# Patient Record
Sex: MALE | Race: OTHER | Employment: OTHER | URBAN - METROPOLITAN AREA
[De-identification: names, ages, dates, MRNs, and addresses within clinical notes are randomized per-mention and may not be internally consistent; named-entity substitution may affect disease eponyms.]

---

## 2017-01-13 ENCOUNTER — OFFICE VISIT (OUTPATIENT)
Dept: PHYSICAL THERAPY | Facility: HOSPITAL | Age: 82
End: 2017-01-13
Attending: FAMILY MEDICINE
Payer: MEDICARE

## 2017-01-13 DIAGNOSIS — M54.5 ACUTE LEFT-SIDED LOW BACK PAIN, WITH SCIATICA PRESENCE UNSPECIFIED: ICD-10-CM

## 2017-01-13 DIAGNOSIS — M47.816 OSTEOARTHRITIS OF LUMBAR SPINE, UNSPECIFIED SPINAL OSTEOARTHRITIS COMPLICATION STATUS: Primary | ICD-10-CM

## 2017-01-13 PROCEDURE — 97162 PT EVAL MOD COMPLEX 30 MIN: CPT

## 2017-01-13 PROCEDURE — 97110 THERAPEUTIC EXERCISES: CPT

## 2017-01-13 NOTE — PROGRESS NOTES
LUMBAR SPINE EVALUATION:   Referring Physician: Dr. Hutson End  Date of Onset: August 2016 Date of Service: 1/13/2017   Dx:Osteoarthritis of lumbar spine, unspecified spinal osteoarthritis complication status (C07.085)  Acute left-sided low back pain, with sc movements:  Baseline   RFIS Min loss, NE on Sxs   PAUL Mod loss, NE on Sxs   Rt Rotation   WNL, incr. Lt. LBP,NW   Lt  Rotation WNL, NE on Sxs   RSGIS   WNL, incr. Lt. LBP,NW   LSGIS WNL, incr. Lt. LBP,NW     RFIL WNL, incr. Lt. LBP,NW   REIL WNL,  incr.  E body mechanics instruction, and a Home Exercise Program.     Education or treatment limitation: Communication  Rehab Potential: good  FOTO: Not captured  Current status G Code: Initial, Mobility: Walking and Moving Around, CK: 40-59% impaired, limited, or

## 2017-01-16 ENCOUNTER — OFFICE VISIT (OUTPATIENT)
Dept: PHYSICAL THERAPY | Facility: HOSPITAL | Age: 82
End: 2017-01-16
Attending: FAMILY MEDICINE
Payer: MEDICARE

## 2017-01-16 PROCEDURE — 97140 MANUAL THERAPY 1/> REGIONS: CPT

## 2017-01-16 PROCEDURE — 97110 THERAPEUTIC EXERCISES: CPT

## 2017-01-16 RX ORDER — LANCETS 33 GAUGE
EACH MISCELLANEOUS
Qty: 100 EACH | Refills: 3 | Status: SHIPPED | OUTPATIENT
Start: 2017-01-16 | End: 2017-04-20

## 2017-01-17 ENCOUNTER — TELEPHONE (OUTPATIENT)
Dept: FAMILY MEDICINE CLINIC | Facility: CLINIC | Age: 82
End: 2017-01-17

## 2017-01-18 ENCOUNTER — OFFICE VISIT (OUTPATIENT)
Dept: PHYSICAL THERAPY | Facility: HOSPITAL | Age: 82
End: 2017-01-18
Attending: FAMILY MEDICINE
Payer: MEDICARE

## 2017-01-18 PROCEDURE — 97110 THERAPEUTIC EXERCISES: CPT

## 2017-01-18 RX ORDER — TAMSULOSIN HYDROCHLORIDE 0.4 MG/1
CAPSULE ORAL
Qty: 180 CAPSULE | Refills: 0 | Status: SHIPPED | OUTPATIENT
Start: 2017-01-18 | End: 2017-04-20

## 2017-01-18 NOTE — PROGRESS NOTES
Dx: Osteoarthritis of lumbar spine, unspecified spinal osteoarthritis complication status (G41.640)  Acute left-sided low back pain, with sciatica presence unspecified (M54.5)    Authorized # of Visits:  3        Next MD visit: none scheduled  Fall Risk: s

## 2017-01-18 NOTE — TELEPHONE ENCOUNTER
Refill Protocol Appointment Criteria  · Appointment scheduled in the past 12 months or in the next 3 months  Recent Visits       Provider Department Primary Dx    1 month ago Anson Siemens, MD [facility], [address], [place] Uncontrolled type 2 di

## 2017-01-23 ENCOUNTER — OFFICE VISIT (OUTPATIENT)
Dept: PHYSICAL THERAPY | Facility: HOSPITAL | Age: 82
End: 2017-01-23
Attending: FAMILY MEDICINE
Payer: MEDICARE

## 2017-01-23 PROCEDURE — 97140 MANUAL THERAPY 1/> REGIONS: CPT

## 2017-01-23 PROCEDURE — 97110 THERAPEUTIC EXERCISES: CPT

## 2017-01-23 NOTE — PROGRESS NOTES
Dx: Osteoarthritis of lumbar spine, unspecified spinal osteoarthritis complication status (O49.546)  Acute left-sided low back pain, with sciatica presence unspecified (M54.5)    Authorized # of Visits:  4       Next MD visit: none scheduled  Fall Risk: st PROGRESS  2. Improve Postural awareness to facilitate symptom management to allow for proper sitting posture/tolerance to activity--IN PROGRESS   3.  Patient able to sit using proper posture for up to1 hour  to allow for patient to watch a television show a

## 2017-01-25 ENCOUNTER — OFFICE VISIT (OUTPATIENT)
Dept: PHYSICAL THERAPY | Facility: HOSPITAL | Age: 82
End: 2017-01-25
Attending: FAMILY MEDICINE
Payer: MEDICARE

## 2017-01-25 PROCEDURE — 97110 THERAPEUTIC EXERCISES: CPT

## 2017-01-25 PROCEDURE — 97140 MANUAL THERAPY 1/> REGIONS: CPT

## 2017-01-25 NOTE — PROGRESS NOTES
Dx: Osteoarthritis of lumbar spine, unspecified spinal osteoarthritis complication status (E89.775)  Acute left-sided low back pain, with sciatica presence unspecified (M54.5)    Authorized # of Visits:  5       Next MD visit: none scheduled  Fall Risk: st television show and rise from chair without increased pain--PARTIALLY MET  4. Patient able bend without increased pain or peripheralization of Sxs to allow patient to perform ADLs this activity without increased deviation--MET  5.  Independent with progress

## 2017-01-30 ENCOUNTER — OFFICE VISIT (OUTPATIENT)
Dept: PHYSICAL THERAPY | Facility: HOSPITAL | Age: 82
End: 2017-01-30
Attending: FAMILY MEDICINE
Payer: MEDICARE

## 2017-01-30 PROCEDURE — 97110 THERAPEUTIC EXERCISES: CPT

## 2017-01-30 PROCEDURE — 97140 MANUAL THERAPY 1/> REGIONS: CPT

## 2017-01-30 NOTE — PROGRESS NOTES
Dx: Osteoarthritis of lumbar spine, unspecified spinal osteoarthritis complication status (B75.305)  Acute left-sided low back pain, with sciatica presence unspecified (M54.5)    Authorized # of Visits:  6      Next MD visit: none scheduled  Fall Risk: sta

## 2017-02-01 ENCOUNTER — OFFICE VISIT (OUTPATIENT)
Dept: PHYSICAL THERAPY | Facility: HOSPITAL | Age: 82
End: 2017-02-01
Attending: FAMILY MEDICINE
Payer: MEDICARE

## 2017-02-01 PROCEDURE — 97110 THERAPEUTIC EXERCISES: CPT

## 2017-02-01 PROCEDURE — 97140 MANUAL THERAPY 1/> REGIONS: CPT

## 2017-02-01 NOTE — PROGRESS NOTES
Dx: Osteoarthritis of lumbar spine, unspecified spinal osteoarthritis complication status (F98.447)  Acute left-sided low back pain, with sciatica presence unspecified (M54.5)    Authorized # of Visits:  7      Next MD visit: none scheduled  Fall Risk: sta

## 2017-02-06 ENCOUNTER — OFFICE VISIT (OUTPATIENT)
Dept: PHYSICAL THERAPY | Facility: HOSPITAL | Age: 82
End: 2017-02-06
Attending: FAMILY MEDICINE
Payer: MEDICARE

## 2017-02-06 PROCEDURE — 97110 THERAPEUTIC EXERCISES: CPT

## 2017-02-06 NOTE — PROGRESS NOTES
Dx: Osteoarthritis of lumbar spine, unspecified spinal osteoarthritis complication status (P94.901)  Acute left-sided low back pain, with sciatica presence unspecified (M54.5)    Authorized # of Visits:  8     Next MD visit: none scheduled  Fall Risk: christopher

## 2017-02-08 ENCOUNTER — OFFICE VISIT (OUTPATIENT)
Dept: PHYSICAL THERAPY | Facility: HOSPITAL | Age: 82
End: 2017-02-08
Attending: FAMILY MEDICINE
Payer: MEDICARE

## 2017-02-08 PROCEDURE — 97110 THERAPEUTIC EXERCISES: CPT

## 2017-02-08 RX ORDER — SIMVASTATIN 40 MG
TABLET ORAL
Qty: 90 TABLET | Refills: 0 | Status: SHIPPED | OUTPATIENT
Start: 2017-02-08 | End: 2017-05-23

## 2017-02-08 NOTE — PROGRESS NOTES
Dx: Osteoarthritis of lumbar spine, unspecified spinal osteoarthritis complication status (M59.980)  Acute left-sided low back pain, with sciatica presence unspecified (M54.5)    Authorized # of Visits:  9     Next MD visit: none scheduled  Fall Risk: christopher ADLs this activity without increased deviation--MET  5.  Independent with progressive HEP during and upon discharge from therapy to aide with Sx management and progress toward greater/painfree functional independence--IN PROGRESS            Plan: Progress s

## 2017-02-08 NOTE — TELEPHONE ENCOUNTER
Rx approved per  protocol. Chart reviewed.     Cholesterol Medications  Protocol Criteria:  · Appointment scheduled in the past 12 months or in the next 3 months  · ALT & LDL on file in the past 12 months  · ALT result < 80  · LDL result <130   Recent Vis

## 2017-02-09 ENCOUNTER — MYAURORA ACCOUNT LINK (OUTPATIENT)
Dept: OTHER | Age: 82
End: 2017-02-09

## 2017-02-15 ENCOUNTER — OFFICE VISIT (OUTPATIENT)
Dept: PHYSICAL THERAPY | Facility: HOSPITAL | Age: 82
End: 2017-02-15
Attending: FAMILY MEDICINE
Payer: MEDICARE

## 2017-02-15 PROCEDURE — 97110 THERAPEUTIC EXERCISES: CPT

## 2017-02-15 NOTE — PROGRESS NOTES
Patient Name: Guera Felton, : 1934, MRN: [id number]   Date:  2/15/2017  Referring Physician:  Roverto Llamas MD     Diagnosis: Osteoarthritis of lumbar spine, unspecified spinal osteoarthritis complication status (D82.258)  Acute left-sided deviation--MET  5.  Independent with progressive HEP during and upon discharge from therapy to aide with Sx management and progress toward greater/painfree functional independence--MET          Current status G Code: Discharge, Mobility: Walking and Moving

## 2017-02-15 NOTE — PROGRESS NOTES
Dx: Osteoarthritis of lumbar spine, unspecified spinal osteoarthritis complication status (I80.299)  Acute left-sided low back pain, with sciatica presence unspecified (M54.5)    Authorized # of Visits:  10     Next MD visit: none scheduled  Fall Risk: sta patient to perform ADLs this activity without increased deviation--MET5.  Independent with progressive HEP during and upon discharge from therapy to aide with Sx management and progress toward greater/painfree functional independence--MET          Plan: Dis

## 2017-02-17 ENCOUNTER — APPOINTMENT (OUTPATIENT)
Dept: PHYSICAL THERAPY | Facility: HOSPITAL | Age: 82
End: 2017-02-17
Attending: FAMILY MEDICINE
Payer: MEDICARE

## 2017-02-20 ENCOUNTER — PRIOR ORIGINAL RECORDS (OUTPATIENT)
Dept: OTHER | Age: 82
End: 2017-02-20

## 2017-02-22 ENCOUNTER — APPOINTMENT (OUTPATIENT)
Dept: PHYSICAL THERAPY | Facility: HOSPITAL | Age: 82
End: 2017-02-22
Attending: FAMILY MEDICINE
Payer: MEDICARE

## 2017-02-27 ENCOUNTER — PRIOR ORIGINAL RECORDS (OUTPATIENT)
Dept: OTHER | Age: 82
End: 2017-02-27

## 2017-03-24 ENCOUNTER — LAB ENCOUNTER (OUTPATIENT)
Dept: LAB | Age: 82
End: 2017-03-24
Attending: INTERNAL MEDICINE
Payer: MEDICARE

## 2017-03-24 DIAGNOSIS — N18.30 CHRONIC KIDNEY DISEASE, STAGE III (MODERATE) (HCC): ICD-10-CM

## 2017-03-24 LAB
ALBUMIN SERPL BCP-MCNC: 3.8 G/DL (ref 3.5–4.8)
ANION GAP SERPL CALC-SCNC: 5 MMOL/L (ref 0–18)
BASOPHILS # BLD: 0 K/UL (ref 0–0.2)
BASOPHILS NFR BLD: 0 %
BUN SERPL-MCNC: 29 MG/DL (ref 8–20)
BUN/CREAT SERPL: 15.8 (ref 10–20)
CALCIUM SERPL-MCNC: 9.3 MG/DL (ref 8.5–10.5)
CHLORIDE SERPL-SCNC: 110 MMOL/L (ref 95–110)
CO2 SERPL-SCNC: 26 MMOL/L (ref 22–32)
CREAT SERPL-MCNC: 1.83 MG/DL (ref 0.5–1.5)
EOSINOPHIL # BLD: 0.1 K/UL (ref 0–0.7)
EOSINOPHIL NFR BLD: 2 %
ERYTHROCYTE [DISTWIDTH] IN BLOOD BY AUTOMATED COUNT: 13.8 % (ref 11–15)
GLUCOSE SERPL-MCNC: 163 MG/DL (ref 70–99)
HCT VFR BLD AUTO: 42.3 % (ref 41–52)
HGB BLD-MCNC: 13.9 G/DL (ref 13.5–17.5)
LYMPHOCYTES # BLD: 1.7 K/UL (ref 1–4)
LYMPHOCYTES NFR BLD: 31 %
MCH RBC QN AUTO: 29.2 PG (ref 27–32)
MCHC RBC AUTO-ENTMCNC: 32.8 G/DL (ref 32–37)
MCV RBC AUTO: 89.1 FL (ref 80–100)
MONOCYTES # BLD: 0.4 K/UL (ref 0–1)
MONOCYTES NFR BLD: 7 %
NEUTROPHILS # BLD AUTO: 3.2 K/UL (ref 1.8–7.7)
NEUTROPHILS NFR BLD: 59 %
OSMOLALITY UR CALC.SUM OF ELEC: 301 MOSM/KG (ref 275–295)
PHOSPHATE SERPL-MCNC: 3.4 MG/DL (ref 2.4–4.7)
PLATELET # BLD AUTO: 159 K/UL (ref 140–400)
PMV BLD AUTO: 8.6 FL (ref 7.4–10.3)
POTASSIUM SERPL-SCNC: 4.8 MMOL/L (ref 3.3–5.1)
RBC # BLD AUTO: 4.75 M/UL (ref 4.5–5.9)
SODIUM SERPL-SCNC: 141 MMOL/L (ref 136–144)
WBC # BLD AUTO: 5.4 K/UL (ref 4–11)

## 2017-03-24 PROCEDURE — 36415 COLL VENOUS BLD VENIPUNCTURE: CPT

## 2017-03-24 PROCEDURE — 80069 RENAL FUNCTION PANEL: CPT

## 2017-03-24 PROCEDURE — 85025 COMPLETE CBC W/AUTO DIFF WBC: CPT

## 2017-03-31 ENCOUNTER — OFFICE VISIT (OUTPATIENT)
Dept: NEPHROLOGY | Facility: CLINIC | Age: 82
End: 2017-03-31

## 2017-03-31 VITALS
WEIGHT: 168 LBS | RESPIRATION RATE: 16 BRPM | HEART RATE: 70 BPM | TEMPERATURE: 98 F | DIASTOLIC BLOOD PRESSURE: 76 MMHG | HEIGHT: 68 IN | BODY MASS INDEX: 25.46 KG/M2 | SYSTOLIC BLOOD PRESSURE: 144 MMHG

## 2017-03-31 DIAGNOSIS — N18.30 CHRONIC KIDNEY DISEASE, STAGE III (MODERATE) (HCC): Primary | Chronic | ICD-10-CM

## 2017-03-31 PROCEDURE — G0463 HOSPITAL OUTPT CLINIC VISIT: HCPCS | Performed by: INTERNAL MEDICINE

## 2017-03-31 PROCEDURE — 99214 OFFICE O/P EST MOD 30 MIN: CPT | Performed by: INTERNAL MEDICINE

## 2017-03-31 RX ORDER — GLIMEPIRIDE 2 MG/1
TABLET ORAL
Qty: 135 TABLET | Refills: 1 | Status: SHIPPED | OUTPATIENT
Start: 2017-03-31 | End: 2017-11-08 | Stop reason: DRUGHIGH

## 2017-03-31 NOTE — PROGRESS NOTES
03/31/2017        Patient: More Nolasco   YOB: 1934   Date of Visit: 3/31/2017       Dear  Dr. Mejia Briceno MD,      Thank you for referring More Nolasco to my practice. Please find my assessment and plan below.       As you know he is an 82-yea

## 2017-04-20 ENCOUNTER — OFFICE VISIT (OUTPATIENT)
Dept: FAMILY MEDICINE CLINIC | Facility: CLINIC | Age: 82
End: 2017-04-20

## 2017-04-20 VITALS
BODY MASS INDEX: 25 KG/M2 | SYSTOLIC BLOOD PRESSURE: 132 MMHG | WEIGHT: 166.63 LBS | DIASTOLIC BLOOD PRESSURE: 73 MMHG | HEART RATE: 75 BPM

## 2017-04-20 DIAGNOSIS — J30.2 SEASONAL ALLERGIC RHINITIS, UNSPECIFIED ALLERGIC RHINITIS TRIGGER: ICD-10-CM

## 2017-04-20 DIAGNOSIS — N40.0 PROSTATE HYPERTROPHY: Primary | ICD-10-CM

## 2017-04-20 PROCEDURE — 99214 OFFICE O/P EST MOD 30 MIN: CPT | Performed by: FAMILY MEDICINE

## 2017-04-20 PROCEDURE — G0463 HOSPITAL OUTPT CLINIC VISIT: HCPCS | Performed by: FAMILY MEDICINE

## 2017-04-20 RX ORDER — TAMSULOSIN HYDROCHLORIDE 0.4 MG/1
CAPSULE ORAL
Qty: 180 CAPSULE | Refills: 2 | Status: SHIPPED | OUTPATIENT
Start: 2017-04-20 | End: 2018-03-11

## 2017-04-20 RX ORDER — FLUTICASONE PROPIONATE 50 MCG
2 SPRAY, SUSPENSION (ML) NASAL DAILY
Qty: 1 BOTTLE | Refills: 3 | Status: SHIPPED | OUTPATIENT
Start: 2017-04-20 | End: 2018-04-15

## 2017-04-20 RX ORDER — FINASTERIDE 5 MG/1
5 TABLET, FILM COATED ORAL DAILY
Qty: 90 TABLET | Refills: 3 | Status: SHIPPED | OUTPATIENT
Start: 2017-04-20 | End: 2018-04-15

## 2017-04-20 NOTE — PROGRESS NOTES
Rajan Swenson is a 80year old male. Patient presents with: Allergies    HPI:   Pt here for issues with urinating. Reports problems with urinating in the mornings - sometimes holds it in the night and then has problems starting the mornings.   Taking tams • Diabetes mellitus ([facility] [address]    • Basal cell carcinoma      right jaw   • Basal cell carcinoma 2009     left cheek   • Basal cell carcinoma 1/13/16     located on the right cheek sup (AO)      Social History:    Smoking Status: Former Smoker

## 2017-05-03 ENCOUNTER — OFFICE VISIT (OUTPATIENT)
Dept: ENDOCRINOLOGY CLINIC | Facility: CLINIC | Age: 82
End: 2017-05-03

## 2017-05-03 VITALS
HEART RATE: 67 BPM | BODY MASS INDEX: 25.22 KG/M2 | WEIGHT: 166.38 LBS | DIASTOLIC BLOOD PRESSURE: 77 MMHG | SYSTOLIC BLOOD PRESSURE: 161 MMHG | HEIGHT: 68 IN

## 2017-05-03 DIAGNOSIS — E11.65 UNCONTROLLED TYPE 2 DIABETES MELLITUS WITH HYPERGLYCEMIA, WITH LONG-TERM CURRENT USE OF INSULIN (HCC): Primary | ICD-10-CM

## 2017-05-03 DIAGNOSIS — Z79.4 UNCONTROLLED TYPE 2 DIABETES MELLITUS WITH HYPERGLYCEMIA, WITH LONG-TERM CURRENT USE OF INSULIN (HCC): Primary | ICD-10-CM

## 2017-05-03 PROCEDURE — 83036 HEMOGLOBIN GLYCOSYLATED A1C: CPT | Performed by: INTERNAL MEDICINE

## 2017-05-03 PROCEDURE — 99213 OFFICE O/P EST LOW 20 MIN: CPT | Performed by: INTERNAL MEDICINE

## 2017-05-03 PROCEDURE — 36416 COLLJ CAPILLARY BLOOD SPEC: CPT | Performed by: INTERNAL MEDICINE

## 2017-05-03 PROCEDURE — 82962 GLUCOSE BLOOD TEST: CPT | Performed by: INTERNAL MEDICINE

## 2017-05-03 NOTE — PROGRESS NOTES
Name: Hernan Godinez  Date: 5/3/2017    Referring Physician: No ref. provider found    HISTORY OF PRESENT ILLNESS   Hernan Godinez is a 80year old male who presents for evaluation of diabetes mellitus.      Prior HbA, C or glycohemoglobin were 8.2% 1/2014, SUBCUTANEOUS ROUTE EVERY EVENING., Disp: 15 mL, Rfl: 3  •  LISINOPRIL 10 MG Oral Tab, TAKE 1 TABLET BY MOUTH DAILY, Disp: 30 tablet, Rfl: 5  •  BD PEN NEEDLE MINI U/F 31G X 5 MM Does not apply Misc, USE ONCE DAILY, Disp: 100 each, Rfl: 3  •  Lancet Device and normal pupils  Ears/Nose/Mouth/Throat/Neck:  no palpable thyroid nodules or cervical lymphadenopathy  Respiratory:  clear to auscultation bilaterally  Cardiovascular:  regular rate, rhythm, , no murmurs, S3 or S4  Gastrointestinal:  normal bowel sounds

## 2017-05-11 RX ORDER — SIMVASTATIN 40 MG
TABLET ORAL
Qty: 90 TABLET | Refills: 0 | Status: SHIPPED | OUTPATIENT
Start: 2017-05-11 | End: 2017-06-19

## 2017-05-11 NOTE — TELEPHONE ENCOUNTER
Cholesterol Medications  Protocol Criteria:  · Appointment scheduled in the past 12 months or in the next 3 months  · ALT & LDL on file in the past 12 months  · ALT result < 80  · LDL result <130   Recent Visits       Provider Department Primary Dx    3 we

## 2017-05-18 RX ORDER — PEN NEEDLE, DIABETIC 31 GX5/16"
NEEDLE, DISPOSABLE MISCELLANEOUS
Qty: 100 EACH | Refills: 3 | Status: SHIPPED | OUTPATIENT
Start: 2017-05-18 | End: 2017-08-21

## 2017-05-18 RX ORDER — SIMVASTATIN 40 MG
TABLET ORAL
Qty: 90 TABLET | Refills: 0 | OUTPATIENT
Start: 2017-05-18

## 2017-05-26 RX ORDER — SIMVASTATIN 40 MG
TABLET ORAL
Qty: 90 TABLET | Refills: 0 | Status: SHIPPED | OUTPATIENT
Start: 2017-05-26 | End: 2017-06-19

## 2017-05-27 NOTE — TELEPHONE ENCOUNTER
Cholesterol Medications: Refilled per protocol    Protocol Criteria:  · Appointment scheduled in the past 12 months or in the next 3 months  · ALT & LDL on file in the past 12 months  · ALT result < 80  · LDL result <130   Recent Visits       Provider Vadim

## 2017-05-30 RX ORDER — TERBINAFINE HYDROCHLORIDE 250 MG/1
250 TABLET ORAL DAILY
Qty: 30 TABLET | Refills: 2 | Status: SHIPPED | OUTPATIENT
Start: 2017-05-30 | End: 2017-08-22

## 2017-06-06 ENCOUNTER — TELEPHONE (OUTPATIENT)
Dept: NEPHROLOGY | Facility: CLINIC | Age: 82
End: 2017-06-06

## 2017-06-06 NOTE — TELEPHONE ENCOUNTER
Spoke to patient's son Yelena Vazquez. Dr. Rolando Avila advice relayed to try Dulcolax suppositories.

## 2017-06-06 NOTE — TELEPHONE ENCOUNTER
Pts son states that the pt. continues to have problems with constipation for the past 2-3 weeks. Pt. Is taking the Mylanta, but it is not helping. Pts son. Wants to know if there is an over the counter medication that the pt. Can take?

## 2017-06-06 NOTE — TELEPHONE ENCOUNTER
MiraLAX 1 scoop daily but if problem persists they need to address this with his primary care doctor.

## 2017-06-06 NOTE — TELEPHONE ENCOUNTER
Contacted son, Balaji Crow. He really apologizes, but he confused Miralax and Mylanta. He has been giving pt Miralax every daily (1 scoop of powder in liquid). Is there anything else he can try? Or should he contact PCP?

## 2017-06-19 ENCOUNTER — OFFICE VISIT (OUTPATIENT)
Dept: FAMILY MEDICINE CLINIC | Facility: CLINIC | Age: 82
End: 2017-06-19

## 2017-06-19 VITALS
HEART RATE: 76 BPM | BODY MASS INDEX: 25 KG/M2 | DIASTOLIC BLOOD PRESSURE: 76 MMHG | SYSTOLIC BLOOD PRESSURE: 119 MMHG | WEIGHT: 163.19 LBS

## 2017-06-19 DIAGNOSIS — R09.89 PAINFUL VEINS: ICD-10-CM

## 2017-06-19 DIAGNOSIS — R52 PAINFUL VEINS: ICD-10-CM

## 2017-06-19 DIAGNOSIS — M54.16 LUMBAR RADICULOPATHY: ICD-10-CM

## 2017-06-19 DIAGNOSIS — R33.8 HYPERTROPHY OF PROSTATE WITH URINARY RETENTION: Primary | ICD-10-CM

## 2017-06-19 DIAGNOSIS — N40.1 HYPERTROPHY OF PROSTATE WITH URINARY RETENTION: Primary | ICD-10-CM

## 2017-06-19 PROCEDURE — 99214 OFFICE O/P EST MOD 30 MIN: CPT | Performed by: FAMILY MEDICINE

## 2017-06-19 PROCEDURE — A6455 SELF-ADHER BAND >=5"/YD: HCPCS | Performed by: FAMILY MEDICINE

## 2017-06-19 PROCEDURE — G0463 HOSPITAL OUTPT CLINIC VISIT: HCPCS | Performed by: FAMILY MEDICINE

## 2017-06-19 RX ORDER — SIMVASTATIN 40 MG
40 TABLET ORAL
Qty: 90 TABLET | Refills: 4 | Status: SHIPPED | OUTPATIENT
Start: 2017-06-19 | End: 2018-09-24

## 2017-06-19 RX ORDER — LANCETS 33 GAUGE
EACH MISCELLANEOUS
Refills: 3 | COMMUNITY
Start: 2017-05-23 | End: 2017-08-21

## 2017-06-19 NOTE — PROGRESS NOTES
Francisco J Stark is a 80year old male. Patient presents with:  Constipation  Prostate Problem    HPI:   Reports few days ago having problems having BM and lower back. Taking metamucil and Miralax and now taking prune juice.  Reports then this improved his jose PEN NEEDLES) 31G X 5 MM Does not apply Misc inject once daily Disp: 100 each Rfl: 11   Pantoprazole Sodium (PROTONIX) 40 MG Oral Tab EC Take 1 tablet by mouth every morning before breakfast. take 1 tablet (40MG)  by oral route  every day Disp: 30 tablet Rf improvements despite PT. Will check MRI to see if could benefit from epidural injection.   - MRI SPINE LUMBAR (CPT=72148); Future    3. Painful veins  Ace wrapped the wrist for compression - will help symptoms.  If not improving, can see vascular surgeon fo

## 2017-06-27 ENCOUNTER — HOSPITAL ENCOUNTER (OUTPATIENT)
Dept: MRI IMAGING | Age: 82
Discharge: HOME | End: 2017-06-27
Attending: FAMILY MEDICINE
Payer: MEDICARE

## 2017-06-27 DIAGNOSIS — M54.16 LUMBAR RADICULOPATHY: ICD-10-CM

## 2017-06-27 PROCEDURE — 72148 MRI LUMBAR SPINE W/O DYE: CPT | Performed by: FAMILY MEDICINE

## 2017-06-29 DIAGNOSIS — M51.16 LUMBAR DISC DISEASE WITH RADICULOPATHY: Primary | ICD-10-CM

## 2017-06-29 NOTE — PROGRESS NOTES
Has a lot of disease in his lumbar discs with bulging discs which are chronic but he may benefit from an epidural injection to help with his ongoing back/leg pain. I have placed a referral to see Dr. Francine Riggins physiatry - for an evaluation.

## 2017-07-07 ENCOUNTER — LAB ENCOUNTER (OUTPATIENT)
Dept: LAB | Age: 82
End: 2017-07-07
Attending: INTERNAL MEDICINE
Payer: MEDICARE

## 2017-07-07 DIAGNOSIS — N18.30 CHRONIC KIDNEY DISEASE, STAGE III (MODERATE) (HCC): ICD-10-CM

## 2017-07-07 LAB
ALBUMIN SERPL BCP-MCNC: 3.7 G/DL (ref 3.5–4.8)
ANION GAP SERPL CALC-SCNC: 11 MMOL/L (ref 0–18)
BASOPHILS # BLD: 0 K/UL (ref 0–0.2)
BASOPHILS NFR BLD: 1 %
BUN SERPL-MCNC: 30 MG/DL (ref 8–20)
BUN/CREAT SERPL: 17 (ref 10–20)
CALCIUM SERPL-MCNC: 9.2 MG/DL (ref 8.5–10.5)
CHLORIDE SERPL-SCNC: 107 MMOL/L (ref 95–110)
CO2 SERPL-SCNC: 19 MMOL/L (ref 22–32)
CREAT SERPL-MCNC: 1.76 MG/DL (ref 0.5–1.5)
EOSINOPHIL # BLD: 0.1 K/UL (ref 0–0.7)
EOSINOPHIL NFR BLD: 2 %
ERYTHROCYTE [DISTWIDTH] IN BLOOD BY AUTOMATED COUNT: 13.9 % (ref 11–15)
GLUCOSE SERPL-MCNC: 148 MG/DL (ref 70–99)
HCT VFR BLD AUTO: 41.4 % (ref 41–52)
HGB BLD-MCNC: 13.9 G/DL (ref 13.5–17.5)
LYMPHOCYTES # BLD: 1.7 K/UL (ref 1–4)
LYMPHOCYTES NFR BLD: 28 %
MCH RBC QN AUTO: 30.4 PG (ref 27–32)
MCHC RBC AUTO-ENTMCNC: 33.6 G/DL (ref 32–37)
MCV RBC AUTO: 90.5 FL (ref 80–100)
MONOCYTES # BLD: 0.5 K/UL (ref 0–1)
MONOCYTES NFR BLD: 8 %
NEUTROPHILS # BLD AUTO: 3.7 K/UL (ref 1.8–7.7)
NEUTROPHILS NFR BLD: 62 %
OSMOLALITY UR CALC.SUM OF ELEC: 293 MOSM/KG (ref 275–295)
PHOSPHATE SERPL-MCNC: 2.6 MG/DL (ref 2.4–4.7)
PLATELET # BLD AUTO: 147 K/UL (ref 140–400)
PMV BLD AUTO: 9.5 FL (ref 7.4–10.3)
POTASSIUM SERPL-SCNC: 4.2 MMOL/L (ref 3.3–5.1)
RBC # BLD AUTO: 4.58 M/UL (ref 4.5–5.9)
SODIUM SERPL-SCNC: 137 MMOL/L (ref 136–144)
WBC # BLD AUTO: 5.9 K/UL (ref 4–11)

## 2017-07-07 PROCEDURE — 80069 RENAL FUNCTION PANEL: CPT

## 2017-07-07 PROCEDURE — 85025 COMPLETE CBC W/AUTO DIFF WBC: CPT

## 2017-07-07 PROCEDURE — 36415 COLL VENOUS BLD VENIPUNCTURE: CPT

## 2017-07-08 ENCOUNTER — TELEPHONE (OUTPATIENT)
Dept: NEPHROLOGY | Facility: CLINIC | Age: 82
End: 2017-07-08

## 2017-07-08 NOTE — TELEPHONE ENCOUNTER
Kidney function remains stable. If doing well CPM.  Repeat labs in 3 months and then see for follow-up.

## 2017-07-10 NOTE — TELEPHONE ENCOUNTER
Patient contacted. Results message from Dr. Jeanne Mendenhall read. Patient states he is feeling fine and is aware to repeat lab work in 3 months. He already has an appointment scheduled for 10/2/17 at 4:00PM with Dr. Jeanne Mendenhall. Appointment confirmed.  Patient has a st

## 2017-08-03 ENCOUNTER — TELEPHONE (OUTPATIENT)
Dept: FAMILY MEDICINE CLINIC | Facility: CLINIC | Age: 82
End: 2017-08-03

## 2017-08-03 RX ORDER — CYCLOBENZAPRINE HCL 5 MG
2.5 TABLET ORAL 2 TIMES DAILY PRN
Qty: 20 TABLET | Refills: 0 | Status: SHIPPED | OUTPATIENT
Start: 2017-08-03 | End: 2019-01-21

## 2017-08-03 NOTE — TELEPHONE ENCOUNTER
Son Serafin Carey calling for medication question/recommendation for Dr. Aimee Dewitt. States father Leonardo Johnson. Norberto Ruiz is still having a \"stiff, achy lower back\" and and has tried Tylenol and Aleve without good relief.  Per son, patient is able to ambulate unassisted,

## 2017-08-09 RX ORDER — METHYLPREDNISOLONE 4 MG/1
TABLET ORAL
Qty: 1 KIT | Refills: 0 | Status: SHIPPED | OUTPATIENT
Start: 2017-08-09 | End: 2017-08-21 | Stop reason: ALTCHOICE

## 2017-08-09 NOTE — TELEPHONE ENCOUNTER
Will give medrol dose pack but should be aware - will raise his glucose a lot. Need to be good with the diet and use extra 10 units of long acting insulin while on it.

## 2017-08-21 ENCOUNTER — OFFICE VISIT (OUTPATIENT)
Dept: NEUROLOGY | Facility: CLINIC | Age: 82
End: 2017-08-21

## 2017-08-21 VITALS
WEIGHT: 168 LBS | SYSTOLIC BLOOD PRESSURE: 138 MMHG | DIASTOLIC BLOOD PRESSURE: 82 MMHG | OXYGEN SATURATION: 97 % | RESPIRATION RATE: 15 BRPM | HEART RATE: 77 BPM | BODY MASS INDEX: 25.46 KG/M2 | HEIGHT: 68 IN

## 2017-08-21 DIAGNOSIS — M51.9 LUMBAR DISC DISEASE: ICD-10-CM

## 2017-08-21 DIAGNOSIS — M48.061 LUMBAR FORAMINAL STENOSIS: ICD-10-CM

## 2017-08-21 DIAGNOSIS — M54.16 LUMBAR RADICULOPATHY: Primary | ICD-10-CM

## 2017-08-21 PROCEDURE — 99204 OFFICE O/P NEW MOD 45 MIN: CPT | Performed by: PHYSICAL MEDICINE & REHABILITATION

## 2017-08-21 RX ORDER — TRAMADOL HYDROCHLORIDE 50 MG/1
50 TABLET ORAL EVERY 6 HOURS PRN
Qty: 30 TABLET | Refills: 0 | Status: SHIPPED | OUTPATIENT
Start: 2017-08-21 | End: 2017-08-21

## 2017-08-21 RX ORDER — TRAMADOL HYDROCHLORIDE 50 MG/1
50 TABLET ORAL EVERY 6 HOURS PRN
Qty: 30 TABLET | Refills: 0 | Status: SHIPPED | OUTPATIENT
Start: 2017-08-21 | End: 2017-09-20

## 2017-08-21 NOTE — PROGRESS NOTES
Patient has been scheduled for a bilateral L5 TFESI  on 08/29/17 at the [facility]. Medications and allergies reviewed. Patient informed to hold aspirins, nsaids, blood thinners, vitamins and fish oils 5-7 days prior to procedure.  Patient informed we will need v

## 2017-08-21 NOTE — PROGRESS NOTES
Low Back Pain H & P    Chief Complaint:  Patient presents with:  Low Back Pain: new right handed primarily Croatian speaking patient here with 3-4 year hx of lower back pain that is localized but has some numbness in the right leg when walking.  presents wit Surgical History   Past Surgical History:  2014: COLONOSCOPY  No date: OTHER SURGICAL HISTORY      Comment: hemorrhoids    Family History   Family History   Problem Relation Age of Onset   • Glaucoma Father        Social History     Social History  Social oriented x 3. The patient has a normal affect and mood. Respiratory:  No acute respiratory distress. Patient does not have a cough. HEENT:  Extraocular muscles are intact. There is no kern icterus. Pupils are equal, round, and reactive to light.  Jie Saldivar RIGHT hip flexion Negative pain   LEFT hip flexion Negative pain   RIGHT hip KAL test Negative for pain   LEFT hip KAL test Negative for pain   RIGHT hip internal rotation Positive for bilateral low back pain   LEFT hip internal rotation Positive for

## 2017-08-21 NOTE — PATIENT INSTRUCTIONS
As of October 6th 2014, the Drug Enforcement Agency [facility]) is reclassifying all hydrocodone combination medications from Schedule III to Schedule II. This includes medications such as Norco, Vicodin, Lortab, Zohydro, and Vicoprofen.     What this means for y chart.        Plan  I will do bilateral L5 TFESI's. The patient will follow up in 3 months, but the patient will call me 2 weeks after having the injection to let me know how the injection worked.     If the injections are helpful for him, then he will p

## 2017-08-22 ENCOUNTER — TELEPHONE (OUTPATIENT)
Dept: NEUROLOGY | Facility: CLINIC | Age: 82
End: 2017-08-22

## 2017-08-22 NOTE — TELEPHONE ENCOUNTER
Medicare Online for insurance coverage of bilateral L5 TFESIs cpt codes [id number], [id number]. insurance was verified and procedure is a covered benefit and does not require authorization. Pt. Is scheduled for procedure on 08/29/17. Will inform Nursing.

## 2017-08-23 RX ORDER — GLIMEPIRIDE 2 MG/1
TABLET ORAL
Qty: 180 TABLET | Refills: 0 | Status: SHIPPED | OUTPATIENT
Start: 2017-08-23 | End: 2017-11-08 | Stop reason: DRUGHIGH

## 2017-08-24 RX ORDER — SIMVASTATIN 40 MG
TABLET ORAL
Qty: 90 TABLET | Refills: 0 | OUTPATIENT
Start: 2017-08-23

## 2017-08-29 ENCOUNTER — OFFICE VISIT (OUTPATIENT)
Dept: SURGERY | Facility: CLINIC | Age: 82
End: 2017-08-29

## 2017-08-29 DIAGNOSIS — M51.9 LUMBAR DISC DISEASE: ICD-10-CM

## 2017-08-29 DIAGNOSIS — M48.061 LUMBAR FORAMINAL STENOSIS: ICD-10-CM

## 2017-08-29 DIAGNOSIS — M54.16 LUMBAR RADICULOPATHY: Primary | ICD-10-CM

## 2017-08-29 PROCEDURE — 64483 NJX AA&/STRD TFRM EPI L/S 1: CPT | Performed by: PHYSICAL MEDICINE & REHABILITATION

## 2017-08-29 NOTE — PROCEDURES
[address]    BILATERAL LUMBAR TRANSFORAMINAL   NAME:  [facility]    MR #:    [id number] :  1934     PHYSICIAN:  Megha Gage        Operative Report    DATE OF PROCEDURE: 2017   PREOPERATIVE DIAGNOSES: 1. Adis Moore At this point in time, Omnipaque-240 contrast was used to obtain a good epidurogram indicating correct needle placement. Then, aspiration was performed. No blood, fluid, or air was aspirated.   Then, the patient was injected with a 3 cc solution of 1.5 cc

## 2017-08-31 RX ORDER — INSULIN GLARGINE 100 [IU]/ML
INJECTION, SOLUTION SUBCUTANEOUS
Qty: 15 ML | Refills: 0 | Status: SHIPPED | OUTPATIENT
Start: 2017-08-31 | End: 2018-03-10

## 2017-09-19 ENCOUNTER — OFFICE VISIT (OUTPATIENT)
Dept: FAMILY MEDICINE CLINIC | Facility: CLINIC | Age: 82
End: 2017-09-19

## 2017-09-19 VITALS
WEIGHT: 156.38 LBS | TEMPERATURE: 97 F | HEART RATE: 74 BPM | DIASTOLIC BLOOD PRESSURE: 60 MMHG | BODY MASS INDEX: 24 KG/M2 | SYSTOLIC BLOOD PRESSURE: 120 MMHG

## 2017-09-19 DIAGNOSIS — R49.9 CHANGE OF VOICE: Primary | ICD-10-CM

## 2017-09-19 PROCEDURE — 99213 OFFICE O/P EST LOW 20 MIN: CPT | Performed by: FAMILY MEDICINE

## 2017-09-19 PROCEDURE — G0463 HOSPITAL OUTPT CLINIC VISIT: HCPCS | Performed by: FAMILY MEDICINE

## 2017-09-19 PROCEDURE — G0008 ADMIN INFLUENZA VIRUS VAC: HCPCS | Performed by: FAMILY MEDICINE

## 2017-09-19 PROCEDURE — 90653 IIV ADJUVANT VACCINE IM: CPT | Performed by: FAMILY MEDICINE

## 2017-09-19 RX ORDER — PANTOPRAZOLE SODIUM 40 MG/1
40 TABLET, DELAYED RELEASE ORAL
Qty: 30 TABLET | Refills: 1 | Status: SHIPPED | OUTPATIENT
Start: 2017-09-19 | End: 2018-05-17

## 2017-09-19 RX ORDER — LEVOCETIRIZINE DIHYDROCHLORIDE 5 MG/1
5 TABLET, FILM COATED ORAL EVERY EVENING
Qty: 20 TABLET | Refills: 0 | Status: SHIPPED | OUTPATIENT
Start: 2017-09-19 | End: 2020-10-01 | Stop reason: ALTCHOICE

## 2017-09-19 NOTE — PROGRESS NOTES
HPI:   Radha Ridley is a 80year old male who presents for upper respiratory symptoms for  1  weeks. Patient reports lost voice - no pain. received epidural injection - pain in back improved significantly.       Current Outpatient Prescriptions on File 1/13/16    located on the right cheek sup (AO)   • BPH (benign prostatic hyperplasia)    • Diabetes ([facility] [address]    • Diabetes mellitus ([facility] [address]    • Glaucoma    • Hyperlipidemia    • Kidney disease       Past Surgical History:  2014: COLONOSCOPY  No date: OTHER ANAI

## 2017-09-21 RX ORDER — LEVOCETIRIZINE DIHYDROCHLORIDE 5 MG/1
TABLET, FILM COATED ORAL
Qty: 90 TABLET | Refills: 0 | OUTPATIENT
Start: 2017-09-21

## 2017-09-22 ENCOUNTER — TELEPHONE (OUTPATIENT)
Dept: NEUROLOGY | Facility: CLINIC | Age: 82
End: 2017-09-22

## 2017-09-26 ENCOUNTER — LAB ENCOUNTER (OUTPATIENT)
Dept: LAB | Age: 82
End: 2017-09-26
Attending: INTERNAL MEDICINE
Payer: MEDICARE

## 2017-09-26 DIAGNOSIS — N18.30 CHRONIC KIDNEY DISEASE, STAGE III (MODERATE) (HCC): ICD-10-CM

## 2017-09-26 LAB
ALBUMIN SERPL BCP-MCNC: 3.6 G/DL (ref 3.5–4.8)
ANION GAP SERPL CALC-SCNC: 6 MMOL/L (ref 0–18)
BASOPHILS # BLD: 0 K/UL (ref 0–0.2)
BASOPHILS NFR BLD: 0 %
BUN SERPL-MCNC: 44 MG/DL (ref 8–20)
BUN/CREAT SERPL: 21.1 (ref 10–20)
CALCIUM SERPL-MCNC: 9.5 MG/DL (ref 8.5–10.5)
CHLORIDE SERPL-SCNC: 108 MMOL/L (ref 95–110)
CO2 SERPL-SCNC: 24 MMOL/L (ref 22–32)
CREAT SERPL-MCNC: 2.09 MG/DL (ref 0.5–1.5)
EOSINOPHIL # BLD: 0.1 K/UL (ref 0–0.7)
EOSINOPHIL NFR BLD: 2 %
ERYTHROCYTE [DISTWIDTH] IN BLOOD BY AUTOMATED COUNT: 13.6 % (ref 11–15)
GLUCOSE SERPL-MCNC: 171 MG/DL (ref 70–99)
HCT VFR BLD AUTO: 42 % (ref 41–52)
HGB BLD-MCNC: 13.8 G/DL (ref 13.5–17.5)
LYMPHOCYTES # BLD: 1.8 K/UL (ref 1–4)
LYMPHOCYTES NFR BLD: 30 %
MCH RBC QN AUTO: 29.8 PG (ref 27–32)
MCHC RBC AUTO-ENTMCNC: 32.9 G/DL (ref 32–37)
MCV RBC AUTO: 90.4 FL (ref 80–100)
MONOCYTES # BLD: 0.5 K/UL (ref 0–1)
MONOCYTES NFR BLD: 8 %
NEUTROPHILS # BLD AUTO: 3.6 K/UL (ref 1.8–7.7)
NEUTROPHILS NFR BLD: 60 %
OSMOLALITY UR CALC.SUM OF ELEC: 301 MOSM/KG (ref 275–295)
PHOSPHATE SERPL-MCNC: 2.8 MG/DL (ref 2.4–4.7)
PLATELET # BLD AUTO: 138 K/UL (ref 140–400)
PMV BLD AUTO: 9 FL (ref 7.4–10.3)
POTASSIUM SERPL-SCNC: 5.1 MMOL/L (ref 3.3–5.1)
RBC # BLD AUTO: 4.64 M/UL (ref 4.5–5.9)
SODIUM SERPL-SCNC: 138 MMOL/L (ref 136–144)
WBC # BLD AUTO: 6 K/UL (ref 4–11)

## 2017-09-26 PROCEDURE — 80069 RENAL FUNCTION PANEL: CPT

## 2017-09-26 PROCEDURE — 36415 COLL VENOUS BLD VENIPUNCTURE: CPT

## 2017-09-26 PROCEDURE — 85025 COMPLETE CBC W/AUTO DIFF WBC: CPT

## 2017-09-26 NOTE — TELEPHONE ENCOUNTER
Spoke to patients son Alyce Espinosa who states the patient is doing well with great relief from the bilateral L5 TFESIs on 8/29/17. Marylene Putnam states patient is pleased with the results, does not complain of much of any pain and will follow up on 11/27/17.  Casey

## 2017-10-02 ENCOUNTER — OFFICE VISIT (OUTPATIENT)
Dept: NEPHROLOGY | Facility: CLINIC | Age: 82
End: 2017-10-02

## 2017-10-02 VITALS
DIASTOLIC BLOOD PRESSURE: 66 MMHG | BODY MASS INDEX: 24.34 KG/M2 | WEIGHT: 160.63 LBS | HEIGHT: 68 IN | SYSTOLIC BLOOD PRESSURE: 122 MMHG | HEART RATE: 75 BPM

## 2017-10-02 DIAGNOSIS — N18.30 CHRONIC KIDNEY DISEASE, STAGE III (MODERATE) (HCC): Primary | Chronic | ICD-10-CM

## 2017-10-02 PROCEDURE — 99214 OFFICE O/P EST MOD 30 MIN: CPT | Performed by: INTERNAL MEDICINE

## 2017-10-02 PROCEDURE — G0463 HOSPITAL OUTPT CLINIC VISIT: HCPCS | Performed by: INTERNAL MEDICINE

## 2017-10-02 RX ORDER — LANCETS 33 GAUGE
EACH MISCELLANEOUS
Refills: 3 | COMMUNITY
Start: 2017-08-21 | End: 2019-04-03

## 2017-10-02 RX ORDER — PEN NEEDLE, DIABETIC 31 GX5/16"
NEEDLE, DISPOSABLE MISCELLANEOUS
Refills: 3 | COMMUNITY
Start: 2017-09-26 | End: 2018-10-19

## 2017-10-02 NOTE — PROGRESS NOTES
10/02/17        Patient: Sarika Finley   YOB: 1934   Date of Visit: 10/2/2017       Dear  Dr. Caryl Mace MD,      Thank you for referring Sarika Finley to my practice. Please find my assessment and plan below.       As you know he is an 83-year- to call.            Sincerely,   Aris Noble MD   [facility] , [address] [place] [id number]    Document electronically generated by:  Aris Noble

## 2017-11-04 ENCOUNTER — LAB ENCOUNTER (OUTPATIENT)
Dept: LAB | Age: 82
End: 2017-11-04
Attending: INTERNAL MEDICINE
Payer: MEDICARE

## 2017-11-04 DIAGNOSIS — N18.30 CHRONIC KIDNEY DISEASE, STAGE III (MODERATE) (HCC): ICD-10-CM

## 2017-11-04 PROCEDURE — 36415 COLL VENOUS BLD VENIPUNCTURE: CPT

## 2017-11-04 PROCEDURE — 85025 COMPLETE CBC W/AUTO DIFF WBC: CPT

## 2017-11-04 PROCEDURE — 80069 RENAL FUNCTION PANEL: CPT

## 2017-11-07 ENCOUNTER — OFFICE VISIT (OUTPATIENT)
Dept: FAMILY MEDICINE CLINIC | Facility: CLINIC | Age: 82
End: 2017-11-07

## 2017-11-07 ENCOUNTER — LAB ENCOUNTER (OUTPATIENT)
Dept: LAB | Age: 82
End: 2017-11-07
Attending: FAMILY MEDICINE
Payer: MEDICARE

## 2017-11-07 ENCOUNTER — PRIOR ORIGINAL RECORDS (OUTPATIENT)
Dept: OTHER | Age: 82
End: 2017-11-07

## 2017-11-07 VITALS
HEART RATE: 66 BPM | HEIGHT: 68 IN | BODY MASS INDEX: 24.4 KG/M2 | TEMPERATURE: 98 F | DIASTOLIC BLOOD PRESSURE: 73 MMHG | WEIGHT: 161 LBS | SYSTOLIC BLOOD PRESSURE: 134 MMHG

## 2017-11-07 DIAGNOSIS — E11.22 UNCONTROLLED TYPE 2 DIABETES MELLITUS WITH CHRONIC KIDNEY DISEASE, WITH LONG-TERM CURRENT USE OF INSULIN, UNSPECIFIED CKD STAGE: Chronic | ICD-10-CM

## 2017-11-07 DIAGNOSIS — N18.30 CHRONIC KIDNEY DISEASE, STAGE III (MODERATE) (HCC): Primary | Chronic | ICD-10-CM

## 2017-11-07 DIAGNOSIS — M48.061 LUMBAR FORAMINAL STENOSIS: ICD-10-CM

## 2017-11-07 DIAGNOSIS — E11.65 UNCONTROLLED TYPE 2 DIABETES MELLITUS WITH CHRONIC KIDNEY DISEASE, WITH LONG-TERM CURRENT USE OF INSULIN, UNSPECIFIED CKD STAGE: Chronic | ICD-10-CM

## 2017-11-07 DIAGNOSIS — Z79.4 UNCONTROLLED TYPE 2 DIABETES MELLITUS WITH CHRONIC KIDNEY DISEASE, WITH LONG-TERM CURRENT USE OF INSULIN, UNSPECIFIED CKD STAGE: Chronic | ICD-10-CM

## 2017-11-07 DIAGNOSIS — Z00.00 ENCOUNTER FOR ANNUAL HEALTH EXAMINATION: ICD-10-CM

## 2017-11-07 PROBLEM — M54.16 LUMBAR RADICULOPATHY: Status: RESOLVED | Noted: 2017-08-21 | Resolved: 2017-11-07

## 2017-11-07 PROBLEM — M51.9 LUMBAR DISC DISEASE: Status: RESOLVED | Noted: 2017-08-21 | Resolved: 2017-11-07

## 2017-11-07 PROCEDURE — 82570 ASSAY OF URINE CREATININE: CPT

## 2017-11-07 PROCEDURE — 36415 COLL VENOUS BLD VENIPUNCTURE: CPT

## 2017-11-07 PROCEDURE — G0439 PPPS, SUBSEQ VISIT: HCPCS | Performed by: FAMILY MEDICINE

## 2017-11-07 PROCEDURE — 84443 ASSAY THYROID STIM HORMONE: CPT

## 2017-11-07 PROCEDURE — 82043 UR ALBUMIN QUANTITATIVE: CPT

## 2017-11-07 PROCEDURE — 80061 LIPID PANEL: CPT

## 2017-11-07 NOTE — PROGRESS NOTES
HPI:   Mirta Carrillo is a 80year old male who presents for a Medicare Subsequent Annual Wellness visit (Pt already had Initial Annual Wellness). Pt here for regular physical. Pt has upcoming appt with endocrine and urology for issues with urination. Oral Tab Take 1 tablet (5 mg total) by mouth every evening.    LANTUS SOLOSTAR 100 UNIT/ML Subcutaneous Solution Pen-injector INJECT 20 UNITS UNDER THE SKIN EVERY EVENING   GLIMEPIRIDE 2 MG Oral Tab TAKE 1 AND 1/2 TABLETS BY MOUTH DAILY   Cyclobenzaprine HC denies heartburn  : 2 per night nocturia, no complaint of urinary incontinence  MUSCULOSKELETAL: denies back pain  NEURO: denies headaches  PSYCHE: denies depression or anxiety  HEMATOLOGIC: denies hx of anemia  ENDOCRINE: denies thyroid history  ALL/AST stated age   Head:  Normocephalic, without obvious abnormality, atraumatic   Eyes:  PERRL, conjunctiva/corneas clear, EOM's intact, both eyes   Ears:  Normal TM's and external ear canals, both ears   Nose: Nares normal, septum midline, mucosa normal, no dr use of insulin, unspecified CKD stage (HCC)  Stable. Per Dr. Toni Negrete; Future  - MICROALB/CREAT RATIO, RANDOM URINE; Future  - TSH W REFLEX TO FREE T4; Future    3.  L5-S1 left mod-severe/right severe, L4-5 bilateral mod, L3-4 right mod foramina No     Functional Status     Hearing Problems?: No    Vision Problems? : No    Difficulty walking?: No    Difficulty dressing or bathing?: No    Problems with daily activities? : No    Memory Problems?: No      Fall/Risk Assessment          Have you fallen Health Maintenance if applicable    Flex Sigmoidoscopy Screen every 10 years No results found for this or any previous visit. No flowsheet data found. Fecal Occult Blood Annually No results found for: FOB No flowsheet data found.     Glaucoma Screening COPD      Spirometry Testing Annually Spirometry date:  No flowsheet data found.

## 2017-11-07 NOTE — PATIENT INSTRUCTIONS
Maddie Cates's SCREENING SCHEDULE   Tests on this list are recommended by your physician but may not be covered, or covered at this frequency, by your insurer. Please check with your insurance carrier before scheduling to verify coverage.     PREVENTATI their lifetime   • Anyone with a family history    Colorectal Cancer Screening Covered up to Age 76     Colonoscopy Screen   Covered every 10 years- more often if abnormal There are no preventive care reminders to display for this patient.  Update Health [place] Illicit injectable drug abusers     Tetanus Toxoid- Only covered with a cut with metal- TD and TDaP Not covered by Medicare Part B) No orders found for this or any previous visit.  This may be covered with your prescription benefits, but Medicare does not

## 2017-11-08 ENCOUNTER — TELEPHONE (OUTPATIENT)
Dept: NEPHROLOGY | Facility: CLINIC | Age: 82
End: 2017-11-08

## 2017-11-08 ENCOUNTER — OFFICE VISIT (OUTPATIENT)
Dept: ENDOCRINOLOGY CLINIC | Facility: CLINIC | Age: 82
End: 2017-11-08

## 2017-11-08 VITALS
HEIGHT: 68 IN | WEIGHT: 159.81 LBS | BODY MASS INDEX: 24.22 KG/M2 | SYSTOLIC BLOOD PRESSURE: 126 MMHG | HEART RATE: 66 BPM | DIASTOLIC BLOOD PRESSURE: 84 MMHG

## 2017-11-08 DIAGNOSIS — Z79.4 UNCONTROLLED TYPE 2 DIABETES MELLITUS WITH COMPLICATION, WITH LONG-TERM CURRENT USE OF INSULIN (HCC): Primary | ICD-10-CM

## 2017-11-08 DIAGNOSIS — E11.65 UNCONTROLLED TYPE 2 DIABETES MELLITUS WITH COMPLICATION, WITH LONG-TERM CURRENT USE OF INSULIN (HCC): Primary | ICD-10-CM

## 2017-11-08 DIAGNOSIS — N18.30 STAGE 3 CHRONIC KIDNEY DISEASE (HCC): Primary | ICD-10-CM

## 2017-11-08 DIAGNOSIS — E11.8 UNCONTROLLED TYPE 2 DIABETES MELLITUS WITH COMPLICATION, WITH LONG-TERM CURRENT USE OF INSULIN (HCC): Primary | ICD-10-CM

## 2017-11-08 PROCEDURE — 36416 COLLJ CAPILLARY BLOOD SPEC: CPT | Performed by: INTERNAL MEDICINE

## 2017-11-08 PROCEDURE — 99213 OFFICE O/P EST LOW 20 MIN: CPT | Performed by: INTERNAL MEDICINE

## 2017-11-08 PROCEDURE — 82962 GLUCOSE BLOOD TEST: CPT | Performed by: INTERNAL MEDICINE

## 2017-11-08 PROCEDURE — 83036 HEMOGLOBIN GLYCOSYLATED A1C: CPT | Performed by: INTERNAL MEDICINE

## 2017-11-08 RX ORDER — GLIMEPIRIDE 4 MG/1
4 TABLET ORAL
Qty: 90 TABLET | Refills: 1 | Status: SHIPPED | OUTPATIENT
Start: 2017-11-08 | End: 2018-05-31

## 2017-11-08 NOTE — TELEPHONE ENCOUNTER
[facility]ina/[facility] lab states that lab orders will  in 1 wk. Please update in EPIC. Aaron Carbajal will watch for updated orders and contact pt. Questions call her at ext [phone].

## 2017-11-08 NOTE — PROGRESS NOTES
Name: Mirta Carrillo  Date: 11/8/2017    Referring Physician: No ref. provider found    HISTORY OF PRESENT ILLNESS   Mirta Carrillo is a 80year old male who presents for evaluation of diabetes mellitus.      Prior HbA, C or glycohemoglobin were 8.2% 1/2014 GLIMEPIRIDE 2 MG Oral Tab, TAKE 1 AND 1/2 TABLETS BY MOUTH DAILY, Disp: 180 tablet, Rfl: 0  •  Cyclobenzaprine HCl 5 MG Oral Tab, Take 0.5 tablets (2.5 mg total) by mouth 2 (two) times daily as needed for Muscle spasms. , Disp: 20 tablet, Rfl: 0  •  simvast Basal cell carcinoma     right jaw   • Basal cell carcinoma 2009    left cheek   • Basal cell carcinoma 1/13/16    located on the right cheek sup (AO)   • BPH (benign prostatic hyperplasia)    • Diabetes ([facility] [address]    • [facility]    • Glaucoma

## 2017-11-08 NOTE — PATIENT INSTRUCTIONS
Metformin 500mg daily in the morning    Increase Glimepiride 4mg in the morning (new prescription sent to pharmacy)    Continue Lantus 18 units SQ daily

## 2017-11-09 ENCOUNTER — TELEPHONE (OUTPATIENT)
Dept: NEPHROLOGY | Facility: CLINIC | Age: 82
End: 2017-11-09

## 2017-11-09 NOTE — TELEPHONE ENCOUNTER
Called patient with [address]  ID [phone]. Patient's son answered. Per patient chart OK to release information to his son. Provided him with Dr. Russel Browne message below. He verbalized his understanding.  He would like to know when his father should

## 2017-11-14 NOTE — TELEPHONE ENCOUNTER
Spoke to pt's son, Agnieszka Andino. Notified him MKK would like to see Teresa Castanedaal in April 2018 if he's feeling well but will have him come in sooner if his next blood draw looks worse. Ramesh stated understanding. Scheduled appt for 4/16/18.

## 2017-11-15 ENCOUNTER — OFFICE VISIT (OUTPATIENT)
Dept: SURGERY | Facility: CLINIC | Age: 82
End: 2017-11-15

## 2017-11-15 VITALS
BODY MASS INDEX: 24.25 KG/M2 | WEIGHT: 160 LBS | RESPIRATION RATE: 18 BRPM | HEIGHT: 68 IN | SYSTOLIC BLOOD PRESSURE: 142 MMHG | TEMPERATURE: 98 F | DIASTOLIC BLOOD PRESSURE: 70 MMHG

## 2017-11-15 DIAGNOSIS — R35.0 BENIGN PROSTATIC HYPERPLASIA WITH URINARY FREQUENCY: Primary | ICD-10-CM

## 2017-11-15 DIAGNOSIS — M47.817 SPONDYLOSIS OF LUMBOSACRAL REGION WITHOUT MYELOPATHY OR RADICULOPATHY: ICD-10-CM

## 2017-11-15 DIAGNOSIS — N20.0 KIDNEY STONE: ICD-10-CM

## 2017-11-15 DIAGNOSIS — N18.30 STAGE 3 CHRONIC KIDNEY DISEASE (HCC): ICD-10-CM

## 2017-11-15 DIAGNOSIS — R35.1 NOCTURIA: ICD-10-CM

## 2017-11-15 DIAGNOSIS — N40.1 BENIGN PROSTATIC HYPERPLASIA WITH URINARY FREQUENCY: Primary | ICD-10-CM

## 2017-11-15 PROCEDURE — 99204 OFFICE O/P NEW MOD 45 MIN: CPT | Performed by: UROLOGY

## 2017-11-15 PROCEDURE — G0463 HOSPITAL OUTPT CLINIC VISIT: HCPCS | Performed by: UROLOGY

## 2017-11-15 RX ORDER — FINASTERIDE 5 MG/1
5 TABLET, FILM COATED ORAL DAILY
Qty: 90 TABLET | Refills: 3 | Status: SHIPPED | OUTPATIENT
Start: 2017-11-15 | End: 2018-01-11

## 2017-11-15 NOTE — PROGRESS NOTES
Gilmar West is a 80year old male. HPI:   Patient presents with:  Urinary Symptoms (urologic): Patient present for consult for hypertrophy of prostate with urinary retention. Denies any current pain.          History provided by pt and son Yelena Vazquez; Lorraine • Basal cell carcinoma 1/13/16    located on the right cheek sup (AO)   • BPH (benign prostatic hyperplasia)    • Diabetes ([facility] [address]    • Diabetes mellitus ([facility] [address]    • Glaucoma    • Hyperlipidemia    • Kidney disease       Past Surgical History:  2014: [place] Appl tamsulosin HCl 0.4 MG Oral Cap TAKE 2 CAPSULES BY MOUTH EVERY DAY (Patient taking differently: 0.8 mg daily.  TAKE 2 CAPSULES BY MOUTH EVERY DAY ) Disp: 180 capsule Rfl: 2   Fluticasone Propionate 50 MCG/ACT Nasal Suspension 2 sprays by Each Nare route jovany Constitutional: appears well hydrated alert and responsive no acute distress noted  Neurological: Oriented to time, place, person with normal affect  Exam appropriate for age; patellar reflexes 1/2+ bilaterally  Head/Face: normocephalic  Eyes/Vision: no sc 06/27/2017 MRI Spine Lumbar = L1-L2:   Broad disc bulge/osteophyte complex with effacement of the ventral thecal sac. No significant central stenosis; There is facet arthropathy;  Moderate left and mild to moderate right foraminal narrowing; L2-L3:   Broad Patient denies any kidney stone attacks or passing a stone and feels this is stable. I discussed the Renal US in 2013 which showed a 6.8 mm nonobstructing right renal stone.  In order to understand the current status, I recommended a plain KUB x ray to [facility] 4.   For the next office visit, please come to office with a full bladder --- we will likely have you perform a uroflow urination test    5. Return visit in 6 months    6.    A few days or several days before visit, KUB plain x-ray to investigate reported By signing my name below, Danielherberth Wilson,  attest that this documentation has been prepared under the direction and in the presence of Charlie Cardenas MD.   Electronically Signed: Brittney Carr, 11/15/2017, 2:56 PM.    Evan Chi MD

## 2017-11-15 NOTE — PATIENT INSTRUCTIONS
1. Urine specimen today for complete urinalysis (look for any microscopic blood in urine); Also urine for cytology (to look for any tumor cells in urine, especially because of history of exposure to chemical fumes for 20 years)     2.    Continue 2 Leigh benigna de la próstata (BPH). La BPH causa presión sobre la uretra. Ése es el tubo que lleva la orina desde la vejiga hacia el pene. Puede interferir con el paso de la [facility]. También puede impedir que la vejiga se vacíe completamente.     Los síntomas de BP el riesgo de tener cáncer de la próstata. Lauren, dado que el cáncer de la próstata es un cáncer común en los hombres, se recomienda que todos los hombres en general se kathrny las pruebas de detección.  [place] puede ayudar a descubrir el cáncer en monica primeras e

## 2017-11-27 ENCOUNTER — OFFICE VISIT (OUTPATIENT)
Dept: NEUROLOGY | Facility: CLINIC | Age: 82
End: 2017-11-27

## 2017-11-27 VITALS
DIASTOLIC BLOOD PRESSURE: 58 MMHG | SYSTOLIC BLOOD PRESSURE: 116 MMHG | RESPIRATION RATE: 16 BRPM | HEIGHT: 68 IN | HEART RATE: 66 BPM | WEIGHT: 162 LBS | BODY MASS INDEX: 24.55 KG/M2

## 2017-11-27 DIAGNOSIS — M48.061 LUMBAR FORAMINAL STENOSIS: Primary | ICD-10-CM

## 2017-11-27 DIAGNOSIS — M51.9 LUMBAR DISC DISEASE: ICD-10-CM

## 2017-11-27 PROCEDURE — 99214 OFFICE O/P EST MOD 30 MIN: CPT | Performed by: PHYSICAL MEDICINE & REHABILITATION

## 2017-11-27 NOTE — PROGRESS NOTES
Low Back Pain H & P    Chief Complaint: Patient presents with:  Low Back Pain: LOV 08/21/17. Pt had bilateral E2ARFRC 08/29/17 with over 75% relief of pain. Pt having minimal pain in lower back. Pt having occasional pain when lying a certain way in bed.  Pt does not use an assistive device. .      The patient does live in a home with stairs. Review of Systems      PE: The patient does appear in his stated age in no distress. The patient is well groomed.     Psychiatric:  The patient is alert and noemi current activities. The patient does not need any pain medications at this time. The patient does not need any injections at this time. He will follow up as needed. The patient understands and agrees with the stated plan. Rickie Ivan MD

## 2017-11-27 NOTE — PATIENT INSTRUCTIONS
Refill policies:    • Allow 2 business days for refills; controlled substances may take longer.   • Contact your pharmacy at least 5 days prior to running out of medication and have them send an electronic request or submit request through the “request re your physician has recommended that you have a procedure or additional testing performed. [facility]) will contact your insurance carrier to obtain pre-certification or prior authorization.     Unfortunately, [place] has seen an increas

## 2017-12-28 ENCOUNTER — APPOINTMENT (OUTPATIENT)
Dept: GENERAL RADIOLOGY | Facility: HOSPITAL | Age: 82
End: 2017-12-28
Attending: EMERGENCY MEDICINE
Payer: MEDICARE

## 2017-12-28 ENCOUNTER — HOSPITAL ENCOUNTER (EMERGENCY)
Facility: HOSPITAL | Age: 82
Discharge: HOME | End: 2017-12-28
Attending: EMERGENCY MEDICINE
Payer: MEDICARE

## 2017-12-28 VITALS
HEART RATE: 72 BPM | SYSTOLIC BLOOD PRESSURE: 137 MMHG | RESPIRATION RATE: 18 BRPM | OXYGEN SATURATION: 98 % | DIASTOLIC BLOOD PRESSURE: 74 MMHG

## 2017-12-28 DIAGNOSIS — R55 SYNCOPE, NEAR: Primary | ICD-10-CM

## 2017-12-28 PROCEDURE — 85025 COMPLETE CBC W/AUTO DIFF WBC: CPT | Performed by: EMERGENCY MEDICINE

## 2017-12-28 PROCEDURE — 71010 XR CHEST AP PORTABLE  (CPT=71010): CPT | Performed by: EMERGENCY MEDICINE

## 2017-12-28 PROCEDURE — 84484 ASSAY OF TROPONIN QUANT: CPT | Performed by: EMERGENCY MEDICINE

## 2017-12-28 PROCEDURE — 80048 BASIC METABOLIC PNL TOTAL CA: CPT | Performed by: EMERGENCY MEDICINE

## 2017-12-28 PROCEDURE — 93005 ELECTROCARDIOGRAM TRACING: CPT

## 2017-12-28 PROCEDURE — 82962 GLUCOSE BLOOD TEST: CPT

## 2017-12-28 PROCEDURE — 36415 COLL VENOUS BLD VENIPUNCTURE: CPT

## 2017-12-28 PROCEDURE — 99285 EMERGENCY DEPT VISIT HI MDM: CPT

## 2017-12-28 PROCEDURE — 93010 ELECTROCARDIOGRAM REPORT: CPT | Performed by: EMERGENCY MEDICINE

## 2017-12-28 NOTE — ED INITIAL ASSESSMENT (HPI)
Patient here for c/o 2 near syncopal episodes today. Denies injury. States his blood sugar was high.

## 2017-12-29 NOTE — ED PROVIDER NOTES
Patient Seen in: [facility] AND [facility]    History   Patient presents with:  Syncope (cardiovascular, neurologic)    Stated Complaint: 2 near syncopal episodes    HPI    80-year-old male with history of hypertension, diabetes, hyperlipide src: n/a  SpO2: 98 %  O2 Device: n/a    Current:/74   Pulse 72   Resp 18   SpO2 98%         Physical Exam    General Appearance: alert, no distress  Eyes: pupils equal and round no pallor or injection  ENT, Mouth: mucous membranes moist  Respiratory: RAINBOW DRAW LAVENDER   RAINBOW DRAW DARK GREEN   RAINBOW DRAW LIGHT GREEN   RAINBOW DRAW GOLD   RAINBOW DRAW LAVENDER TALL (BNP)   CBC W/ DIFFERENTIAL     EKG    Rate, intervals and axes as noted on EKG Report.   Rate: 72  Rhythm: Sinus Rhythm  Axis: Nor

## 2018-01-09 ENCOUNTER — TELEPHONE (OUTPATIENT)
Dept: FAMILY MEDICINE CLINIC | Facility: CLINIC | Age: 83
End: 2018-01-09

## 2018-01-09 NOTE — TELEPHONE ENCOUNTER
Pt requesting HFU with Dr. Tracy Blank. Pts son refused pr to see other drs. Please advise.      Please reply to pool: EMERSON Stack

## 2018-01-11 ENCOUNTER — OFFICE VISIT (OUTPATIENT)
Dept: FAMILY MEDICINE CLINIC | Facility: CLINIC | Age: 83
End: 2018-01-11

## 2018-01-11 VITALS
SYSTOLIC BLOOD PRESSURE: 126 MMHG | TEMPERATURE: 98 F | BODY MASS INDEX: 24.83 KG/M2 | RESPIRATION RATE: 12 BRPM | HEIGHT: 68 IN | DIASTOLIC BLOOD PRESSURE: 72 MMHG | HEART RATE: 70 BPM | WEIGHT: 163.81 LBS

## 2018-01-11 DIAGNOSIS — N18.30 CHRONIC KIDNEY DISEASE, STAGE III (MODERATE) (HCC): ICD-10-CM

## 2018-01-11 DIAGNOSIS — E11.65 UNCONTROLLED TYPE 2 DIABETES MELLITUS WITH CHRONIC KIDNEY DISEASE, WITH LONG-TERM CURRENT USE OF INSULIN, UNSPECIFIED CKD STAGE: ICD-10-CM

## 2018-01-11 DIAGNOSIS — R55 SYNCOPE, NEAR: Primary | ICD-10-CM

## 2018-01-11 DIAGNOSIS — Z79.4 UNCONTROLLED TYPE 2 DIABETES MELLITUS WITH CHRONIC KIDNEY DISEASE, WITH LONG-TERM CURRENT USE OF INSULIN, UNSPECIFIED CKD STAGE: ICD-10-CM

## 2018-01-11 DIAGNOSIS — E11.22 UNCONTROLLED TYPE 2 DIABETES MELLITUS WITH CHRONIC KIDNEY DISEASE, WITH LONG-TERM CURRENT USE OF INSULIN, UNSPECIFIED CKD STAGE: ICD-10-CM

## 2018-01-11 PROCEDURE — G0463 HOSPITAL OUTPT CLINIC VISIT: HCPCS | Performed by: FAMILY MEDICINE

## 2018-01-11 PROCEDURE — 99213 OFFICE O/P EST LOW 20 MIN: CPT | Performed by: FAMILY MEDICINE

## 2018-01-11 NOTE — PROGRESS NOTES
Sirena Cleveland is a 80year old male. Patient presents with:  ER F/U: blurry vision, high bp and blood sugars     HPI:   Was seen in ER about 2 weeks ago with pre syncopal episode.    Notes from ER visit \" 80-year-old male with history of hypertension, gaston UNIT/ML Subcutaneous Solution Pen-injector INJECT 20 UNITS UNDER THE SKIN EVERY EVENING (Patient taking differently: INJECT 18 UNITS UNDER THE SKIN EVERY EVENING) Disp: 15 mL Rfl: 0   Cyclobenzaprine HCl 5 MG Oral Tab Take 0.5 tablets (2.5 mg total) by brigida pain  RESPIRATORY: denies shortness of breath, denies cough  CARDIOVASCULAR: denies chest pain  GI: denies abdominal pain and denies heartburn  NEURO: denies headaches  Musculoskeletal: no joint pain, back pain    EXAM:   /72 (BP Location: Left arm,

## 2018-01-31 ENCOUNTER — OFFICE VISIT (OUTPATIENT)
Dept: ENDOCRINOLOGY CLINIC | Facility: CLINIC | Age: 83
End: 2018-01-31

## 2018-01-31 VITALS
BODY MASS INDEX: 24.55 KG/M2 | WEIGHT: 162 LBS | HEIGHT: 68 IN | HEART RATE: 63 BPM | SYSTOLIC BLOOD PRESSURE: 141 MMHG | DIASTOLIC BLOOD PRESSURE: 79 MMHG

## 2018-01-31 DIAGNOSIS — Z79.4 UNCONTROLLED TYPE 2 DIABETES MELLITUS WITH HYPERGLYCEMIA, WITH LONG-TERM CURRENT USE OF INSULIN (HCC): Primary | ICD-10-CM

## 2018-01-31 DIAGNOSIS — E11.65 UNCONTROLLED TYPE 2 DIABETES MELLITUS WITH HYPERGLYCEMIA, WITH LONG-TERM CURRENT USE OF INSULIN (HCC): Primary | ICD-10-CM

## 2018-01-31 LAB
CARTRIDGE LOT#: ABNORMAL NUMERIC
GLUCOSE BLOOD: 127
HEMOGLOBIN A1C: 7.9 % (ref 4.3–5.6)
TEST STRIP LOT #: NORMAL NUMERIC

## 2018-01-31 PROCEDURE — 36416 COLLJ CAPILLARY BLOOD SPEC: CPT | Performed by: INTERNAL MEDICINE

## 2018-01-31 PROCEDURE — 83036 HEMOGLOBIN GLYCOSYLATED A1C: CPT | Performed by: INTERNAL MEDICINE

## 2018-01-31 PROCEDURE — 99213 OFFICE O/P EST LOW 20 MIN: CPT | Performed by: INTERNAL MEDICINE

## 2018-01-31 PROCEDURE — 82962 GLUCOSE BLOOD TEST: CPT | Performed by: INTERNAL MEDICINE

## 2018-01-31 NOTE — PROGRESS NOTES
Name: Lisa George  Date: 1/31/2018    Referring Physician: No ref. provider found    HISTORY OF PRESENT ILLNESS   Lisa George is a 80year old male who presents for evaluation of diabetes mellitus.      Prior HbA, C or glycohemoglobin were 8.2% 1/2014 Solution Pen-injector, INJECT 20 UNITS UNDER THE SKIN EVERY EVENING (Patient taking differently: INJECT 18 UNITS UNDER THE SKIN EVERY EVENING), Disp: 15 mL, Rfl: 0  •  Cyclobenzaprine HCl 5 MG Oral Tab, Take 0.5 tablets (2.5 mg total) by mouth 2 (two) time History:   Diagnosis Date   • Acid reflux    • Basal cell carcinoma     right jaw   • Basal cell carcinoma 2009    left cheek   • Basal cell carcinoma 1/13/16    located on the right cheek sup (AO)   • BPH (benign prostatic hyperplasia)    • Diabetes ([facility] [address] 1/31/2018  Aron Caballero MD

## 2018-02-02 RX ORDER — LISINOPRIL 10 MG/1
TABLET ORAL
Qty: 30 TABLET | Refills: 2 | Status: SHIPPED | OUTPATIENT
Start: 2018-02-02 | End: 2018-09-14

## 2018-03-05 RX ORDER — LANCETS 33 GAUGE
EACH MISCELLANEOUS
Qty: 100 EACH | Refills: 3 | Status: SHIPPED | OUTPATIENT
Start: 2018-03-05 | End: 2018-10-19

## 2018-03-12 RX ORDER — INSULIN GLARGINE 100 [IU]/ML
INJECTION, SOLUTION SUBCUTANEOUS
Qty: 15 ML | Refills: 1 | Status: SHIPPED | OUTPATIENT
Start: 2018-03-12 | End: 2019-03-20

## 2018-03-13 RX ORDER — TAMSULOSIN HYDROCHLORIDE 0.4 MG/1
CAPSULE ORAL
Qty: 180 CAPSULE | Refills: 1 | Status: SHIPPED | OUTPATIENT
Start: 2018-03-13 | End: 2018-04-02

## 2018-03-28 RX ORDER — TAMSULOSIN HYDROCHLORIDE 0.4 MG/1
CAPSULE ORAL
Qty: 180 CAPSULE | Refills: 0 | OUTPATIENT
Start: 2018-03-28

## 2018-03-28 NOTE — TELEPHONE ENCOUNTER
Pending Prescriptions Disp Refills    TAMSULOSIN HCL 0.4 MG Oral Cap [Pharmacy Med Name: TAMSULOSIN 0.4MG CAPSULES] 180 capsule 0     Sig: TAKE 2 CAPSULES BY MOUTH EVERY DAY           LR 3-13-18 # 180 + 1  rx too soon for refill.

## 2018-04-02 RX ORDER — TAMSULOSIN HYDROCHLORIDE 0.4 MG/1
CAPSULE ORAL
Qty: 180 CAPSULE | Refills: 0 | OUTPATIENT
Start: 2018-04-02

## 2018-04-02 RX ORDER — TAMSULOSIN HYDROCHLORIDE 0.4 MG/1
CAPSULE ORAL
Qty: 180 CAPSULE | Refills: 1 | Status: SHIPPED | OUTPATIENT
Start: 2018-04-02 | End: 2018-10-09

## 2018-04-02 NOTE — TELEPHONE ENCOUNTER
Son called to check on refill. Med was refilled on 3/13/18 for 180 tabs/1 refill. Called pharmacy, it had not been received by the pharmacy. Resent as was previously written by DIONNA.

## 2018-04-13 ENCOUNTER — LAB ENCOUNTER (OUTPATIENT)
Dept: LAB | Age: 83
End: 2018-04-13
Attending: INTERNAL MEDICINE
Payer: MEDICARE

## 2018-04-13 DIAGNOSIS — N18.30 STAGE 3 CHRONIC KIDNEY DISEASE (HCC): ICD-10-CM

## 2018-04-13 PROCEDURE — 80069 RENAL FUNCTION PANEL: CPT

## 2018-04-13 PROCEDURE — 36415 COLL VENOUS BLD VENIPUNCTURE: CPT

## 2018-04-13 PROCEDURE — 85025 COMPLETE CBC W/AUTO DIFF WBC: CPT

## 2018-04-16 ENCOUNTER — OFFICE VISIT (OUTPATIENT)
Dept: NEPHROLOGY | Facility: CLINIC | Age: 83
End: 2018-04-16

## 2018-04-16 VITALS
DIASTOLIC BLOOD PRESSURE: 76 MMHG | SYSTOLIC BLOOD PRESSURE: 135 MMHG | HEIGHT: 66.75 IN | WEIGHT: 161.63 LBS | BODY MASS INDEX: 25.37 KG/M2 | HEART RATE: 67 BPM

## 2018-04-16 DIAGNOSIS — N18.30 CHRONIC KIDNEY DISEASE, STAGE III (MODERATE) (HCC): Primary | Chronic | ICD-10-CM

## 2018-04-16 PROCEDURE — 99214 OFFICE O/P EST MOD 30 MIN: CPT | Performed by: INTERNAL MEDICINE

## 2018-04-16 PROCEDURE — G0463 HOSPITAL OUTPT CLINIC VISIT: HCPCS | Performed by: INTERNAL MEDICINE

## 2018-04-16 RX ORDER — FINASTERIDE 5 MG/1
5 TABLET, FILM COATED ORAL DAILY
Refills: 2 | COMMUNITY
Start: 2018-02-16 | End: 2018-11-26

## 2018-04-16 NOTE — PROGRESS NOTES
04/16/18        Patient: Rajan Swenson   YOB: 1934   Date of Visit: 4/16/2018       Dear  Dr. Edwardo Maldonado MD,      Thank you for referring Rajan Swenson to my practice. Please find my assessment and plan below.       As you know he is an 83-year- [address] [address]    Document electronically generated by:  Nathan Graham

## 2018-04-18 RX ORDER — INSULIN GLARGINE 100 [IU]/ML
INJECTION, SOLUTION SUBCUTANEOUS
Qty: 15 ML | Refills: 2 | Status: ON HOLD | OUTPATIENT
Start: 2018-04-18 | End: 2018-04-28

## 2018-04-26 ENCOUNTER — HOSPITAL ENCOUNTER (INPATIENT)
Facility: HOSPITAL | Age: 83
LOS: 2 days | Discharge: HOME | DRG: 243 | End: 2018-04-28
Attending: EMERGENCY MEDICINE | Admitting: EMERGENCY MEDICINE
Payer: MEDICARE

## 2018-04-26 ENCOUNTER — APPOINTMENT (OUTPATIENT)
Dept: GENERAL RADIOLOGY | Facility: HOSPITAL | Age: 83
DRG: 243 | End: 2018-04-26
Attending: EMERGENCY MEDICINE
Payer: MEDICARE

## 2018-04-26 DIAGNOSIS — R00.1 SYMPTOMATIC BRADYCARDIA: Primary | ICD-10-CM

## 2018-04-26 DIAGNOSIS — E11.65 TYPE 2 DIABETES MELLITUS WITH HYPERGLYCEMIA, WITH LONG-TERM CURRENT USE OF INSULIN (HCC): ICD-10-CM

## 2018-04-26 DIAGNOSIS — Z79.4 TYPE 2 DIABETES MELLITUS WITH HYPERGLYCEMIA, WITH LONG-TERM CURRENT USE OF INSULIN (HCC): ICD-10-CM

## 2018-04-26 PROCEDURE — 71045 X-RAY EXAM CHEST 1 VIEW: CPT | Performed by: EMERGENCY MEDICINE

## 2018-04-26 PROCEDURE — 99223 1ST HOSP IP/OBS HIGH 75: CPT | Performed by: INTERNAL MEDICINE

## 2018-04-26 RX ORDER — LEVOCETIRIZINE DIHYDROCHLORIDE 5 MG/1
5 TABLET, FILM COATED ORAL EVERY EVENING
Status: DISCONTINUED | OUTPATIENT
Start: 2018-04-26 | End: 2018-04-26

## 2018-04-26 RX ORDER — NITROGLYCERIN 0.4 MG/1
0.4 TABLET SUBLINGUAL EVERY 5 MIN PRN
Status: DISCONTINUED | OUTPATIENT
Start: 2018-04-26 | End: 2018-04-28

## 2018-04-26 RX ORDER — FINASTERIDE 5 MG/1
5 TABLET, FILM COATED ORAL DAILY
Status: DISCONTINUED | OUTPATIENT
Start: 2018-04-26 | End: 2018-04-28

## 2018-04-26 RX ORDER — ALFUZOSIN HYDROCHLORIDE 10 MG/1
10 TABLET, EXTENDED RELEASE ORAL
Status: DISCONTINUED | OUTPATIENT
Start: 2018-04-27 | End: 2018-04-26

## 2018-04-26 RX ORDER — GLIMEPIRIDE 2 MG/1
4 TABLET ORAL
Status: DISCONTINUED | OUTPATIENT
Start: 2018-04-27 | End: 2018-04-28

## 2018-04-26 RX ORDER — ATORVASTATIN CALCIUM 20 MG/1
20 TABLET, FILM COATED ORAL NIGHTLY
Status: DISCONTINUED | OUTPATIENT
Start: 2018-04-26 | End: 2018-04-28

## 2018-04-26 RX ORDER — SODIUM CHLORIDE 9 MG/ML
INJECTION, SOLUTION INTRAVENOUS CONTINUOUS
Status: DISCONTINUED | OUTPATIENT
Start: 2018-04-26 | End: 2018-04-28

## 2018-04-26 RX ORDER — DEXTROSE MONOHYDRATE 25 G/50ML
50 INJECTION, SOLUTION INTRAVENOUS AS NEEDED
Status: DISCONTINUED | OUTPATIENT
Start: 2018-04-26 | End: 2018-04-28

## 2018-04-26 RX ORDER — PANTOPRAZOLE SODIUM 40 MG/1
40 TABLET, DELAYED RELEASE ORAL
Status: DISCONTINUED | OUTPATIENT
Start: 2018-04-27 | End: 2018-04-26

## 2018-04-26 RX ORDER — ALFUZOSIN HYDROCHLORIDE 10 MG/1
10 TABLET, EXTENDED RELEASE ORAL NIGHTLY
Status: DISCONTINUED | OUTPATIENT
Start: 2018-04-26 | End: 2018-04-28

## 2018-04-26 RX ORDER — SODIUM CHLORIDE 0.9 % (FLUSH) 0.9 %
3 SYRINGE (ML) INJECTION AS NEEDED
Status: DISCONTINUED | OUTPATIENT
Start: 2018-04-26 | End: 2018-04-28

## 2018-04-26 RX ORDER — PANTOPRAZOLE SODIUM 40 MG/1
40 TABLET, DELAYED RELEASE ORAL NIGHTLY
Status: DISCONTINUED | OUTPATIENT
Start: 2018-04-26 | End: 2018-04-28

## 2018-04-26 RX ORDER — CYCLOBENZAPRINE HCL 5 MG
2.5 TABLET ORAL 2 TIMES DAILY PRN
Status: DISCONTINUED | OUTPATIENT
Start: 2018-04-26 | End: 2018-04-28

## 2018-04-26 RX ORDER — CHLORHEXIDINE GLUCONATE 4 G/100ML
30 SOLUTION TOPICAL
Status: ACTIVE | OUTPATIENT
Start: 2018-04-27 | End: 2018-04-27

## 2018-04-26 RX ORDER — CETIRIZINE HYDROCHLORIDE 10 MG/1
10 TABLET ORAL EVERY EVENING
Status: DISCONTINUED | OUTPATIENT
Start: 2018-04-26 | End: 2018-04-28

## 2018-04-26 RX ORDER — DOPAMINE HYDROCHLORIDE 160 MG/100ML
INJECTION, SOLUTION INTRAVENOUS CONTINUOUS
Status: DISCONTINUED | OUTPATIENT
Start: 2018-04-26 | End: 2018-04-27

## 2018-04-26 RX ORDER — LISINOPRIL 10 MG/1
10 TABLET ORAL
Status: DISCONTINUED | OUTPATIENT
Start: 2018-04-26 | End: 2018-04-28

## 2018-04-26 RX ORDER — CEFAZOLIN SODIUM/WATER 2 G/20 ML
2 SYRINGE (ML) INTRAVENOUS
Status: DISPENSED | OUTPATIENT
Start: 2018-04-26 | End: 2018-04-27

## 2018-04-26 RX ORDER — CEFAZOLIN SODIUM/WATER 2 G/20 ML
2 SYRINGE (ML) INTRAVENOUS ONCE
Status: DISCONTINUED | OUTPATIENT
Start: 2018-04-26 | End: 2018-04-28

## 2018-04-26 NOTE — CONSULTS
[facility]  Cardiology Consultation    Sirena Cleveland Patient Status:  Emergency    1934 MRN [id number]   Location [address] Attending Ronda Cohen MD   Hosp Day # 0 PCP Loly Farias MD     Reason for [facility]: Alert and oriented x 3. No apparent distress. No respiratory or constitutional distress. HEENT: Normocephalic, anicteric sclera, neck supple. Neck: No JVD, carotids 2+, no bruits. Cardiac: Regular rate and rhythm.  S1, S2 normal. No murmur,

## 2018-04-26 NOTE — H&P
Pulmonary/Critical Care Admission Note    HPI:   Lisa George is a 80year old male with Patient presents with:  Hypertension (cardiovascular)  Vomiting  Syncope (cardiovascular, neurologic)    Nonda Bolus, MD    Pt is 79 yo with h/o GERD, BPH, DM, H apply Misc USE ONCE D Disp:  Rfl: 3   ONETOUCH DELICA LANCETS 62C Does not apply Misc TEST ONCE D Disp:  Rfl: 3   Glucose Blood (ONETOUCH ULTRA BLUE) In Vitro Strip TEST ONCE D Disp:  Rfl: 3   Pantoprazole Sodium 40 MG Oral Tab EC Take 1 tablet (40 mg tota Smoking status: Former Smoker                                                                Packs/day: 0.00      Years: 17.00          Types: Cigarettes    Smokeless tobacco: Former User                       Alcohol use: Yes           0.6 oz/week       C

## 2018-04-26 NOTE — PROGRESS NOTES
Requested ER RN to pls.  notify receiving floor RN, PT is scheduled for pacemaker tomorrow and  will be sent for around 6:30 am and needs working IV on Lt arm

## 2018-04-26 NOTE — ED PROVIDER NOTES
Patient Seen in: [facility] 2w/sw    History   Patient presents with:  Hypertension (cardiovascular)  Vomiting  Syncope (cardiovascular, neurologic)    Stated Complaint: HTN    HPI    80year old male with past medical history of diabetes, hyperli 98.2 °F (36.8 °C)  Temp src: Oral  SpO2: 98 %  O2 Device: None (Room air)    Current:/60   Pulse 62   Temp 98.6 °F (37 °C) (Temporal)   Resp 18   SpO2 98%         Physical Exam   Constitutional: He is oriented to person, place, and time.  He appears w The following orders were created for panel order CBC WITH DIFFERENTIAL WITH PLATELET.   Procedure                               Abnormality         Status                     ---------                               -----------         ------ administered)   DOPamine in D5W (INTROPIN) 400 mg/250 ml premix infusion (1.5 mcg/kg/min × 73.3 kg Intravenous Handoff 4/26/18 6361)   0.9%  NaCl infusion (not administered)   Chlorhexidine Gluconate (HIBICLENS) 4 % liquid 30 mL (not administered)   ceFAZo

## 2018-04-26 NOTE — CONSULTS
[facility]    Cardiac Electrophysiology Consultation  ELIZA Stark Location: [address]    1934 MRN [id number]   Consulting Date 2018 [facility] [id number]   Consulting Physician Janine Chappell MD daily. Disp:  Rfl: 2   tamsulosin HCl 0.4 MG Oral Cap TAKE 2 CAPSULES BY MOUTH EVERY DAY Disp: 180 capsule Rfl: 1   LANTUS SOLOSTAR 100 UNIT/ML Subcutaneous Solution Pen-injector INJECT 20 UNITS UNDER THE SKIN EVERY EVENING Disp: 15 mL Rfl: 1   ONETOUCH UL paresthesias    Physical Exam:  Vital Signs: /51   Pulse (!) 42   Temp 98.2 °F (36.8 °C) (Oral)   Resp 18   SpO2 98%   General: Comfortable, well-nourished, in no acute distress   HEENT: Atraumatic. No scleral icterus.   Mucous membranes are moist. MD  Cardiac EP  [facility]  4/26/2018  17:30

## 2018-04-26 NOTE — HISTORICAL OFFICE NOTE
Rolando Santiago  : 1934  ACCOUNT:  [id number]  [phone]  PCP: Dr. Aminta Stanford    TODAY'S DATE: 2016  DICTATED BY:  Demetris Carrillo M.D.]    CHIEF COMPLAINT: [abnormal test.]    HPI:  [On 2016, Haley Clarke, an 19-year-old male, presented w see below    VITAL SIGNS: [B/P - 127/73 , Pulse - 70, Respiration - 14, Weight -  166, Height -   68 , BMI - 25.2 ]    CONS: well developed, well nourished. WEIGHT: BMI parameters reviewed and discussed. HEAD/FACE: no trauma and normocephalic.  EYES: conjun basis only.]    Thank you, Dr. Daria Levin, for allowing me to participate in the care of your patient. Feel free to contact me if you have any questions.      PRESCRIPTIONS:  12/23/16 Aspirin Adult Low [id number]      1tab once daily  12/23/16 DiazePAM

## 2018-04-27 ENCOUNTER — APPOINTMENT (OUTPATIENT)
Dept: INTERVENTIONAL RADIOLOGY/VASCULAR | Facility: HOSPITAL | Age: 83
DRG: 243 | End: 2018-04-27
Attending: EMERGENCY MEDICINE
Payer: MEDICARE

## 2018-04-27 ENCOUNTER — PRIOR ORIGINAL RECORDS (OUTPATIENT)
Dept: OTHER | Age: 83
End: 2018-04-27

## 2018-04-27 PROCEDURE — 0JH606Z INSERTION OF PACEMAKER, DUAL CHAMBER INTO CHEST SUBCUTANEOUS TISSUE AND FASCIA, OPEN APPROACH: ICD-10-PCS | Performed by: INTERNAL MEDICINE

## 2018-04-27 PROCEDURE — 02H63JZ INSERTION OF PACEMAKER LEAD INTO RIGHT ATRIUM, PERCUTANEOUS APPROACH: ICD-10-PCS | Performed by: INTERNAL MEDICINE

## 2018-04-27 PROCEDURE — 99232 SBSQ HOSP IP/OBS MODERATE 35: CPT | Performed by: INTERNAL MEDICINE

## 2018-04-27 PROCEDURE — 02HK3JZ INSERTION OF PACEMAKER LEAD INTO RIGHT VENTRICLE, PERCUTANEOUS APPROACH: ICD-10-PCS | Performed by: INTERNAL MEDICINE

## 2018-04-27 RX ORDER — LIDOCAINE HYDROCHLORIDE AND EPINEPHRINE 10; 10 MG/ML; UG/ML
INJECTION, SOLUTION INFILTRATION; PERINEURAL
Status: COMPLETED
Start: 2018-04-27 | End: 2018-04-27

## 2018-04-27 RX ORDER — BACITRACIN 50000 [USP'U]/1
INJECTION, POWDER, LYOPHILIZED, FOR SOLUTION INTRAMUSCULAR
Status: COMPLETED
Start: 2018-04-27 | End: 2018-04-27

## 2018-04-27 RX ORDER — SODIUM CHLORIDE 9 MG/ML
INJECTION, SOLUTION INTRAVENOUS
Status: COMPLETED
Start: 2018-04-27 | End: 2018-04-27

## 2018-04-27 RX ORDER — CEFAZOLIN SODIUM/WATER 2 G/20 ML
SYRINGE (ML) INTRAVENOUS
Status: DISCONTINUED
Start: 2018-04-27 | End: 2018-04-27 | Stop reason: WASHOUT

## 2018-04-27 RX ORDER — ACETAMINOPHEN 500 MG
500 TABLET ORAL EVERY 6 HOURS PRN
Status: DISCONTINUED | OUTPATIENT
Start: 2018-04-27 | End: 2018-04-28

## 2018-04-27 RX ORDER — CEFAZOLIN SODIUM/WATER 2 G/20 ML
2 SYRINGE (ML) INTRAVENOUS EVERY 8 HOURS
Status: COMPLETED | OUTPATIENT
Start: 2018-04-27 | End: 2018-04-28

## 2018-04-27 RX ORDER — MIDAZOLAM HYDROCHLORIDE 1 MG/ML
INJECTION INTRAMUSCULAR; INTRAVENOUS
Status: COMPLETED
Start: 2018-04-27 | End: 2018-04-27

## 2018-04-27 NOTE — OPERATIVE REPORT
[facility]    Cardiac Electrophysiology Operative Note    [facility]   [facility] [id number] MRN [id number]   Admission Date 4/26/2018 Procedure Date 4/27/2018   Attending Physician Bharat Pierce MD Procedure Physic and electrical parameters were excellent, and the lead was secured to the pectoralis muscle using 3 separate ties of 0-ethibond suture and the collar.   The next wire was used to advance a 6-Spanish sheath into the axillary vein, through which the preformed-

## 2018-04-27 NOTE — PROGRESS NOTES
Patient notified this writer that his prescription glasses have been missing. This writer called security and filled a report. Will continue to monitor patient.

## 2018-04-27 NOTE — PROGRESS NOTES
[facility]     Progress Note        Edgardkleberhermann Felton Patient Status:  Inpatient    1934 MRN [id number]   Location [facility] 2W/SW Attending Sofya Florez MD   Hosp Day # 1 PCP Vikash Hoffman MD       Subjective:   Patient (UROXATRAL) 24 hr tab 10 mg 10 mg Oral Nightly   Pantoprazole Sodium (PROTONIX) EC tab 40 mg 40 mg Oral Nightly       Continuous Infusions:   • sodium chloride 20 mL/hr at 04/26/18 2148   • sodium chloride         Physical Exam  Constitutional: no acute di

## 2018-04-28 ENCOUNTER — APPOINTMENT (OUTPATIENT)
Dept: GENERAL RADIOLOGY | Facility: HOSPITAL | Age: 83
DRG: 243 | End: 2018-04-28
Attending: INTERNAL MEDICINE
Payer: MEDICARE

## 2018-04-28 VITALS
TEMPERATURE: 98 F | BODY MASS INDEX: 24 KG/M2 | DIASTOLIC BLOOD PRESSURE: 77 MMHG | RESPIRATION RATE: 18 BRPM | OXYGEN SATURATION: 97 % | SYSTOLIC BLOOD PRESSURE: 141 MMHG | HEART RATE: 94 BPM | WEIGHT: 152.81 LBS

## 2018-04-28 PROCEDURE — 71046 X-RAY EXAM CHEST 2 VIEWS: CPT | Performed by: INTERNAL MEDICINE

## 2018-04-28 PROCEDURE — 99232 SBSQ HOSP IP/OBS MODERATE 35: CPT | Performed by: INTERNAL MEDICINE

## 2018-04-28 PROCEDURE — 99239 HOSP IP/OBS DSCHRG MGMT >30: CPT | Performed by: HOSPITALIST

## 2018-04-28 NOTE — PROGRESS NOTES
[facility]    Progress Note    Rajan Swenson Patient Status:  Inpatient    1934 MRN [id number]   Location [facility] 3W/SW Attending Michi Bergman MD   [facility] Day # 2 PCP Andre Valentin MD     Subjective:     Constitutional: N and right ventricle. Dictated by (CST): Agustin Noriega MD on 4/28/2018 at 10:08     Approved by (CST): Agustin Noriega MD on 4/28/2018 at 10:10          Xr Chest Ap Portable  (cpt=71045)    Result Date: 4/26/2018  CONCLUSION:  1.  No acute dise

## 2018-04-28 NOTE — TRANSITION NOTE
[facility]    Cardiology Discharge Summary    Mirta Carrillo Patient Status:  Inpatient    1934 MRN [id number]   Location [facility] 3W/SW Attending Bladimir Westbrook MD   [facility] Day # 2 PCP Aislinn Chaudhry MD         Discharge Summar Tabs  Commonly known as:  PROSCAR      Take 5 mg by mouth daily.    Refills:  2     glimepiride 4 MG Tabs  Commonly known as:  AMARYL      Take 1 tablet (4 mg total) by mouth every morning before breakfast.   Quantity:  90 tablet  Refills:  1     Levocetiri 3.6 04/13/2018   TROP 0.01 04/26/2018       MILADYS Comer  4/28/2018

## 2018-04-28 NOTE — DISCHARGE SUMMARY
[facility]  Discharge Summary     Andre Arizmendi  : 1934    Status: Inpatient  Day #: 2    Attending: Marcelo Caban MD  PCP: Kaya Loza MD     Date of Admission: 2018  Date of Discharge: 2018     Hospital Discharge Diagno LANTUS SOLOSTAR 100 UNIT/ML Sopn  Generic drug:  insulin glargine  What changed:  Another medication with the same name was removed. Continue taking this medication, and follow the directions you see here.       INJECT Jose G 32 SKIN EVERY Ramonita Calabrese 1     simvastatin 40 MG Tabs  Commonly known as:  ZOCOR      Take 1 tablet (40 mg total) by mouth once daily.    Quantity:  90 tablet  Refills:  4     tamsulosin HCl 0.4 MG Caps  Commonly known as:  FLOMAX      TAKE 2 CAPSULES BY MOUTH EVERY DAY   Quantity

## 2018-04-28 NOTE — PROGRESS NOTES
[facility] Progress Note      Areatha  Patient Status:  Inpatient    1934 MRN [id number]   Location [facility] 3W/SW Attending Shefali Sousa MD   Hosp Day # 2 PCP MD Elsie Soriano well approximated, no hematoma    Scheduled Meds:   • ceFAZolin  2 g Intravenous Once   • insulin detemir  20 Units Subcutaneous Nightly   • glimepiride  4 mg Oral QAM AC   • finasteride  5 mg Oral Daily   • lisinopril  10 mg Oral Daily   • atorvastatin  2 Interpretation  -------------------------- 2:1 AV block Low voltage in precordial leads.  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  -Old inferior-apical infarct.  ABNORMAL When compared with ECG of 12/28/2017 19:55:49

## 2018-04-28 NOTE — PLAN OF CARE
Problem: Diabetes/Glucose Control  Goal: Glucose maintained within prescribed range  INTERVENTIONS:  - Monitor Blood Glucose as ordered  - Assess for signs and symptoms of hyperglycemia and hypoglycemia  - Administer ordered medications to maintain glucose electrolytes and administer replacement therapy as ordered   Outcome: Progressing      Problem: SAFETY ADULT - FALL  Goal: Free from fall injury  INTERVENTIONS:  - Assess pt frequently for physical needs  - Identify cognitive and physical deficits and beha

## 2018-04-28 NOTE — PROGRESS NOTES
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 No acute cardiopulmonary disease. 2. Pacemaker left anterior chest with dual-chamber leads in the right atrium and right ventricle.      Dictated by (CST): Guille Mason MD on 4/28/2018 at 10:08     Approved by (CST): Kimmy Sabillon

## 2018-04-28 NOTE — PLAN OF CARE
Patient discharged on 4/28at 4:10 pm. Patient alert and oriented. Belongings gathered and sent with patient. Discharge instructions given. No new prescriptions. Tele monitor and IV removed. Patient discharged home.

## 2018-04-30 ENCOUNTER — PATIENT OUTREACH (OUTPATIENT)
Dept: CASE MANAGEMENT | Age: 83
End: 2018-04-30

## 2018-04-30 ENCOUNTER — TELEPHONE (OUTPATIENT)
Dept: CARDIOLOGY UNIT | Facility: HOSPITAL | Age: 83
End: 2018-04-30

## 2018-05-04 ENCOUNTER — MYAURORA ACCOUNT LINK (OUTPATIENT)
Dept: OTHER | Age: 83
End: 2018-05-04

## 2018-05-07 ENCOUNTER — PRIOR ORIGINAL RECORDS (OUTPATIENT)
Dept: OTHER | Age: 83
End: 2018-05-07

## 2018-05-10 ENCOUNTER — OFFICE VISIT (OUTPATIENT)
Dept: FAMILY MEDICINE CLINIC | Facility: CLINIC | Age: 83
End: 2018-05-10

## 2018-05-10 VITALS
BODY MASS INDEX: 25 KG/M2 | WEIGHT: 158 LBS | SYSTOLIC BLOOD PRESSURE: 137 MMHG | DIASTOLIC BLOOD PRESSURE: 86 MMHG | HEART RATE: 94 BPM

## 2018-05-10 DIAGNOSIS — Z95.0 S/P PLACEMENT OF CARDIAC PACEMAKER: ICD-10-CM

## 2018-05-10 DIAGNOSIS — R00.1 SYMPTOMATIC BRADYCARDIA: Primary | ICD-10-CM

## 2018-05-10 PROCEDURE — 99495 TRANSJ CARE MGMT MOD F2F 14D: CPT | Performed by: FAMILY MEDICINE

## 2018-05-10 PROCEDURE — 1111F DSCHRG MED/CURRENT MED MERGE: CPT | Performed by: FAMILY MEDICINE

## 2018-05-10 NOTE — PROGRESS NOTES
HPI:    Sarika Finley is a 80year old male here today for hospital follow up.    He was discharged from Inpatient hospital, [facility]  to Home   Admission Date: 4/26/18   Discharge Date: 4/28/18  Hospital Discharge Diagnoses (since 4/10/2018) has No Known Allergies. Current Meds:    Current Outpatient Prescriptions on File Prior to Visit:  finasteride 5 MG Oral Tab Take 5 mg by mouth daily.    tamsulosin HCl 0.4 MG Oral Cap TAKE 2 CAPSULES BY MOUTH EVERY DAY   LANTUS SOLOSTAR 100 UNIT/ML Subc father. He  reports that he has quit smoking. His smoking use included Cigarettes. He smoked 0.00 packs per day for 17.00 years. He has quit using smokeless tobacco. He reports that he drinks about 0.6 oz of alcohol per week .  He reports that he does not this Visit:  No prescriptions requested or ordered in this encounter    Imaging & Consults:  None         Transitional Care Management Certification:  I certify that the following are true:  Communication with the patient was made within 2 business days of

## 2018-05-14 ENCOUNTER — OFFICE VISIT (OUTPATIENT)
Dept: SURGERY | Facility: CLINIC | Age: 83
End: 2018-05-14

## 2018-05-14 VITALS
HEIGHT: 66 IN | WEIGHT: 161 LBS | TEMPERATURE: 98 F | BODY MASS INDEX: 25.88 KG/M2 | HEART RATE: 83 BPM | SYSTOLIC BLOOD PRESSURE: 106 MMHG | DIASTOLIC BLOOD PRESSURE: 70 MMHG

## 2018-05-14 DIAGNOSIS — K59.01 CONSTIPATION BY DELAYED COLONIC TRANSIT: ICD-10-CM

## 2018-05-14 DIAGNOSIS — R80.9 PROTEINURIA, UNSPECIFIED TYPE: ICD-10-CM

## 2018-05-14 DIAGNOSIS — N20.0 KIDNEY STONE: ICD-10-CM

## 2018-05-14 DIAGNOSIS — R35.0 BENIGN PROSTATIC HYPERPLASIA WITH URINARY FREQUENCY: Primary | ICD-10-CM

## 2018-05-14 DIAGNOSIS — R35.1 NOCTURIA: ICD-10-CM

## 2018-05-14 DIAGNOSIS — N40.1 BENIGN PROSTATIC HYPERPLASIA WITH URINARY FREQUENCY: Primary | ICD-10-CM

## 2018-05-14 DIAGNOSIS — N18.30 STAGE 3 CHRONIC KIDNEY DISEASE (HCC): ICD-10-CM

## 2018-05-14 DIAGNOSIS — M47.817 SPONDYLOSIS OF LUMBOSACRAL REGION WITHOUT MYELOPATHY OR RADICULOPATHY: ICD-10-CM

## 2018-05-14 PROCEDURE — G0463 HOSPITAL OUTPT CLINIC VISIT: HCPCS | Performed by: UROLOGY

## 2018-05-14 PROCEDURE — 99214 OFFICE O/P EST MOD 30 MIN: CPT | Performed by: UROLOGY

## 2018-05-14 NOTE — PROGRESS NOTES
HPI:    Patient ID: Sravani Kam is a 80year old male. HPI     History provided by pt and son Shaista Chairez; Citizen of [place] translation provided by son.     Voiding Dysfunction  Patient has current AUA score of 3.5, mild voiding dysfunction category, improved compar chest pain. Gastrointestinal: Positive for constipation. Negative for abdominal pain. Genitourinary: Negative for dysuria, flank pain and hematuria (Gross). Positive for weak stream,  and nocturia 3-4x   Skin: Negative for color change.    Neurol TAKE 1 TABLET BY mouth DAILY WITH MEALS) Disp: 180 tablet Rfl: 4     Allergies:No Known Allergies    HISTORY:  Past Medical History:   Diagnosis Date   • Acid reflux    • Basal cell carcinoma     right jaw   • Basal cell carcinoma 2009    left cheek   • Ba 33  11/15/2017 UA RBC = 1; WBC = 2; Protein = 100  11/4/2017 Creatinine = 1.73; eGFR = 38  11/16/2016 Creatinine = 1.77; eGFR = 39  6/6/2017 PSA = 1.6      UROLOGICAL IMAGING   06/27/2017 MRI Spine Lumbar = L1-L2:   Broad disc bulge/osteophyte complex with and son agree.  I discussed if there were stones found on KUB, the treatment options of laser lithotripsy vs shockwave lithotripsy and I gave the pt a handout of these treatment options.    (N18.3) Stage 3 chronic kidney disease  04/13/2018 Creatinine = 1.8 for, nature of, alternatives to the above treatment options listed above, and I answered questions concerning them; patient understands all of this and decides to proceed with the following:       Treatment Plan & Patient Instructions    1.   Continue 2 wills

## 2018-05-14 NOTE — PATIENT INSTRUCTIONS
1.  Continue 2 tamsulosin 0.4 mg for a total dose of 0.8 mg daily    2. Continue finasteride 5 mg daily    3.   Please take sugar-free generic orange flavored Metamucil 1 large tablespoon in 8-10 ounces of cool water as soon as you wake up every morning--y

## 2018-05-17 RX ORDER — PANTOPRAZOLE SODIUM 40 MG/1
TABLET, DELAYED RELEASE ORAL
Qty: 30 TABLET | Refills: 0 | Status: SHIPPED | OUTPATIENT
Start: 2018-05-17 | End: 2021-02-10

## 2018-05-17 NOTE — TELEPHONE ENCOUNTER
Refill Protocol Appointment Criteria  · Appointment scheduled in the past 12 months or in the next 3 months  Recent Outpatient Visits            3 days ago Benign prostatic hyperplasia with urinary frequency    [facility], [address],

## 2018-05-31 RX ORDER — GLIMEPIRIDE 4 MG/1
4 TABLET ORAL
Qty: 90 TABLET | Refills: 0 | Status: SHIPPED | OUTPATIENT
Start: 2018-05-31 | End: 2018-08-27

## 2018-05-31 NOTE — TELEPHONE ENCOUNTER
Current Outpatient Prescriptions:  glimepiride 4 MG Oral Tab Take 1 tablet (4 mg total) by mouth every morning before breakfast. Disp: 90 tablet Rfl: 1     Refill

## 2018-06-08 ENCOUNTER — PRIOR ORIGINAL RECORDS (OUTPATIENT)
Dept: OTHER | Age: 83
End: 2018-06-08

## 2018-06-08 LAB
BUN: 49 MG/DL
CALCIUM: 9.1 MG/DL
CHLORIDE: 105 MEQ/L
CHOLESTEROL, TOTAL: 123 MG/DL
CREATININE, SERUM: 2.03 MG/DL
GLUCOSE: 254 MG/DL
HDL CHOLESTEROL: 28 MG/DL
HEMATOCRIT: 41.4 %
HEMOGLOBIN: 13.8 G/DL
LDL CHOLESTEROL: 51 MG/DL
NON-HDL CHOLESTEROL: 95 MG/DL
PLATELETS: 165 K/UL
POTASSIUM, SERUM: 4.4 MEQ/L
RED BLOOD COUNT: 4.61 X 10-6/U
SODIUM: 136 MEQ/L
TRIGLYCERIDES: 222 MG/DL
WHITE BLOOD COUNT: 7.6 X 10-3/U

## 2018-06-13 ENCOUNTER — PRIOR ORIGINAL RECORDS (OUTPATIENT)
Dept: OTHER | Age: 83
End: 2018-06-13

## 2018-06-13 ENCOUNTER — MYAURORA ACCOUNT LINK (OUTPATIENT)
Dept: OTHER | Age: 83
End: 2018-06-13

## 2018-06-25 ENCOUNTER — LAB ENCOUNTER (OUTPATIENT)
Dept: LAB | Age: 83
End: 2018-06-25
Attending: INTERNAL MEDICINE
Payer: MEDICARE

## 2018-06-25 ENCOUNTER — TELEPHONE (OUTPATIENT)
Dept: NEPHROLOGY | Facility: CLINIC | Age: 83
End: 2018-06-25

## 2018-06-25 DIAGNOSIS — N18.30 STAGE 3 CHRONIC KIDNEY DISEASE (HCC): ICD-10-CM

## 2018-06-25 PROCEDURE — 36415 COLL VENOUS BLD VENIPUNCTURE: CPT

## 2018-06-25 PROCEDURE — 80069 RENAL FUNCTION PANEL: CPT

## 2018-06-25 PROCEDURE — 85025 COMPLETE CBC W/AUTO DIFF WBC: CPT

## 2018-06-25 NOTE — TELEPHONE ENCOUNTER
Kidney function a little better. If doing well CPM.  Repeat labs in 3 months and then see for follow-up.

## 2018-06-25 NOTE — TELEPHONE ENCOUNTER
Contacted pt's son, Aster Torres. Notified him that Cal's kidney function is a little better. Advised to CPM if doing well and to repeat labs in 3 months and then follow up with [facility].  He already has an appt scheduled with [facility] for 10/19/18 so they will keep this

## 2018-07-09 ENCOUNTER — PRIOR ORIGINAL RECORDS (OUTPATIENT)
Dept: OTHER | Age: 83
End: 2018-07-09

## 2018-08-01 ENCOUNTER — OFFICE VISIT (OUTPATIENT)
Dept: ENDOCRINOLOGY CLINIC | Facility: CLINIC | Age: 83
End: 2018-08-01
Payer: MEDICARE

## 2018-08-01 VITALS
HEART RATE: 86 BPM | HEIGHT: 66 IN | DIASTOLIC BLOOD PRESSURE: 84 MMHG | WEIGHT: 161 LBS | SYSTOLIC BLOOD PRESSURE: 144 MMHG | BODY MASS INDEX: 25.88 KG/M2

## 2018-08-01 DIAGNOSIS — Z79.4 UNCONTROLLED TYPE 2 DIABETES MELLITUS WITH COMPLICATION, WITH LONG-TERM CURRENT USE OF INSULIN (HCC): Primary | ICD-10-CM

## 2018-08-01 DIAGNOSIS — E11.8 UNCONTROLLED TYPE 2 DIABETES MELLITUS WITH COMPLICATION, WITH LONG-TERM CURRENT USE OF INSULIN (HCC): Primary | ICD-10-CM

## 2018-08-01 DIAGNOSIS — E11.65 UNCONTROLLED TYPE 2 DIABETES MELLITUS WITH COMPLICATION, WITH LONG-TERM CURRENT USE OF INSULIN (HCC): Primary | ICD-10-CM

## 2018-08-01 LAB
CARTRIDGE LOT#: ABNORMAL NUMERIC
GLUCOSE BLOOD: 209
HEMOGLOBIN A1C: 9.3 % (ref 4.3–5.6)
TEST STRIP LOT #: NORMAL NUMERIC

## 2018-08-01 PROCEDURE — 99213 OFFICE O/P EST LOW 20 MIN: CPT | Performed by: INTERNAL MEDICINE

## 2018-08-01 PROCEDURE — 36416 COLLJ CAPILLARY BLOOD SPEC: CPT | Performed by: INTERNAL MEDICINE

## 2018-08-01 PROCEDURE — 83036 HEMOGLOBIN GLYCOSYLATED A1C: CPT | Performed by: INTERNAL MEDICINE

## 2018-08-01 PROCEDURE — 82962 GLUCOSE BLOOD TEST: CPT | Performed by: INTERNAL MEDICINE

## 2018-08-01 NOTE — PROGRESS NOTES
Name: Zohra Patterson  Date: 8/1/2018    Referring Physician: No ref. provider found    HISTORY OF PRESENT ILLNESS   Zohra Patterson is a 80year old male who presents for evaluation of diabetes mellitus.      Since last visit he has been hospitalized for bra BEFORE BREAKFAST, Disp: 30 tablet, Rfl: 0  •  finasteride 5 MG Oral Tab, Take 5 mg by mouth daily. , Disp: , Rfl: 2  •  tamsulosin HCl 0.4 MG Oral Cap, TAKE 2 CAPSULES BY MOUTH EVERY DAY, Disp: 180 capsule, Rfl: 1  •  ONETOUCH ULTRA BLUE In Vitro Strip, MARINO The patient does live in a home with stairs.             Medical History:   Past Medical History:   Diagnosis Date   • Acid reflux    • Basal cell carcinoma     right jaw   • Basal cell carcinoma 2009    left cheek   • Basal cell carcinoma 1/13/16    jaspreet to 26 units SQ daily   -Normotensive at home   -UTD with podiatry     RTC 3 months     8/1/2018  Ata Ryan MD

## 2018-08-03 ENCOUNTER — MYAURORA ACCOUNT LINK (OUTPATIENT)
Dept: OTHER | Age: 83
End: 2018-08-03

## 2018-08-03 ENCOUNTER — PRIOR ORIGINAL RECORDS (OUTPATIENT)
Dept: OTHER | Age: 83
End: 2018-08-03

## 2018-08-06 RX ORDER — PEN NEEDLE, DIABETIC 31 GX5/16"
NEEDLE, DISPOSABLE MISCELLANEOUS
Qty: 100 EACH | Refills: 0 | Status: SHIPPED | OUTPATIENT
Start: 2018-08-06 | End: 2018-11-18

## 2018-08-09 ENCOUNTER — PRIOR ORIGINAL RECORDS (OUTPATIENT)
Dept: OTHER | Age: 83
End: 2018-08-09

## 2018-08-13 ENCOUNTER — PRIOR ORIGINAL RECORDS (OUTPATIENT)
Dept: OTHER | Age: 83
End: 2018-08-13

## 2018-08-14 ENCOUNTER — PRIOR ORIGINAL RECORDS (OUTPATIENT)
Dept: OTHER | Age: 83
End: 2018-08-14

## 2018-08-14 ENCOUNTER — MYAURORA ACCOUNT LINK (OUTPATIENT)
Dept: OTHER | Age: 83
End: 2018-08-14

## 2018-08-27 ENCOUNTER — PRIOR ORIGINAL RECORDS (OUTPATIENT)
Dept: OTHER | Age: 83
End: 2018-08-27

## 2018-08-27 RX ORDER — GLIMEPIRIDE 4 MG/1
TABLET ORAL
Qty: 90 TABLET | Refills: 1 | Status: SHIPPED | OUTPATIENT
Start: 2018-08-27 | End: 2019-01-21

## 2018-09-14 RX ORDER — LISINOPRIL 10 MG/1
TABLET ORAL
Qty: 30 TABLET | Refills: 1 | Status: SHIPPED | OUTPATIENT
Start: 2018-09-14 | End: 2018-11-13

## 2018-09-17 NOTE — TELEPHONE ENCOUNTER
Called patient using Turkish interpretor Lobo Jacome #[id number]. Patient is aware to do labs prior to appointment with Dr. Laisha Reyes on 10/19/18. Standing order expires 11/9/18.

## 2018-09-24 RX ORDER — GLIMEPIRIDE 4 MG/1
TABLET ORAL
Qty: 90 TABLET | Refills: 0 | Status: SHIPPED | OUTPATIENT
Start: 2018-09-24 | End: 2018-10-19

## 2018-09-26 RX ORDER — SIMVASTATIN 40 MG
TABLET ORAL
Qty: 90 TABLET | Refills: 1 | Status: SHIPPED | OUTPATIENT
Start: 2018-09-26 | End: 2018-10-19

## 2018-09-26 RX ORDER — SIMVASTATIN 40 MG
TABLET ORAL
Qty: 90 TABLET | Refills: 4 | Status: SHIPPED | OUTPATIENT
Start: 2018-09-26 | End: 2020-07-21

## 2018-10-09 RX ORDER — TAMSULOSIN HYDROCHLORIDE 0.4 MG/1
CAPSULE ORAL
Qty: 180 CAPSULE | Refills: 0 | OUTPATIENT
Start: 2018-10-09

## 2018-10-11 ENCOUNTER — LAB ENCOUNTER (OUTPATIENT)
Dept: LAB | Age: 83
End: 2018-10-11
Attending: INTERNAL MEDICINE
Payer: MEDICARE

## 2018-10-11 DIAGNOSIS — N18.30 STAGE 3 CHRONIC KIDNEY DISEASE (HCC): ICD-10-CM

## 2018-10-11 PROCEDURE — 80069 RENAL FUNCTION PANEL: CPT

## 2018-10-11 PROCEDURE — 85025 COMPLETE CBC W/AUTO DIFF WBC: CPT

## 2018-10-11 PROCEDURE — 36415 COLL VENOUS BLD VENIPUNCTURE: CPT

## 2018-10-11 RX ORDER — TAMSULOSIN HYDROCHLORIDE 0.4 MG/1
CAPSULE ORAL
Qty: 180 CAPSULE | Refills: 0 | Status: SHIPPED | OUTPATIENT
Start: 2018-10-11 | End: 2019-01-06

## 2018-10-12 ENCOUNTER — TELEPHONE (OUTPATIENT)
Dept: NEPHROLOGY | Facility: CLINIC | Age: 83
End: 2018-10-12

## 2018-10-15 NOTE — TELEPHONE ENCOUNTER
Pt's son returned call. Notified him of MKK's results message below (hipaa on file). Pt plans to see MKK on 10/19 and will bring his BP readings.

## 2018-10-19 ENCOUNTER — OFFICE VISIT (OUTPATIENT)
Dept: NEPHROLOGY | Facility: CLINIC | Age: 83
End: 2018-10-19
Payer: MEDICARE

## 2018-10-19 VITALS
WEIGHT: 162.63 LBS | DIASTOLIC BLOOD PRESSURE: 77 MMHG | HEART RATE: 76 BPM | HEIGHT: 68 IN | BODY MASS INDEX: 24.65 KG/M2 | SYSTOLIC BLOOD PRESSURE: 148 MMHG

## 2018-10-19 DIAGNOSIS — N18.30 CHRONIC KIDNEY DISEASE, STAGE III (MODERATE) (HCC): Primary | ICD-10-CM

## 2018-10-19 PROCEDURE — G0463 HOSPITAL OUTPT CLINIC VISIT: HCPCS | Performed by: INTERNAL MEDICINE

## 2018-10-19 PROCEDURE — 99214 OFFICE O/P EST MOD 30 MIN: CPT | Performed by: INTERNAL MEDICINE

## 2018-10-19 RX ORDER — AMLODIPINE BESYLATE 5 MG/1
5 TABLET ORAL DAILY
Refills: 0 | COMMUNITY
Start: 2018-10-19 | End: 2019-04-19

## 2018-10-19 NOTE — PROGRESS NOTES
10/19/18        Patient: Cristina Ambrocio   YOB: 1934   Date of Visit: 10/19/2018       Dear  Dr. Claytno Fulton MD,      Thank you for referring Cristina Ambrocio to my practice. Please find my assessment and plan below.       As you know he is an 84-year indicated. He will continue to work with endocrine to get his blood sugars under better control. Thank you again for allowing me to participate in the care of your patient. If you have any questions please feel free to call.            Sincerely,   Mar

## 2018-10-31 ENCOUNTER — OFFICE VISIT (OUTPATIENT)
Dept: ENDOCRINOLOGY CLINIC | Facility: CLINIC | Age: 83
End: 2018-10-31
Payer: MEDICARE

## 2018-10-31 VITALS
WEIGHT: 159.63 LBS | BODY MASS INDEX: 24 KG/M2 | DIASTOLIC BLOOD PRESSURE: 90 MMHG | SYSTOLIC BLOOD PRESSURE: 143 MMHG | HEART RATE: 90 BPM

## 2018-10-31 DIAGNOSIS — E11.8 DIABETES MELLITUS TYPE 2, UNCONTROLLED, WITH COMPLICATIONS (HCC): Primary | ICD-10-CM

## 2018-10-31 DIAGNOSIS — E11.65 DIABETES MELLITUS TYPE 2, UNCONTROLLED, WITH COMPLICATIONS (HCC): Primary | ICD-10-CM

## 2018-10-31 PROCEDURE — 36416 COLLJ CAPILLARY BLOOD SPEC: CPT | Performed by: INTERNAL MEDICINE

## 2018-10-31 PROCEDURE — 82962 GLUCOSE BLOOD TEST: CPT | Performed by: INTERNAL MEDICINE

## 2018-10-31 PROCEDURE — 99214 OFFICE O/P EST MOD 30 MIN: CPT | Performed by: INTERNAL MEDICINE

## 2018-10-31 PROCEDURE — 83036 HEMOGLOBIN GLYCOSYLATED A1C: CPT | Performed by: INTERNAL MEDICINE

## 2018-10-31 RX ORDER — BRIMONIDINE TARTRATE/TIMOLOL 0.2%-0.5%
DROPS OPHTHALMIC (EYE)
Refills: 12 | COMMUNITY
Start: 2018-08-06 | End: 2021-05-20

## 2018-10-31 NOTE — PROGRESS NOTES
Name: Antonia Amezquita  Date: 10/31/2018    Referring Physician: No ref. provider found    HISTORY OF PRESENT ILLNESS   Antonia Amezquita is a 80year old male who presents for evaluation of diabetes mellitus.      Since last visit he has been hospitalized for b tablet, Rfl: 1  •  BD PEN NEEDLE MINI U/F 31G X 5 MM Does not apply Misc, USE ONCE DAILY, Disp: 100 each, Rfl: 0  •  METFORMIN  MG Oral Tab, TAKE 1 TABLET BY MOUTH TWICE DAILY WITH MEALS, Disp: 180 tablet, Rfl: 4  •  PANTOPRAZOLE SODIUM 40 MG Oral T Comment: 1 beer with dinner      Drug use: No      Sexual activity: Not on file    Other Topics      Concerns:         Service: Not Asked        Blood Transfusions: Not Asked        Caffeine Concern: Yes          coffee and soda        Occupational rhythm, , no murmurs, S3 or S4  Gastrointestinal:  normal bowel sounds and no palpable masses in abdomen, organomegaly or tenderness   Musculoskeletal:  normal muscle strength and tone  Hair & Nails:  normal scalp hair     Hematologic:  no excessive bruisi

## 2018-11-06 NOTE — PROGRESS NOTES
Name: Mara Gaviria  Date: 11/7/2018    Referring Physician: No ref. provider found    HISTORY OF PRESENT ILLNESS   Mara Gaviria is a 80year old   male who presents for evaluation of diabetes mellitus.      Recent hospitalization for bradycardia MG Oral Tab, TAKE 1 TABLET BY MOUTH DAILY, Disp: 30 tablet, Rfl: 1  •  GLIMEPIRIDE 4 MG Oral Tab, TAKE 1 TABLET(4 MG) BY MOUTH EVERY MORNING BEFORE BREAKFAST, Disp: 90 tablet, Rfl: 1  •  BD PEN NEEDLE MINI U/F 31G X 5 MM Does not apply Misc, USE ONCE DAILY Smokeless tobacco: Former User    Substance and Sexual Activity      Alcohol use:  Yes        Alcohol/week: 0.6 oz        Types: 1 Cans of beer per week        Comment: 1 beer with dinner      Drug use: No      Sexual activity: Not on file    Other Topic pupils  Ears/Nose/Mouth/Throat/Neck:  no palpable thyroid nodules or cervical lymphadenopathy  Respiratory:  clear to auscultation bilaterally  Cardiovascular:  regular rate, rhythm, , no murmurs, S3, +  S4  Gastrointestinal:  normal bowel sounds and no pa Statin therapy     4. CKD stage 3  PEr Dr Jeanne Mendenhall       Continuous Glucose Monitoring Interpretation  Cristina Toribio has undergone continuous glucose monitoring with the professional [facility].  He was evaluated with the amadou from 10/31/2018- 11/7/20

## 2018-11-07 ENCOUNTER — OFFICE VISIT (OUTPATIENT)
Dept: ENDOCRINOLOGY CLINIC | Facility: CLINIC | Age: 83
End: 2018-11-07
Payer: MEDICARE

## 2018-11-07 VITALS
SYSTOLIC BLOOD PRESSURE: 134 MMHG | BODY MASS INDEX: 24 KG/M2 | HEART RATE: 62 BPM | DIASTOLIC BLOOD PRESSURE: 76 MMHG | WEIGHT: 157.38 LBS | RESPIRATION RATE: 20 BRPM

## 2018-11-07 DIAGNOSIS — E11.65 TYPE 2 DIABETES MELLITUS WITH HYPERGLYCEMIA, WITH LONG-TERM CURRENT USE OF INSULIN (HCC): ICD-10-CM

## 2018-11-07 DIAGNOSIS — N18.30 CHRONIC KIDNEY DISEASE, STAGE III (MODERATE) (HCC): Primary | ICD-10-CM

## 2018-11-07 DIAGNOSIS — Z79.4 TYPE 2 DIABETES MELLITUS WITH HYPERGLYCEMIA, WITH LONG-TERM CURRENT USE OF INSULIN (HCC): ICD-10-CM

## 2018-11-07 DIAGNOSIS — I15.2 HYPERTENSION DUE TO ENDOCRINE DISORDER: ICD-10-CM

## 2018-11-07 DIAGNOSIS — E11.69 HYPERLIPIDEMIA ASSOCIATED WITH TYPE 2 DIABETES MELLITUS (HCC): ICD-10-CM

## 2018-11-07 DIAGNOSIS — E78.5 HYPERLIPIDEMIA ASSOCIATED WITH TYPE 2 DIABETES MELLITUS (HCC): ICD-10-CM

## 2018-11-07 PROCEDURE — 36416 COLLJ CAPILLARY BLOOD SPEC: CPT | Performed by: NURSE PRACTITIONER

## 2018-11-07 PROCEDURE — 82962 GLUCOSE BLOOD TEST: CPT | Performed by: NURSE PRACTITIONER

## 2018-11-07 PROCEDURE — 99213 OFFICE O/P EST LOW 20 MIN: CPT | Performed by: NURSE PRACTITIONER

## 2018-11-07 PROCEDURE — 95251 CONT GLUC MNTR ANALYSIS I&R: CPT | Performed by: NURSE PRACTITIONER

## 2018-11-08 ENCOUNTER — MYAURORA ACCOUNT LINK (OUTPATIENT)
Dept: OTHER | Age: 83
End: 2018-11-08

## 2018-11-13 RX ORDER — LISINOPRIL 10 MG/1
TABLET ORAL
Qty: 30 TABLET | Refills: 4 | Status: SHIPPED | OUTPATIENT
Start: 2018-11-13

## 2018-11-19 ENCOUNTER — OFFICE VISIT (OUTPATIENT)
Dept: SURGERY | Facility: CLINIC | Age: 83
End: 2018-11-19
Payer: MEDICARE

## 2018-11-19 ENCOUNTER — LAB ENCOUNTER (OUTPATIENT)
Dept: LAB | Facility: HOSPITAL | Age: 83
End: 2018-11-19
Attending: UROLOGY
Payer: MEDICARE

## 2018-11-19 ENCOUNTER — HOSPITAL ENCOUNTER (OUTPATIENT)
Dept: GENERAL RADIOLOGY | Facility: HOSPITAL | Age: 83
Discharge: HOME | End: 2018-11-19
Attending: UROLOGY | Admitting: UROLOGY
Payer: MEDICARE

## 2018-11-19 VITALS
BODY MASS INDEX: 25.23 KG/M2 | WEIGHT: 157 LBS | SYSTOLIC BLOOD PRESSURE: 134 MMHG | RESPIRATION RATE: 16 BRPM | HEIGHT: 66 IN | TEMPERATURE: 98 F | DIASTOLIC BLOOD PRESSURE: 76 MMHG | HEART RATE: 62 BPM

## 2018-11-19 DIAGNOSIS — N18.30 CHRONIC KIDNEY DISEASE, STAGE III (MODERATE) (HCC): ICD-10-CM

## 2018-11-19 DIAGNOSIS — K59.01 CONSTIPATION BY DELAYED COLONIC TRANSIT: ICD-10-CM

## 2018-11-19 DIAGNOSIS — N20.0 KIDNEY STONE: ICD-10-CM

## 2018-11-19 DIAGNOSIS — N18.30 STAGE 3 CHRONIC KIDNEY DISEASE (HCC): ICD-10-CM

## 2018-11-19 DIAGNOSIS — R35.1 NOCTURIA: ICD-10-CM

## 2018-11-19 DIAGNOSIS — M47.817 SPONDYLOSIS OF LUMBOSACRAL REGION WITHOUT MYELOPATHY OR RADICULOPATHY: ICD-10-CM

## 2018-11-19 DIAGNOSIS — R80.9 PROTEINURIA, UNSPECIFIED TYPE: ICD-10-CM

## 2018-11-19 DIAGNOSIS — N40.1 BENIGN PROSTATIC HYPERPLASIA WITH URINARY FREQUENCY: Primary | ICD-10-CM

## 2018-11-19 DIAGNOSIS — R35.0 BENIGN PROSTATIC HYPERPLASIA WITH URINARY FREQUENCY: Primary | ICD-10-CM

## 2018-11-19 PROCEDURE — 99214 OFFICE O/P EST MOD 30 MIN: CPT | Performed by: UROLOGY

## 2018-11-19 PROCEDURE — G0463 HOSPITAL OUTPT CLINIC VISIT: HCPCS | Performed by: UROLOGY

## 2018-11-19 PROCEDURE — 74021 RADEX ABDOMEN 3+ VIEWS: CPT | Performed by: UROLOGY

## 2018-11-19 PROCEDURE — 81001 URINALYSIS AUTO W/SCOPE: CPT

## 2018-11-19 RX ORDER — PEN NEEDLE, DIABETIC 31 GX5/16"
NEEDLE, DISPOSABLE MISCELLANEOUS
Qty: 100 EACH | Refills: 5 | Status: SHIPPED | OUTPATIENT
Start: 2018-11-19 | End: 2020-01-06

## 2018-11-19 NOTE — PROGRESS NOTES
HPI:    Patient ID: Guera Felton is a 80year old male. HPI     History provided by pt and son Thomaspatti Lozaks; Venezuelan translation provided by son.     Voiding Dysfunction  Patient has current AUA score of 4, mild voiding dysfunction category, similar compared Negative for chest tightness and shortness of breath. Cardiovascular: Negative for chest pain. Gastrointestinal: Negative for abdominal pain and constipation. Genitourinary: Negative for dysuria, flank pain and hematuria (Gross).         Positive for by mouth 2 (two) times daily as needed for Muscle spasms.  Disp: 20 tablet Rfl: 0     Allergies:No Known Allergies    HISTORY:  Past Medical History:   Diagnosis Date   • Acid reflux    • Basal cell carcinoma     right jaw   • Basal cell carcinoma 2009    l kg/m².    LABORATORIES    10/11/2018 Creatinine = 1.91; eGFR = 31  04/13/2018 Creatinine = 1.83; eGFR 33  11/15/2017 UA RBC = 1; WBC = 2; Protein = 100  11/4/2017 Creatinine = 1.73; eGFR = 38  11/16/2016 Creatinine = 1.77; eGFR = 39  6/6/2017 PSA = 1.6 understands and agrees; if there is some constipation, may ask to repeat imaging study with higher dose of Miralax.  If imaging study unremarkable discussed option of follow up in 6-12 months and pt would like to follow up in 6 months.     (N18.3) Stage 3 c KUB plain x-ray this evening; if there is a lot of constipation if it is difficult to see stones, we may ask you to repeat it after taking a larger dose of MiraLAX. We will let you know the results regardless.   If the x-ray shows significant kidney stones

## 2018-11-20 RX ORDER — LISINOPRIL 10 MG/1
TABLET ORAL
Qty: 30 TABLET | Refills: 0 | OUTPATIENT
Start: 2018-11-20

## 2018-11-20 NOTE — TELEPHONE ENCOUNTER
Lisinopril 10 mg rx request addressed and filled on 11/13/18. No further action required at this time.

## 2018-11-20 NOTE — PATIENT INSTRUCTIONS
Sloane Chand M.D.      1.  KUB plain x-ray this evening; if there is a lot of constipation if it is difficult to see stones, we may ask you to repeat it after taking a larger dose of MiraLAX.   We will let you know the resul

## 2018-11-26 RX ORDER — LISINOPRIL 10 MG/1
TABLET ORAL
Qty: 30 TABLET | Refills: 0 | OUTPATIENT
Start: 2018-11-26

## 2018-11-26 RX ORDER — FINASTERIDE 5 MG/1
TABLET, FILM COATED ORAL
Qty: 90 TABLET | Refills: 0 | Status: SHIPPED | OUTPATIENT
Start: 2018-11-26 | End: 2019-02-23

## 2018-12-19 ENCOUNTER — PRIOR ORIGINAL RECORDS (OUTPATIENT)
Dept: OTHER | Age: 83
End: 2018-12-19

## 2018-12-19 ENCOUNTER — OFFICE VISIT (OUTPATIENT)
Dept: ENDOCRINOLOGY CLINIC | Facility: CLINIC | Age: 83
End: 2018-12-19
Payer: MEDICARE

## 2018-12-19 VITALS
HEIGHT: 66 IN | SYSTOLIC BLOOD PRESSURE: 146 MMHG | HEART RATE: 60 BPM | WEIGHT: 157 LBS | DIASTOLIC BLOOD PRESSURE: 78 MMHG | BODY MASS INDEX: 25.23 KG/M2

## 2018-12-19 DIAGNOSIS — E11.65 TYPE 2 DIABETES MELLITUS WITH HYPERGLYCEMIA, WITH LONG-TERM CURRENT USE OF INSULIN (HCC): Primary | ICD-10-CM

## 2018-12-19 DIAGNOSIS — E11.69 HYPERLIPIDEMIA ASSOCIATED WITH TYPE 2 DIABETES MELLITUS (HCC): ICD-10-CM

## 2018-12-19 DIAGNOSIS — N18.30 CHRONIC KIDNEY DISEASE, STAGE III (MODERATE) (HCC): ICD-10-CM

## 2018-12-19 DIAGNOSIS — I15.2 HYPERTENSION DUE TO ENDOCRINE DISORDER: ICD-10-CM

## 2018-12-19 DIAGNOSIS — Z79.4 TYPE 2 DIABETES MELLITUS WITH HYPERGLYCEMIA, WITH LONG-TERM CURRENT USE OF INSULIN (HCC): Primary | ICD-10-CM

## 2018-12-19 DIAGNOSIS — E78.5 HYPERLIPIDEMIA ASSOCIATED WITH TYPE 2 DIABETES MELLITUS (HCC): ICD-10-CM

## 2018-12-19 PROCEDURE — 99213 OFFICE O/P EST LOW 20 MIN: CPT | Performed by: NURSE PRACTITIONER

## 2018-12-19 PROCEDURE — G0463 HOSPITAL OUTPT CLINIC VISIT: HCPCS | Performed by: NURSE PRACTITIONER

## 2018-12-19 NOTE — PATIENT INSTRUCTIONS
Continue Glimepiride 4mg  daily   -Continue Metformin low dose / 500 mg po daily   -Continue Lantus 25 units  daily    Follow up in 3 months

## 2018-12-19 NOTE — PROGRESS NOTES
Name: Wilson Arvizu  Date: 12/19/2018    Referring Physician: No ref. provider found    HISTORY OF PRESENT ILLNESS   Wilson Arvizu is a 80year old   male who presents for evaluation of diabetes mellitus.       Significant history of recent 3AVB / Medications:   •  FINASTERIDE 5 MG Oral Tab, TAKE 1 TABLET(5 MG) BY MOUTH DAILY, Disp: 90 tablet, Rfl: 0  •  BD PEN NEEDLE MINI U/F 31G X 5 MM Does not apply Misc, USE EVERY DAY, Disp: 100 each, Rfl: 5  •  LISINOPRIL 10 MG Oral Tab, TAKE 1 TABLET BY MOUTH Use      Smoking status: Former Smoker        Packs/day: 0.00        Years: 17.00        Pack years: 0        Types: Cigarettes      Smokeless tobacco: Former User    Substance and Sexual Activity      Alcohol use:  Yes        Alcohol/week: 0.6 oz        Ty grossly intact and motor grossly intact  Psychiatric:  oriented to time, self, and place  Nutritional:  no abnormal weight gain or loss    ASSESSMENT/PLAN:      1.  Diabetes Mellitus, controlled  -controlled, HgA1c 8.3% -->mild improvement  States he has be

## 2018-12-24 RX ORDER — GLIMEPIRIDE 4 MG/1
TABLET ORAL
Qty: 90 TABLET | Refills: 1 | Status: SHIPPED | OUTPATIENT
Start: 2018-12-24 | End: 2019-07-11

## 2018-12-24 RX ORDER — GLIMEPIRIDE 4 MG/1
TABLET ORAL
Qty: 90 TABLET | Refills: 1 | Status: SHIPPED | OUTPATIENT
Start: 2018-12-24 | End: 2019-01-21

## 2019-01-08 RX ORDER — TAMSULOSIN HYDROCHLORIDE 0.4 MG/1
CAPSULE ORAL
Qty: 180 CAPSULE | Refills: 3 | Status: SHIPPED | OUTPATIENT
Start: 2019-01-08 | End: 2020-01-21

## 2019-01-10 RX ORDER — TAMSULOSIN HYDROCHLORIDE 0.4 MG/1
CAPSULE ORAL
Qty: 180 CAPSULE | Refills: 0 | OUTPATIENT
Start: 2019-01-10

## 2019-01-12 RX ORDER — TAMSULOSIN HYDROCHLORIDE 0.4 MG/1
CAPSULE ORAL
Qty: 180 CAPSULE | Refills: 0 | OUTPATIENT
Start: 2019-01-12

## 2019-01-13 NOTE — TELEPHONE ENCOUNTER
Duplicate request.     Requested Prescriptions     Pending Prescriptions Disp Refills   • TAMSULOSIN HCL 0.4 MG Oral Cap [Pharmacy Med Name: TAMSULOSIN 0.4MG CAPSULES] 180 capsule 0     Sig: TAKE 2 CAPSULES BY MOUTH EVERY DAY       LR 1-8-19 # 180 + 3  Catalino

## 2019-01-14 ENCOUNTER — ORDER TRANSCRIPTION (OUTPATIENT)
Dept: ADMINISTRATIVE | Facility: HOSPITAL | Age: 84
End: 2019-01-14

## 2019-01-14 ENCOUNTER — PRIOR ORIGINAL RECORDS (OUTPATIENT)
Dept: OTHER | Age: 84
End: 2019-01-14

## 2019-01-14 ENCOUNTER — LAB ENCOUNTER (OUTPATIENT)
Dept: LAB | Age: 84
End: 2019-01-14
Attending: INTERNAL MEDICINE
Payer: MEDICARE

## 2019-01-14 DIAGNOSIS — R06.00 DYSPNEA: Primary | ICD-10-CM

## 2019-01-14 DIAGNOSIS — N18.30 CHRONIC KIDNEY DISEASE, STAGE III (MODERATE) (HCC): ICD-10-CM

## 2019-01-14 LAB
ALBUMIN SERPL BCP-MCNC: 3.6 G/DL (ref 3.5–4.8)
ANION GAP SERPL CALC-SCNC: 11 MMOL/L (ref 0–18)
BASOPHILS # BLD: 0 K/UL (ref 0–0.2)
BASOPHILS NFR BLD: 1 %
BUN SERPL-MCNC: 27 MG/DL (ref 8–20)
BUN/CREAT SERPL: 12.3 (ref 10–20)
CALCIUM SERPL-MCNC: 9.5 MG/DL (ref 8.5–10.5)
CHLORIDE SERPL-SCNC: 106 MMOL/L (ref 95–110)
CO2 SERPL-SCNC: 23 MMOL/L (ref 22–32)
CREAT SERPL-MCNC: 2.19 MG/DL (ref 0.5–1.5)
EOSINOPHIL # BLD: 0.2 K/UL (ref 0–0.7)
EOSINOPHIL NFR BLD: 3 %
ERYTHROCYTE [DISTWIDTH] IN BLOOD BY AUTOMATED COUNT: 13.7 % (ref 11–15)
GLUCOSE SERPL-MCNC: 109 MG/DL (ref 70–99)
HCT VFR BLD AUTO: 40.5 % (ref 41–52)
HGB BLD-MCNC: 13.5 G/DL (ref 13.5–17.5)
LYMPHOCYTES # BLD: 1.6 K/UL (ref 1–4)
LYMPHOCYTES NFR BLD: 24 %
MCH RBC QN AUTO: 30 PG (ref 27–32)
MCHC RBC AUTO-ENTMCNC: 33.4 G/DL (ref 32–37)
MCV RBC AUTO: 89.8 FL (ref 80–100)
MONOCYTES # BLD: 0.6 K/UL (ref 0–1)
MONOCYTES NFR BLD: 9 %
NEUTROPHILS # BLD AUTO: 4.3 K/UL (ref 1.8–7.7)
NEUTROPHILS NFR BLD: 64 %
OSMOLALITY UR CALC.SUM OF ELEC: 296 MOSM/KG (ref 275–295)
PHOSPHATE SERPL-MCNC: 3 MG/DL (ref 2.4–4.7)
PLATELET # BLD AUTO: 206 K/UL (ref 140–400)
PMV BLD AUTO: 8.8 FL (ref 7.4–10.3)
POTASSIUM SERPL-SCNC: 4.5 MMOL/L (ref 3.3–5.1)
RBC # BLD AUTO: 4.51 M/UL (ref 4.5–5.9)
SODIUM SERPL-SCNC: 140 MMOL/L (ref 136–144)
WBC # BLD AUTO: 6.7 K/UL (ref 4–11)

## 2019-01-14 PROCEDURE — 80069 RENAL FUNCTION PANEL: CPT

## 2019-01-14 PROCEDURE — 85025 COMPLETE CBC W/AUTO DIFF WBC: CPT

## 2019-01-14 PROCEDURE — 36415 COLL VENOUS BLD VENIPUNCTURE: CPT

## 2019-01-15 ENCOUNTER — TELEPHONE (OUTPATIENT)
Dept: NEPHROLOGY | Facility: CLINIC | Age: 84
End: 2019-01-15

## 2019-01-15 ENCOUNTER — TELEPHONE (OUTPATIENT)
Dept: OTHER | Age: 84
End: 2019-01-15

## 2019-01-15 NOTE — TELEPHONE ENCOUNTER
Patient's son calling Pratik Sun who is on HIPPA stated the patient has been trying multiple times to get his Tamsulosin filled. Per our records indicate that it was filled on 1/8/19. Multiple requests received.     I call [facility] in [place] who stated t

## 2019-01-16 NOTE — TELEPHONE ENCOUNTER
Patient contacted. Results message from Dr. Jacob Garcia read to patient. He states blood pressures are under control but did not have any readings. He is aware to repeat labs in 3 months (April) then schedule a follow up visit with Dr. Jacob Garcia after that.  Yareli

## 2019-01-16 NOTE — TELEPHONE ENCOUNTER
Kidneys were a little worse. Make sure he is drinking enough fluids. Are blood pressures under control? ?.  Continue to work with endocrine to get your blood sugars under good control. Repeat labs in 3 months and then see for follow-up.

## 2019-01-18 ENCOUNTER — HOSPITAL ENCOUNTER (OUTPATIENT)
Dept: RESPIRATORY THERAPY | Facility: HOSPITAL | Age: 84
Discharge: HOME | End: 2019-01-18
Attending: INTERNAL MEDICINE
Payer: MEDICARE

## 2019-01-18 LAB
ALBUMIN: 3.6 G/DL
BUN: 27 MG/DL
CALCIUM: 9.5 MG/DL
CHLORIDE: 106 MEQ/L
CREATININE, SERUM: 2.19 MG/DL
GLUCOSE: 109 MG/DL
HEMATOCRIT: 40.5 %
HEMOGLOBIN: 13.5 G/DL
PLATELETS: 206 K/UL
POTASSIUM, SERUM: 4.5 MEQ/L
RED BLOOD COUNT: 4.51 X 10-6/U
SODIUM: 140 MEQ/L
WHITE BLOOD COUNT: 6.7 X 10-3/U

## 2019-01-18 PROCEDURE — 94729 DIFFUSING CAPACITY: CPT

## 2019-01-18 PROCEDURE — 94726 PLETHYSMOGRAPHY LUNG VOLUMES: CPT

## 2019-01-18 PROCEDURE — 94060 EVALUATION OF WHEEZING: CPT

## 2019-01-22 ENCOUNTER — HOSPITAL ENCOUNTER (OUTPATIENT)
Dept: INTERVENTIONAL RADIOLOGY/VASCULAR | Facility: HOSPITAL | Age: 84
Discharge: HOME | End: 2019-01-22
Attending: INTERNAL MEDICINE | Admitting: INTERNAL MEDICINE
Payer: MEDICARE

## 2019-01-22 ENCOUNTER — PRIOR ORIGINAL RECORDS (OUTPATIENT)
Dept: OTHER | Age: 84
End: 2019-01-22

## 2019-01-22 VITALS
WEIGHT: 169 LBS | HEART RATE: 63 BPM | RESPIRATION RATE: 17 BRPM | SYSTOLIC BLOOD PRESSURE: 142 MMHG | OXYGEN SATURATION: 98 % | DIASTOLIC BLOOD PRESSURE: 76 MMHG | BODY MASS INDEX: 27 KG/M2

## 2019-01-22 DIAGNOSIS — E55.9 VITAMIN D DEFICIENCY: ICD-10-CM

## 2019-01-22 DIAGNOSIS — R06.00 DYSPNEA: ICD-10-CM

## 2019-01-22 LAB
BASE EXCESS BLD CALC-SCNC: -0.8 MMOL/L (ref ?–2)
GLUCOSE BLDC GLUCOMTR-MCNC: 189 MG/DL (ref 70–99)
HCO3 BLDA-SCNC: 24.3 MEQ/L (ref 21–27)
MODIFIED ALLEN TEST: POSITIVE
O2 CT BLD-SCNC: 17.8 VOL% (ref 15–23)
PCO2 BLDA: 37 MM HG (ref 35–45)
PH BLDA: 7.41 [PH] (ref 7.35–7.45)
PO2 BLDA: 76 MM HG (ref 80–100)
PUNCTURE CHARGE: YES
SAO2 % BLDA: 98.3 % (ref 94–100)

## 2019-01-22 PROCEDURE — 4A023N6 MEASUREMENT OF CARDIAC SAMPLING AND PRESSURE, RIGHT HEART, PERCUTANEOUS APPROACH: ICD-10-PCS | Performed by: INTERNAL MEDICINE

## 2019-01-22 PROCEDURE — 36600 WITHDRAWAL OF ARTERIAL BLOOD: CPT | Performed by: INTERNAL MEDICINE

## 2019-01-22 PROCEDURE — 96365 THER/PROPH/DIAG IV INF INIT: CPT

## 2019-01-22 PROCEDURE — 93451 RIGHT HEART CATH: CPT

## 2019-01-22 PROCEDURE — 36415 COLL VENOUS BLD VENIPUNCTURE: CPT

## 2019-01-22 PROCEDURE — 82805 BLOOD GASES W/O2 SATURATION: CPT | Performed by: INTERNAL MEDICINE

## 2019-01-22 PROCEDURE — 82962 GLUCOSE BLOOD TEST: CPT

## 2019-01-22 RX ORDER — SODIUM CHLORIDE 9 MG/ML
INJECTION, SOLUTION INTRAVENOUS CONTINUOUS
Status: DISCONTINUED | OUTPATIENT
Start: 2019-01-22 | End: 2019-01-22

## 2019-01-22 RX ORDER — SODIUM CHLORIDE 9 MG/ML
INJECTION, SOLUTION INTRAVENOUS
Status: DISCONTINUED
Start: 2019-01-22 | End: 2019-01-22 | Stop reason: WASHOUT

## 2019-01-22 RX ORDER — LIDOCAINE HYDROCHLORIDE 20 MG/ML
INJECTION, SOLUTION EPIDURAL; INFILTRATION; INTRACAUDAL; PERINEURAL
Status: COMPLETED
Start: 2019-01-22 | End: 2019-01-22

## 2019-01-22 RX ORDER — HYDROCHLOROTHIAZIDE 25 MG/1
12.5 TABLET ORAL DAILY
Qty: 30 TABLET | Refills: 3 | Status: SHIPPED | OUTPATIENT
Start: 2019-01-22 | End: 2021-05-20

## 2019-01-22 RX ORDER — SODIUM NITROPRUSSIDE 25 MG/ML
INJECTION INTRAVENOUS
Status: DISCONTINUED
Start: 2019-01-22 | End: 2019-01-22 | Stop reason: WASHOUT

## 2019-01-22 RX ORDER — SODIUM CHLORIDE 9 MG/ML
INJECTION, SOLUTION INTRAVENOUS
Status: DISCONTINUED
Start: 2019-01-22 | End: 2019-01-22

## 2019-01-22 RX ORDER — HYDROCHLOROTHIAZIDE 25 MG/1
12.5 TABLET ORAL DAILY
Qty: 30 TABLET | Refills: 3 | Status: SHIPPED | OUTPATIENT
Start: 2019-01-22 | End: 2019-01-22

## 2019-01-22 RX ORDER — DEXTROSE MONOHYDRATE 50 MG/ML
INJECTION, SOLUTION INTRAVENOUS
Status: DISCONTINUED
Start: 2019-01-22 | End: 2019-01-22 | Stop reason: WASHOUT

## 2019-01-22 NOTE — PROCEDURES
Olege Cuff  9/5/1934    Procedure:  1) Right Heart Catheterization    Reason for Procedure:  Dyspnea on exertion     Procedure Summary:  1. Risks and Benefits were explained to patient. Informed Consent was obtained  2.  Patient prepped and draped in us

## 2019-01-22 NOTE — H&P
Lisa March 9/5/1934          HPI:   81 yo male with dual chamber ppm, HTN, CKD, DM, who has had progressive SOB, DUNCAN since ppm implant . NYHA class III symptoms. Not on diuretics. Here for Dyspnea evaluation and RHC.          Past Medical History: Not Asked        Special Diet: Not Asked        Back Care: Not Asked        Exercise: Yes          cutting grass        Bike Helmet: Not Asked        Seat Belt: Not Asked        Self-Exams: Not Asked        Grew up on a farm: Not Asked        History of ta Patient Active Problem List:     Chronic kidney disease, stage III (moderate) (HCC)     Type II diabetes mellitus, uncontrolled (HCC)     L5-S1 left mod-severe/right severe, L4-5 bilateral mod, L3-4 right mod foraminal stenosis     L5-S1 bilateral mo

## 2019-01-23 LAB — GLUCOSE: 189 MG/DL

## 2019-01-31 ENCOUNTER — PRIOR ORIGINAL RECORDS (OUTPATIENT)
Dept: OTHER | Age: 84
End: 2019-01-31

## 2019-02-07 ENCOUNTER — PRIOR ORIGINAL RECORDS (OUTPATIENT)
Dept: OTHER | Age: 84
End: 2019-02-07

## 2019-02-07 ENCOUNTER — LAB ENCOUNTER (OUTPATIENT)
Dept: LAB | Age: 84
End: 2019-02-07
Attending: INTERNAL MEDICINE
Payer: MEDICARE

## 2019-02-07 DIAGNOSIS — E11.65 TYPE 2 DIABETES MELLITUS WITH HYPERGLYCEMIA, WITH LONG-TERM CURRENT USE OF INSULIN (HCC): ICD-10-CM

## 2019-02-07 DIAGNOSIS — I10 BENIGN HYPERTENSION: ICD-10-CM

## 2019-02-07 DIAGNOSIS — R06.02 SHORTNESS OF BREATH: ICD-10-CM

## 2019-02-07 DIAGNOSIS — E78.00 PURE HYPERCHOLESTEROLEMIA: ICD-10-CM

## 2019-02-07 DIAGNOSIS — Z79.4 TYPE 2 DIABETES MELLITUS WITH HYPERGLYCEMIA, WITH LONG-TERM CURRENT USE OF INSULIN (HCC): ICD-10-CM

## 2019-02-07 LAB
ALBUMIN SERPL BCP-MCNC: 3.7 G/DL (ref 3.5–4.8)
ALBUMIN/GLOB SERPL: 1.5 {RATIO} (ref 1–2)
ALP SERPL-CCNC: 108 U/L (ref 32–100)
ALT SERPL-CCNC: 20 U/L (ref 17–63)
ANION GAP SERPL CALC-SCNC: 8 MMOL/L (ref 0–18)
AST SERPL-CCNC: 24 U/L (ref 15–41)
BASOPHILS # BLD AUTO: 0.03 X10(3) UL (ref 0–0.2)
BASOPHILS NFR BLD AUTO: 0.5 %
BILIRUB SERPL-MCNC: 0.4 MG/DL (ref 0.3–1.2)
BNP SERPL-MCNC: 104 PG/ML (ref 0–100)
BUN SERPL-MCNC: 30 MG/DL (ref 8–20)
BUN/CREAT SERPL: 15.8 (ref 10–20)
CALCIUM SERPL-MCNC: 9.6 MG/DL (ref 8.5–10.5)
CHLORIDE SERPL-SCNC: 107 MMOL/L (ref 95–110)
CHOLEST SERPL-MCNC: 129 MG/DL (ref 110–200)
CO2 SERPL-SCNC: 24 MMOL/L (ref 22–32)
CREAT SERPL-MCNC: 1.9 MG/DL (ref 0.5–1.5)
DEPRECATED RDW RBC AUTO: 43.3 FL (ref 35.1–46.3)
EOSINOPHIL # BLD AUTO: 0.1 X10(3) UL (ref 0–0.7)
EOSINOPHIL NFR BLD AUTO: 1.8 %
ERYTHROCYTE [DISTWIDTH] IN BLOOD BY AUTOMATED COUNT: 13 % (ref 11–15)
EST. AVERAGE GLUCOSE BLD GHB EST-MCNC: 189 MG/DL (ref 68–126)
GLOBULIN PLAS-MCNC: 2.5 G/DL (ref 2.5–3.7)
GLUCOSE SERPL-MCNC: 234 MG/DL (ref 70–99)
HBA1C MFR BLD HPLC: 8.2 % (ref ?–5.7)
HCT VFR BLD AUTO: 40.8 % (ref 39–53)
HDLC SERPL-MCNC: 31 MG/DL
HGB BLD-MCNC: 13.3 G/DL (ref 13–17.5)
IMM GRANULOCYTES # BLD AUTO: 0.01 X10(3) UL (ref 0–1)
IMM GRANULOCYTES NFR BLD: 0.2 %
LDLC SERPL CALC-MCNC: 38 MG/DL (ref 0–99)
LYMPHOCYTES # BLD AUTO: 1.22 X10(3) UL (ref 1–4)
LYMPHOCYTES NFR BLD AUTO: 21.7 %
MCH RBC QN AUTO: 29.8 PG (ref 26–34)
MCHC RBC AUTO-ENTMCNC: 32.6 G/DL (ref 31–37)
MCV RBC AUTO: 91.3 FL (ref 80–100)
MONOCYTES # BLD AUTO: 0.43 X10(3) UL (ref 0.1–1)
MONOCYTES NFR BLD AUTO: 7.7 %
NEUTROPHILS # BLD AUTO: 3.83 X10 (3) UL (ref 1.5–7.7)
NEUTROPHILS # BLD AUTO: 3.83 X10(3) UL (ref 1.5–7.7)
NEUTROPHILS NFR BLD AUTO: 68.1 %
NONHDLC SERPL-MCNC: 98 MG/DL
OSMOLALITY UR CALC.SUM OF ELEC: 302 MOSM/KG (ref 275–295)
PATIENT FASTING: YES
PLATELET # BLD AUTO: 216 10(3)UL (ref 150–450)
POTASSIUM SERPL-SCNC: 4.6 MMOL/L (ref 3.3–5.1)
PROT SERPL-MCNC: 6.2 G/DL (ref 5.9–8.4)
RBC # BLD AUTO: 4.47 X10(6)UL (ref 3.8–5.8)
SODIUM SERPL-SCNC: 139 MMOL/L (ref 136–144)
TRIGL SERPL-MCNC: 300 MG/DL (ref 1–149)
TSH SERPL-ACNC: 2.64 UIU/ML (ref 0.45–5.33)
WBC # BLD AUTO: 5.6 X10(3) UL (ref 4–11)

## 2019-02-07 PROCEDURE — 84443 ASSAY THYROID STIM HORMONE: CPT

## 2019-02-07 PROCEDURE — 80053 COMPREHEN METABOLIC PANEL: CPT

## 2019-02-07 PROCEDURE — 85025 COMPLETE CBC W/AUTO DIFF WBC: CPT

## 2019-02-07 PROCEDURE — 80061 LIPID PANEL: CPT

## 2019-02-07 PROCEDURE — 83880 ASSAY OF NATRIURETIC PEPTIDE: CPT

## 2019-02-07 PROCEDURE — 83036 HEMOGLOBIN GLYCOSYLATED A1C: CPT

## 2019-02-07 PROCEDURE — 36415 COLL VENOUS BLD VENIPUNCTURE: CPT

## 2019-02-08 ENCOUNTER — PRIOR ORIGINAL RECORDS (OUTPATIENT)
Dept: OTHER | Age: 84
End: 2019-02-08

## 2019-02-08 ENCOUNTER — MYAURORA ACCOUNT LINK (OUTPATIENT)
Dept: OTHER | Age: 84
End: 2019-02-08

## 2019-02-08 LAB
ALBUMIN: 3.7 G/DL
ALKALINE PHOSPHATATE(ALK PHOS): 108 IU/L
ALT (SGPT): 20 U/L
AST (SGOT): 24 U/L
BILIRUBIN TOTAL: 0.4 MG/DL
BNP: 104 PMOL/L
BUN: 30 MG/DL
CALCIUM: 9.6 MG/DL
CHLORIDE: 107 MEQ/L
CHOLESTEROL, TOTAL: 129 MG/DL
CREATININE, SERUM: 1.9 MG/DL
GLOBULIN: 2.5 G/DL
GLUCOSE: 234 MG/DL
GLUCOSE: 234 MG/DL
HDL CHOLESTEROL: 31 MG/DL
HEMATOCRIT: 40.8 %
HEMOGLOBIN A1C: 8.2 %
HEMOGLOBIN: 13.3 G/DL
LDL CHOLESTEROL: 38 MG/DL
NON-HDL CHOLESTEROL: 98 MG/DL
PLATELETS: 216 K/UL
POTASSIUM, SERUM: 4.6 MEQ/L
PROTEIN, TOTAL: 6.2 G/DL
RED BLOOD COUNT: 4.47 X 10-6/U
SGOT (AST): 24 IU/L
SGPT (ALT): 20 IU/L
SODIUM: 139 MEQ/L
THYROID STIMULATING HORMONE: 2.64 MLU/L
TRIGLYCERIDES: 300 MG/DL
WHITE BLOOD COUNT: 5.6 X 10-3/U

## 2019-02-15 NOTE — MR AVS SNAPSHOT
Clemente Ray [address]  [facility] [address]  [phone]               Thank you for choosing us for your health care visit with Evelio Salazar PT.   We are glad to serve you and happy to provide you with t [address] [facility], [address] [facility] (Nikhil Burkett7)    [address], [address]  [address] [facility]   [phone]              [facility]

## 2019-02-26 RX ORDER — FINASTERIDE 5 MG/1
TABLET, FILM COATED ORAL
Qty: 90 TABLET | Refills: 3 | Status: SHIPPED | OUTPATIENT
Start: 2019-02-26 | End: 2019-03-04

## 2019-02-28 VITALS
HEART RATE: 65 BPM | HEIGHT: 68 IN | SYSTOLIC BLOOD PRESSURE: 110 MMHG | WEIGHT: 164 LBS | BODY MASS INDEX: 24.86 KG/M2 | DIASTOLIC BLOOD PRESSURE: 60 MMHG | RESPIRATION RATE: 16 BRPM

## 2019-02-28 VITALS
DIASTOLIC BLOOD PRESSURE: 60 MMHG | HEIGHT: 60 IN | HEART RATE: 76 BPM | BODY MASS INDEX: 31.61 KG/M2 | OXYGEN SATURATION: 98 % | SYSTOLIC BLOOD PRESSURE: 140 MMHG | WEIGHT: 161 LBS

## 2019-02-28 VITALS
HEART RATE: 76 BPM | SYSTOLIC BLOOD PRESSURE: 140 MMHG | DIASTOLIC BLOOD PRESSURE: 70 MMHG | BODY MASS INDEX: 24.71 KG/M2 | HEIGHT: 68 IN | RESPIRATION RATE: 20 BRPM | WEIGHT: 163 LBS

## 2019-02-28 VITALS
HEIGHT: 68 IN | SYSTOLIC BLOOD PRESSURE: 150 MMHG | HEART RATE: 65 BPM | WEIGHT: 159 LBS | DIASTOLIC BLOOD PRESSURE: 80 MMHG | BODY MASS INDEX: 24.1 KG/M2 | RESPIRATION RATE: 18 BRPM

## 2019-02-28 VITALS
DIASTOLIC BLOOD PRESSURE: 76 MMHG | BODY MASS INDEX: 23.49 KG/M2 | WEIGHT: 155 LBS | OXYGEN SATURATION: 98 % | SYSTOLIC BLOOD PRESSURE: 142 MMHG | HEART RATE: 72 BPM | HEIGHT: 68 IN

## 2019-02-28 VITALS
HEART RATE: 64 BPM | BODY MASS INDEX: 23.95 KG/M2 | OXYGEN SATURATION: 99 % | SYSTOLIC BLOOD PRESSURE: 126 MMHG | DIASTOLIC BLOOD PRESSURE: 60 MMHG | RESPIRATION RATE: 20 BRPM | HEIGHT: 68 IN | WEIGHT: 158 LBS

## 2019-03-04 RX ORDER — FINASTERIDE 5 MG/1
TABLET, FILM COATED ORAL
Qty: 90 TABLET | Refills: 3 | Status: SHIPPED | OUTPATIENT
Start: 2019-03-04 | End: 2020-03-19

## 2019-03-04 NOTE — TELEPHONE ENCOUNTER
Pt LOV with Dr. Floresita Brown 11/19/18 pt pharmacy requesting refill on finasteride if you agree please review and sign med. I copied and pasted part of PVK note below. 2.  Urinalysis urine test this evening--we will let you know the results     3.   Continu

## 2019-03-20 ENCOUNTER — OFFICE VISIT (OUTPATIENT)
Dept: ENDOCRINOLOGY CLINIC | Facility: CLINIC | Age: 84
End: 2019-03-20
Payer: MEDICARE

## 2019-03-20 VITALS
BODY MASS INDEX: 26 KG/M2 | WEIGHT: 158 LBS | HEART RATE: 73 BPM | DIASTOLIC BLOOD PRESSURE: 78 MMHG | SYSTOLIC BLOOD PRESSURE: 164 MMHG

## 2019-03-20 DIAGNOSIS — Z79.4 CONTROLLED TYPE 2 DIABETES MELLITUS WITH COMPLICATION, WITH LONG-TERM CURRENT USE OF INSULIN (HCC): Primary | ICD-10-CM

## 2019-03-20 DIAGNOSIS — E11.8 CONTROLLED TYPE 2 DIABETES MELLITUS WITH COMPLICATION, WITH LONG-TERM CURRENT USE OF INSULIN (HCC): Primary | ICD-10-CM

## 2019-03-20 LAB
CARTRIDGE LOT#: ABNORMAL NUMERIC
HEMOGLOBIN A1C: 7.8 % (ref 4.3–5.6)

## 2019-03-20 PROCEDURE — 83036 HEMOGLOBIN GLYCOSYLATED A1C: CPT | Performed by: INTERNAL MEDICINE

## 2019-03-20 PROCEDURE — 36416 COLLJ CAPILLARY BLOOD SPEC: CPT | Performed by: INTERNAL MEDICINE

## 2019-03-20 PROCEDURE — G0463 HOSPITAL OUTPT CLINIC VISIT: HCPCS | Performed by: INTERNAL MEDICINE

## 2019-03-20 PROCEDURE — 99213 OFFICE O/P EST LOW 20 MIN: CPT | Performed by: INTERNAL MEDICINE

## 2019-04-03 RX ORDER — LANCETS 33 GAUGE
EACH MISCELLANEOUS
Qty: 100 EACH | Refills: 3 | Status: SHIPPED | OUTPATIENT
Start: 2019-04-03 | End: 2020-06-04

## 2019-04-03 NOTE — TELEPHONE ENCOUNTER
Current Outpatient Medications:  ONETOJULIANNA BARRETO LANCETS 64R Does not apply Misc TEST ONCE D Disp:  Rfl: 3     Refill

## 2019-04-08 RX ORDER — SIMVASTATIN 40 MG
TABLET ORAL
Qty: 90 TABLET | Refills: 0 | Status: SHIPPED | OUTPATIENT
Start: 2019-04-08 | End: 2019-10-07

## 2019-04-08 RX ORDER — SIMVASTATIN 40 MG
TABLET ORAL
Qty: 90 TABLET | Refills: 4 | Status: SHIPPED | OUTPATIENT
Start: 2019-04-08 | End: 2019-10-18

## 2019-04-08 NOTE — TELEPHONE ENCOUNTER
do you approve refill request.   Pharmacy stated that they did not received the script from 9/26/2018 I was going to send them a new one but got some high alerts. Please sign if you override the alert. Thanks

## 2019-04-10 RX ORDER — AMLODIPINE BESYLATE 5 MG/1
5 TABLET ORAL DAILY
COMMUNITY
Start: 2018-08-03 | End: 2021-05-13 | Stop reason: ALTCHOICE

## 2019-04-10 RX ORDER — TAMSULOSIN HYDROCHLORIDE 0.4 MG/1
0.8 CAPSULE ORAL DAILY
COMMUNITY
Start: 2016-12-23

## 2019-04-10 RX ORDER — LISINOPRIL 10 MG/1
10 TABLET ORAL DAILY
COMMUNITY
Start: 2016-12-23 | End: 2020-07-13 | Stop reason: DRUGHIGH

## 2019-04-10 RX ORDER — FINASTERIDE 5 MG/1
5 TABLET, FILM COATED ORAL DAILY
COMMUNITY
Start: 2018-12-19

## 2019-04-10 RX ORDER — GLIMEPIRIDE 4 MG/1
4 TABLET ORAL
COMMUNITY
Start: 2018-12-19

## 2019-04-10 RX ORDER — CYCLOBENZAPRINE HCL 5 MG
TABLET ORAL
COMMUNITY
Start: 2018-12-19 | End: 2019-08-19 | Stop reason: CLARIF

## 2019-04-10 RX ORDER — PANTOPRAZOLE SODIUM 40 MG/1
40 TABLET, DELAYED RELEASE ORAL DAILY
COMMUNITY
Start: 2016-12-23

## 2019-04-10 RX ORDER — SIMVASTATIN 40 MG
40 TABLET ORAL NIGHTLY
COMMUNITY
Start: 2016-12-23 | End: 2021-05-13 | Stop reason: SDUPTHER

## 2019-04-10 RX ORDER — LEVOCETIRIZINE DIHYDROCHLORIDE 5 MG/1
TABLET, FILM COATED ORAL
COMMUNITY
Start: 2018-12-19 | End: 2020-01-27

## 2019-04-13 ENCOUNTER — APPOINTMENT (OUTPATIENT)
Dept: LAB | Age: 84
End: 2019-04-13
Attending: INTERNAL MEDICINE
Payer: MEDICARE

## 2019-04-13 DIAGNOSIS — R06.00 DYSPNEA ON EXERTION: ICD-10-CM

## 2019-04-13 DIAGNOSIS — E78.2 MIXED HYPERLIPIDEMIA: ICD-10-CM

## 2019-04-13 DIAGNOSIS — E55.9 VITAMIN D DEFICIENCY: ICD-10-CM

## 2019-04-13 DIAGNOSIS — N18.30 CHRONIC KIDNEY DISEASE, STAGE III (MODERATE) (HCC): ICD-10-CM

## 2019-04-13 DIAGNOSIS — R06.02 SHORTNESS OF BREATH: ICD-10-CM

## 2019-04-13 LAB
25(OH)D3+25(OH)D2 SERPL-MCNC: 29.6 NG/ML
ABSOLUTE IMMATURE GRANULOCYTES (OFFPRE24): NORMAL
ALBUMIN SERPL-MCNC: 3.5 G/DL
ALBUMIN/GLOB SERPL: NORMAL {RATIO}
ALP SERPL-CCNC: 115 U/L
ALT SERPL-CCNC: 21 U/L
ANION GAP SERPL CALC-SCNC: NORMAL MMOL/L
AST SERPL-CCNC: 16 U/L
BASO+EOS+MONOS # BLD: NORMAL 10*3/UL
BASO+EOS+MONOS NFR BLD: NORMAL %
BASOPHILS # BLD: NORMAL 10*3/UL
BASOPHILS NFR BLD: NORMAL %
BILIRUB SERPL-MCNC: 0.3 MG/DL
BNP SERPL-MCNC: 204 PG/ML
BNP SERPL-MCNC: 204 PG/ML
BUN SERPL-MCNC: 37 MG/DL
BUN/CREAT SERPL: NORMAL
CALCIUM SERPL-MCNC: 9.3 MG/DL
CHLORIDE SERPL-SCNC: 110 MMOL/L
CHOLEST SERPL-MCNC: 121 MG/DL
CHOLEST/HDLC SERPL: 56 {RATIO}
CO2 SERPL-SCNC: NORMAL MMOL/L
CREAT SERPL-MCNC: 1.98 MG/DL
DIFFERENTIAL METHOD BLD: NORMAL
EOSINOPHIL # BLD: NORMAL 10*3/UL
EOSINOPHIL NFR BLD: NORMAL %
ERYTHROCYTE [DISTWIDTH] IN BLOOD: NORMAL %
GLOBULIN SER-MCNC: 3.3 G/DL
GLUCOSE SERPL-MCNC: 141 MG/DL
HCT VFR BLD CALC: 41.2 %
HDLC SERPL-MCNC: 31 MG/DL
HGB BLD-MCNC: 13.1 G/DL
IMMATURE GRANULOCYTES (OFFPRE25): NORMAL
LDLC SERPL CALC-MCNC: 90 MG/DL
LENGTH OF FAST TIME PATIENT: NORMAL H
LENGTH OF FAST TIME PATIENT: NORMAL H
LYMPHOCYTES # BLD: NORMAL 10*3/UL
LYMPHOCYTES NFR BLD: NORMAL %
MCH RBC QN AUTO: NORMAL PG
MCHC RBC AUTO-ENTMCNC: NORMAL G/DL
MCV RBC AUTO: NORMAL FL
MONOCYTES # BLD: NORMAL 10*3/UL
MONOCYTES NFR BLD: NORMAL %
MPV (OFFPRE2): NORMAL
NEUTROPHILS # BLD: NORMAL 10*3/UL
NEUTROPHILS NFR BLD: NORMAL %
NONHDLC SERPL-MCNC: NORMAL MG/DL
NRBC BLD MANUAL-RTO: NORMAL %
PLAT MORPH BLD: NORMAL
PLATELET # BLD: 176 10*3/UL
POTASSIUM SERPL-SCNC: 4.6 MMOL/L
PROT SERPL-MCNC: 6.8 G/DL
RBC # BLD: 4.51 10*6/UL
RBC MORPH BLD: NORMAL
SODIUM SERPL-SCNC: 141 MMOL/L
TRIGL SERPL-MCNC: 169 MG/DL
TSH SERPL-ACNC: 3.24 M[IU]/L
TSH SERPL-ACNC: 3.24 M[IU]/L
VLDLC SERPL CALC-MCNC: NORMAL MG/DL
WBC # BLD: 5.4 10*3/UL
WBC MORPH BLD: NORMAL

## 2019-04-13 PROCEDURE — 36415 COLL VENOUS BLD VENIPUNCTURE: CPT

## 2019-04-13 PROCEDURE — 83880 ASSAY OF NATRIURETIC PEPTIDE: CPT

## 2019-04-13 PROCEDURE — 84100 ASSAY OF PHOSPHORUS: CPT

## 2019-04-13 PROCEDURE — 85025 COMPLETE CBC W/AUTO DIFF WBC: CPT

## 2019-04-13 PROCEDURE — 84443 ASSAY THYROID STIM HORMONE: CPT

## 2019-04-13 PROCEDURE — 80061 LIPID PANEL: CPT

## 2019-04-13 PROCEDURE — 80053 COMPREHEN METABOLIC PANEL: CPT

## 2019-04-13 PROCEDURE — 82306 VITAMIN D 25 HYDROXY: CPT

## 2019-04-14 ENCOUNTER — TELEPHONE (OUTPATIENT)
Dept: NEPHROLOGY | Facility: CLINIC | Age: 84
End: 2019-04-14

## 2019-04-14 LAB
ABSOLUTE IMMATURE GRANULOCYTES (OFFPRE24): NORMAL
ALBUMIN SERPL-MCNC: 3.5 G/DL
ALBUMIN/GLOB SERPL: NORMAL {RATIO}
ALP SERPL-CCNC: 115 U/L
ALT SERPL-CCNC: 21 U/L
ANION GAP SERPL CALC-SCNC: NORMAL MMOL/L
AST SERPL-CCNC: 16 U/L
BASO+EOS+MONOS # BLD: NORMAL 10*3/UL
BASO+EOS+MONOS NFR BLD: NORMAL %
BASOPHILS # BLD: NORMAL 10*3/UL
BASOPHILS NFR BLD: NORMAL %
BILIRUB SERPL-MCNC: 0.3 MG/DL
BUN SERPL-MCNC: 37 MG/DL
BUN/CREAT SERPL: NORMAL
CALCIUM SERPL-MCNC: 9.3 MG/DL
CHLORIDE SERPL-SCNC: 110 MMOL/L
CHOLEST SERPL-MCNC: 121 MG/DL
CHOLEST/HDLC SERPL: NORMAL {RATIO}
CO2 SERPL-SCNC: NORMAL MMOL/L
CREAT SERPL-MCNC: 1.98 MG/DL
DIFFERENTIAL METHOD BLD: NORMAL
EOSINOPHIL # BLD: NORMAL 10*3/UL
EOSINOPHIL NFR BLD: NORMAL %
ERYTHROCYTE [DISTWIDTH] IN BLOOD: NORMAL %
GLOBULIN SER-MCNC: 3.3 G/DL
GLUCOSE SERPL-MCNC: 141 MG/DL
HCT VFR BLD CALC: 41.2 %
HDLC SERPL-MCNC: 31 MG/DL
HGB BLD-MCNC: 13.1 G/DL
IMMATURE GRANULOCYTES (OFFPRE25): NORMAL
LDLC SERPL CALC-MCNC: 56 MG/DL
LENGTH OF FAST TIME PATIENT: NORMAL H
LENGTH OF FAST TIME PATIENT: NORMAL H
LYMPHOCYTES # BLD: NORMAL 10*3/UL
LYMPHOCYTES NFR BLD: NORMAL %
MCH RBC QN AUTO: NORMAL PG
MCHC RBC AUTO-ENTMCNC: NORMAL G/DL
MCV RBC AUTO: NORMAL FL
MONOCYTES # BLD: NORMAL 10*3/UL
MONOCYTES NFR BLD: NORMAL %
MPV (OFFPRE2): NORMAL
NEUTROPHILS # BLD: NORMAL 10*3/UL
NEUTROPHILS NFR BLD: NORMAL %
NONHDLC SERPL-MCNC: 90 MG/DL
NRBC BLD MANUAL-RTO: NORMAL %
PLAT MORPH BLD: NORMAL
PLATELET # BLD: 176 10*3/UL
POTASSIUM SERPL-SCNC: 4.6 MMOL/L
PROT SERPL-MCNC: 6.8 G/DL
RBC # BLD: 4.51 10*6/UL
RBC MORPH BLD: NORMAL
SODIUM SERPL-SCNC: 141 MMOL/L
TRIGL SERPL-MCNC: 169 MG/DL
VLDLC SERPL CALC-MCNC: NORMAL MG/DL
WBC # BLD: 5.4 10*3/UL
WBC MORPH BLD: NORMAL

## 2019-04-15 ENCOUNTER — CLINICAL ABSTRACT (OUTPATIENT)
Dept: CARDIOLOGY | Age: 84
End: 2019-04-15

## 2019-04-15 LAB
25(OH)D3+25(OH)D2 SERPL-MCNC: 29.6 NG/ML
25(OH)D3+25(OH)D2 SERPL-MCNC: 29.6 NG/ML

## 2019-04-15 NOTE — TELEPHONE ENCOUNTER
Left son, Db Meade, message per consent on file that pt's kidneys are stable & to keep appt as scheduled on 4/19/19. Advised to call office if he has any questions.

## 2019-04-16 ENCOUNTER — CLINICAL ABSTRACT (OUTPATIENT)
Dept: CARDIOLOGY | Age: 84
End: 2019-04-16

## 2019-04-18 ENCOUNTER — TELEPHONE (OUTPATIENT)
Dept: CARDIOLOGY | Age: 84
End: 2019-04-18

## 2019-04-19 ENCOUNTER — OFFICE VISIT (OUTPATIENT)
Dept: NEPHROLOGY | Facility: CLINIC | Age: 84
End: 2019-04-19
Payer: MEDICARE

## 2019-04-19 VITALS
WEIGHT: 160 LBS | HEIGHT: 66 IN | DIASTOLIC BLOOD PRESSURE: 75 MMHG | SYSTOLIC BLOOD PRESSURE: 143 MMHG | BODY MASS INDEX: 25.71 KG/M2 | HEART RATE: 66 BPM

## 2019-04-19 DIAGNOSIS — N18.30 CHRONIC KIDNEY DISEASE, STAGE III (MODERATE) (HCC): Primary | ICD-10-CM

## 2019-04-19 PROCEDURE — 99214 OFFICE O/P EST MOD 30 MIN: CPT | Performed by: INTERNAL MEDICINE

## 2019-04-19 PROCEDURE — G0463 HOSPITAL OUTPT CLINIC VISIT: HCPCS | Performed by: INTERNAL MEDICINE

## 2019-04-19 RX ORDER — AMLODIPINE BESYLATE 5 MG/1
5 TABLET ORAL DAILY
Qty: 90 TABLET | Refills: 1 | Status: SHIPPED | OUTPATIENT
Start: 2019-04-19 | End: 2019-10-16

## 2019-04-19 NOTE — PROGRESS NOTES
04/19/19        Patient: Zohra Patterson   YOB: 1934   Date of Visit: 4/19/2019       Dear  Dr. David Espinal MD,      Thank you for referring Zohra Patterson to my practice. Please find my assessment and plan below.       As you know he is an 84-year-

## 2019-05-01 ENCOUNTER — CLINICAL ABSTRACT (OUTPATIENT)
Dept: CARDIOLOGY | Age: 84
End: 2019-05-01

## 2019-05-23 ENCOUNTER — APPOINTMENT (OUTPATIENT)
Dept: CARDIOLOGY | Age: 84
End: 2019-05-23
Attending: INTERNAL MEDICINE

## 2019-05-29 ENCOUNTER — TELEPHONE (OUTPATIENT)
Dept: CARDIOLOGY | Age: 84
End: 2019-05-29

## 2019-05-29 RX ORDER — CHOLECALCIFEROL (VITAMIN D3) 125 MCG
2000 CAPSULE ORAL DAILY
COMMUNITY
End: 2021-05-13 | Stop reason: SDUPTHER

## 2019-06-10 ENCOUNTER — CLINICAL ABSTRACT (OUTPATIENT)
Dept: CARDIOLOGY | Age: 84
End: 2019-06-10

## 2019-06-19 ENCOUNTER — ANCILLARY PROCEDURE (OUTPATIENT)
Dept: CARDIOLOGY | Age: 84
End: 2019-06-19
Attending: INTERNAL MEDICINE

## 2019-06-19 ENCOUNTER — ANCILLARY ORDERS (OUTPATIENT)
Dept: CARDIOLOGY | Age: 84
End: 2019-06-19

## 2019-06-19 DIAGNOSIS — Z45.018 PACEMAKER REPROGRAMMING/CHECK: ICD-10-CM

## 2019-06-19 PROCEDURE — 93294 REM INTERROG EVL PM/LDLS PM: CPT | Performed by: INTERNAL MEDICINE

## 2019-06-20 NOTE — TELEPHONE ENCOUNTER
Please review; protocol failed.     Requested Prescriptions     Pending Prescriptions Disp Refills   • METFORMIN  MG Oral Tab [Pharmacy Med Name: METFORMIN 500MG TABLETS] 180 tablet 0     Sig: TAKE 1 TABLET BY MOUTH TWICE DAILY WITH MEALS         Rec

## 2019-06-26 ENCOUNTER — OFFICE VISIT (OUTPATIENT)
Dept: ENDOCRINOLOGY CLINIC | Facility: CLINIC | Age: 84
End: 2019-06-26
Payer: MEDICARE

## 2019-06-26 VITALS
HEART RATE: 62 BPM | DIASTOLIC BLOOD PRESSURE: 77 MMHG | SYSTOLIC BLOOD PRESSURE: 169 MMHG | BODY MASS INDEX: 25 KG/M2 | WEIGHT: 157 LBS

## 2019-06-26 DIAGNOSIS — E11.8 CONTROLLED TYPE 2 DIABETES MELLITUS WITH COMPLICATION, WITH LONG-TERM CURRENT USE OF INSULIN (HCC): Primary | ICD-10-CM

## 2019-06-26 DIAGNOSIS — Z79.4 CONTROLLED TYPE 2 DIABETES MELLITUS WITH COMPLICATION, WITH LONG-TERM CURRENT USE OF INSULIN (HCC): Primary | ICD-10-CM

## 2019-06-26 PROCEDURE — 99213 OFFICE O/P EST LOW 20 MIN: CPT | Performed by: INTERNAL MEDICINE

## 2019-06-26 PROCEDURE — 36416 COLLJ CAPILLARY BLOOD SPEC: CPT | Performed by: INTERNAL MEDICINE

## 2019-06-26 PROCEDURE — 82962 GLUCOSE BLOOD TEST: CPT | Performed by: INTERNAL MEDICINE

## 2019-06-26 PROCEDURE — G0463 HOSPITAL OUTPT CLINIC VISIT: HCPCS | Performed by: INTERNAL MEDICINE

## 2019-06-26 PROCEDURE — 83036 HEMOGLOBIN GLYCOSYLATED A1C: CPT | Performed by: INTERNAL MEDICINE

## 2019-06-26 NOTE — PROGRESS NOTES
Name: More Nolasco  Date: 6/26/2019    Referring Physician: No ref. provider found    HISTORY OF PRESENT ILLNESS   More Nolasco is a 80year old male who presents for evaluation of diabetes mellitus.      He is currently feeling well and following with ONCE D, Disp: 100 each, Rfl: 3  •  ONETOUCH ULTRA BLUE In Vitro Strip, TEST ONCE DAILY, Disp: 100 strip, Rfl: 0  •  insulin glargine (LANTUS SOLOSTAR) 100 UNIT/ML Subcutaneous Solution Pen-injector, INJECT 25 UNITS UNDER THE SKIN EVERY EVENING/Taking 20 un of beer per week        Comment: 1 beer with dinner      Drug use: No    Other Topics      Concerns:        Caffeine Concern: Yes          coffee and soda        Exercise: Yes          cutting grass        Reaction to local anesthetic: No    Social History controlled  -controlled, HgA1c 8.0% -->stable   -Congratulated patient on stable glycemic control   -Discussed importance of glycemic control to prevent complications of diabetes  -Discussed complications of diabetes include retinopathy, neuropathy, nephro

## 2019-07-11 RX ORDER — GLIMEPIRIDE 4 MG/1
TABLET ORAL
Qty: 90 TABLET | Refills: 0 | Status: SHIPPED | OUTPATIENT
Start: 2019-07-11 | End: 2019-10-07

## 2019-07-16 ENCOUNTER — TELEPHONE (OUTPATIENT)
Dept: ENDOCRINOLOGY CLINIC | Facility: CLINIC | Age: 84
End: 2019-07-16

## 2019-07-29 ENCOUNTER — LAB ENCOUNTER (OUTPATIENT)
Dept: LAB | Age: 84
End: 2019-07-29
Attending: INTERNAL MEDICINE
Payer: MEDICARE

## 2019-07-29 DIAGNOSIS — N18.30 CHRONIC KIDNEY DISEASE, STAGE III (MODERATE) (HCC): ICD-10-CM

## 2019-07-29 DIAGNOSIS — R35.1 NOCTURIA: ICD-10-CM

## 2019-07-29 LAB
ALBUMIN SERPL-MCNC: 3.5 G/DL (ref 3.4–5)
ANION GAP SERPL CALC-SCNC: 7 MMOL/L (ref 0–18)
BACTERIA UR QL AUTO: NEGATIVE /HPF
BASOPHILS # BLD AUTO: 0.04 X10(3) UL (ref 0–0.2)
BASOPHILS NFR BLD AUTO: 0.7 %
BILIRUB UR QL: NEGATIVE
BUN BLD-MCNC: 28 MG/DL (ref 7–18)
BUN/CREAT SERPL: 14.4 (ref 10–20)
CALCIUM BLD-MCNC: 9.4 MG/DL (ref 8.5–10.1)
CHLORIDE SERPL-SCNC: 110 MMOL/L (ref 98–112)
CLARITY UR: CLEAR
CO2 SERPL-SCNC: 26 MMOL/L (ref 21–32)
COLOR UR: YELLOW
CREAT BLD-MCNC: 1.95 MG/DL (ref 0.7–1.3)
DEPRECATED RDW RBC AUTO: 45.3 FL (ref 35.1–46.3)
EOSINOPHIL # BLD AUTO: 0.17 X10(3) UL (ref 0–0.7)
EOSINOPHIL NFR BLD AUTO: 2.8 %
ERYTHROCYTE [DISTWIDTH] IN BLOOD BY AUTOMATED COUNT: 13.4 % (ref 11–15)
GLUCOSE BLD-MCNC: 175 MG/DL (ref 70–99)
GLUCOSE UR-MCNC: 50 MG/DL
HCT VFR BLD AUTO: 41.8 % (ref 39–53)
HGB BLD-MCNC: 13.5 G/DL (ref 13–17.5)
HGB UR QL STRIP.AUTO: NEGATIVE
IMM GRANULOCYTES # BLD AUTO: 0.02 X10(3) UL (ref 0–1)
IMM GRANULOCYTES NFR BLD: 0.3 %
KETONES UR-MCNC: NEGATIVE MG/DL
LEUKOCYTE ESTERASE UR QL STRIP.AUTO: NEGATIVE
LYMPHOCYTES # BLD AUTO: 1.46 X10(3) UL (ref 1–4)
LYMPHOCYTES NFR BLD AUTO: 24.1 %
MCH RBC QN AUTO: 29.7 PG (ref 26–34)
MCHC RBC AUTO-ENTMCNC: 32.3 G/DL (ref 31–37)
MCV RBC AUTO: 92.1 FL (ref 80–100)
MONOCYTES # BLD AUTO: 0.47 X10(3) UL (ref 0.1–1)
MONOCYTES NFR BLD AUTO: 7.8 %
NEUTROPHILS # BLD AUTO: 3.89 X10 (3) UL (ref 1.5–7.7)
NEUTROPHILS # BLD AUTO: 3.89 X10(3) UL (ref 1.5–7.7)
NEUTROPHILS NFR BLD AUTO: 64.3 %
NITRITE UR QL STRIP.AUTO: NEGATIVE
OSMOLALITY SERPL CALC.SUM OF ELEC: 306 MOSM/KG (ref 275–295)
PH UR: 6 [PH] (ref 5–8)
PHOSPHATE SERPL-MCNC: 3 MG/DL (ref 2.5–4.9)
PLATELET # BLD AUTO: 189 10(3)UL (ref 150–450)
POTASSIUM SERPL-SCNC: 4.6 MMOL/L (ref 3.5–5.1)
PROT UR-MCNC: 100 MG/DL
RBC # BLD AUTO: 4.54 X10(6)UL (ref 3.8–5.8)
RBC #/AREA URNS AUTO: <1 /HPF
SODIUM SERPL-SCNC: 143 MMOL/L (ref 136–145)
SP GR UR STRIP: 1.02 (ref 1–1.03)
UROBILINOGEN UR STRIP-ACNC: <2
VIT C UR-MCNC: NEGATIVE MG/DL
WBC # BLD AUTO: 6.1 X10(3) UL (ref 4–11)
WBC #/AREA URNS AUTO: <1 /HPF

## 2019-07-29 PROCEDURE — 36415 COLL VENOUS BLD VENIPUNCTURE: CPT

## 2019-07-29 PROCEDURE — 81001 URINALYSIS AUTO W/SCOPE: CPT

## 2019-07-29 PROCEDURE — 80069 RENAL FUNCTION PANEL: CPT

## 2019-07-29 PROCEDURE — 85025 COMPLETE CBC W/AUTO DIFF WBC: CPT

## 2019-07-30 ENCOUNTER — TELEPHONE (OUTPATIENT)
Dept: NEPHROLOGY | Facility: CLINIC | Age: 84
End: 2019-07-30

## 2019-07-30 DIAGNOSIS — N18.30 CHRONIC KIDNEY DISEASE, STAGE III (MODERATE) (HCC): Primary | ICD-10-CM

## 2019-07-30 NOTE — TELEPHONE ENCOUNTER
Kidneys remain stable. If doing well repeat CBC and renal panel just before upcoming visit in October.

## 2019-07-31 ENCOUNTER — OFFICE VISIT (OUTPATIENT)
Dept: FAMILY MEDICINE CLINIC | Facility: CLINIC | Age: 84
End: 2019-07-31
Payer: MEDICARE

## 2019-07-31 VITALS
HEIGHT: 72 IN | SYSTOLIC BLOOD PRESSURE: 164 MMHG | HEART RATE: 71 BPM | WEIGHT: 161 LBS | DIASTOLIC BLOOD PRESSURE: 83 MMHG | BODY MASS INDEX: 21.81 KG/M2

## 2019-07-31 DIAGNOSIS — E11.65 UNCONTROLLED TYPE 2 DIABETES MELLITUS WITH HYPERGLYCEMIA (HCC): ICD-10-CM

## 2019-07-31 DIAGNOSIS — N18.30 CHRONIC KIDNEY DISEASE, STAGE III (MODERATE) (HCC): Primary | ICD-10-CM

## 2019-07-31 DIAGNOSIS — E11.65 TYPE 2 DIABETES MELLITUS WITH HYPERGLYCEMIA, WITH LONG-TERM CURRENT USE OF INSULIN (HCC): ICD-10-CM

## 2019-07-31 DIAGNOSIS — Z00.00 ENCOUNTER FOR ANNUAL HEALTH EXAMINATION: ICD-10-CM

## 2019-07-31 DIAGNOSIS — Z95.0 S/P PLACEMENT OF CARDIAC PACEMAKER: ICD-10-CM

## 2019-07-31 DIAGNOSIS — M48.061 LUMBAR FORAMINAL STENOSIS: ICD-10-CM

## 2019-07-31 DIAGNOSIS — Z79.4 TYPE 2 DIABETES MELLITUS WITH HYPERGLYCEMIA, WITH LONG-TERM CURRENT USE OF INSULIN (HCC): ICD-10-CM

## 2019-07-31 PROBLEM — M51.9 LUMBAR DISC DISEASE: Status: RESOLVED | Noted: 2017-11-27 | Resolved: 2019-07-31

## 2019-07-31 PROBLEM — R00.1 SYMPTOMATIC BRADYCARDIA: Status: RESOLVED | Noted: 2018-04-26 | Resolved: 2019-07-31

## 2019-07-31 PROCEDURE — G0439 PPPS, SUBSEQ VISIT: HCPCS | Performed by: FAMILY MEDICINE

## 2019-07-31 NOTE — PROGRESS NOTES
HPI:   Brooklyn Lipscomb is a 80year old male who presents for a Medicare Subsequent Annual Wellness visit (Pt already had Initial Annual Wellness). Lives with his wife who has significant medical problems but has support of his son.  Just had labs few d as PCP - General (Family Practice)  [facility]  Abdon Sánchez, RN as  (EM) (Care Management)  Mae Heller, PT (Physical Therapy)  Florinda Sparrow, PT as Physical Therapist (Physical Therapy)  Antony Bush MD (Physica apply Misc TEST ONCE D   insulin glargine (LANTUS SOLOSTAR) 100 UNIT/ML Subcutaneous Solution Pen-injector INJECT 25 UNITS UNDER THE SKIN EVERY EVENING/Taking 20 units   FINASTERIDE 5 MG Oral Tab TAKE 1 TABLET(5 MG) BY MOUTH DAILY   hydrochlorothiazide 25 shortness of breath with exertion  CARDIOVASCULAR: denies chest pain on exertion  GI: denies abdominal pain, denies heartburn  : 1 per night nocturia, no complaint of urinary incontinence  MUSCULOSKELETAL: denies back pain  NEURO: denies headaches  PSYCH told me they think I may have hearing loss:   No                 Visual Acuity                           General Appearance:  Alert, cooperative, no distress, appears stated age   Head:  Normocephalic, without obvious abnormality, atraumatic   Eyes:  PERRL, stable. BP still running high. Per pt, normal in Am. No changes to meds for now    2. Uncontrolled type 2 diabetes mellitus with hyperglycemia ([address]  Per endo. Mildly elevated hgb a1c     3.  Type 2 diabetes mellitus with hyperglycemia, with long-term curre years No results found for this or any previous visit. No flowsheet data found. Fecal Occult Blood Annually No results found for: FOB No flowsheet data found.     Glaucoma Screening      Ophthalmology Visit Annually: Diabetics, FHx Glaucoma, AA>50, Hisp flowsheet data found. Drug Serum Conc  Annually No results found for: DIGOXIN, DIG, VALP No flowsheet data found. Diabetes      HgbA1C  Annually HEMOGLOBIN A1C (%)   Date Value   06/26/2019 8.0 (A)       No flowsheet data found.     Creat/alb ratio

## 2019-07-31 NOTE — PATIENT INSTRUCTIONS
Ruchi Cates's SCREENING SCHEDULE   Tests on this list are recommended by your physician but may not be covered, or covered at this frequency, by your insurer. Please check with your insurance carrier before scheduling to verify coverage.     PREVENTATI Covered every 10 years- more often if abnormal There are no preventive care reminders to display for this patient.  Update Health Maintenance if applicable    Flex Sigmoidoscopy Screen  Covered every 5 years No results found for this or any previous visit Part B) No orders found for this or any previous visit. This may be covered with your prescription benefits, but Medicare does not cover unless Medically needed    Zoster (Not covered by Medicare Part B) No orders found for this or any previous visit.  This

## 2019-08-01 NOTE — TELEPHONE ENCOUNTER
Patient contacted. Results message from Dr. Elinor Disla read to patient. He has standing orders for lab work and will complete just before appointment. Orders  10/19/19. Replaced with new orders per written protocol.

## 2019-08-12 ENCOUNTER — ANCILLARY PROCEDURE (OUTPATIENT)
Dept: CARDIOLOGY | Age: 84
End: 2019-08-12
Attending: INTERNAL MEDICINE

## 2019-08-12 DIAGNOSIS — R06.00 DYSPNEA: ICD-10-CM

## 2019-08-12 PROCEDURE — 93306 TTE W/DOPPLER COMPLETE: CPT | Performed by: INTERNAL MEDICINE

## 2019-08-14 ENCOUNTER — TELEPHONE (OUTPATIENT)
Dept: SURGERY | Facility: CLINIC | Age: 84
End: 2019-08-14

## 2019-08-14 NOTE — TELEPHONE ENCOUNTER
Tried to reach patient by phone.  No answer or VM.  ----- Message from Sharon Scherer MD sent at 8/11/2019  4:43 PM CDT -----  Urology medical assistant,  Please notify patient of the following----  7/29/2019 urinalysis urine test did not show any signif

## 2019-08-15 ENCOUNTER — TELEPHONE (OUTPATIENT)
Dept: SURGERY | Facility: CLINIC | Age: 84
End: 2019-08-15

## 2019-08-15 NOTE — TELEPHONE ENCOUNTER
Spoke to patients son and informed of urinalysis results and the need to see Dr Jeanne Mendenhall states he already sees him and has a f/u appointment on 10/18/19

## 2019-08-15 NOTE — TELEPHONE ENCOUNTER
----- Message from Luna Dalal MD sent at 8/11/2019  4:43 PM CDT -----  Urology medical assistant,  Please notify patient of the following----  7/29/2019 urinalysis urine test did not show any significant blood in the urine; it did show a lot of prot

## 2019-08-19 ENCOUNTER — OFFICE VISIT (OUTPATIENT)
Dept: CARDIOLOGY | Age: 84
End: 2019-08-19

## 2019-08-19 VITALS
HEART RATE: 64 BPM | RESPIRATION RATE: 18 BRPM | SYSTOLIC BLOOD PRESSURE: 136 MMHG | BODY MASS INDEX: 24.96 KG/M2 | DIASTOLIC BLOOD PRESSURE: 60 MMHG | WEIGHT: 159 LBS | HEIGHT: 67 IN

## 2019-08-19 DIAGNOSIS — I50.22 CHRONIC SYSTOLIC HEART FAILURE (CMD): ICD-10-CM

## 2019-08-19 DIAGNOSIS — I10 ESSENTIAL HYPERTENSION: Primary | ICD-10-CM

## 2019-08-19 PROCEDURE — 99215 OFFICE O/P EST HI 40 MIN: CPT | Performed by: INTERNAL MEDICINE

## 2019-08-19 RX ORDER — FUROSEMIDE 20 MG/1
20 TABLET ORAL
Qty: 24 TABLET | Refills: 3 | Status: SHIPPED | OUTPATIENT
Start: 2019-08-19 | End: 2020-01-27 | Stop reason: SDUPTHER

## 2019-08-19 ASSESSMENT — NEW YORK HEART ASSOCIATION (NYHA) CLASSIFICATION: NYHA FUNCTIONAL CLASS: II

## 2019-08-19 ASSESSMENT — ENCOUNTER SYMPTOMS
CHILLS: 0
ALLERGIC/IMMUNOLOGIC COMMENTS: NO NEW FOOD ALLERGIES
HEMATOCHEZIA: 0
WEIGHT LOSS: 0
COUGH: 0
SUSPICIOUS LESIONS: 0
WEIGHT GAIN: 0
HEMOPTYSIS: 0
FEVER: 0
BRUISES/BLEEDS EASILY: 0

## 2019-08-19 ASSESSMENT — PATIENT HEALTH QUESTIONNAIRE - PHQ9
SUM OF ALL RESPONSES TO PHQ9 QUESTIONS 1 AND 2: 0
1. LITTLE INTEREST OR PLEASURE IN DOING THINGS: NOT AT ALL
2. FEELING DOWN, DEPRESSED OR HOPELESS: NOT AT ALL
SUM OF ALL RESPONSES TO PHQ9 QUESTIONS 1 AND 2: 0

## 2019-08-20 ENCOUNTER — ANCILLARY PROCEDURE (OUTPATIENT)
Dept: CARDIOLOGY | Age: 84
End: 2019-08-20
Attending: INTERNAL MEDICINE

## 2019-08-20 VITALS
RESPIRATION RATE: 16 BRPM | DIASTOLIC BLOOD PRESSURE: 66 MMHG | WEIGHT: 158 LBS | BODY MASS INDEX: 24.75 KG/M2 | HEART RATE: 60 BPM | SYSTOLIC BLOOD PRESSURE: 144 MMHG

## 2019-08-20 DIAGNOSIS — Z45.018 PACEMAKER REPROGRAMMING/CHECK: Primary | ICD-10-CM

## 2019-08-20 PROCEDURE — 93280 PM DEVICE PROGR EVAL DUAL: CPT | Performed by: INTERNAL MEDICINE

## 2019-08-26 PROCEDURE — 93280 PM DEVICE PROGR EVAL DUAL: CPT | Performed by: INTERNAL MEDICINE

## 2019-09-03 ENCOUNTER — LAB ENCOUNTER (OUTPATIENT)
Dept: LAB | Age: 84
End: 2019-09-03
Attending: INTERNAL MEDICINE
Payer: MEDICARE

## 2019-09-03 DIAGNOSIS — I50.22 CHRONIC SYSTOLIC HEART FAILURE (HCC): Primary | ICD-10-CM

## 2019-09-03 LAB
ANION GAP SERPL CALC-SCNC: 6 MMOL/L (ref 0–18)
ANION GAP SERPL CALC-SCNC: NORMAL MMOL/L
BUN BLD-MCNC: 35 MG/DL (ref 7–18)
BUN SERPL-MCNC: 35 MG/DL
BUN/CREAT SERPL: 15.6 (ref 10–20)
BUN/CREAT SERPL: NORMAL
CALCIUM BLD-MCNC: 9.5 MG/DL (ref 8.5–10.1)
CALCIUM SERPL-MCNC: 9.5 MG/DL
CHLORIDE SERPL-SCNC: 108 MMOL/L
CHLORIDE SERPL-SCNC: 108 MMOL/L (ref 98–112)
CO2 SERPL-SCNC: 28 MMOL/L (ref 21–32)
CO2 SERPL-SCNC: NORMAL MMOL/L
CREAT BLD-MCNC: 2.24 MG/DL (ref 0.7–1.3)
CREAT SERPL-MCNC: 2.24 MG/DL
GLUCOSE BLD-MCNC: 147 MG/DL (ref 70–99)
GLUCOSE SERPL-MCNC: 147 MG/DL
LENGTH OF FAST TIME PATIENT: NORMAL H
OSMOLALITY SERPL CALC.SUM OF ELEC: 305 MOSM/KG (ref 275–295)
PATIENT FASTING: YES
POTASSIUM SERPL-SCNC: 4.6 MMOL/L
POTASSIUM SERPL-SCNC: 4.6 MMOL/L (ref 3.5–5.1)
SODIUM SERPL-SCNC: 142 MMOL/L
SODIUM SERPL-SCNC: 142 MMOL/L (ref 136–145)

## 2019-09-03 PROCEDURE — 80048 BASIC METABOLIC PNL TOTAL CA: CPT

## 2019-09-03 PROCEDURE — 36415 COLL VENOUS BLD VENIPUNCTURE: CPT

## 2019-09-04 ENCOUNTER — CLINICAL ABSTRACT (OUTPATIENT)
Dept: CARDIOLOGY | Age: 84
End: 2019-09-04

## 2019-09-04 ENCOUNTER — TELEPHONE (OUTPATIENT)
Dept: CARDIOLOGY | Age: 84
End: 2019-09-04

## 2019-09-18 ENCOUNTER — OFFICE VISIT (OUTPATIENT)
Dept: FAMILY MEDICINE CLINIC | Facility: CLINIC | Age: 84
End: 2019-09-18
Payer: MEDICARE

## 2019-09-18 VITALS
HEART RATE: 65 BPM | HEIGHT: 67 IN | BODY MASS INDEX: 24.96 KG/M2 | DIASTOLIC BLOOD PRESSURE: 78 MMHG | SYSTOLIC BLOOD PRESSURE: 146 MMHG | WEIGHT: 159 LBS

## 2019-09-18 DIAGNOSIS — H61.23 BILATERAL IMPACTED CERUMEN: Primary | ICD-10-CM

## 2019-09-18 PROCEDURE — G0463 HOSPITAL OUTPT CLINIC VISIT: HCPCS | Performed by: FAMILY MEDICINE

## 2019-09-18 PROCEDURE — 99214 OFFICE O/P EST MOD 30 MIN: CPT | Performed by: FAMILY MEDICINE

## 2019-09-18 NOTE — PROGRESS NOTES
Lisa George is a 80year old male. Patient presents with:  Ear Problem: both ears; x 2 weeks    HPI:   Reports woke up with loud noise 2 weeks ago. No pain. Reports can hear but seems like less. Could be wax.  His son noticed he seems like walking [place] ONCE D Disp:  Rfl: 3   Levocetirizine Dihydrochloride 5 MG Oral Tab Take 1 tablet (5 mg total) by mouth every evening. Disp: 20 tablet Rfl: 0     No current facility-administered medications on file prior to visit.     Past Medical History:   Diagnosis Date

## 2019-10-07 RX ORDER — GLIMEPIRIDE 4 MG/1
TABLET ORAL
Qty: 90 TABLET | Refills: 0 | Status: SHIPPED | OUTPATIENT
Start: 2019-10-07 | End: 2020-01-09

## 2019-10-08 RX ORDER — SIMVASTATIN 40 MG
TABLET ORAL
Qty: 90 TABLET | Refills: 1 | Status: SHIPPED | OUTPATIENT
Start: 2019-10-08 | End: 2019-10-18

## 2019-10-08 NOTE — TELEPHONE ENCOUNTER
Refill passed per [facility] protocol.   Cholesterol Medications  Protocol Criteria:  · Appointment scheduled in the past 12 months or in the next 3 months  · ALT & LDL on file in the past 12 months  · ALT result < 80  · LDL result <130   Recent Outpat

## 2019-10-14 ENCOUNTER — LAB ENCOUNTER (OUTPATIENT)
Dept: LAB | Age: 84
End: 2019-10-14
Attending: FAMILY MEDICINE
Payer: MEDICARE

## 2019-10-14 DIAGNOSIS — N18.30 CHRONIC KIDNEY DISEASE, STAGE III (MODERATE) (HCC): ICD-10-CM

## 2019-10-14 PROCEDURE — 85025 COMPLETE CBC W/AUTO DIFF WBC: CPT

## 2019-10-14 PROCEDURE — 36415 COLL VENOUS BLD VENIPUNCTURE: CPT

## 2019-10-14 PROCEDURE — 80069 RENAL FUNCTION PANEL: CPT

## 2019-10-15 ENCOUNTER — OFFICE VISIT (OUTPATIENT)
Dept: SURGERY | Facility: CLINIC | Age: 84
End: 2019-10-15
Payer: MEDICARE

## 2019-10-15 VITALS
TEMPERATURE: 98 F | WEIGHT: 168 LBS | BODY MASS INDEX: 26.37 KG/M2 | HEART RATE: 60 BPM | SYSTOLIC BLOOD PRESSURE: 138 MMHG | DIASTOLIC BLOOD PRESSURE: 76 MMHG | HEIGHT: 67 IN

## 2019-10-15 DIAGNOSIS — N18.30 STAGE 3 CHRONIC KIDNEY DISEASE (HCC): ICD-10-CM

## 2019-10-15 DIAGNOSIS — M47.817 SPONDYLOSIS OF LUMBOSACRAL REGION WITHOUT MYELOPATHY OR RADICULOPATHY: ICD-10-CM

## 2019-10-15 DIAGNOSIS — R80.1 PERSISTENT PROTEINURIA: ICD-10-CM

## 2019-10-15 DIAGNOSIS — N40.1 BENIGN PROSTATIC HYPERPLASIA WITH URINARY FREQUENCY: Primary | ICD-10-CM

## 2019-10-15 DIAGNOSIS — R35.1 NOCTURIA: ICD-10-CM

## 2019-10-15 DIAGNOSIS — K59.01 CONSTIPATION BY DELAYED COLONIC TRANSIT: ICD-10-CM

## 2019-10-15 DIAGNOSIS — N20.0 KIDNEY STONE: ICD-10-CM

## 2019-10-15 DIAGNOSIS — R35.0 BENIGN PROSTATIC HYPERPLASIA WITH URINARY FREQUENCY: Primary | ICD-10-CM

## 2019-10-15 PROCEDURE — 99214 OFFICE O/P EST MOD 30 MIN: CPT | Performed by: UROLOGY

## 2019-10-15 PROCEDURE — G0463 HOSPITAL OUTPT CLINIC VISIT: HCPCS | Performed by: UROLOGY

## 2019-10-15 NOTE — PROGRESS NOTES
HPI:    Patient ID: Viky Hernandez is a 80year old male. HPI    History provided by patient in Tajik and translated by his son, Jasiel Bishop. Voiding Dysfunction  Chronic.  Patient has current AUA score of 12, moderate voiding dysfunction category, worse improved. 11/15/2017 office consult with me; AUA of 17; Started Tamsulosin 0.4 mg 5 years ago and Finasteride April 2017; 25 year exposure to fumes; 17 pack year smoking history;  Denies any FHx of kidney stones; 4 + enlarged, 50 g, no palpable nodules or apply Misc, TEST ONCE D, Disp: 100 each, Rfl: 3  insulin glargine (LANTUS SOLOSTAR) 100 UNIT/ML Subcutaneous Solution Pen-injector, INJECT 25 UNITS UNDER THE SKIN EVERY EVENING/Taking 20 units, Disp: 15 mL, Rfl: 1  FINASTERIDE 5 MG Oral Tab, TAKE 1 TABLET( History: Social History    Tobacco Use      Smoking status: Former Smoker        Packs/day: 0.00        Years: 17.00        Pack years: 0        Types: Cigarettes      Smokeless tobacco: Former User    Alcohol use:  Yes      Alcohol/week: 1.0 standard drink disc bulge/ossific complex without significant central stenosis; There is facet arthropathy; Moderate right and mild to moderate left foraminal narrowing; L3-L4:   Broad right lateralized disc bulge/osteophyte complex; No significant central stenosis;  Mode benefits, risks, and alternatives to this treatment option and I answered patient's questions; patient decides to undergo renal US.    (R35.1) Nocturia  Patient has current AUA score of 12, moderate voiding dysfunction category, worse compared to previous This Encounter      Urinalysis, Routine- Future      Meds This Visit:  Requested Prescriptions      No prescriptions requested or ordered in this encounter       Imaging & Referrals:  [facility] ([id number]=[id number])     MT#[id number]    By signing my name below, Andrew Amado

## 2019-10-15 NOTE — PATIENT INSTRUCTIONS
Aniyah Harrington M.D.      1.  Please continue finasteride 5 mg daily    2. Please continue tamsulosin 0.4 mg daily    3.   Kidney ultrasound--to make sure there is no new blockage of the kidneys in light of impaired

## 2019-10-16 RX ORDER — AMLODIPINE BESYLATE 5 MG/1
TABLET ORAL
Qty: 90 TABLET | Refills: 1 | Status: SHIPPED | OUTPATIENT
Start: 2019-10-16 | End: 2020-04-23

## 2019-10-16 NOTE — TELEPHONE ENCOUNTER
LOV 4/19/19. RTC in 6 mos (10/2019) F/U scheduled for 10/18/19. Refill pended and routed to Dr. Jacob Garcia to approve.

## 2019-10-18 ENCOUNTER — OFFICE VISIT (OUTPATIENT)
Dept: NEPHROLOGY | Facility: CLINIC | Age: 84
End: 2019-10-18
Payer: MEDICARE

## 2019-10-18 VITALS
HEIGHT: 71 IN | SYSTOLIC BLOOD PRESSURE: 132 MMHG | BODY MASS INDEX: 22.51 KG/M2 | WEIGHT: 160.81 LBS | HEART RATE: 61 BPM | DIASTOLIC BLOOD PRESSURE: 70 MMHG

## 2019-10-18 DIAGNOSIS — N18.30 CHRONIC KIDNEY DISEASE, STAGE III (MODERATE) (HCC): Primary | ICD-10-CM

## 2019-10-18 PROCEDURE — G0463 HOSPITAL OUTPT CLINIC VISIT: HCPCS | Performed by: INTERNAL MEDICINE

## 2019-10-18 PROCEDURE — 99214 OFFICE O/P EST MOD 30 MIN: CPT | Performed by: INTERNAL MEDICINE

## 2019-10-18 NOTE — PROGRESS NOTES
10/18/19        Patient: Wilson Arvizu   YOB: 1934   Date of Visit: 10/18/2019       Dear  Dr. Juaquin Dee MD,      Thank you for referring Wilson Arvizu to my practice. Please find my assessment and plan below.       As you know he is an 85-year

## 2019-10-23 ENCOUNTER — OFFICE VISIT (OUTPATIENT)
Dept: ENDOCRINOLOGY CLINIC | Facility: CLINIC | Age: 84
End: 2019-10-23
Payer: MEDICARE

## 2019-10-23 VITALS
DIASTOLIC BLOOD PRESSURE: 69 MMHG | SYSTOLIC BLOOD PRESSURE: 129 MMHG | WEIGHT: 166 LBS | BODY MASS INDEX: 23 KG/M2 | HEART RATE: 62 BPM

## 2019-10-23 DIAGNOSIS — Z79.4 CONTROLLED TYPE 2 DIABETES MELLITUS WITH COMPLICATION, WITH LONG-TERM CURRENT USE OF INSULIN (HCC): Primary | ICD-10-CM

## 2019-10-23 DIAGNOSIS — E11.8 CONTROLLED TYPE 2 DIABETES MELLITUS WITH COMPLICATION, WITH LONG-TERM CURRENT USE OF INSULIN (HCC): Primary | ICD-10-CM

## 2019-10-23 LAB — HEMOGLOBIN A1C: 8.2 %

## 2019-10-23 PROCEDURE — 99213 OFFICE O/P EST LOW 20 MIN: CPT | Performed by: INTERNAL MEDICINE

## 2019-10-23 PROCEDURE — 36416 COLLJ CAPILLARY BLOOD SPEC: CPT | Performed by: INTERNAL MEDICINE

## 2019-10-23 PROCEDURE — 83036 HEMOGLOBIN GLYCOSYLATED A1C: CPT | Performed by: INTERNAL MEDICINE

## 2019-10-23 PROCEDURE — G0463 HOSPITAL OUTPT CLINIC VISIT: HCPCS | Performed by: INTERNAL MEDICINE

## 2019-10-23 PROCEDURE — 82962 GLUCOSE BLOOD TEST: CPT | Performed by: INTERNAL MEDICINE

## 2019-10-23 NOTE — PROGRESS NOTES
Name: Mary Dalton  Date: 10/23/2019    Referring Physician: No ref. provider found    HISTORY OF PRESENT ILLNESS   Mary Dalton is a 80year old male who presents for evaluation of diabetes mellitus.      He is currently feeling well and following with D, Disp: 100 each, Rfl: 3  •  insulin glargine (LANTUS SOLOSTAR) 100 UNIT/ML Subcutaneous Solution Pen-injector, INJECT 25 UNITS UNDER THE SKIN EVERY EVENING/Taking 20 units, Disp: 15 mL, Rfl: 1  •  FINASTERIDE 5 MG Oral Tab, TAKE 1 TABLET(5 MG) BY MOUTH D cutting grass        Reaction to local anesthetic: No    Social History Narrative      The patient does not use an assistive device. .        The patient does live in a home with stairs.       Medical History:   Past Medical History:   Diagnosis Date   • Aci neuropathy, nephropathy and cardiovascular disease  -Discussed importance of SBGM  -Discussed importance of low CHO diet  -Continue Glimepiride 4mg PO daily   -Continue Metformin   -Continue Lantus 25 units SQ daily  -Normotensive at home   -UTD with catherine

## 2019-11-05 ENCOUNTER — HOSPITAL ENCOUNTER (OUTPATIENT)
Dept: ULTRASOUND IMAGING | Facility: HOSPITAL | Age: 84
Discharge: HOME | End: 2019-11-05
Attending: UROLOGY
Payer: MEDICARE

## 2019-11-05 DIAGNOSIS — N18.30 STAGE 3 CHRONIC KIDNEY DISEASE (HCC): ICD-10-CM

## 2019-11-05 PROCEDURE — 76775 US EXAM ABDO BACK WALL LIM: CPT | Performed by: UROLOGY

## 2019-11-13 ENCOUNTER — TELEPHONE (OUTPATIENT)
Dept: SURGERY | Facility: CLINIC | Age: 84
End: 2019-11-13

## 2019-11-13 NOTE — TELEPHONE ENCOUNTER
----- Message from KARLO Andrade sent at 11/6/2019 11:44 AM CST -----  Please let patient know his ultrasound of his kidneys is normal.  He has bilateral simple cysts on his kidneys, this is a common finding and is not cancerous.   Continue with

## 2019-12-05 ENCOUNTER — ANCILLARY PROCEDURE (OUTPATIENT)
Dept: CARDIOLOGY | Age: 84
End: 2019-12-05
Attending: INTERNAL MEDICINE

## 2019-12-05 DIAGNOSIS — Z95.0 PACEMAKER: ICD-10-CM

## 2019-12-27 DIAGNOSIS — E11.8 CONTROLLED TYPE 2 DIABETES MELLITUS WITH COMPLICATION, WITH LONG-TERM CURRENT USE OF INSULIN (HCC): ICD-10-CM

## 2019-12-27 DIAGNOSIS — Z79.4 CONTROLLED TYPE 2 DIABETES MELLITUS WITH COMPLICATION, WITH LONG-TERM CURRENT USE OF INSULIN (HCC): ICD-10-CM

## 2019-12-27 NOTE — TELEPHONE ENCOUNTER
Current Outpatient Medications   Medication Sig Dispense Refill   • insulin glargine (LANTUS SOLOSTAR) 100 UNIT/ML Subcutaneous Solution Pen-injector INJECT 25 UNITS UNDER THE SKIN EVERY EVENING/Taking 20 units 15 mL 1     Refill per pharmacy pt req a box

## 2019-12-30 ENCOUNTER — OFFICE VISIT (OUTPATIENT)
Dept: CARDIOLOGY | Age: 84
End: 2019-12-30

## 2019-12-30 DIAGNOSIS — I10 ESSENTIAL HYPERTENSION: Primary | ICD-10-CM

## 2020-01-01 ENCOUNTER — EXTERNAL RECORD (OUTPATIENT)
Dept: HEALTH INFORMATION MANAGEMENT | Facility: OTHER | Age: 85
End: 2020-01-01

## 2020-01-06 NOTE — TELEPHONE ENCOUNTER
Current Outpatient Medications   Medication Sig Dispense Refill   • BD PEN NEEDLE MINI U/F 31G X 5 MM Does not apply Misc USE EVERY  each 5     Refill

## 2020-01-09 RX ORDER — GLIMEPIRIDE 4 MG/1
TABLET ORAL
Qty: 90 TABLET | Refills: 0 | Status: SHIPPED | OUTPATIENT
Start: 2020-01-09 | End: 2020-04-07

## 2020-01-15 ENCOUNTER — LAB ENCOUNTER (OUTPATIENT)
Dept: LAB | Age: 85
End: 2020-01-15
Attending: INTERNAL MEDICINE
Payer: MEDICARE

## 2020-01-15 DIAGNOSIS — N18.30 CHRONIC KIDNEY DISEASE, STAGE III (MODERATE) (HCC): ICD-10-CM

## 2020-01-15 LAB
ALBUMIN SERPL-MCNC: 3.6 G/DL (ref 3.4–5)
ANION GAP SERPL CALC-SCNC: 7 MMOL/L (ref 0–18)
BASOPHILS # BLD AUTO: 0.03 X10(3) UL (ref 0–0.2)
BASOPHILS NFR BLD AUTO: 0.5 %
BUN BLD-MCNC: 33 MG/DL (ref 7–18)
BUN SERPL-MCNC: 33 MG/DL
BUN/CREAT SERPL: 14.6 (ref 10–20)
CALCIUM BLD-MCNC: 9 MG/DL (ref 8.5–10.1)
CALCIUM SERPL-MCNC: 9 MG/DL
CHLORIDE SERPL-SCNC: 108 MMOL/L
CHLORIDE SERPL-SCNC: 108 MMOL/L (ref 98–112)
CO2 SERPL-SCNC: 25 MMOL/L (ref 21–32)
CREAT BLD-MCNC: 2.26 MG/DL (ref 0.7–1.3)
CREAT SERPL-MCNC: 2.26 MG/DL
DEPRECATED RDW RBC AUTO: 44.1 FL (ref 35.1–46.3)
EOSINOPHIL # BLD AUTO: 0.18 X10(3) UL (ref 0–0.7)
EOSINOPHIL NFR BLD AUTO: 2.9 %
ERYTHROCYTE [DISTWIDTH] IN BLOOD BY AUTOMATED COUNT: 13.2 % (ref 11–15)
GLUCOSE BLD-MCNC: 174 MG/DL (ref 70–99)
GLUCOSE SERPL-MCNC: 174 MG/DL
HCT VFR BLD AUTO: 42.8 % (ref 39–53)
HCT VFR BLD CALC: 42.8 %
HGB BLD-MCNC: 14.1 G/DL
HGB BLD-MCNC: 14.1 G/DL (ref 13–17.5)
IMM GRANULOCYTES # BLD AUTO: 0.02 X10(3) UL (ref 0–1)
IMM GRANULOCYTES NFR BLD: 0.3 %
LYMPHOCYTES # BLD AUTO: 1.6 X10(3) UL (ref 1–4)
LYMPHOCYTES NFR BLD AUTO: 25.7 %
MCH RBC QN AUTO: 30 PG (ref 26–34)
MCHC RBC AUTO-ENTMCNC: 32.9 G/DL (ref 31–37)
MCV RBC AUTO: 91.1 FL (ref 80–100)
MONOCYTES # BLD AUTO: 0.56 X10(3) UL (ref 0.1–1)
MONOCYTES NFR BLD AUTO: 9 %
NEUTROPHILS # BLD AUTO: 3.84 X10 (3) UL (ref 1.5–7.7)
NEUTROPHILS # BLD AUTO: 3.84 X10(3) UL (ref 1.5–7.7)
NEUTROPHILS NFR BLD AUTO: 61.6 %
OSMOLALITY SERPL CALC.SUM OF ELEC: 301 MOSM/KG (ref 275–295)
PHOSPHATE SERPL-MCNC: 3.4 MG/DL (ref 2.5–4.9)
PLATELET # BLD AUTO: 198 10(3)UL (ref 150–450)
PLATELET # BLD: 198 K/MCL
POTASSIUM SERPL-SCNC: 4.1 MMOL/L
POTASSIUM SERPL-SCNC: 4.1 MMOL/L (ref 3.5–5.1)
RBC # BLD AUTO: 4.7 X10(6)UL (ref 3.8–5.8)
RBC # BLD: 4.7 10*6/UL
SODIUM SERPL-SCNC: 140 MMOL/L
SODIUM SERPL-SCNC: 140 MMOL/L (ref 136–145)
WBC # BLD AUTO: 6.2 X10(3) UL (ref 4–11)
WBC # BLD: 6.2 K/MCL

## 2020-01-15 PROCEDURE — 36415 COLL VENOUS BLD VENIPUNCTURE: CPT

## 2020-01-15 PROCEDURE — 80069 RENAL FUNCTION PANEL: CPT

## 2020-01-15 PROCEDURE — 85025 COMPLETE CBC W/AUTO DIFF WBC: CPT

## 2020-01-19 ENCOUNTER — TELEPHONE (OUTPATIENT)
Dept: NEPHROLOGY | Facility: CLINIC | Age: 85
End: 2020-01-19

## 2020-01-19 NOTE — TELEPHONE ENCOUNTER
Kidney function a little worse compared to 3 months ago but similar to what it was 4 months ago. Make sure he is drinking plenty of fluids. Avoid nonsteroidals. Repeat CBC and renal panel just before upcoming visit in April.

## 2020-01-20 NOTE — TELEPHONE ENCOUNTER
Spoke to patient's son Juarez Feliz (see HIPPA/FYI) Results message read. Appointment confirmed. Patient will do lab work a few days prior to appointment in April. Patient has standing order that  2020.

## 2020-01-21 RX ORDER — TAMSULOSIN HYDROCHLORIDE 0.4 MG/1
CAPSULE ORAL
Qty: 180 CAPSULE | Refills: 1 | Status: SHIPPED | OUTPATIENT
Start: 2020-01-21 | End: 2020-07-20

## 2020-01-22 NOTE — TELEPHONE ENCOUNTER
Review pended refill request as it does not fall under a protocol.   Requested Prescriptions     Pending Prescriptions Disp Refills   • tamsulosin (FLOMAX) cap [Pharmacy Med Name: TAMSULOSIN 0.4MG CAPSULES] 180 capsule 3     Sig: TAKE 2 CAPSULES BY MOUTH EV

## 2020-01-27 ENCOUNTER — OFFICE VISIT (OUTPATIENT)
Dept: CARDIOLOGY | Age: 85
End: 2020-01-27

## 2020-01-27 VITALS
SYSTOLIC BLOOD PRESSURE: 142 MMHG | OXYGEN SATURATION: 97 % | WEIGHT: 163.7 LBS | BODY MASS INDEX: 25.69 KG/M2 | HEART RATE: 68 BPM | HEIGHT: 67 IN | RESPIRATION RATE: 18 BRPM | DIASTOLIC BLOOD PRESSURE: 66 MMHG

## 2020-01-27 DIAGNOSIS — E78.00 HYPERCHOLESTEREMIA: ICD-10-CM

## 2020-01-27 DIAGNOSIS — E55.9 VITAMIN D DEFICIENCY: ICD-10-CM

## 2020-01-27 DIAGNOSIS — I10 ESSENTIAL HYPERTENSION: Primary | ICD-10-CM

## 2020-01-27 PROCEDURE — 99214 OFFICE O/P EST MOD 30 MIN: CPT | Performed by: INTERNAL MEDICINE

## 2020-01-27 RX ORDER — TIMOLOL MALEATE 5 MG/ML
1 SOLUTION/ DROPS OPHTHALMIC 2 TIMES DAILY
COMMUNITY
End: 2021-02-22

## 2020-01-27 RX ORDER — FUROSEMIDE 20 MG/1
20 TABLET ORAL
Qty: 35 TABLET | Refills: 3 | Status: SHIPPED | OUTPATIENT
Start: 2020-01-27 | End: 2021-05-13 | Stop reason: SDUPTHER

## 2020-01-27 RX ORDER — LATANOPROST 50 UG/ML
1 SOLUTION/ DROPS OPHTHALMIC NIGHTLY
COMMUNITY

## 2020-01-27 ASSESSMENT — PATIENT HEALTH QUESTIONNAIRE - PHQ9
1. LITTLE INTEREST OR PLEASURE IN DOING THINGS: NOT AT ALL
SUM OF ALL RESPONSES TO PHQ9 QUESTIONS 1 AND 2: 0
SUM OF ALL RESPONSES TO PHQ9 QUESTIONS 1 AND 2: 0
2. FEELING DOWN, DEPRESSED OR HOPELESS: NOT AT ALL

## 2020-01-27 ASSESSMENT — ENCOUNTER SYMPTOMS
HEMOPTYSIS: 0
BRUISES/BLEEDS EASILY: 0
CONSTIPATION: 1
SUSPICIOUS LESIONS: 0
INSOMNIA: 1
CHILLS: 0
HEMATOCHEZIA: 0
WEIGHT GAIN: 0
FEVER: 0
COUGH: 0
ALLERGIC/IMMUNOLOGIC COMMENTS: NO NEW FOOD ALLERGIES
WEIGHT LOSS: 0

## 2020-02-10 ENCOUNTER — TELEPHONE (OUTPATIENT)
Dept: CARDIOLOGY | Age: 85
End: 2020-02-10

## 2020-02-11 ENCOUNTER — ANCILLARY PROCEDURE (OUTPATIENT)
Dept: CARDIOLOGY | Age: 85
End: 2020-02-11
Attending: INTERNAL MEDICINE

## 2020-02-11 DIAGNOSIS — Z95.0 CARDIAC PACEMAKER: ICD-10-CM

## 2020-02-11 PROCEDURE — X1094 NO CHARGE VISIT: HCPCS | Performed by: INTERNAL MEDICINE

## 2020-03-12 ENCOUNTER — ANCILLARY PROCEDURE (OUTPATIENT)
Dept: CARDIOLOGY | Age: 85
End: 2020-03-12
Attending: INTERNAL MEDICINE

## 2020-03-12 DIAGNOSIS — Z95.0 CARDIAC PACEMAKER: ICD-10-CM

## 2020-03-13 PROCEDURE — 93294 REM INTERROG EVL PM/LDLS PM: CPT | Performed by: INTERNAL MEDICINE

## 2020-03-19 RX ORDER — FINASTERIDE 5 MG/1
TABLET, FILM COATED ORAL
Qty: 90 TABLET | Refills: 3 | Status: SHIPPED | OUTPATIENT
Start: 2020-03-19 | End: 2021-04-15

## 2020-03-19 NOTE — TELEPHONE ENCOUNTER
Pt LOV with BRUNOK 10/15/19 pt pharmacy requesting refill on finasteride if you agree please review and sign med, I copied and pasted part of PVK note below. 1.  Please continue finasteride 5 mg daily     2. Please continue tamsulosin 0.4 mg daily     3.

## 2020-04-07 RX ORDER — GLIMEPIRIDE 4 MG/1
TABLET ORAL
Qty: 90 TABLET | Refills: 0 | Status: SHIPPED | OUTPATIENT
Start: 2020-04-07 | End: 2020-07-08

## 2020-04-15 ENCOUNTER — TELEPHONE (OUTPATIENT)
Dept: NEPHROLOGY | Facility: CLINIC | Age: 85
End: 2020-04-15

## 2020-04-17 ENCOUNTER — VIRTUAL PHONE E/M (OUTPATIENT)
Dept: NEPHROLOGY | Facility: CLINIC | Age: 85
End: 2020-04-17
Payer: MEDICARE

## 2020-04-17 DIAGNOSIS — N18.30 CHRONIC KIDNEY DISEASE, STAGE III (MODERATE) (HCC): Primary | ICD-10-CM

## 2020-04-17 PROCEDURE — 99213 OFFICE O/P EST LOW 20 MIN: CPT | Performed by: INTERNAL MEDICINE

## 2020-04-17 RX ORDER — FUROSEMIDE 20 MG/1
TABLET ORAL
Refills: 0 | COMMUNITY
Start: 2020-04-17 | End: 2021-05-20

## 2020-04-17 NOTE — PATIENT INSTRUCTIONS
Please do your follow-up laboratory studies. If they are stable I will have you repeat them in 3 months and then have you see me for follow-up.

## 2020-04-17 NOTE — PROGRESS NOTES
Virtual Telephone Check-In    Lisa March verbally consents to a Virtual/Telephone Check-In visit on 04/17/20. Patient understands and accepts financial responsibility for any deductible, co-insurance and/or co-pays associated with this service.     [facility])          Neema Eduardo MD

## 2020-04-18 ENCOUNTER — LAB ENCOUNTER (OUTPATIENT)
Dept: LAB | Age: 85
End: 2020-04-18
Attending: INTERNAL MEDICINE
Payer: MEDICARE

## 2020-04-18 DIAGNOSIS — N18.30 CHRONIC KIDNEY DISEASE, STAGE III (MODERATE) (HCC): ICD-10-CM

## 2020-04-18 LAB
BUN SERPL-MCNC: 32 MG/DL
CALCIUM SERPL-MCNC: 9.1 MG/DL
CHLORIDE SERPL-SCNC: 109 MMOL/L
CREAT SERPL-MCNC: 2.07 MG/DL
GLUCOSE SERPL-MCNC: 165 MG/DL
HCT VFR BLD CALC: 41.6 %
HGB BLD-MCNC: 13.4 G/DL
PLATELET # BLD: 187 K/MCL
POTASSIUM SERPL-SCNC: 4.5 MMOL/L
RBC # BLD: 4.52 10*6/UL
SODIUM SERPL-SCNC: 140 MMOL/L
WBC # BLD: 5.7 K/MCL

## 2020-04-18 PROCEDURE — 36415 COLL VENOUS BLD VENIPUNCTURE: CPT

## 2020-04-18 PROCEDURE — 80069 RENAL FUNCTION PANEL: CPT

## 2020-04-18 PROCEDURE — 85025 COMPLETE CBC W/AUTO DIFF WBC: CPT

## 2020-04-19 ENCOUNTER — TELEPHONE (OUTPATIENT)
Dept: NEPHROLOGY | Facility: CLINIC | Age: 85
End: 2020-04-19

## 2020-04-19 DIAGNOSIS — N18.30 STAGE 3 CHRONIC KIDNEY DISEASE (HCC): Primary | ICD-10-CM

## 2020-04-23 RX ORDER — AMLODIPINE BESYLATE 5 MG/1
TABLET ORAL
Qty: 90 TABLET | Refills: 1 | Status: SHIPPED | OUTPATIENT
Start: 2020-04-23

## 2020-04-23 NOTE — TELEPHONE ENCOUNTER
Amlodipine Besylate 5 mg rx request. Please review and sign off if appropriate. Thank you. Last office visit: 4/17/2020 phone visit.   Last refill: 10/16/19

## 2020-06-03 DIAGNOSIS — E11.8 CONTROLLED TYPE 2 DIABETES MELLITUS WITH COMPLICATION, WITH LONG-TERM CURRENT USE OF INSULIN (HCC): ICD-10-CM

## 2020-06-03 DIAGNOSIS — Z79.4 CONTROLLED TYPE 2 DIABETES MELLITUS WITH COMPLICATION, WITH LONG-TERM CURRENT USE OF INSULIN (HCC): ICD-10-CM

## 2020-06-04 RX ORDER — BLOOD SUGAR DIAGNOSTIC
STRIP MISCELLANEOUS
Qty: 100 STRIP | Refills: 0 | Status: SHIPPED | OUTPATIENT
Start: 2020-06-04 | End: 2020-09-15

## 2020-06-04 RX ORDER — LANCETS 33 GAUGE
EACH MISCELLANEOUS
Qty: 100 EACH | Refills: 3 | Status: SHIPPED | OUTPATIENT
Start: 2020-06-04 | End: 2021-07-01

## 2020-06-04 NOTE — TELEPHONE ENCOUNTER
Rx request. Please review and sign off if appropriate. Thank you.     Last office visit: 10/23/19  Future appt: 7/15/2020

## 2020-06-19 ENCOUNTER — ANCILLARY PROCEDURE (OUTPATIENT)
Dept: CARDIOLOGY | Age: 85
End: 2020-06-19
Attending: INTERNAL MEDICINE

## 2020-06-19 DIAGNOSIS — Z95.0 CARDIAC PACEMAKER: ICD-10-CM

## 2020-06-19 PROCEDURE — 93294 REM INTERROG EVL PM/LDLS PM: CPT | Performed by: INTERNAL MEDICINE

## 2020-06-20 NOTE — ED INITIAL ASSESSMENT (HPI)
Pt came in today ,drove himself with c/o high blood pressure, and had a syncopal episode at home today. Did vomit one time. Denies chest pain or sob.  Skin warm and dry Unknown

## 2020-06-24 ENCOUNTER — TELEPHONE (OUTPATIENT)
Dept: FAMILY MEDICINE CLINIC | Facility: CLINIC | Age: 85
End: 2020-06-24

## 2020-06-30 ENCOUNTER — VIRTUAL PHONE E/M (OUTPATIENT)
Dept: FAMILY MEDICINE CLINIC | Facility: CLINIC | Age: 85
End: 2020-06-30
Payer: MEDICARE

## 2020-06-30 DIAGNOSIS — G47.00 INSOMNIA, UNSPECIFIED TYPE: ICD-10-CM

## 2020-06-30 DIAGNOSIS — N18.30 CHRONIC KIDNEY DISEASE, STAGE III (MODERATE) (HCC): ICD-10-CM

## 2020-06-30 DIAGNOSIS — F43.21 GRIEF: ICD-10-CM

## 2020-06-30 DIAGNOSIS — E11.65 UNCONTROLLED TYPE 2 DIABETES MELLITUS WITH HYPERGLYCEMIA (HCC): Primary | ICD-10-CM

## 2020-06-30 PROCEDURE — 99443 PHONE E/M BY PHYS 21-30 MIN: CPT | Performed by: FAMILY MEDICINE

## 2020-06-30 RX ORDER — TRAZODONE HYDROCHLORIDE 50 MG/1
50 TABLET ORAL NIGHTLY
Qty: 30 TABLET | Refills: 2 | Status: SHIPPED | OUTPATIENT
Start: 2020-06-30 | End: 2020-11-16

## 2020-06-30 NOTE — PROGRESS NOTES
Virtual Telephone Check-In    Antonia Amezquita verbally consents to a Virtual/Telephone Check-In visit on 06/30/20. Patient has been referred to the [facility] website at [email or web address] to review the yearly Consent to Treat document.     Patient Alexandro Fatima 3  LISINOPRIL 10 MG Oral Tab, TAKE 1 TABLET BY MOUTH DAILY, Disp: 30 tablet, Rfl: 4  COMBIGAN 0.2-0.5 % Ophthalmic Solution, USE 1 GTT OU BID, Disp: , Rfl: 12  SIMVASTATIN 40 MG Oral Tab, TAKE 1 TABLET(40 MG) BY MOUTH EVERY DAY, Disp: 90 tablet, Rfl: 4  PA Spoke with patient but also with son. Repeating plan   ASSESSMENT AND PLAN:   1. Uncontrolled type 2 diabetes mellitus with hyperglycemia ([facility] [address]  Due for labs. And has appt with Dr. Jad Henry.   - HEMOGLOBIN A1C; Future  - COMP METABOLIC PANEL (14);  Future  -

## 2020-07-01 ENCOUNTER — LAB ENCOUNTER (OUTPATIENT)
Dept: LAB | Age: 85
End: 2020-07-01
Attending: FAMILY MEDICINE
Payer: MEDICARE

## 2020-07-01 DIAGNOSIS — E11.65 UNCONTROLLED TYPE 2 DIABETES MELLITUS WITH HYPERGLYCEMIA (HCC): ICD-10-CM

## 2020-07-01 LAB
ALBUMIN SERPL-MCNC: 3.5 G/DL
ALBUMIN SERPL-MCNC: 3.5 G/DL (ref 3.4–5)
ALBUMIN/GLOB SERPL: 1.2 {RATIO} (ref 1–2)
ALP LIVER SERPL-CCNC: 111 U/L (ref 45–117)
ALP SERPL-CCNC: 111 U/L
ALT SERPL-CCNC: 20 U/L (ref 16–61)
ALT SERPL-CCNC: 20 UNITS/L
ANION GAP SERPL CALC-SCNC: 9 MMOL/L (ref 0–18)
AST SERPL-CCNC: 14 U/L (ref 15–37)
AST SERPL-CCNC: 14 UNITS/L
BASOPHILS # BLD AUTO: 0.03 X10(3) UL (ref 0–0.2)
BASOPHILS NFR BLD AUTO: 0.5 %
BILIRUB SERPL-MCNC: 0.5 MG/DL
BILIRUB SERPL-MCNC: 0.5 MG/DL (ref 0.1–2)
BUN BLD-MCNC: 33 MG/DL (ref 7–18)
BUN SERPL-MCNC: 33 MG/DL
BUN/CREAT SERPL: 15.3 (ref 10–20)
CALCIUM BLD-MCNC: 9.3 MG/DL (ref 8.5–10.1)
CALCIUM SERPL-MCNC: 9.3 MG/DL
CHLORIDE SERPL-SCNC: 109 MMOL/L
CHLORIDE SERPL-SCNC: 109 MMOL/L (ref 98–112)
CHOLEST SERPL-MCNC: 132 MG/DL
CHOLEST SMN-MCNC: 132 MG/DL (ref ?–200)
CO2 SERPL-SCNC: 24 MMOL/L (ref 21–32)
CREAT BLD-MCNC: 2.16 MG/DL (ref 0.7–1.3)
CREAT SERPL-MCNC: 2.16 MG/DL
CREAT UR-SCNC: 132 MG/DL
DEPRECATED RDW RBC AUTO: 42.5 FL (ref 35.1–46.3)
EOSINOPHIL # BLD AUTO: 0.21 X10(3) UL (ref 0–0.7)
EOSINOPHIL NFR BLD AUTO: 3.3 %
ERYTHROCYTE [DISTWIDTH] IN BLOOD BY AUTOMATED COUNT: 12.8 % (ref 11–15)
EST. AVERAGE GLUCOSE BLD GHB EST-MCNC: 192 MG/DL (ref 68–126)
GLOBULIN PLAS-MCNC: 3 G/DL (ref 2.8–4.4)
GLUCOSE BLD-MCNC: 161 MG/DL (ref 70–99)
GLUCOSE SERPL-MCNC: 161 MG/DL
HBA1C MFR BLD HPLC: 8.3 % (ref ?–5.7)
HCT VFR BLD AUTO: 40.4 % (ref 39–53)
HCT VFR BLD CALC: 40.4 %
HDLC SERPL-MCNC: 30 MG/DL
HDLC SERPL-MCNC: 30 MG/DL (ref 40–59)
HGB BLD-MCNC: 13.4 G/DL
HGB BLD-MCNC: 13.4 G/DL (ref 13–17.5)
IMM GRANULOCYTES # BLD AUTO: 0.02 X10(3) UL (ref 0–1)
IMM GRANULOCYTES NFR BLD: 0.3 %
LDLC SERPL CALC-MCNC: 60 MG/DL
LDLC SERPL CALC-MCNC: 60 MG/DL (ref ?–100)
LYMPHOCYTES # BLD AUTO: 1.5 X10(3) UL (ref 1–4)
LYMPHOCYTES NFR BLD AUTO: 23.7 %
M PROTEIN MFR SERPL ELPH: 6.5 G/DL (ref 6.4–8.2)
MCH RBC QN AUTO: 30.1 PG (ref 26–34)
MCHC RBC AUTO-ENTMCNC: 33.2 G/DL (ref 31–37)
MCV RBC AUTO: 90.8 FL (ref 80–100)
MICROALBUMIN UR-MCNC: 75.8 MG/DL
MICROALBUMIN/CREAT 24H UR-RTO: 574.2 UG/MG (ref ?–30)
MONOCYTES # BLD AUTO: 0.52 X10(3) UL (ref 0.1–1)
MONOCYTES NFR BLD AUTO: 8.2 %
NEUTROPHILS # BLD AUTO: 4.04 X10 (3) UL (ref 1.5–7.7)
NEUTROPHILS # BLD AUTO: 4.04 X10(3) UL (ref 1.5–7.7)
NEUTROPHILS NFR BLD AUTO: 64 %
NONHDLC SERPL-MCNC: 102 MG/DL
NONHDLC SERPL-MCNC: 102 MG/DL (ref ?–130)
OSMOLALITY SERPL CALC.SUM OF ELEC: 305 MOSM/KG (ref 275–295)
PATIENT FASTING Y/N/NP: YES
PATIENT FASTING Y/N/NP: YES
PLATELET # BLD AUTO: 197 10(3)UL (ref 150–450)
PLATELET # BLD: 197 K/MCL
POTASSIUM SERPL-SCNC: 4.3 MMOL/L
POTASSIUM SERPL-SCNC: 4.3 MMOL/L (ref 3.5–5.1)
PROT SERPL-MCNC: 6.5 G/DL
RBC # BLD AUTO: 4.45 X10(6)UL (ref 3.8–5.8)
RBC # BLD: 4.45 10*6/UL
SODIUM SERPL-SCNC: 142 MMOL/L
SODIUM SERPL-SCNC: 142 MMOL/L (ref 136–145)
TRIGL SERPL-MCNC: 212 MG/DL
TRIGL SERPL-MCNC: 212 MG/DL (ref 30–149)
TSH SERPL-ACNC: 3.11 MCUNITS/ML
TSI SER-ACNC: 3.11 MIU/ML (ref 0.36–3.74)
VLDLC SERPL CALC-MCNC: 42 MG/DL (ref 0–30)
WBC # BLD AUTO: 6.3 X10(3) UL (ref 4–11)
WBC # BLD: 6.3 K/MCL

## 2020-07-01 PROCEDURE — 36415 COLL VENOUS BLD VENIPUNCTURE: CPT

## 2020-07-01 PROCEDURE — 83036 HEMOGLOBIN GLYCOSYLATED A1C: CPT

## 2020-07-01 PROCEDURE — 82570 ASSAY OF URINE CREATININE: CPT

## 2020-07-01 PROCEDURE — 82043 UR ALBUMIN QUANTITATIVE: CPT

## 2020-07-01 PROCEDURE — 85025 COMPLETE CBC W/AUTO DIFF WBC: CPT

## 2020-07-01 PROCEDURE — 80053 COMPREHEN METABOLIC PANEL: CPT

## 2020-07-01 PROCEDURE — 84443 ASSAY THYROID STIM HORMONE: CPT

## 2020-07-01 PROCEDURE — 80061 LIPID PANEL: CPT

## 2020-07-04 ENCOUNTER — TELEPHONE (OUTPATIENT)
Dept: NEPHROLOGY | Facility: CLINIC | Age: 85
End: 2020-07-04

## 2020-07-07 NOTE — PROGRESS NOTES
Overall labs are stable. No changes to medications at this time. Stable diabetes, cholesterol and kidney function.

## 2020-07-07 NOTE — TELEPHONE ENCOUNTER
Spoke to patient's son Patrick Kawasaki. Results message from Dr. Jo Kelly read. Appointment confirmed.

## 2020-07-08 ENCOUNTER — ANCILLARY PROCEDURE (OUTPATIENT)
Dept: CARDIOLOGY | Age: 85
End: 2020-07-08
Attending: INTERNAL MEDICINE

## 2020-07-08 DIAGNOSIS — I10 ESSENTIAL HYPERTENSION: ICD-10-CM

## 2020-07-08 PROCEDURE — 93306 TTE W/DOPPLER COMPLETE: CPT | Performed by: INTERNAL MEDICINE

## 2020-07-08 RX ORDER — GLIMEPIRIDE 4 MG/1
TABLET ORAL
Qty: 90 TABLET | Refills: 0 | Status: SHIPPED | OUTPATIENT
Start: 2020-07-08 | End: 2020-10-13

## 2020-07-13 ENCOUNTER — OFFICE VISIT (OUTPATIENT)
Dept: CARDIOLOGY | Age: 85
End: 2020-07-13

## 2020-07-13 VITALS
BODY MASS INDEX: 25.11 KG/M2 | HEART RATE: 60 BPM | SYSTOLIC BLOOD PRESSURE: 152 MMHG | RESPIRATION RATE: 18 BRPM | HEIGHT: 67 IN | DIASTOLIC BLOOD PRESSURE: 70 MMHG | WEIGHT: 160 LBS

## 2020-07-13 DIAGNOSIS — I10 ESSENTIAL HYPERTENSION: Primary | ICD-10-CM

## 2020-07-13 DIAGNOSIS — R06.09 DYSPNEA ON EXERTION: ICD-10-CM

## 2020-07-13 PROCEDURE — 99214 OFFICE O/P EST MOD 30 MIN: CPT | Performed by: INTERNAL MEDICINE

## 2020-07-13 RX ORDER — LISINOPRIL 10 MG/1
TABLET ORAL
Qty: 135 TABLET | Refills: 3 | Status: SHIPPED | OUTPATIENT
Start: 2020-07-13 | End: 2020-11-02 | Stop reason: DRUGHIGH

## 2020-07-13 ASSESSMENT — PATIENT HEALTH QUESTIONNAIRE - PHQ9
SUM OF ALL RESPONSES TO PHQ9 QUESTIONS 1 AND 2: 0
CLINICAL INTERPRETATION OF PHQ2 SCORE: NO FURTHER SCREENING NEEDED
1. LITTLE INTEREST OR PLEASURE IN DOING THINGS: NOT AT ALL
SUM OF ALL RESPONSES TO PHQ9 QUESTIONS 1 AND 2: 0
2. FEELING DOWN, DEPRESSED OR HOPELESS: NOT AT ALL
CLINICAL INTERPRETATION OF PHQ9 SCORE: NO FURTHER SCREENING NEEDED

## 2020-07-13 ASSESSMENT — ENCOUNTER SYMPTOMS
ALLERGIC/IMMUNOLOGIC COMMENTS: NO NEW FOOD ALLERGIES
CHILLS: 0
CONSTIPATION: 1
FEVER: 0
BACK PAIN: 1
HEMATOCHEZIA: 0
WEIGHT LOSS: 0
WEIGHT GAIN: 0
HEMOPTYSIS: 0
BRUISES/BLEEDS EASILY: 0
SUSPICIOUS LESIONS: 0
COUGH: 0

## 2020-07-15 ENCOUNTER — OFFICE VISIT (OUTPATIENT)
Dept: ENDOCRINOLOGY CLINIC | Facility: CLINIC | Age: 85
End: 2020-07-15
Payer: MEDICARE

## 2020-07-15 VITALS
HEART RATE: 66 BPM | DIASTOLIC BLOOD PRESSURE: 80 MMHG | WEIGHT: 158 LBS | SYSTOLIC BLOOD PRESSURE: 149 MMHG | BODY MASS INDEX: 22 KG/M2

## 2020-07-15 DIAGNOSIS — E11.8 CONTROLLED TYPE 2 DIABETES MELLITUS WITH COMPLICATION, WITH LONG-TERM CURRENT USE OF INSULIN (HCC): Primary | ICD-10-CM

## 2020-07-15 DIAGNOSIS — Z79.4 CONTROLLED TYPE 2 DIABETES MELLITUS WITH COMPLICATION, WITH LONG-TERM CURRENT USE OF INSULIN (HCC): Primary | ICD-10-CM

## 2020-07-15 LAB
GLUCOSE BLOOD: 194
TEST STRIP LOT #: NORMAL NUMERIC

## 2020-07-15 PROCEDURE — 82962 GLUCOSE BLOOD TEST: CPT | Performed by: INTERNAL MEDICINE

## 2020-07-15 PROCEDURE — 99213 OFFICE O/P EST LOW 20 MIN: CPT | Performed by: INTERNAL MEDICINE

## 2020-07-15 PROCEDURE — G0463 HOSPITAL OUTPT CLINIC VISIT: HCPCS | Performed by: INTERNAL MEDICINE

## 2020-07-15 PROCEDURE — 36416 COLLJ CAPILLARY BLOOD SPEC: CPT | Performed by: INTERNAL MEDICINE

## 2020-07-15 NOTE — PROGRESS NOTES
Name: Susie Reyez  Date: 7/15/2020    Referring Physician: No ref. provider found    HISTORY OF PRESENT ILLNESS   Susie Reyez is a 80year old male who presents for evaluation of diabetes mellitus. Since last visit his wife passed away in 3/2020. In Vitro Strip, TEST ONCE DAILY, Disp: 100 strip, Rfl: 0  •  AMLODIPINE BESYLATE 5 MG Oral Tab, TAKE 1 TABLET(5 MG) BY MOUTH DAILY, Disp: 90 tablet, Rfl: 1  •  furosemide 20 MG Oral Tab, Take 1 pill 2 times per week., Disp: , Rfl: 0  •  FINASTERIDE 5 MG Or coffee and soda        Exercise: Yes          cutting grass        Reaction to local anesthetic: No    Social History Narrative      The patient does not use an assistive device. .        The patient does live in a home with stairs.       Medical History: Metformin   -Continue Lantus 25 units SQ daily  -Normotensive at home   -UTD with podiatry   -Renal function stable     RTC 6 months     7/15/2020  Melvin Hill MD

## 2020-07-20 ENCOUNTER — TELEPHONE (OUTPATIENT)
Dept: FAMILY MEDICINE CLINIC | Facility: CLINIC | Age: 85
End: 2020-07-20

## 2020-07-20 ENCOUNTER — CLINICAL ABSTRACT (OUTPATIENT)
Dept: CARDIOLOGY | Age: 85
End: 2020-07-20

## 2020-07-20 ENCOUNTER — OFFICE VISIT (OUTPATIENT)
Dept: NEPHROLOGY | Facility: CLINIC | Age: 85
End: 2020-07-20
Payer: MEDICARE

## 2020-07-20 VITALS
DIASTOLIC BLOOD PRESSURE: 68 MMHG | HEART RATE: 62 BPM | WEIGHT: 161.19 LBS | BODY MASS INDEX: 25.3 KG/M2 | HEIGHT: 67 IN | SYSTOLIC BLOOD PRESSURE: 136 MMHG

## 2020-07-20 DIAGNOSIS — N18.30 STAGE 3 CHRONIC KIDNEY DISEASE (HCC): Primary | ICD-10-CM

## 2020-07-20 PROCEDURE — 99214 OFFICE O/P EST MOD 30 MIN: CPT | Performed by: INTERNAL MEDICINE

## 2020-07-20 PROCEDURE — G0463 HOSPITAL OUTPT CLINIC VISIT: HCPCS | Performed by: INTERNAL MEDICINE

## 2020-07-20 RX ORDER — TAMSULOSIN HYDROCHLORIDE 0.4 MG/1
CAPSULE ORAL
Qty: 180 CAPSULE | Refills: 1 | Status: SHIPPED | OUTPATIENT
Start: 2020-07-20 | End: 2021-01-11

## 2020-07-20 NOTE — PROGRESS NOTES
07/20/20        Patient: Francisco J Stark   YOB: 1934   Date of Visit: 7/20/2020       Dear  Dr. Brent Mason MD,      Thank you for referring Francisco J Stark to my practice. Please find my assessment and plan below.       As you know he is an 85-year- in 2 weeks. Assuming they are stable he will continue to do CBC and renal panels quarterly. I will see him again in 6 months for follow-up or sooner if clinically indicated. Thank you again for allowing me to participate in the care of your patient.

## 2020-07-20 NOTE — TELEPHONE ENCOUNTER
Current Outpatient Medications:     •  SIMVASTATIN 40 MG Oral Tab, TAKE 1 TABLET(40 MG) BY MOUTH EVERY DAY, Disp: 90 tablet, Rfl: 4

## 2020-07-21 LAB
HCT VFR BLD CALC: 37.5 %
HGB BLD-MCNC: 12 G/DL
PLATELET # BLD: 196 K/MCL
RBC # BLD: 4.08 10*6/UL
WBC # BLD: 5.1 K/MCL

## 2020-07-21 RX ORDER — SIMVASTATIN 40 MG
40 TABLET ORAL DAILY
Qty: 90 TABLET | Refills: 4 | Status: SHIPPED | OUTPATIENT
Start: 2020-07-21 | End: 2021-05-20

## 2020-08-03 ENCOUNTER — OFFICE VISIT (OUTPATIENT)
Dept: PODIATRY CLINIC | Facility: CLINIC | Age: 85
End: 2020-08-03
Payer: MEDICARE

## 2020-08-03 DIAGNOSIS — Z79.4 TYPE 2 DIABETES MELLITUS WITH HYPERGLYCEMIA, WITH LONG-TERM CURRENT USE OF INSULIN (HCC): Primary | ICD-10-CM

## 2020-08-03 DIAGNOSIS — E11.65 TYPE 2 DIABETES MELLITUS WITH HYPERGLYCEMIA, WITH LONG-TERM CURRENT USE OF INSULIN (HCC): Primary | ICD-10-CM

## 2020-08-03 PROCEDURE — 99203 OFFICE O/P NEW LOW 30 MIN: CPT | Performed by: PODIATRIST

## 2020-08-03 PROCEDURE — G0463 HOSPITAL OUTPT CLINIC VISIT: HCPCS | Performed by: PODIATRIST

## 2020-08-03 NOTE — PROGRESS NOTES
HPI:    Patient ID: Jose Gomes is a 80year old male. This 61-year-old male presents to me today as a new patient on referral from [address] [facility]. Patient indicates that he is here for a foot evaluation. He has been a diabetic for many years.   His most • COMBIGAN 0.2-0.5 % Ophthalmic Solution USE 1 GTT OU BID  12   • PANTOPRAZOLE SODIUM 40 MG Oral Tab EC TAKE 1 TABLET(40 MG) BY MOUTH EVERY MORNING BEFORE BREAKFAST 30 tablet 0   • Glucose Blood (ONETOUCH ULTRA BLUE) In Vitro Strip TEST ONCE D  3   • Sherren Messier

## 2020-08-11 ENCOUNTER — TELEPHONE (OUTPATIENT)
Dept: GASTROENTEROLOGY | Facility: CLINIC | Age: 85
End: 2020-08-11

## 2020-08-11 DIAGNOSIS — R13.10 DYSPHAGIA, UNSPECIFIED TYPE: Primary | ICD-10-CM

## 2020-08-11 DIAGNOSIS — K22.2 SCHATZKI'S RING: ICD-10-CM

## 2020-08-11 NOTE — TELEPHONE ENCOUNTER
Please contact the patient or the patient's son to arrange an upper endoscopy and esophageal dilatation for dysphagia and a history of a Schatzki ring. Monitored anesthesia care. Diabetic instructions as per Dr. Shaheen Roca.

## 2020-08-11 NOTE — TELEPHONE ENCOUNTER
Patient's son Sunny Chaudhary is returning the call. GI  not available.  Please advise #[phone]

## 2020-08-11 NOTE — TELEPHONE ENCOUNTER
1st attempt,call patient Son Jasiel Bishop and left voicemail to call back ,regards to schedule his Father Charbel Spear w/. Please forward it Zayda [email or web address] or [email or web address] to schedule . As per  recommendation below;   Note      P

## 2020-08-12 NOTE — TELEPHONE ENCOUNTER
Scheduled for:  EGD w/Dil [address]  Provider Name:  Dr. Deirdre Johnson  Date:  10/14/20  Location:    [facility]  Sedation:  MAC  Time:  1000 (pt is aware to arrive at 0900)   Prep:  NPO after midnight, mailed [place] and Kinyarwanda instructions on 8/13/20   Meds/Allergi

## 2020-08-12 NOTE — TELEPHONE ENCOUNTER
GI/RN--    This patient is scheduled. Please contact Dr. Kody Arceo for DM orders, thank you    You may close this TE when done :)    Please contact this patients son Parrish Click at [phone] to give orders.  Thank you

## 2020-08-12 NOTE — TELEPHONE ENCOUNTER
Dr Raghavendra Lundberg-    Patient is scheduled for EGD on 10- and will be NPO after midnight the night before. Please advise on order adjustments for glimepiride, lantus and metformin. Thank You.      Please route back to P [facility] GI CLINICAL STAFF when complet

## 2020-08-13 NOTE — TELEPHONE ENCOUNTER
The day before EGD decrease Lantus to 18 units SQ daily. No diabetic medications in AM prior to EGD. Thanks.

## 2020-08-13 NOTE — TELEPHONE ENCOUNTER
Spoke with Leydi Atkins he was able to write down and repeat back the medication adjustments below. No further questions.

## 2020-08-27 ENCOUNTER — APPOINTMENT (OUTPATIENT)
Dept: CARDIOLOGY | Age: 85
End: 2020-08-27

## 2020-09-08 ENCOUNTER — MED REC SCAN ONLY (OUTPATIENT)
Dept: FAMILY MEDICINE CLINIC | Facility: CLINIC | Age: 85
End: 2020-09-08

## 2020-09-08 ENCOUNTER — OFFICE VISIT (OUTPATIENT)
Dept: CARDIOLOGY | Age: 85
End: 2020-09-08

## 2020-09-08 VITALS
WEIGHT: 155 LBS | OXYGEN SATURATION: 95 % | SYSTOLIC BLOOD PRESSURE: 130 MMHG | BODY MASS INDEX: 24.33 KG/M2 | HEART RATE: 61 BPM | DIASTOLIC BLOOD PRESSURE: 72 MMHG | HEIGHT: 67 IN

## 2020-09-08 DIAGNOSIS — I44.7 LBBB (LEFT BUNDLE BRANCH BLOCK): ICD-10-CM

## 2020-09-08 DIAGNOSIS — I44.2 AV BLOCK, 3RD DEGREE (CMD): ICD-10-CM

## 2020-09-08 DIAGNOSIS — I50.22 CHRONIC SYSTOLIC CONGESTIVE HEART FAILURE (CMD): Primary | ICD-10-CM

## 2020-09-08 DIAGNOSIS — Z95.0 PACEMAKER: ICD-10-CM

## 2020-09-08 PROCEDURE — 99215 OFFICE O/P EST HI 40 MIN: CPT | Performed by: INTERNAL MEDICINE

## 2020-09-08 ASSESSMENT — PATIENT HEALTH QUESTIONNAIRE - PHQ9
1. LITTLE INTEREST OR PLEASURE IN DOING THINGS: NOT AT ALL
SUM OF ALL RESPONSES TO PHQ9 QUESTIONS 1 AND 2: 0
CLINICAL INTERPRETATION OF PHQ2 SCORE: NO FURTHER SCREENING NEEDED
SUM OF ALL RESPONSES TO PHQ9 QUESTIONS 1 AND 2: 0
CLINICAL INTERPRETATION OF PHQ9 SCORE: NO FURTHER SCREENING NEEDED
2. FEELING DOWN, DEPRESSED OR HOPELESS: NOT AT ALL

## 2020-09-09 ENCOUNTER — TELEPHONE (OUTPATIENT)
Dept: CARDIOLOGY | Age: 85
End: 2020-09-09

## 2020-09-09 DIAGNOSIS — Z95.0 CARDIAC PACEMAKER IN SITU: ICD-10-CM

## 2020-09-09 DIAGNOSIS — R06.00 DYSPNEA, UNSPECIFIED TYPE: ICD-10-CM

## 2020-09-09 DIAGNOSIS — Z01.818 PRE-OP TESTING: ICD-10-CM

## 2020-09-09 DIAGNOSIS — I10 ESSENTIAL HYPERTENSION: ICD-10-CM

## 2020-09-09 DIAGNOSIS — Z95.0 PACEMAKER: ICD-10-CM

## 2020-09-09 DIAGNOSIS — I50.22 CHRONIC SYSTOLIC CONGESTIVE HEART FAILURE (CMD): Primary | ICD-10-CM

## 2020-09-09 DIAGNOSIS — I44.2 AV BLOCK, 3RD DEGREE (CMD): ICD-10-CM

## 2020-09-09 DIAGNOSIS — I44.7 LBBB (LEFT BUNDLE BRANCH BLOCK): ICD-10-CM

## 2020-09-11 ENCOUNTER — ANCILLARY PROCEDURE (OUTPATIENT)
Dept: CARDIOLOGY | Age: 85
End: 2020-09-11
Attending: INTERNAL MEDICINE

## 2020-09-11 VITALS
DIASTOLIC BLOOD PRESSURE: 62 MMHG | BODY MASS INDEX: 24.8 KG/M2 | WEIGHT: 158 LBS | SYSTOLIC BLOOD PRESSURE: 128 MMHG | HEIGHT: 67 IN

## 2020-09-11 DIAGNOSIS — Z45.018 PACEMAKER REPROGRAMMING/CHECK: ICD-10-CM

## 2020-09-11 PROCEDURE — 93280 PM DEVICE PROGR EVAL DUAL: CPT | Performed by: INTERNAL MEDICINE

## 2020-09-14 DIAGNOSIS — Z79.4 CONTROLLED TYPE 2 DIABETES MELLITUS WITH COMPLICATION, WITH LONG-TERM CURRENT USE OF INSULIN (HCC): Primary | ICD-10-CM

## 2020-09-14 DIAGNOSIS — E11.8 CONTROLLED TYPE 2 DIABETES MELLITUS WITH COMPLICATION, WITH LONG-TERM CURRENT USE OF INSULIN (HCC): Primary | ICD-10-CM

## 2020-09-15 RX ORDER — BLOOD SUGAR DIAGNOSTIC
STRIP MISCELLANEOUS
Qty: 100 STRIP | Refills: 0 | Status: SHIPPED | OUTPATIENT
Start: 2020-09-15 | End: 2020-12-21

## 2020-09-16 NOTE — H&P
[facility]    Cardiac Electrophysiology H&P Addendum    [facility]   [facility] [id number] MRN [id number]   Admission Date 10/2/2020 Procedure Date 10/2/2020   Attending Physician Asha Hoffman MD Procedure Physician JAQUELINE

## 2020-09-17 PROCEDURE — 93280 PM DEVICE PROGR EVAL DUAL: CPT | Performed by: INTERNAL MEDICINE

## 2020-09-23 ENCOUNTER — APPOINTMENT (OUTPATIENT)
Dept: CARDIOLOGY | Age: 85
End: 2020-09-23

## 2020-09-29 ENCOUNTER — HOSPITAL ENCOUNTER (OUTPATIENT)
Dept: GENERAL RADIOLOGY | Age: 85
Discharge: HOME | End: 2020-09-29
Attending: INTERNAL MEDICINE
Payer: MEDICARE

## 2020-09-29 ENCOUNTER — APPOINTMENT (OUTPATIENT)
Dept: LAB | Age: 85
End: 2020-09-29
Attending: INTERNAL MEDICINE
Payer: MEDICARE

## 2020-09-29 ENCOUNTER — LAB ENCOUNTER (OUTPATIENT)
Dept: LAB | Age: 85
End: 2020-09-29
Attending: INTERNAL MEDICINE
Payer: MEDICARE

## 2020-09-29 DIAGNOSIS — R35.1 NOCTURIA: ICD-10-CM

## 2020-09-29 DIAGNOSIS — Z01.818 PREOP TESTING: ICD-10-CM

## 2020-09-29 DIAGNOSIS — N18.30 STAGE 3 CHRONIC KIDNEY DISEASE (HCC): ICD-10-CM

## 2020-09-29 LAB
MRSA DNA SPEC QL NAA+PROBE: NEGATIVE
SARS-COV-2 RNA SPEC QL NAA+PROBE: NOT DETECTED
SPECIMEN SOURCE: NORMAL

## 2020-09-29 PROCEDURE — 80069 RENAL FUNCTION PANEL: CPT

## 2020-09-29 PROCEDURE — 81001 URINALYSIS AUTO W/SCOPE: CPT

## 2020-09-29 PROCEDURE — 36415 COLL VENOUS BLD VENIPUNCTURE: CPT

## 2020-09-29 PROCEDURE — 85025 COMPLETE CBC W/AUTO DIFF WBC: CPT

## 2020-09-29 PROCEDURE — 71046 X-RAY EXAM CHEST 2 VIEWS: CPT | Performed by: INTERNAL MEDICINE

## 2020-09-29 PROCEDURE — 87641 MR-STAPH DNA AMP PROBE: CPT

## 2020-10-01 ENCOUNTER — TELEPHONE (OUTPATIENT)
Dept: CARDIOLOGY | Age: 85
End: 2020-10-01

## 2020-10-01 RX ORDER — MULTIVIT-MIN/IRON/FOLIC ACID/K 18-600-40
CAPSULE ORAL DAILY
COMMUNITY

## 2020-10-02 ENCOUNTER — HOSPITAL ENCOUNTER (OUTPATIENT)
Dept: INTERVENTIONAL RADIOLOGY/VASCULAR | Facility: HOSPITAL | Age: 85
Discharge: HOME | End: 2020-10-02
Attending: INTERNAL MEDICINE | Admitting: INTERNAL MEDICINE
Payer: MEDICARE

## 2020-10-02 ENCOUNTER — HOSPITAL ENCOUNTER (OUTPATIENT)
Dept: GENERAL RADIOLOGY | Facility: HOSPITAL | Age: 85
Discharge: HOME | End: 2020-10-02
Attending: INTERNAL MEDICINE | Admitting: INTERNAL MEDICINE
Payer: MEDICARE

## 2020-10-02 VITALS
TEMPERATURE: 98 F | SYSTOLIC BLOOD PRESSURE: 150 MMHG | WEIGHT: 155 LBS | OXYGEN SATURATION: 97 % | RESPIRATION RATE: 21 BRPM | HEART RATE: 63 BPM | HEIGHT: 67 IN | DIASTOLIC BLOOD PRESSURE: 70 MMHG | BODY MASS INDEX: 24.33 KG/M2

## 2020-10-02 DIAGNOSIS — Z01.818 PREOP TESTING: Primary | ICD-10-CM

## 2020-10-02 DIAGNOSIS — I44.7 LBBB (LEFT BUNDLE BRANCH BLOCK): ICD-10-CM

## 2020-10-02 DIAGNOSIS — I44.2 AV BLOCK, 3RD DEGREE (HCC): ICD-10-CM

## 2020-10-02 DIAGNOSIS — I50.22 CHRONIC SYSTOLIC CONGESTIVE HEART FAILURE (HCC): ICD-10-CM

## 2020-10-02 PROCEDURE — 33225 L VENTRIC PACING LEAD ADD-ON: CPT | Performed by: INTERNAL MEDICINE

## 2020-10-02 PROCEDURE — 33229 REMV&REPLC PM GEN MULT LEADS: CPT

## 2020-10-02 PROCEDURE — 82962 GLUCOSE BLOOD TEST: CPT

## 2020-10-02 PROCEDURE — 0JPT0PZ REMOVAL OF CARDIAC RHYTHM RELATED DEVICE FROM TRUNK SUBCUTANEOUS TISSUE AND FASCIA, OPEN APPROACH: ICD-10-PCS | Performed by: INTERNAL MEDICINE

## 2020-10-02 PROCEDURE — 99152 MOD SED SAME PHYS/QHP 5/>YRS: CPT

## 2020-10-02 PROCEDURE — 0JH607Z INSERTION OF CARDIAC RESYNCHRONIZATION PACEMAKER PULSE GENERATOR INTO CHEST SUBCUTANEOUS TISSUE AND FASCIA, OPEN APPROACH: ICD-10-PCS | Performed by: INTERNAL MEDICINE

## 2020-10-02 PROCEDURE — 99152 MOD SED SAME PHYS/QHP 5/>YRS: CPT | Performed by: INTERNAL MEDICINE

## 2020-10-02 PROCEDURE — 75822 VEIN X-RAY ARMS/LEGS: CPT

## 2020-10-02 PROCEDURE — 33264 RMVL & RPLCMT DFB GEN MLT LD: CPT | Performed by: INTERNAL MEDICINE

## 2020-10-02 PROCEDURE — 36415 COLL VENOUS BLD VENIPUNCTURE: CPT

## 2020-10-02 PROCEDURE — 02H43JZ INSERTION OF PACEMAKER LEAD INTO CORONARY VEIN, PERCUTANEOUS APPROACH: ICD-10-PCS | Performed by: INTERNAL MEDICINE

## 2020-10-02 PROCEDURE — 33225 L VENTRIC PACING LEAD ADD-ON: CPT

## 2020-10-02 PROCEDURE — 99153 MOD SED SAME PHYS/QHP EA: CPT

## 2020-10-02 PROCEDURE — B50P1ZZ PLAIN RADIOGRAPHY OF BILATERAL UPPER EXTREMITY VEINS USING LOW OSMOLAR CONTRAST: ICD-10-PCS | Performed by: INTERNAL MEDICINE

## 2020-10-02 PROCEDURE — 36005 INJECTION EXT VENOGRAPHY: CPT

## 2020-10-02 PROCEDURE — 71045 X-RAY EXAM CHEST 1 VIEW: CPT | Performed by: INTERNAL MEDICINE

## 2020-10-02 RX ORDER — LIDOCAINE HYDROCHLORIDE AND EPINEPHRINE 10; 10 MG/ML; UG/ML
INJECTION, SOLUTION INFILTRATION; PERINEURAL
Status: COMPLETED
Start: 2020-10-02 | End: 2020-10-02

## 2020-10-02 RX ORDER — CEFAZOLIN SODIUM/WATER 2 G/20 ML
SYRINGE (ML) INTRAVENOUS
Status: COMPLETED
Start: 2020-10-02 | End: 2020-10-02

## 2020-10-02 RX ORDER — MIDAZOLAM HYDROCHLORIDE 1 MG/ML
INJECTION INTRAMUSCULAR; INTRAVENOUS
Status: COMPLETED
Start: 2020-10-02 | End: 2020-10-02

## 2020-10-02 RX ORDER — ACETAMINOPHEN 500 MG
1000 TABLET ORAL ONCE AS NEEDED
Status: COMPLETED | OUTPATIENT
Start: 2020-10-02 | End: 2020-10-02

## 2020-10-02 RX ORDER — CEPHALEXIN 500 MG/1
500 CAPSULE ORAL EVERY 6 HOURS SCHEDULED
Status: DISCONTINUED | OUTPATIENT
Start: 2020-10-02 | End: 2020-10-02

## 2020-10-02 RX ORDER — SODIUM CHLORIDE 9 MG/ML
INJECTION, SOLUTION INTRAVENOUS
Status: COMPLETED | OUTPATIENT
Start: 2020-10-02 | End: 2020-10-02

## 2020-10-02 RX ORDER — CEPHALEXIN 500 MG/1
500 CAPSULE ORAL EVERY 6 HOURS SCHEDULED
Qty: 4 CAPSULE | Refills: 0 | Status: SHIPPED | OUTPATIENT
Start: 2020-10-02 | End: 2020-10-03

## 2020-10-02 RX ORDER — ACETAMINOPHEN AND CODEINE PHOSPHATE 300; 30 MG/1; MG/1
1 TABLET ORAL EVERY 4 HOURS PRN
Qty: 10 TABLET | Refills: 0 | Status: SHIPPED | OUTPATIENT
Start: 2020-10-02

## 2020-10-02 RX ORDER — ACETAMINOPHEN 500 MG
TABLET ORAL
Status: COMPLETED
Start: 2020-10-02 | End: 2020-10-02

## 2020-10-02 RX ADMIN — ACETAMINOPHEN 1000 MG: 500 MG TABLET ORAL at 12:18:00

## 2020-10-02 RX ADMIN — SODIUM CHLORIDE: 9 INJECTION, SOLUTION INTRAVENOUS at 08:30:00

## 2020-10-02 RX ADMIN — CEPHALEXIN 500 MG: 500 CAPSULE ORAL at 15:30:00

## 2020-10-02 NOTE — OPERATIVE REPORT
[facility]    Cardiac Electrophysiology Operative Note    Justino [facility] Lab Suites   [facility] [id number] MRN [id number]   Admission Date 10/2/2020 Procedure Date 10/2/2020   Attending Physician Gladys Mahoney MD Procedure Physician coronary sinus had a very tortuous ostium, and multiple techniques were used, finally successful with a 6 Czech deflectable EP catheter, with a straight sheath over it. Through this a coronary sinus venogram was performed.   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 2) No anticoagulants for 48 hours. 3) Incision care as directed. Keep dry for 5 days.   4) Driving restrictions as inst

## 2020-10-02 NOTE — PROGRESS NOTES
Discharge instructions reviewed with patient and son at this time. All questions and concerns addressed. All parties verbalized understanding of plan of care via teach back method.  At discharge vital signs were stable on room air, incision dressing was dry

## 2020-10-03 ENCOUNTER — TELEPHONE (OUTPATIENT)
Dept: NEPHROLOGY | Facility: CLINIC | Age: 85
End: 2020-10-03

## 2020-10-03 DIAGNOSIS — N18.31 STAGE 3A CHRONIC KIDNEY DISEASE (HCC): Primary | ICD-10-CM

## 2020-10-06 ENCOUNTER — TELEPHONE (OUTPATIENT)
Dept: CARDIOLOGY | Age: 85
End: 2020-10-06

## 2020-10-09 ENCOUNTER — ANCILLARY PROCEDURE (OUTPATIENT)
Dept: CARDIOLOGY | Age: 85
End: 2020-10-09
Attending: INTERNAL MEDICINE

## 2020-10-09 DIAGNOSIS — Z45.018 PACEMAKER REPROGRAMMING/CHECK: ICD-10-CM

## 2020-10-09 PROCEDURE — 93281 PM DEVICE PROGR EVAL MULTI: CPT | Performed by: INTERNAL MEDICINE

## 2020-10-11 ENCOUNTER — APPOINTMENT (OUTPATIENT)
Dept: LAB | Facility: HOSPITAL | Age: 85
End: 2020-10-11
Attending: INTERNAL MEDICINE
Payer: MEDICARE

## 2020-10-11 DIAGNOSIS — Z01.818 PREOP TESTING: ICD-10-CM

## 2020-10-12 ENCOUNTER — TELEPHONE (OUTPATIENT)
Dept: ENDOCRINOLOGY CLINIC | Facility: CLINIC | Age: 85
End: 2020-10-12

## 2020-10-12 NOTE — TELEPHONE ENCOUNTER
Lov: 7/15/20    Pt son concerned that metformin he is taking is causing cancer per News. rn informed that only certain metformin is affected by a recall of 2 different companies and will check with suis on his supply.    rn called [employer] and state

## 2020-10-12 NOTE — TELEPHONE ENCOUNTER
•  GLIMEPIRIDE 4 MG Oral Tab, TAKE 1 TABLET BY MOUTH EVERY MORNING BEFORE BREAKFAST, Disp: 90 tablet, Rfl: 0

## 2020-10-13 RX ORDER — GLIMEPIRIDE 4 MG/1
4 TABLET ORAL
Qty: 90 TABLET | Refills: 0 | Status: SHIPPED | OUTPATIENT
Start: 2020-10-13 | End: 2021-01-08

## 2020-10-13 NOTE — TELEPHONE ENCOUNTER
Patient's son Sujey Elaine was contacted and relayed below message as outlined. He confirmed that there were no prep medication, was just told to be NPO starting at 2200 today.   He voiced understanding to the instructions and denied further questions at this shama

## 2020-10-13 NOTE — TELEPHONE ENCOUNTER
Jacqui Baxter! Was the patient given any other instructions? Has he been instructed to take a prep of any sort? If not, then, the night before, he can take 18 units of his Lantus and the morning of his procedure, hold his metformin and glimepiride.

## 2020-10-14 ENCOUNTER — HOSPITAL ENCOUNTER (OUTPATIENT)
Facility: HOSPITAL | Age: 85
Setting detail: HOSPITAL OUTPATIENT SURGERY
Discharge: HOME | End: 2020-10-14
Attending: INTERNAL MEDICINE | Admitting: INTERNAL MEDICINE
Payer: MEDICARE

## 2020-10-14 ENCOUNTER — ANESTHESIA EVENT (OUTPATIENT)
Dept: ENDOSCOPY | Facility: HOSPITAL | Age: 85
End: 2020-10-14
Payer: MEDICARE

## 2020-10-14 ENCOUNTER — ANESTHESIA (OUTPATIENT)
Dept: ENDOSCOPY | Facility: HOSPITAL | Age: 85
End: 2020-10-14
Payer: MEDICARE

## 2020-10-14 VITALS
RESPIRATION RATE: 20 BRPM | SYSTOLIC BLOOD PRESSURE: 144 MMHG | HEIGHT: 67 IN | WEIGHT: 160 LBS | OXYGEN SATURATION: 100 % | DIASTOLIC BLOOD PRESSURE: 74 MMHG | HEART RATE: 60 BPM | TEMPERATURE: 98 F | BODY MASS INDEX: 25.11 KG/M2

## 2020-10-14 DIAGNOSIS — Z01.818 PREOP TESTING: Primary | ICD-10-CM

## 2020-10-14 DIAGNOSIS — R13.10 DYSPHAGIA, UNSPECIFIED TYPE: ICD-10-CM

## 2020-10-14 DIAGNOSIS — K44.9 HIATAL HERNIA: ICD-10-CM

## 2020-10-14 DIAGNOSIS — K22.2 SCHATZKI'S RING: ICD-10-CM

## 2020-10-14 PROCEDURE — 0D748ZZ DILATION OF ESOPHAGOGASTRIC JUNCTION, VIA NATURAL OR ARTIFICIAL OPENING ENDOSCOPIC: ICD-10-PCS | Performed by: INTERNAL MEDICINE

## 2020-10-14 PROCEDURE — 43239 EGD BIOPSY SINGLE/MULTIPLE: CPT | Performed by: INTERNAL MEDICINE

## 2020-10-14 PROCEDURE — 43249 ESOPH EGD DILATION <30 MM: CPT | Performed by: INTERNAL MEDICINE

## 2020-10-14 PROCEDURE — 0DB38ZX EXCISION OF LOWER ESOPHAGUS, VIA NATURAL OR ARTIFICIAL OPENING ENDOSCOPIC, DIAGNOSTIC: ICD-10-PCS | Performed by: INTERNAL MEDICINE

## 2020-10-14 PROCEDURE — 93281 PM DEVICE PROGR EVAL MULTI: CPT | Performed by: INTERNAL MEDICINE

## 2020-10-14 RX ORDER — LIDOCAINE HYDROCHLORIDE 20 MG/ML
INJECTION, SOLUTION EPIDURAL; INFILTRATION; INTRACAUDAL; PERINEURAL AS NEEDED
Status: DISCONTINUED | OUTPATIENT
Start: 2020-10-14 | End: 2020-10-14 | Stop reason: SURG

## 2020-10-14 RX ORDER — SODIUM CHLORIDE, SODIUM LACTATE, POTASSIUM CHLORIDE, CALCIUM CHLORIDE 600; 310; 30; 20 MG/100ML; MG/100ML; MG/100ML; MG/100ML
INJECTION, SOLUTION INTRAVENOUS CONTINUOUS
Status: DISCONTINUED | OUTPATIENT
Start: 2020-10-14 | End: 2020-10-14

## 2020-10-14 RX ADMIN — SODIUM CHLORIDE, SODIUM LACTATE, POTASSIUM CHLORIDE, CALCIUM CHLORIDE: 600; 310; 30; 20 INJECTION, SOLUTION INTRAVENOUS at 10:16:00

## 2020-10-14 RX ADMIN — SODIUM CHLORIDE, SODIUM LACTATE, POTASSIUM CHLORIDE, CALCIUM CHLORIDE: 600; 310; 30; 20 INJECTION, SOLUTION INTRAVENOUS at 10:39:00

## 2020-10-14 RX ADMIN — LIDOCAINE HYDROCHLORIDE 20 MG: 20 INJECTION, SOLUTION EPIDURAL; INFILTRATION; INTRACAUDAL; PERINEURAL at 10:19:00

## 2020-10-14 NOTE — ANESTHESIA PREPROCEDURE EVALUATION
Anesthesia PreOp Note    HPI:     Jam Black is a 80year old male who presents for preoperative consultation requested by: Nitesh Reina MD    Date of Surgery: 10/14/2020    Procedure(s):  ESOPHAGOGASTRODUODENOSCOPY (EGD)  Indication: Dysphagi 10/13/2020    •  tamsulosin (FLOMAX) cap, TAKE 2 CAPSULES BY MOUTH EVERY DAY, Disp: 180 capsule, Rfl: 1, 10/13/2020    •  traZODone HCl 50 MG Oral Tab, Take 1 tablet (50 mg total) by mouth nightly., Disp: 30 tablet, Rfl: 2, 10/13/2020    •  METFORMIN HCL 5 3        •  lactated ringers infusion, , Intravenous, Continuous, Denisha Burns MD    No current [facility]-ordered outpatient medications on file.       No Known Allergies    Family History   Problem Relation Age of Onset   • Glaucoma Father      Social Occupational Exposure: Not Asked        Hobby Hazards: Not Asked        Sleep Concern: Not Asked        Stress Concern: Not Asked        Weight Concern: Not Asked        Special Diet: Not Asked        Back Care: Not Asked        Exercise: Yes          cutt breath,   Cardiovascular   (+) pacemaker, hypertension well controlled,     Rhythm: regular  ROS comment: 7/2020 ECHO: EF 40-45%, mild to moderate MR, mild TR    10/2020 PPM upgraded to BiV, states feels better since upgrade.     Neuro/Psych      GI/Hepatic

## 2020-10-14 NOTE — ANESTHESIA POSTPROCEDURE EVALUATION
Patient: Khloe Duncan    Procedure Summary     Date: 10/14/20 Room / Location: [address] [place] [address] ENDOSCOPY 04 / [address] [place] [address] ENDOSCOPY    Anesthesia Start: 8206 Anesthesia Stop:     Procedure: ESOPHAGOGASTRODUODENOSCOPY (EGD) (N/A ) Diagnosis:       Dysphagia, unspecified type

## 2020-10-14 NOTE — OPERATIVE REPORT
[facility] Endoscopy Report      Date of Procedure:  10/14/20        Preoperative Diagnosis:  1. Recurrent solid food dysphagia  2. History of Schatzki ring/peptic stricture      Postoperative Diagnosis:  1. Recurrent Schatzki ring  2. normal.    Following diagnostic endoscopy a TTS balloon dilator was positioned across the gastroesophageal junction. Initially sequential inflations were made with the 15, 16.5 and 18 mm balloons. The balloon catheter was deflated.   There were no signs o

## 2020-10-14 NOTE — H&P
History & Physical Examination    Patient Name: Daron Canavan  MRN: [id number]  CSN: [id number]  YOB: 1934    Diagnosis: Recurrent dysphagia and history of Schatzki ring/peptic stricture        •  Cholecalciferol (VITAMIN D) 50 MCG (2000 UT) Pain., Disp: 10 tablet, Rfl: 0    •  ONETOUCH ULTRA In Vitro Strip, TEST ONCE DAILY, Disp: 100 strip, Rfl: 0    •  ONETOUCH DELICA PLUS [id number] Does not apply Misc, TEST ONCE DAILY, Disp: 100 each, Rfl: 3    •  Insulin Pen Needle (BD PEN NEEDLE MINI U/F) ] I have reviewed the History and Physical done within the last 30 days. Any changes noted above.     Mary Dumont MD  10/14/2020  10:10 AM

## 2020-10-15 ENCOUNTER — OFFICE VISIT (OUTPATIENT)
Dept: FAMILY MEDICINE CLINIC | Facility: CLINIC | Age: 85
End: 2020-10-15
Payer: MEDICARE

## 2020-10-15 VITALS
SYSTOLIC BLOOD PRESSURE: 144 MMHG | HEART RATE: 62 BPM | TEMPERATURE: 97 F | DIASTOLIC BLOOD PRESSURE: 72 MMHG | BODY MASS INDEX: 24 KG/M2 | WEIGHT: 156 LBS

## 2020-10-15 DIAGNOSIS — N18.30 STAGE 3 CHRONIC KIDNEY DISEASE, UNSPECIFIED WHETHER STAGE 3A OR 3B CKD (HCC): ICD-10-CM

## 2020-10-15 DIAGNOSIS — G47.00 INSOMNIA, UNSPECIFIED TYPE: ICD-10-CM

## 2020-10-15 DIAGNOSIS — E11.65 UNCONTROLLED TYPE 2 DIABETES MELLITUS WITH HYPERGLYCEMIA (HCC): Primary | ICD-10-CM

## 2020-10-15 PROCEDURE — 99213 OFFICE O/P EST LOW 20 MIN: CPT | Performed by: FAMILY MEDICINE

## 2020-10-15 PROCEDURE — 1111F DSCHRG MED/CURRENT MED MERGE: CPT | Performed by: FAMILY MEDICINE

## 2020-10-15 PROCEDURE — G0463 HOSPITAL OUTPT CLINIC VISIT: HCPCS | Performed by: FAMILY MEDICINE

## 2020-10-15 NOTE — PROGRESS NOTES
Roxann Paula is a 80year old male. Patient presents with: Follow - Up    HPI:   Pt has been staying active. - reports has large yard and cutting grass and taking care of house. Has dog - walking regularly.  Wife  earlier this year but has support of DAY, Disp: 100 each, Rfl: 5    •  hydrochlorothiazide 25 MG Oral Tab, Take 0.5 tablets (12.5 mg total) by mouth daily. , Disp: 30 tablet, Rfl: 3    •  LISINOPRIL 10 MG Oral Tab, TAKE 1 TABLET BY MOUTH DAILY, Disp: 30 tablet, Rfl: 4    •  COMBIGAN 0.2-0.5 % BMI 24.43 kg/m²   GENERAL: well developed, well nourished,in no apparent distress  SKIN: no rashes,no suspicious lesions  NECK: supple,no adenopathy,no bruits  LUNGS: clear to auscultation  CARDIO: RRR without murmur  EXTREMITIES: no cyanosis, clubbing or

## 2020-10-26 ENCOUNTER — LAB ENCOUNTER (OUTPATIENT)
Dept: LAB | Age: 85
End: 2020-10-26
Attending: INTERNAL MEDICINE
Payer: MEDICARE

## 2020-10-26 DIAGNOSIS — N18.31 STAGE 3A CHRONIC KIDNEY DISEASE (HCC): ICD-10-CM

## 2020-10-26 LAB
BUN SERPL-MCNC: 24 MG/DL
CALCIUM SERPL-MCNC: 9.4 MG/DL
CHLORIDE SERPL-SCNC: 112 MMOL/L
CREAT SERPL-MCNC: 1.94 MG/DL
GLUCOSE SERPL-MCNC: 87 MG/DL
HCT VFR BLD CALC: 38.9 %
HGB BLD-MCNC: 12.4 G/DL
PLATELET # BLD: 210 K/MCL
POTASSIUM SERPL-SCNC: 4.6 MMOL/L
RBC # BLD: 4.16 10*6/UL
SODIUM SERPL-SCNC: 144 MMOL/L
WBC # BLD: 5.4 K/MCL

## 2020-10-26 PROCEDURE — 85025 COMPLETE CBC W/AUTO DIFF WBC: CPT

## 2020-10-26 PROCEDURE — 80069 RENAL FUNCTION PANEL: CPT

## 2020-10-26 PROCEDURE — 36415 COLL VENOUS BLD VENIPUNCTURE: CPT

## 2020-11-02 ENCOUNTER — OFFICE VISIT (OUTPATIENT)
Dept: CARDIOLOGY | Age: 85
End: 2020-11-02

## 2020-11-02 VITALS
BODY MASS INDEX: 24.64 KG/M2 | RESPIRATION RATE: 18 BRPM | HEART RATE: 64 BPM | DIASTOLIC BLOOD PRESSURE: 60 MMHG | HEIGHT: 67 IN | WEIGHT: 157 LBS | SYSTOLIC BLOOD PRESSURE: 158 MMHG

## 2020-11-02 DIAGNOSIS — I50.22 CHRONIC SYSTOLIC CONGESTIVE HEART FAILURE (CMD): Primary | ICD-10-CM

## 2020-11-02 DIAGNOSIS — E11.9 TYPE 2 DIABETES MELLITUS WITHOUT COMPLICATION, WITHOUT LONG-TERM CURRENT USE OF INSULIN (CMD): ICD-10-CM

## 2020-11-02 DIAGNOSIS — I10 ESSENTIAL HYPERTENSION: ICD-10-CM

## 2020-11-02 PROCEDURE — 99024 POSTOP FOLLOW-UP VISIT: CPT | Performed by: INTERNAL MEDICINE

## 2020-11-02 RX ORDER — LISINOPRIL 10 MG/1
5 TABLET ORAL 2 TIMES DAILY
Qty: 135 TABLET | Refills: 3 | Status: SHIPPED | COMMUNITY
Start: 2020-11-02 | End: 2021-05-13 | Stop reason: SDUPTHER

## 2020-11-02 RX ORDER — TRAZODONE HYDROCHLORIDE 50 MG/1
50 TABLET ORAL NIGHTLY PRN
COMMUNITY
Start: 2020-10-08 | End: 2021-08-23 | Stop reason: CLARIF

## 2020-11-02 SDOH — HEALTH STABILITY: PHYSICAL HEALTH: ON AVERAGE, HOW MANY DAYS PER WEEK DO YOU ENGAGE IN MODERATE TO STRENUOUS EXERCISE (LIKE A BRISK WALK)?: 7 DAYS

## 2020-11-02 ASSESSMENT — ENCOUNTER SYMPTOMS
ALLERGIC/IMMUNOLOGIC COMMENTS: NO NEW FOOD ALLERGIES
COUGH: 0
BRUISES/BLEEDS EASILY: 0
WEIGHT GAIN: 0
CONSTIPATION: 1
FEVER: 0
HEMOPTYSIS: 0
INSOMNIA: 1
CHILLS: 0
SUSPICIOUS LESIONS: 0
WEIGHT LOSS: 0
HEMATOCHEZIA: 0

## 2020-11-02 ASSESSMENT — PATIENT HEALTH QUESTIONNAIRE - PHQ9
SUM OF ALL RESPONSES TO PHQ9 QUESTIONS 1 AND 2: 0
CLINICAL INTERPRETATION OF PHQ9 SCORE: NO FURTHER SCREENING NEEDED
SUM OF ALL RESPONSES TO PHQ9 QUESTIONS 1 AND 2: 0
CLINICAL INTERPRETATION OF PHQ2 SCORE: NO FURTHER SCREENING NEEDED
1. LITTLE INTEREST OR PLEASURE IN DOING THINGS: NOT AT ALL
2. FEELING DOWN, DEPRESSED OR HOPELESS: NOT AT ALL

## 2020-11-03 ENCOUNTER — CLINICAL ABSTRACT (OUTPATIENT)
Dept: CARDIOLOGY | Age: 85
End: 2020-11-03

## 2020-11-09 ENCOUNTER — TELEPHONE (OUTPATIENT)
Dept: NEPHROLOGY | Facility: CLINIC | Age: 85
End: 2020-11-09

## 2020-11-09 NOTE — TELEPHONE ENCOUNTER
Copied and pasted from My Chart message. Your kidneys remain stable.  If doing well just repeat your kidney blood test just prior to your upcoming visit in January 2021.

## 2020-11-16 ENCOUNTER — OFFICE VISIT (OUTPATIENT)
Dept: PODIATRY CLINIC | Facility: CLINIC | Age: 85
End: 2020-11-16
Payer: MEDICARE

## 2020-11-16 DIAGNOSIS — M79.675 PAIN IN TOES OF BOTH FEET: Primary | ICD-10-CM

## 2020-11-16 DIAGNOSIS — M79.674 PAIN IN TOES OF BOTH FEET: Primary | ICD-10-CM

## 2020-11-16 DIAGNOSIS — B35.1 ONYCHOMYCOSIS: ICD-10-CM

## 2020-11-16 PROCEDURE — 11721 DEBRIDE NAIL 6 OR MORE: CPT | Performed by: PODIATRIST

## 2020-11-16 NOTE — TELEPHONE ENCOUNTER
•  traZODone HCl 50 MG Oral Tab, Take 1 tablet (50 mg total) by mouth nightly., Disp: 30 tablet, Rfl: 2

## 2020-11-16 NOTE — PROGRESS NOTES
HPI:    Patient ID: Viky Hernandez is a 80year old male.   This 55-year-old diabetic presents today having not been seen since early August.  His chief concern today is some pain and discomfort associated with both great toes although the left seems worse TABLET BY MOUTH DAILY 30 tablet 4   • COMBIGAN 0.2-0.5 % Ophthalmic Solution USE 1 GTT OU BID  12   • PANTOPRAZOLE SODIUM 40 MG Oral Tab EC TAKE 1 TABLET(40 MG) BY MOUTH EVERY MORNING BEFORE BREAKFAST 30 tablet 0   • Glucose Blood (ONETOUCH ULTRA BLUE) In

## 2020-11-17 RX ORDER — TRAZODONE HYDROCHLORIDE 50 MG/1
50 TABLET ORAL NIGHTLY
Qty: 30 TABLET | Refills: 2 | Status: SHIPPED | OUTPATIENT
Start: 2020-11-17 | End: 2021-05-20

## 2020-11-18 DIAGNOSIS — Z79.4 CONTROLLED TYPE 2 DIABETES MELLITUS WITH COMPLICATION, WITH LONG-TERM CURRENT USE OF INSULIN (HCC): ICD-10-CM

## 2020-11-18 DIAGNOSIS — E11.8 CONTROLLED TYPE 2 DIABETES MELLITUS WITH COMPLICATION, WITH LONG-TERM CURRENT USE OF INSULIN (HCC): ICD-10-CM

## 2020-11-18 NOTE — TELEPHONE ENCOUNTER
•  insulin glargine (LANTUS SOLOSTAR) 100 UNIT/ML Subcutaneous Solution Pen-injector, INJECT 25 UNITS UNDER THE SKIN EVERY EVENING, Disp: 15 mL, Rfl: 1

## 2020-11-19 RX ORDER — INSULIN GLARGINE 100 [IU]/ML
INJECTION, SOLUTION SUBCUTANEOUS
Qty: 15 ML | Refills: 1 | Status: SHIPPED | OUTPATIENT
Start: 2020-11-19 | End: 2021-01-14

## 2020-12-18 DIAGNOSIS — Z79.4 CONTROLLED TYPE 2 DIABETES MELLITUS WITH COMPLICATION, WITH LONG-TERM CURRENT USE OF INSULIN (HCC): ICD-10-CM

## 2020-12-18 DIAGNOSIS — E11.8 CONTROLLED TYPE 2 DIABETES MELLITUS WITH COMPLICATION, WITH LONG-TERM CURRENT USE OF INSULIN (HCC): ICD-10-CM

## 2020-12-21 RX ORDER — BLOOD SUGAR DIAGNOSTIC
STRIP MISCELLANEOUS
Qty: 100 STRIP | Refills: 0 | Status: SHIPPED | OUTPATIENT
Start: 2020-12-21 | End: 2021-03-17

## 2021-01-02 ENCOUNTER — PATIENT MESSAGE (OUTPATIENT)
Dept: FAMILY MEDICINE CLINIC | Facility: CLINIC | Age: 86
End: 2021-01-02

## 2021-01-04 NOTE — TELEPHONE ENCOUNTER
Agree with message sent
From: Heraclio Robertson  To: Amanda Chand MD  Sent: 1/2/2021 11:49 AM CST  Subject: Non-Urgent Medical Question    Dr Any Gómez,  When would my father be eligible for the COVID-19 vaccine.  He is 80years old, with all his medical challenges, would be a c
Routed to Dr Jesus Kaplan for [employer], thanks.       Future Appointments   Date Time Provider Nash Delongi   1/13/2021  1:00 PM Francisco J Valdez MD [facility] ADO   1/13/2021  1:30 PM Salo Powell MD [facility] ADO   1/20/2021  9:20 AM aCleb Garcia MD CC
Name band;

## 2021-01-07 ENCOUNTER — PATIENT MESSAGE (OUTPATIENT)
Dept: FAMILY MEDICINE CLINIC | Facility: CLINIC | Age: 86
End: 2021-01-07

## 2021-01-07 ENCOUNTER — TELEPHONE (OUTPATIENT)
Dept: FAMILY MEDICINE CLINIC | Facility: CLINIC | Age: 86
End: 2021-01-07

## 2021-01-07 DIAGNOSIS — Z20.822 SUSPECTED COVID-19 VIRUS INFECTION: Primary | ICD-10-CM

## 2021-01-08 ENCOUNTER — LAB ENCOUNTER (OUTPATIENT)
Dept: LAB | Age: 86
End: 2021-01-08
Attending: PHYSICIAN ASSISTANT
Payer: MEDICARE

## 2021-01-08 RX ORDER — GLIMEPIRIDE 4 MG/1
TABLET ORAL
Qty: 90 TABLET | Refills: 0 | Status: SHIPPED | OUTPATIENT
Start: 2021-01-08 | End: 2021-04-08

## 2021-01-08 NOTE — TELEPHONE ENCOUNTER
Spoke to patient's son Carson Delcid. Diana Morse who is on HIPPA and was informed regarding Covid-19 order being placed. Full name and  of patient verified. Son was told that patient needs to quarantine until results are received.

## 2021-01-08 NOTE — TELEPHONE ENCOUNTER
From: Roxann Shelter  To: Ria Romero MD  Sent: 1/7/2021 5:22 PM CST  Subject: Other    Dr. Cortney Tipton,    My father says he is feeling okay, but has fever of 100.1. I gave him some Tylenol. He has a slight cold.  I'm not sure, if he should come in for

## 2021-01-08 NOTE — TELEPHONE ENCOUNTER
Advised to call d/t sx's     From: Gilmar West  To: Kimberly Palmer MD  Sent: 1/7/2021  5:22 PM CST  Subject: Other    Dr. Rochele Holter,    My father says he is feeling okay, but has fever of 100. 1.  I gave him some Tylenol.  He has a slight cold.  I'm n

## 2021-01-08 NOTE — TELEPHONE ENCOUNTER
Patient's son states he has not yet spoken with his father for an update on his health. Patient lives on his own and is so far able to care for himself. Son asking for Covid test for patient.   Informed son to make sure patient is well hydrated, resting

## 2021-01-09 LAB — SARS-COV-2 RNA RESP QL NAA+PROBE: DETECTED

## 2021-01-11 ENCOUNTER — TELEPHONE (OUTPATIENT)
Dept: FAMILY MEDICINE CLINIC | Facility: CLINIC | Age: 86
End: 2021-01-11

## 2021-01-11 RX ORDER — TAMSULOSIN HYDROCHLORIDE 0.4 MG/1
CAPSULE ORAL
Qty: 180 CAPSULE | Refills: 1 | Status: SHIPPED | OUTPATIENT
Start: 2021-01-11 | End: 2021-05-11

## 2021-01-11 NOTE — TELEPHONE ENCOUNTER
Action Requested: Summary for Provider     []  Critical Lab, Recommendations Needed  [] Need Additional Advice  []   FYI    []   Need Orders  [] Need Medications Sent to Pharmacy  []  Other     SUMMARY: patients savage Martinez contacted office.  Has concerns abo

## 2021-01-11 NOTE — TELEPHONE ENCOUNTER
Per pharmacy pt is requesting refill for the following medication.         •  tamsulosin (FLOMAX) cap, TAKE 2 CAPSULES BY MOUTH EVERY DAY, Disp: 180 capsule, Rfl: 1

## 2021-01-11 NOTE — TELEPHONE ENCOUNTER
Disp Refills Start End     tamsulosin (FLOMAX) cap 180 capsule 1 1/11/2021     Sig: TAKE 2 CAPSULES BY MOUTH EVERY DAY    Sent to pharmacy as: Tamsulosin HCl 0.4 MG Oral Capsule (FLOMAX)    E-Prescribing Status: Receipt confirmed by pharmacy (1/11/2021  2:

## 2021-01-12 ENCOUNTER — HOSPITAL ENCOUNTER (EMERGENCY)
Facility: HOSPITAL | Age: 86
Discharge: HOME | End: 2021-01-12
Attending: EMERGENCY MEDICINE
Payer: MEDICARE

## 2021-01-12 ENCOUNTER — APPOINTMENT (OUTPATIENT)
Dept: GENERAL RADIOLOGY | Facility: HOSPITAL | Age: 86
End: 2021-01-12
Attending: EMERGENCY MEDICINE
Payer: MEDICARE

## 2021-01-12 ENCOUNTER — VIRTUAL PHONE E/M (OUTPATIENT)
Dept: FAMILY MEDICINE CLINIC | Facility: CLINIC | Age: 86
End: 2021-01-12
Payer: MEDICARE

## 2021-01-12 VITALS
HEART RATE: 62 BPM | RESPIRATION RATE: 24 BRPM | DIASTOLIC BLOOD PRESSURE: 77 MMHG | TEMPERATURE: 100 F | WEIGHT: 155 LBS | OXYGEN SATURATION: 96 % | BODY MASS INDEX: 24 KG/M2 | SYSTOLIC BLOOD PRESSURE: 130 MMHG

## 2021-01-12 DIAGNOSIS — U07.1 COVID-19: Primary | ICD-10-CM

## 2021-01-12 DIAGNOSIS — U07.1 PNEUMONIA DUE TO COVID-19 VIRUS: Primary | ICD-10-CM

## 2021-01-12 DIAGNOSIS — J12.82 PNEUMONIA DUE TO COVID-19 VIRUS: Primary | ICD-10-CM

## 2021-01-12 LAB
ANION GAP SERPL CALC-SCNC: 3 MMOL/L (ref 0–18)
BACTERIA UR QL AUTO: NEGATIVE /HPF
BASOPHILS # BLD AUTO: 0.01 X10(3) UL (ref 0–0.2)
BASOPHILS NFR BLD AUTO: 0.2 %
BILIRUB UR QL: NEGATIVE
BUN BLD-MCNC: 32 MG/DL (ref 7–18)
BUN/CREAT SERPL: 14 (ref 10–20)
CALCIUM BLD-MCNC: 9.4 MG/DL (ref 8.5–10.1)
CHLORIDE SERPL-SCNC: 102 MMOL/L (ref 98–112)
CLARITY UR: CLEAR
CO2 SERPL-SCNC: 29 MMOL/L (ref 21–32)
COLOR UR: YELLOW
CREAT BLD-MCNC: 2.29 MG/DL
DEPRECATED RDW RBC AUTO: 41.9 FL (ref 35.1–46.3)
EOSINOPHIL # BLD AUTO: 0 X10(3) UL (ref 0–0.7)
EOSINOPHIL NFR BLD AUTO: 0 %
ERYTHROCYTE [DISTWIDTH] IN BLOOD BY AUTOMATED COUNT: 12.7 % (ref 11–15)
GLUCOSE BLD-MCNC: 110 MG/DL (ref 70–99)
GLUCOSE UR-MCNC: 50 MG/DL
HCT VFR BLD AUTO: 40.7 %
HGB BLD-MCNC: 13.4 G/DL
HGB UR QL STRIP.AUTO: NEGATIVE
HYALINE CASTS #/AREA URNS AUTO: 1 /LPF
IMM GRANULOCYTES # BLD AUTO: 0.03 X10(3) UL (ref 0–1)
IMM GRANULOCYTES NFR BLD: 0.6 %
KETONES UR-MCNC: NEGATIVE MG/DL
LEUKOCYTE ESTERASE UR QL STRIP.AUTO: NEGATIVE
LYMPHOCYTES # BLD AUTO: 1.27 X10(3) UL (ref 1–4)
LYMPHOCYTES NFR BLD AUTO: 27.1 %
MCH RBC QN AUTO: 29.6 PG (ref 26–34)
MCHC RBC AUTO-ENTMCNC: 32.9 G/DL (ref 31–37)
MCV RBC AUTO: 90 FL
MONOCYTES # BLD AUTO: 0.4 X10(3) UL (ref 0.1–1)
MONOCYTES NFR BLD AUTO: 8.5 %
NEUTROPHILS # BLD AUTO: 2.97 X10 (3) UL (ref 1.5–7.7)
NEUTROPHILS # BLD AUTO: 2.97 X10(3) UL (ref 1.5–7.7)
NEUTROPHILS NFR BLD AUTO: 63.6 %
NITRITE UR QL STRIP.AUTO: NEGATIVE
OSMOLALITY SERPL CALC.SUM OF ELEC: 286 MOSM/KG (ref 275–295)
PH UR: 6 [PH] (ref 5–8)
PLATELET # BLD AUTO: 197 10(3)UL (ref 150–450)
POTASSIUM SERPL-SCNC: 4.3 MMOL/L (ref 3.5–5.1)
PROCALCITONIN SERPL-MCNC: 0.16 NG/ML (ref ?–0.16)
PROT UR-MCNC: 100 MG/DL
RBC # BLD AUTO: 4.52 X10(6)UL
RBC #/AREA URNS AUTO: 14 /HPF
SODIUM SERPL-SCNC: 134 MMOL/L (ref 136–145)
SP GR UR STRIP: 1.02 (ref 1–1.03)
UROBILINOGEN UR STRIP-ACNC: <2
WBC # BLD AUTO: 4.7 X10(3) UL (ref 4–11)
WBC #/AREA URNS AUTO: <1 /HPF

## 2021-01-12 PROCEDURE — 36415 COLL VENOUS BLD VENIPUNCTURE: CPT

## 2021-01-12 PROCEDURE — 99442 PHONE E/M BY PHYS 11-20 MIN: CPT | Performed by: FAMILY MEDICINE

## 2021-01-12 PROCEDURE — 81001 URINALYSIS AUTO W/SCOPE: CPT | Performed by: EMERGENCY MEDICINE

## 2021-01-12 PROCEDURE — 80048 BASIC METABOLIC PNL TOTAL CA: CPT | Performed by: EMERGENCY MEDICINE

## 2021-01-12 PROCEDURE — 84145 PROCALCITONIN (PCT): CPT | Performed by: EMERGENCY MEDICINE

## 2021-01-12 PROCEDURE — 99285 EMERGENCY DEPT VISIT HI MDM: CPT

## 2021-01-12 PROCEDURE — 99453 REM MNTR PHYSIOL PARAM SETUP: CPT

## 2021-01-12 PROCEDURE — 85025 COMPLETE CBC W/AUTO DIFF WBC: CPT | Performed by: EMERGENCY MEDICINE

## 2021-01-12 PROCEDURE — 71045 X-RAY EXAM CHEST 1 VIEW: CPT | Performed by: EMERGENCY MEDICINE

## 2021-01-12 RX ORDER — ACETAMINOPHEN 500 MG
1000 TABLET ORAL ONCE
Status: COMPLETED | OUTPATIENT
Start: 2021-01-12 | End: 2021-01-12

## 2021-01-12 NOTE — ED PROVIDER NOTES
Patient Seen in: [facility] AND [facility] Emergency Department    History   Patient presents with:  Covid    Stated Complaint: covid+, sent by doc for [facility]     HPI    80-year-old male past medical diabetes, hypertension, dyslipidemia, CKD, CHF (40-45% ejection fra 300-30 MG Oral Tab,  Take 1 tablet by mouth every 4 (four) hours as needed for Pain. Cholecalciferol (VITAMIN D) 50 MCG (2000 UT) Oral Cap,  Take by mouth daily. simvastatin 40 MG Oral Tab,  Take 1 tablet (40 mg total) by mouth daily.    Nate Bloom Temp 99.8 °F (37.7 °C)   Temp src Temporal   SpO2 96 %   O2 Device None (Room air)       Current:BP (!) 164/77   Pulse 72   Temp 99.8 °F (37.7 °C) (Temporal)   Resp 18   SpO2 96%         Physical Exam   Constitutional: No distress. HEENT: MMM.   Head: N hospitalization and have the following risks factors age, HTN, dyslipidemia, chronic kidney disease, CHF.     The patient/caregiver has been given the “Fact Sheet for Patients, Parents and Caregivers”, informed of alternatives to receiving authorized bamlan care provider within the next three months to obtain basic health screening including reassessment of your blood pressure.       You had elevated blood pressure today and you need to follow up with your doctor for a repeat blood pressure check and further d

## 2021-01-12 NOTE — ED NOTES
Pt educated on bamlanivimab infusion and possible side effects with video  at bedside. Pt verbalized understanding, denied further questions or concerns. Pt states he is ready to receive bamlanivimab.

## 2021-01-12 NOTE — PROGRESS NOTES
Virtual Telephone Check-In    Cristina Ambrocio verbally consents to a Virtual/Telephone Check-In visit on 01/12/21. Patient has been referred to the [facility] website at [email or web address] to review the yearly Consent to Treat document.     Patient Yulia each, Rfl: 3    •  AMLODIPINE BESYLATE 5 MG Oral Tab, TAKE 1 TABLET(5 MG) BY MOUTH DAILY, Disp: 90 tablet, Rfl: 1    •  furosemide 20 MG Oral Tab, Take 1 pill 2 times per week., Disp: , Rfl: 0    •  FINASTERIDE 5 MG Oral Tab, TAKE 1 TABLET(5 MG) BY MOUTH D ear pain  RESPIRATORY: pos shortness of breath, denies cough  CARDIOVASCULAR: denies chest pain  GI: denies abdominal pain and denies heartburn  NEURO: denies headaches      EXAM:   There were no vitals taken for this visit.   GENERAL:speaking in complete s

## 2021-01-12 NOTE — PATIENT INSTRUCTIONS
Instrucciones para el Paciente Sobre el Uso del Pulsioxímetro en el Hogar Debido a la [place]umonía por COVID-19   Resumen y Terminología   Un pulsioxímetro es un pequeño dispositivo que se abrocha al dedo y mide el nivel de saturación de oxígeno y la frecuen vuelva a la salinas de emergencias más cercana o llame al 911. • Si tiene preguntas, contáctese con calderon prestador de atención primaria de terese o con el representante del sam del departamento de emergencias.    [facility] [phone]

## 2021-01-12 NOTE — ED NOTES
Pt sent by PCP for antibody infusion s/p testing COVID+ on 1/8/21. Pt reports mild cough, body aches, and low grade fevers. Pt denies n/v/d. Pt Croatian speaking only and assessment obtained through bedside video .

## 2021-01-13 ENCOUNTER — PATIENT OUTREACH (OUTPATIENT)
Dept: CASE MANAGEMENT | Age: 86
End: 2021-01-13

## 2021-01-13 NOTE — PROGRESS NOTES
Home Monitoring Condition Update    Covid19+ test date: 01/08/2021       Consent Verification:  Assessment Completed With: Patient  HIPAA Verified?   Yes    COVID-19 HOME MONITORING 1/13/2021   Temperature (No Data)   SPO2 97   Pulse taken from Pulse Oxim monoclonal AB treatment in the ED, are you experiencing any of the following: fever, weakness, difficulty breathing, hives, joint pain, rashes, itching, tongue or lip swelling or wheezing?  No  If yes, we will contact your doctor/on call provider or if you

## 2021-01-13 NOTE — PROGRESS NOTES
Called patient to begin Home Monitoring. Spoke to patient's son and he states that patient is sleeping at the time.  Requesting a return call at about 1pm.

## 2021-01-13 NOTE — ED NOTES
Discharge instructions given to pt. Pt verbalized understanding of home care, pulse ox, incentive spirometer, and to follow up with pcp. Pt denied further questions or concerns. Pt ambulatory out of ED, discharged in stable condition.

## 2021-01-14 ENCOUNTER — TELEPHONE (OUTPATIENT)
Dept: FAMILY MEDICINE CLINIC | Facility: CLINIC | Age: 86
End: 2021-01-14

## 2021-01-14 ENCOUNTER — PATIENT OUTREACH (OUTPATIENT)
Dept: CASE MANAGEMENT | Age: 86
End: 2021-01-14

## 2021-01-14 DIAGNOSIS — Z79.4 CONTROLLED TYPE 2 DIABETES MELLITUS WITH COMPLICATION, WITH LONG-TERM CURRENT USE OF INSULIN (HCC): ICD-10-CM

## 2021-01-14 DIAGNOSIS — E11.8 CONTROLLED TYPE 2 DIABETES MELLITUS WITH COMPLICATION, WITH LONG-TERM CURRENT USE OF INSULIN (HCC): ICD-10-CM

## 2021-01-14 RX ORDER — INSULIN GLARGINE 100 [IU]/ML
INJECTION, SOLUTION SUBCUTANEOUS
Qty: 15 ML | Refills: 1 | COMMUNITY
Start: 2021-01-14 | End: 2021-04-26

## 2021-01-14 NOTE — TELEPHONE ENCOUNTER
Encounter routed to the triage pool for RN follow-up.             ----- Message from [address] [place] [address]. Kalpana Gee sent at 1/14/2021  9:51 AM CST -----  Regarding: Other  Contact: [phone]  Dr. Kennyth Kehr Summary.   My dad woke up this morning around 5:30am.  He ha

## 2021-01-14 NOTE — PROGRESS NOTES
Home Monitoring Condition Update    Covid19+ test date: 1/8/2021      Consent Verification:  Assessment Completed With: Patient  HIPAA Verified?   Yes    COVID-19 HOME MONITORING 1/14/2021   Temperature 97.5   SPO2 99   Pulse taken from Pulse Oximeter   E treatment in the ED, are you experiencing any of the following: fever, weakness, difficulty breathing, hives, joint pain, rashes, itching, tongue or lip swelling or wheezing?  No  If yes, we will contact your doctor/on call provider or if you are experienci

## 2021-01-14 NOTE — TELEPHONE ENCOUNTER
Mathew Underwood contacted office. Reports that patient woke up at 530am, was weak. Blood sugar was 81, now 117 after eating fruit. States vitals are stable. Afebrile at this time. Advised to re-check temperature of 95.6  /90.   Decreased appetite, but i

## 2021-01-14 NOTE — TELEPHONE ENCOUNTER
No that is not too high - just a little elevated. If he is nervous, it will raise it. Avoid caffeine right now.

## 2021-01-14 NOTE — TELEPHONE ENCOUNTER
Spoke with pt son and Dr Pedro Barbosa message given. Pt son verb understanding. Pt son states he rechecked pt temp and 97.4    Pt son is asking if Dr Irineo Najjar is concerned about pt b/p. Per son checked it again and 161/80. Per son, pt is concerned about b/p.

## 2021-01-14 NOTE — TELEPHONE ENCOUNTER
Should not stop completely but should take 10 units instead of 25 units as listed in the chart. Continue to monitor pulse ox and vitals regularly.  If oxygen below 90 needs to go to the ER

## 2021-01-15 ENCOUNTER — PATIENT OUTREACH (OUTPATIENT)
Dept: CASE MANAGEMENT | Age: 86
End: 2021-01-15

## 2021-01-15 NOTE — PROGRESS NOTES
Home Monitoring Condition Update    Covid19+ test date: 1/8/2021    Consent Verification:  Assessment Completed With: Patient  HIPAA Verified?   Yes    COVID-19 HOME MONITORING 1/15/2021   Temperature 97.4   Reading From Mouth   SPO2 98   Pulse 70   Pulse you received the monoclonal AB treatment in the ED, are you experiencing any of the following: fever, weakness, difficulty breathing, hives, joint pain, rashes, itching, tongue or lip swelling or wheezing?  No  If yes, we will contact your doctor/on call pr

## 2021-01-16 ENCOUNTER — ANCILLARY PROCEDURE (OUTPATIENT)
Dept: CARDIOLOGY | Age: 86
End: 2021-01-16
Attending: INTERNAL MEDICINE

## 2021-01-16 ENCOUNTER — ANCILLARY ORDERS (OUTPATIENT)
Dept: CARDIOLOGY | Age: 86
End: 2021-01-16

## 2021-01-16 DIAGNOSIS — Z95.0 CARDIAC PACEMAKER IN SITU: ICD-10-CM

## 2021-01-16 PROCEDURE — X1114 CARDIAC DEVICE HOME CHECK - REMOTE UNSCHEDULED: HCPCS | Performed by: INTERNAL MEDICINE

## 2021-01-18 ENCOUNTER — PATIENT OUTREACH (OUTPATIENT)
Dept: CASE MANAGEMENT | Age: 86
End: 2021-01-18

## 2021-01-18 PROCEDURE — 93294 REM INTERROG EVL PM/LDLS PM: CPT | Performed by: INTERNAL MEDICINE

## 2021-01-18 NOTE — PROGRESS NOTES
Home Monitoring Condition Update    Covid19+ test date: 1/8/2021      Consent Verification:  Assessment Completed With: Patient  HIPAA Verified?   Yes    COVID-19 HOME MONITORING 1/18/2021   Temperature 96.5   Reading From Mouth   SPO2 99   Pulse 64   Pul noticed you received the monoclonal AB treatment in the ED, are you experiencing any of the following: fever, weakness, difficulty breathing, hives, joint pain, rashes, itching, tongue or lip swelling or wheezing?  No  If yes, we will contact your doctor/on

## 2021-01-19 ENCOUNTER — PATIENT OUTREACH (OUTPATIENT)
Dept: CASE MANAGEMENT | Age: 86
End: 2021-01-19

## 2021-01-19 ENCOUNTER — TELEPHONE (OUTPATIENT)
Dept: CASE MANAGEMENT | Age: 86
End: 2021-01-19

## 2021-01-19 ENCOUNTER — TELEMEDICINE (OUTPATIENT)
Dept: FAMILY MEDICINE CLINIC | Facility: CLINIC | Age: 86
End: 2021-01-19
Payer: MEDICARE

## 2021-01-19 DIAGNOSIS — U07.1 COVID-19: Primary | ICD-10-CM

## 2021-01-19 PROCEDURE — 99442 PHONE E/M BY PHYS 11-20 MIN: CPT | Performed by: FAMILY MEDICINE

## 2021-01-19 NOTE — PROGRESS NOTES
Virtual Telephone Check-In    Guera Purnima verbally consents to a Virtual/Telephone Check-In visit on 01/19/21. Patient has been referred to the [facility] website at [email or web address] to review the yearly Consent to Treat document.     Patient Monica García 1    •  furosemide 20 MG Oral Tab, Take 1 pill 2 times per week., Disp: , Rfl: 0    •  FINASTERIDE 5 MG Oral Tab, TAKE 1 TABLET(5 MG) BY MOUTH DAILY, Disp: 90 tablet, Rfl: 3    •  Insulin Pen Needle (BD PEN NEEDLE MINI U/F) 31G X 5 MM Does not apply Misc, denies abdominal pain and denies heartburn  NEURO: denies headaches  Musculoskeletal: no joint pain, back pain    EXAM:   There were no vitals taken for this visit. GENERAL: speaking in complete sentences. ASSESSMENT AND PLAN:   1.  COVID-19  Did very

## 2021-01-23 ENCOUNTER — LAB ENCOUNTER (OUTPATIENT)
Dept: LAB | Age: 86
End: 2021-01-23
Attending: INTERNAL MEDICINE
Payer: MEDICARE

## 2021-01-23 DIAGNOSIS — N18.31 STAGE 3A CHRONIC KIDNEY DISEASE (HCC): ICD-10-CM

## 2021-01-23 LAB
ALBUMIN SERPL-MCNC: 3.1 G/DL (ref 3.4–5)
ANION GAP SERPL CALC-SCNC: 7 MMOL/L (ref 0–18)
BASOPHILS # BLD AUTO: 0.03 X10(3) UL (ref 0–0.2)
BASOPHILS NFR BLD AUTO: 0.4 %
BUN BLD-MCNC: 35 MG/DL (ref 7–18)
BUN/CREAT SERPL: 17.2 (ref 10–20)
CALCIUM BLD-MCNC: 9.9 MG/DL (ref 8.5–10.1)
CHLORIDE SERPL-SCNC: 109 MMOL/L (ref 98–112)
CO2 SERPL-SCNC: 23 MMOL/L (ref 21–32)
CREAT BLD-MCNC: 2.03 MG/DL
DEPRECATED RDW RBC AUTO: 42.9 FL (ref 35.1–46.3)
EOSINOPHIL # BLD AUTO: 0.14 X10(3) UL (ref 0–0.7)
EOSINOPHIL NFR BLD AUTO: 2 %
ERYTHROCYTE [DISTWIDTH] IN BLOOD BY AUTOMATED COUNT: 12.7 % (ref 11–15)
GLUCOSE BLD-MCNC: 154 MG/DL (ref 70–99)
HCT VFR BLD AUTO: 40.8 %
HGB BLD-MCNC: 13 G/DL
IMM GRANULOCYTES # BLD AUTO: 0.04 X10(3) UL (ref 0–1)
IMM GRANULOCYTES NFR BLD: 0.6 %
LYMPHOCYTES # BLD AUTO: 1.55 X10(3) UL (ref 1–4)
LYMPHOCYTES NFR BLD AUTO: 22.1 %
MCH RBC QN AUTO: 29.5 PG (ref 26–34)
MCHC RBC AUTO-ENTMCNC: 31.9 G/DL (ref 31–37)
MCV RBC AUTO: 92.5 FL
MONOCYTES # BLD AUTO: 0.59 X10(3) UL (ref 0.1–1)
MONOCYTES NFR BLD AUTO: 8.4 %
NEUTROPHILS # BLD AUTO: 4.66 X10 (3) UL (ref 1.5–7.7)
NEUTROPHILS # BLD AUTO: 4.66 X10(3) UL (ref 1.5–7.7)
NEUTROPHILS NFR BLD AUTO: 66.5 %
OSMOLALITY SERPL CALC.SUM OF ELEC: 299 MOSM/KG (ref 275–295)
PHOSPHATE SERPL-MCNC: 3 MG/DL (ref 2.5–4.9)
PLATELET # BLD AUTO: 355 10(3)UL (ref 150–450)
POTASSIUM SERPL-SCNC: 5.1 MMOL/L (ref 3.5–5.1)
RBC # BLD AUTO: 4.41 X10(6)UL
SODIUM SERPL-SCNC: 139 MMOL/L (ref 136–145)
WBC # BLD AUTO: 7 X10(3) UL (ref 4–11)

## 2021-01-23 PROCEDURE — 80069 RENAL FUNCTION PANEL: CPT

## 2021-01-23 PROCEDURE — 36415 COLL VENOUS BLD VENIPUNCTURE: CPT

## 2021-01-23 PROCEDURE — 85025 COMPLETE CBC W/AUTO DIFF WBC: CPT

## 2021-01-25 ENCOUNTER — OFFICE VISIT (OUTPATIENT)
Dept: NEPHROLOGY | Facility: CLINIC | Age: 86
End: 2021-01-25
Payer: MEDICARE

## 2021-01-25 VITALS
DIASTOLIC BLOOD PRESSURE: 81 MMHG | WEIGHT: 153 LBS | BODY MASS INDEX: 24.01 KG/M2 | HEIGHT: 67 IN | SYSTOLIC BLOOD PRESSURE: 151 MMHG | HEART RATE: 76 BPM

## 2021-01-25 DIAGNOSIS — N18.32 STAGE 3B CHRONIC KIDNEY DISEASE (HCC): Primary | ICD-10-CM

## 2021-01-25 PROCEDURE — 99214 OFFICE O/P EST MOD 30 MIN: CPT | Performed by: INTERNAL MEDICINE

## 2021-01-25 NOTE — PATIENT INSTRUCTIONS
Continue to monitor your blood pressure regularly. Repeat your kidney blood test in 3 months. Orders are in the computer.

## 2021-01-25 NOTE — PROGRESS NOTES
01/25/21        Patient: Khloe Duncan   YOB: 1934   Date of Visit: 1/25/2021       Dear  Dr. Andrew Juárez MD,      Thank you for referring Khloe Duncan to my practice. Please find my assessment and plan below.       As you know he is an 86-year- free to call.            Sincerely,   Angelo Mullen MD   [facility] , [address] [place] [address]  [address]  [facility] [id number]    Document electronically generated by:  Angelo Mullen

## 2021-02-01 DIAGNOSIS — Z23 NEED FOR VACCINATION: ICD-10-CM

## 2021-02-10 ENCOUNTER — OFFICE VISIT (OUTPATIENT)
Dept: FAMILY MEDICINE CLINIC | Facility: CLINIC | Age: 86
End: 2021-02-10
Payer: MEDICARE

## 2021-02-10 ENCOUNTER — LAB ENCOUNTER (OUTPATIENT)
Dept: LAB | Age: 86
End: 2021-02-10
Attending: FAMILY MEDICINE
Payer: MEDICARE

## 2021-02-10 VITALS
DIASTOLIC BLOOD PRESSURE: 70 MMHG | HEIGHT: 67 IN | HEART RATE: 78 BPM | TEMPERATURE: 97 F | BODY MASS INDEX: 24.8 KG/M2 | WEIGHT: 158 LBS | SYSTOLIC BLOOD PRESSURE: 130 MMHG

## 2021-02-10 DIAGNOSIS — Z00.00 ENCOUNTER FOR ANNUAL HEALTH EXAMINATION: ICD-10-CM

## 2021-02-10 DIAGNOSIS — E11.65 UNCONTROLLED TYPE 2 DIABETES MELLITUS WITH HYPERGLYCEMIA (HCC): Chronic | ICD-10-CM

## 2021-02-10 DIAGNOSIS — I44.2 AV BLOCK, 3RD DEGREE (HCC): ICD-10-CM

## 2021-02-10 DIAGNOSIS — Z79.4 TYPE 2 DIABETES MELLITUS WITH HYPERGLYCEMIA, WITH LONG-TERM CURRENT USE OF INSULIN (HCC): ICD-10-CM

## 2021-02-10 DIAGNOSIS — N18.32 STAGE 3B CHRONIC KIDNEY DISEASE (HCC): Chronic | ICD-10-CM

## 2021-02-10 DIAGNOSIS — Z95.0 S/P PLACEMENT OF CARDIAC PACEMAKER: Primary | ICD-10-CM

## 2021-02-10 DIAGNOSIS — E11.65 TYPE 2 DIABETES MELLITUS WITH HYPERGLYCEMIA, WITH LONG-TERM CURRENT USE OF INSULIN (HCC): ICD-10-CM

## 2021-02-10 DIAGNOSIS — M48.061 LUMBAR FORAMINAL STENOSIS: ICD-10-CM

## 2021-02-10 DIAGNOSIS — Z86.16 HISTORY OF COVID-19: ICD-10-CM

## 2021-02-10 DIAGNOSIS — I50.22 CHRONIC SYSTOLIC CONGESTIVE HEART FAILURE (HCC): ICD-10-CM

## 2021-02-10 LAB
ALBUMIN SERPL-MCNC: 3.2 G/DL (ref 3.4–5)
ALP LIVER SERPL-CCNC: 96 U/L
ALT SERPL-CCNC: 29 U/L
AST SERPL-CCNC: 24 U/L (ref 15–37)
BILIRUB DIRECT SERPL-MCNC: 0.1 MG/DL (ref 0–0.2)
BILIRUB SERPL-MCNC: 0.5 MG/DL (ref 0.1–2)
CHOLEST SMN-MCNC: 129 MG/DL (ref ?–200)
CREAT UR-SCNC: 231 MG/DL
EST. AVERAGE GLUCOSE BLD GHB EST-MCNC: 206 MG/DL (ref 68–126)
HBA1C MFR BLD HPLC: 8.8 % (ref ?–5.7)
HDLC SERPL-MCNC: 41 MG/DL (ref 40–59)
LDLC SERPL CALC-MCNC: 57 MG/DL (ref ?–100)
M PROTEIN MFR SERPL ELPH: 6.7 G/DL (ref 6.4–8.2)
MICROALBUMIN UR-MCNC: 139 MG/DL
MICROALBUMIN/CREAT 24H UR-RTO: 601.7 UG/MG (ref ?–30)
NONHDLC SERPL-MCNC: 88 MG/DL (ref ?–130)
PATIENT FASTING Y/N/NP: YES
TRIGL SERPL-MCNC: 157 MG/DL (ref 30–149)
TSI SER-ACNC: 3 MIU/ML (ref 0.36–3.74)
VLDLC SERPL CALC-MCNC: 31 MG/DL (ref 0–30)

## 2021-02-10 PROCEDURE — 84443 ASSAY THYROID STIM HORMONE: CPT

## 2021-02-10 PROCEDURE — 36415 COLL VENOUS BLD VENIPUNCTURE: CPT

## 2021-02-10 PROCEDURE — 82043 UR ALBUMIN QUANTITATIVE: CPT

## 2021-02-10 PROCEDURE — 80076 HEPATIC FUNCTION PANEL: CPT

## 2021-02-10 PROCEDURE — 82570 ASSAY OF URINE CREATININE: CPT

## 2021-02-10 PROCEDURE — G0439 PPPS, SUBSEQ VISIT: HCPCS | Performed by: FAMILY MEDICINE

## 2021-02-10 PROCEDURE — 83036 HEMOGLOBIN GLYCOSYLATED A1C: CPT

## 2021-02-10 PROCEDURE — 80061 LIPID PANEL: CPT

## 2021-02-10 RX ORDER — PANTOPRAZOLE SODIUM 40 MG/1
40 TABLET, DELAYED RELEASE ORAL
Qty: 90 TABLET | Refills: 1 | Status: SHIPPED | OUTPATIENT
Start: 2021-02-10 | End: 2021-10-25

## 2021-02-10 NOTE — PATIENT INSTRUCTIONS
Mary Cates's SCREENING SCHEDULE   Tests on this list are recommended by your physician but may not be covered, or covered at this frequency, by your insurer. Please check with your insurance carrier before scheduling to verify coverage.     PREVENTATI Covered up to Age 76     Colonoscopy Screen   Covered every 10 years- more often if abnormal There are no preventive care reminders to display for this patient.  Update Health Maintenance if applicable    Flex Sigmoidoscopy Screen  Covered every 5 years No with metal- TD and TDaP Not covered by Medicare Part B) No orders found for this or any previous visit.  This may be covered with your prescription benefits, but Medicare does not cover unless Medically needed    Zoster (Not covered by Medicare Part B) No o

## 2021-02-10 NOTE — PROGRESS NOTES
HPI:   Wilson Arvizu is a 80year old male who presents for a Medicare Subsequent Annual Wellness visit (Pt already had Initial Annual Wellness). Here for follow up/ Had covid 19 1/8/2021 and received monoclonal Ab and did ok.  Reports went for COVID abuse and had a score of 0 so is at low risk.      Patient Care Team: Patient Care Team:  Kenia Jj MD as PCP - General (Family Practice)  Hermelinda Smith MD as PCP - [facility]  Molly Zaragoza RN as  (EM) (Care Management)  Suha Gamboa, tablet (500 mg total) by mouth 2 (two) times daily with meals. •  traZODone HCl 50 MG Oral Tab, Take 1 tablet (50 mg total) by mouth nightly.     •  Acetaminophen-Codeine #3 300-30 MG Oral Tab, Take 1 tablet by mouth every 4 (four) hours as needed for Pa for 17.00 years. He has never used smokeless tobacco. He reports current alcohol use of about 1.0 standard drinks of alcohol per week. He reports that he does not use drugs.      REVIEW OF SYSTEMS:   GENERAL: feels well otherwise  SKIN: denies any unusual s at a meeting or place of Islam: No   Many people I talk to seem to mumble (or don't speak clearly): Sometimes People get annoyed because I misunderstand what they say: No   I misunderstand what others are saying and make inappropriate responses: No I mars 10/12/2015, 10/25/2015, 10/05/2016, 10/12/2018   • Influenza 10/08/2009   • Influenza Vaccine, Preserv Free 10/08/2009, 10/05/2010   • Pneumococcal (Prevnar 13) 10/08/2009, 10/08/2009, 10/25/2015, 10/12/2018   • Pneumovax 23 10/08/2009, 10/12/2015        A Internal Lab or Procedure External Lab or Procedure   Diabetes Screening      HbgA1C   Annually HEMOGLOBIN A1C (%)   Date Value   10/23/2019 8.2 (A)     HgbA1C (%)   Date Value   07/01/2020 8.3 (H)       No flowsheet data found.     Fasting Blood Sugar (FSB covered with a cut with metal- TD and TDaP Not covered by Medicare Part B) No vaccine history found This may be covered with your prescription benefits, but Medicare does not cover unless Medically needed    Zoster   Not covered by Medicare Part B No vacci

## 2021-02-11 ENCOUNTER — ANCILLARY PROCEDURE (OUTPATIENT)
Dept: CARDIOLOGY | Age: 86
End: 2021-02-11
Attending: INTERNAL MEDICINE

## 2021-02-11 ENCOUNTER — PATIENT MESSAGE (OUTPATIENT)
Dept: FAMILY MEDICINE CLINIC | Facility: CLINIC | Age: 86
End: 2021-02-11

## 2021-02-11 DIAGNOSIS — I10 ESSENTIAL HYPERTENSION: ICD-10-CM

## 2021-02-11 DIAGNOSIS — I50.22 CHRONIC SYSTOLIC CONGESTIVE HEART FAILURE (CMD): ICD-10-CM

## 2021-02-11 PROCEDURE — 93306 TTE W/DOPPLER COMPLETE: CPT | Performed by: INTERNAL MEDICINE

## 2021-02-11 NOTE — PROGRESS NOTES
José Manuel Mccall - There is slight worsening of your diabetes/glucose. Try to be better with your diet. Avoid sweets and extra breads, rice, tortillas. Make sure you have follow up with Dr. Joey Rice. Your cholesterol is stable.  I will not change your medications for

## 2021-02-22 ENCOUNTER — OFFICE VISIT (OUTPATIENT)
Dept: CARDIOLOGY | Age: 86
End: 2021-02-22

## 2021-02-22 ENCOUNTER — OFFICE VISIT (OUTPATIENT)
Dept: PODIATRY CLINIC | Facility: CLINIC | Age: 86
End: 2021-02-22
Payer: MEDICARE

## 2021-02-22 VITALS
WEIGHT: 154 LBS | HEART RATE: 60 BPM | DIASTOLIC BLOOD PRESSURE: 60 MMHG | RESPIRATION RATE: 18 BRPM | BODY MASS INDEX: 24.17 KG/M2 | HEIGHT: 67 IN | SYSTOLIC BLOOD PRESSURE: 110 MMHG

## 2021-02-22 DIAGNOSIS — E55.9 VITAMIN D DEFICIENCY: ICD-10-CM

## 2021-02-22 DIAGNOSIS — Z79.4 TYPE 2 DIABETES MELLITUS WITH HYPERGLYCEMIA, WITH LONG-TERM CURRENT USE OF INSULIN (HCC): Primary | ICD-10-CM

## 2021-02-22 DIAGNOSIS — E78.00 HYPERCHOLESTEREMIA: ICD-10-CM

## 2021-02-22 DIAGNOSIS — M79.675 PAIN IN TOES OF BOTH FEET: ICD-10-CM

## 2021-02-22 DIAGNOSIS — B35.1 ONYCHOMYCOSIS: ICD-10-CM

## 2021-02-22 DIAGNOSIS — E11.65 TYPE 2 DIABETES MELLITUS WITH HYPERGLYCEMIA, WITH LONG-TERM CURRENT USE OF INSULIN (HCC): Primary | ICD-10-CM

## 2021-02-22 DIAGNOSIS — M79.674 PAIN IN TOES OF BOTH FEET: ICD-10-CM

## 2021-02-22 DIAGNOSIS — I50.30 DIASTOLIC CONGESTIVE HEART FAILURE, UNSPECIFIED HF CHRONICITY (CMD): Primary | ICD-10-CM

## 2021-02-22 PROCEDURE — 99214 OFFICE O/P EST MOD 30 MIN: CPT | Performed by: INTERNAL MEDICINE

## 2021-02-22 PROCEDURE — 11721 DEBRIDE NAIL 6 OR MORE: CPT | Performed by: PODIATRIST

## 2021-02-22 RX ORDER — BRIMONIDINE TARTRATE AND TIMOLOL MALEATE 2; 5 MG/ML; MG/ML
1 SOLUTION OPHTHALMIC 2 TIMES DAILY
COMMUNITY

## 2021-02-22 SDOH — HEALTH STABILITY: PHYSICAL HEALTH: ON AVERAGE, HOW MANY DAYS PER WEEK DO YOU ENGAGE IN MODERATE TO STRENUOUS EXERCISE (LIKE A BRISK WALK)?: 7 DAYS

## 2021-02-22 SDOH — HEALTH STABILITY: PHYSICAL HEALTH: ON AVERAGE, HOW MANY MINUTES DO YOU ENGAGE IN EXERCISE AT THIS LEVEL?: 10 MIN

## 2021-02-22 ASSESSMENT — PATIENT HEALTH QUESTIONNAIRE - PHQ9
1. LITTLE INTEREST OR PLEASURE IN DOING THINGS: NOT AT ALL
CLINICAL INTERPRETATION OF PHQ2 SCORE: NO FURTHER SCREENING NEEDED
SUM OF ALL RESPONSES TO PHQ9 QUESTIONS 1 AND 2: 0
CLINICAL INTERPRETATION OF PHQ9 SCORE: NO FURTHER SCREENING NEEDED
SUM OF ALL RESPONSES TO PHQ9 QUESTIONS 1 AND 2: 0
2. FEELING DOWN, DEPRESSED OR HOPELESS: NOT AT ALL

## 2021-02-22 ASSESSMENT — ENCOUNTER SYMPTOMS
COUGH: 0
HEMOPTYSIS: 0
WEIGHT LOSS: 0
HEMATOCHEZIA: 0
WEIGHT GAIN: 0
FEVER: 0
INSOMNIA: 1
BRUISES/BLEEDS EASILY: 0
SUSPICIOUS LESIONS: 0
CONSTIPATION: 1
ALLERGIC/IMMUNOLOGIC COMMENTS: NO NEW FOOD ALLERGIES
CHILLS: 0

## 2021-02-22 NOTE — PROGRESS NOTES
HPI:    Patient ID: Narayan Bales is a 80year old male. 80year-old diabetic presents with recurrent pain associated with his toenails. The last time I saw this patient was November 16 of 2020. He reports relief by previous care.   His most recent A1 tablets (12.5 mg total) by mouth daily.  30 tablet 3   • LISINOPRIL 10 MG Oral Tab TAKE 1 TABLET BY MOUTH DAILY (Patient taking differently: Take 5 mg by mouth 2 (two) times a day.  ) 30 tablet 4   • COMBIGAN 0.2-0.5 % Ophthalmic Solution USE 1 GTT OU BID

## 2021-03-12 ENCOUNTER — TELEPHONE (OUTPATIENT)
Dept: ENDOCRINOLOGY CLINIC | Facility: CLINIC | Age: 86
End: 2021-03-12

## 2021-03-12 NOTE — TELEPHONE ENCOUNTER
Son requesting appt sooner than first available. Pt missed appt scheduled for 3/10/21.  Please call [phone]

## 2021-03-15 DIAGNOSIS — Z79.4 CONTROLLED TYPE 2 DIABETES MELLITUS WITH COMPLICATION, WITH LONG-TERM CURRENT USE OF INSULIN (HCC): ICD-10-CM

## 2021-03-15 DIAGNOSIS — E11.8 CONTROLLED TYPE 2 DIABETES MELLITUS WITH COMPLICATION, WITH LONG-TERM CURRENT USE OF INSULIN (HCC): ICD-10-CM

## 2021-03-15 NOTE — TELEPHONE ENCOUNTER
Pt apt scheduled for 3/18 at 1500 at [facility].  Spoke with pt's son and confirmed date, time and location

## 2021-03-17 RX ORDER — BLOOD SUGAR DIAGNOSTIC
STRIP MISCELLANEOUS
Qty: 100 STRIP | Refills: 0 | Status: SHIPPED | OUTPATIENT
Start: 2021-03-17 | End: 2021-06-30

## 2021-03-18 ENCOUNTER — OFFICE VISIT (OUTPATIENT)
Dept: ENDOCRINOLOGY CLINIC | Facility: CLINIC | Age: 86
End: 2021-03-18
Payer: MEDICARE

## 2021-03-18 VITALS
SYSTOLIC BLOOD PRESSURE: 162 MMHG | HEART RATE: 69 BPM | WEIGHT: 153 LBS | BODY MASS INDEX: 24 KG/M2 | DIASTOLIC BLOOD PRESSURE: 86 MMHG

## 2021-03-18 DIAGNOSIS — E11.8 CONTROLLED TYPE 2 DIABETES MELLITUS WITH COMPLICATION, WITH LONG-TERM CURRENT USE OF INSULIN (HCC): Primary | ICD-10-CM

## 2021-03-18 DIAGNOSIS — Z79.4 CONTROLLED TYPE 2 DIABETES MELLITUS WITH COMPLICATION, WITH LONG-TERM CURRENT USE OF INSULIN (HCC): Primary | ICD-10-CM

## 2021-03-18 LAB
GLUCOSE BLOOD: 162
TEST STRIP LOT #: NORMAL NUMERIC

## 2021-03-18 PROCEDURE — 99214 OFFICE O/P EST MOD 30 MIN: CPT | Performed by: INTERNAL MEDICINE

## 2021-03-18 PROCEDURE — 36416 COLLJ CAPILLARY BLOOD SPEC: CPT | Performed by: INTERNAL MEDICINE

## 2021-03-18 PROCEDURE — 82947 ASSAY GLUCOSE BLOOD QUANT: CPT | Performed by: INTERNAL MEDICINE

## 2021-03-18 RX ORDER — CANAGLIFLOZIN 100 MG/1
100 TABLET, FILM COATED ORAL
Qty: 90 TABLET | Refills: 1 | Status: SHIPPED | OUTPATIENT
Start: 2021-03-18 | End: 2021-04-17

## 2021-04-08 RX ORDER — GLIMEPIRIDE 4 MG/1
TABLET ORAL
Qty: 90 TABLET | Refills: 0 | Status: SHIPPED | OUTPATIENT
Start: 2021-04-08 | End: 2021-05-24

## 2021-04-08 RX ORDER — FINASTERIDE 5 MG/1
TABLET, FILM COATED ORAL
Qty: 90 TABLET | Refills: 3 | OUTPATIENT
Start: 2021-04-08

## 2021-04-14 NOTE — TELEPHONE ENCOUNTER
Pt's son called. Pt is out of the rx. Finasteride 5mg. Pt scheduled an appointment with Dr. Winnie Libman on 5-11-21. Sent to the [facility].   Call

## 2021-04-15 RX ORDER — FINASTERIDE 5 MG/1
5 TABLET, FILM COATED ORAL DAILY
Qty: 90 TABLET | Refills: 0 | Status: SHIPPED | OUTPATIENT
Start: 2021-04-15 | End: 2021-05-11

## 2021-04-22 ENCOUNTER — ANCILLARY ORDERS (OUTPATIENT)
Dept: CARDIOLOGY | Age: 86
End: 2021-04-22

## 2021-04-22 ENCOUNTER — ANCILLARY PROCEDURE (OUTPATIENT)
Dept: CARDIOLOGY | Age: 86
End: 2021-04-22
Attending: INTERNAL MEDICINE

## 2021-04-22 DIAGNOSIS — Z95.0 CARDIAC PACEMAKER: ICD-10-CM

## 2021-04-22 PROCEDURE — X1114 CARDIAC DEVICE HOME CHECK - REMOTE UNSCHEDULED: HCPCS | Performed by: INTERNAL MEDICINE

## 2021-04-22 PROCEDURE — 93294 REM INTERROG EVL PM/LDLS PM: CPT | Performed by: INTERNAL MEDICINE

## 2021-04-24 DIAGNOSIS — E11.8 CONTROLLED TYPE 2 DIABETES MELLITUS WITH COMPLICATION, WITH LONG-TERM CURRENT USE OF INSULIN (HCC): ICD-10-CM

## 2021-04-24 DIAGNOSIS — Z79.4 CONTROLLED TYPE 2 DIABETES MELLITUS WITH COMPLICATION, WITH LONG-TERM CURRENT USE OF INSULIN (HCC): ICD-10-CM

## 2021-04-26 RX ORDER — INSULIN GLARGINE 100 [IU]/ML
INJECTION, SOLUTION SUBCUTANEOUS
Qty: 15 ML | Refills: 1 | Status: SHIPPED | OUTPATIENT
Start: 2021-04-26

## 2021-04-30 ENCOUNTER — LAB ENCOUNTER (OUTPATIENT)
Dept: LAB | Age: 86
End: 2021-04-30
Attending: INTERNAL MEDICINE
Payer: MEDICARE

## 2021-04-30 DIAGNOSIS — N18.31 STAGE 3A CHRONIC KIDNEY DISEASE (HCC): ICD-10-CM

## 2021-04-30 PROCEDURE — 80069 RENAL FUNCTION PANEL: CPT

## 2021-04-30 PROCEDURE — 85025 COMPLETE CBC W/AUTO DIFF WBC: CPT

## 2021-04-30 PROCEDURE — 36415 COLL VENOUS BLD VENIPUNCTURE: CPT

## 2021-05-11 ENCOUNTER — OFFICE VISIT (OUTPATIENT)
Dept: SURGERY | Facility: CLINIC | Age: 86
End: 2021-05-11
Payer: MEDICARE

## 2021-05-11 ENCOUNTER — TELEPHONE (OUTPATIENT)
Dept: CARDIOLOGY | Age: 86
End: 2021-05-11

## 2021-05-11 VITALS
BODY MASS INDEX: 24.01 KG/M2 | HEIGHT: 67 IN | WEIGHT: 153 LBS | HEART RATE: 62 BPM | DIASTOLIC BLOOD PRESSURE: 77 MMHG | SYSTOLIC BLOOD PRESSURE: 173 MMHG

## 2021-05-11 DIAGNOSIS — N40.1 BENIGN PROSTATIC HYPERPLASIA WITH URINARY FREQUENCY: ICD-10-CM

## 2021-05-11 DIAGNOSIS — R35.0 BENIGN PROSTATIC HYPERPLASIA WITH URINARY FREQUENCY: ICD-10-CM

## 2021-05-11 DIAGNOSIS — M47.817 SPONDYLOSIS OF LUMBOSACRAL REGION WITHOUT MYELOPATHY OR RADICULOPATHY: ICD-10-CM

## 2021-05-11 DIAGNOSIS — R31.29 MICROHEMATURIA: Primary | ICD-10-CM

## 2021-05-11 DIAGNOSIS — R35.1 NOCTURIA: ICD-10-CM

## 2021-05-11 DIAGNOSIS — R80.1 PERSISTENT PROTEINURIA: ICD-10-CM

## 2021-05-11 DIAGNOSIS — N20.0 KIDNEY STONE: ICD-10-CM

## 2021-05-11 DIAGNOSIS — N19 RENAL FAILURE, UNSPECIFIED CHRONICITY: ICD-10-CM

## 2021-05-11 DIAGNOSIS — K59.01 CONSTIPATION BY DELAYED COLONIC TRANSIT: ICD-10-CM

## 2021-05-11 PROCEDURE — 99214 OFFICE O/P EST MOD 30 MIN: CPT | Performed by: UROLOGY

## 2021-05-11 RX ORDER — FINASTERIDE 5 MG/1
5 TABLET, FILM COATED ORAL DAILY
Qty: 90 TABLET | Refills: 3 | Status: SHIPPED | OUTPATIENT
Start: 2021-05-11

## 2021-05-11 RX ORDER — TAMSULOSIN HYDROCHLORIDE 0.4 MG/1
0.8 CAPSULE ORAL DAILY
Qty: 180 CAPSULE | Refills: 3 | Status: SHIPPED | OUTPATIENT
Start: 2021-05-11

## 2021-05-11 NOTE — PATIENT INSTRUCTIONS
Nina West M.D.    1.    Urine specimen today for cytology--we will notify you of the results. Even if this test comes back normal, we still need to do the rest of the work-up listed below      2.   CT of the abd

## 2021-05-11 NOTE — PROGRESS NOTES
HPI:    Patient ID: Andre Arizmendi is a 80year old male. HPI     History provided by patient and his son, Jovita Nix. Kp Castanon in [address] by son.     Microhematuria   Problem started 01/12/2021 when UA RBC = 14.  Patient presently denies any associated epi hydronephrosis; 6.8 mm nonobstructing right renal stone; 1.99 cm right renal cyst  11/07/2017 Dr. Rachel Kelly; medicare subsequent annual wellness; chronic kidney disease stage III; follow up with Dr. Stanley Guevara;  L5-S1 left mod-severe/right severe, L4-L5 daily. 90 tablet 3   • tamsulosin (FLOMAX) cap Take 2 capsules (0.8 mg total) by mouth daily.  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       ASSESSMENT/PLAN:   Microhematuria  (primary encounter diagnosis)  Kidney stone  Benign prostatic hyperplasia with urinary frequency  Renal failure, unspecified chronicity  Persistent proteinuria  Nocturia  Constipation by delayed colonic tr electronic refill to patient's pharmacy today.      (N19) Renal failure, unspecified chronicity    Chronic. 11/05/2019  KIDNEYS = No hydronephrosis. I explain to patient and son that his renal failure is not urological in nature.  Most recent 04/30/2021 C if this test comes back normal, we still need to do the rest of the work-up listed below    2. CT of the abdomen and pelvis without contrast--to investigate microscopic blood in the urine. We will notify you of the results    3.   Cystoscopy with possible

## 2021-05-13 ENCOUNTER — OFFICE VISIT (OUTPATIENT)
Dept: CARDIOLOGY | Age: 86
End: 2021-05-13

## 2021-05-13 VITALS
WEIGHT: 163 LBS | SYSTOLIC BLOOD PRESSURE: 164 MMHG | RESPIRATION RATE: 16 BRPM | DIASTOLIC BLOOD PRESSURE: 68 MMHG | HEART RATE: 60 BPM | BODY MASS INDEX: 25.58 KG/M2 | HEIGHT: 67 IN

## 2021-05-13 DIAGNOSIS — I44.7 LBBB (LEFT BUNDLE BRANCH BLOCK): ICD-10-CM

## 2021-05-13 DIAGNOSIS — Z95.0 PACEMAKER: ICD-10-CM

## 2021-05-13 DIAGNOSIS — E78.00 HYPERCHOLESTEREMIA: ICD-10-CM

## 2021-05-13 DIAGNOSIS — E11.9 TYPE 2 DIABETES MELLITUS WITHOUT COMPLICATION, WITHOUT LONG-TERM CURRENT USE OF INSULIN (CMD): ICD-10-CM

## 2021-05-13 DIAGNOSIS — I44.2 AV BLOCK, 3RD DEGREE (CMD): ICD-10-CM

## 2021-05-13 DIAGNOSIS — I10 ESSENTIAL HYPERTENSION: Primary | ICD-10-CM

## 2021-05-13 DIAGNOSIS — I50.22 CHRONIC SYSTOLIC CONGESTIVE HEART FAILURE (CMD): ICD-10-CM

## 2021-05-13 PROCEDURE — 99214 OFFICE O/P EST MOD 30 MIN: CPT | Performed by: NURSE PRACTITIONER

## 2021-05-13 RX ORDER — FUROSEMIDE 20 MG/1
20 TABLET ORAL
Qty: 90 TABLET | Refills: 3 | Status: SHIPPED | OUTPATIENT
Start: 2021-05-14 | End: 2022-08-31 | Stop reason: DRUGHIGH

## 2021-05-13 RX ORDER — LISINOPRIL 10 MG/1
10 TABLET ORAL 2 TIMES DAILY
Qty: 180 TABLET | Refills: 3 | Status: SHIPPED | OUTPATIENT
Start: 2021-05-13 | End: 2021-09-02 | Stop reason: SDUPTHER

## 2021-05-13 RX ORDER — SIMVASTATIN 40 MG
40 TABLET ORAL NIGHTLY
Qty: 90 TABLET | Refills: 3 | Status: SHIPPED | OUTPATIENT
Start: 2021-05-13

## 2021-05-13 RX ORDER — CHOLECALCIFEROL (VITAMIN D3) 125 MCG
2000 CAPSULE ORAL DAILY
Qty: 90 TABLET | Refills: 3 | Status: SHIPPED | OUTPATIENT
Start: 2021-05-13

## 2021-05-13 RX ORDER — TIMOLOL MALEATE 5 MG/ML
SOLUTION/ DROPS OPHTHALMIC
COMMUNITY
Start: 2021-04-22 | End: 2021-06-04 | Stop reason: SDUPTHER

## 2021-05-13 RX ORDER — PEN NEEDLE, DIABETIC 31 GX3/16"
NEEDLE, DISPOSABLE MISCELLANEOUS
COMMUNITY
Start: 2021-03-15

## 2021-05-13 RX ORDER — LANCETS 33 GAUGE
EACH MISCELLANEOUS
COMMUNITY
Start: 2021-03-15

## 2021-05-13 ASSESSMENT — PATIENT HEALTH QUESTIONNAIRE - PHQ9
2. FEELING DOWN, DEPRESSED OR HOPELESS: NOT AT ALL
CLINICAL INTERPRETATION OF PHQ2 SCORE: NO FURTHER SCREENING NEEDED
SUM OF ALL RESPONSES TO PHQ9 QUESTIONS 1 AND 2: 0
SUM OF ALL RESPONSES TO PHQ9 QUESTIONS 1 AND 2: 0
1. LITTLE INTEREST OR PLEASURE IN DOING THINGS: NOT AT ALL
CLINICAL INTERPRETATION OF PHQ9 SCORE: NO FURTHER SCREENING NEEDED

## 2021-05-20 ENCOUNTER — OFFICE VISIT (OUTPATIENT)
Dept: FAMILY MEDICINE CLINIC | Facility: CLINIC | Age: 86
End: 2021-05-20
Payer: MEDICARE

## 2021-05-20 VITALS
HEART RATE: 69 BPM | BODY MASS INDEX: 24.96 KG/M2 | DIASTOLIC BLOOD PRESSURE: 78 MMHG | WEIGHT: 159 LBS | SYSTOLIC BLOOD PRESSURE: 154 MMHG | HEIGHT: 67 IN

## 2021-05-20 DIAGNOSIS — N18.32 STAGE 3B CHRONIC KIDNEY DISEASE (HCC): ICD-10-CM

## 2021-05-20 DIAGNOSIS — E11.65 UNCONTROLLED TYPE 2 DIABETES MELLITUS WITH HYPERGLYCEMIA (HCC): Primary | ICD-10-CM

## 2021-05-20 DIAGNOSIS — Z79.4 CONTROLLED TYPE 2 DIABETES MELLITUS WITH COMPLICATION, WITH LONG-TERM CURRENT USE OF INSULIN (HCC): ICD-10-CM

## 2021-05-20 DIAGNOSIS — E11.8 CONTROLLED TYPE 2 DIABETES MELLITUS WITH COMPLICATION, WITH LONG-TERM CURRENT USE OF INSULIN (HCC): ICD-10-CM

## 2021-05-20 PROCEDURE — 99214 OFFICE O/P EST MOD 30 MIN: CPT | Performed by: FAMILY MEDICINE

## 2021-05-20 PROCEDURE — 83036 HEMOGLOBIN GLYCOSYLATED A1C: CPT | Performed by: FAMILY MEDICINE

## 2021-05-20 RX ORDER — BRIMONIDINE TARTRATE/TIMOLOL 0.2%-0.5%
1 DROPS OPHTHALMIC (EYE) EVERY 12 HOURS
Qty: 1 BOTTLE | Refills: 12 | Status: SHIPPED | OUTPATIENT
Start: 2021-05-20

## 2021-05-20 RX ORDER — TAMSULOSIN HYDROCHLORIDE 0.4 MG/1
0.8 CAPSULE ORAL DAILY
Qty: 180 CAPSULE | Refills: 3 | Status: SHIPPED | OUTPATIENT
Start: 2021-05-20 | End: 2021-09-13

## 2021-05-20 RX ORDER — PANTOPRAZOLE SODIUM 40 MG/1
40 TABLET, DELAYED RELEASE ORAL
Qty: 90 TABLET | Refills: 1 | Status: SHIPPED | OUTPATIENT
Start: 2021-05-20 | End: 2021-09-13

## 2021-05-20 RX ORDER — FINASTERIDE 5 MG/1
5 TABLET, FILM COATED ORAL DAILY
Qty: 90 TABLET | Refills: 3 | Status: SHIPPED | OUTPATIENT
Start: 2021-05-20 | End: 2021-09-13

## 2021-05-20 RX ORDER — SIMVASTATIN 40 MG
40 TABLET ORAL DAILY
Qty: 90 TABLET | Refills: 4 | Status: SHIPPED | OUTPATIENT
Start: 2021-05-20

## 2021-05-20 RX ORDER — FUROSEMIDE 20 MG/1
TABLET ORAL
Qty: 30 TABLET | Refills: 3 | Status: SHIPPED | OUTPATIENT
Start: 2021-05-20

## 2021-05-20 RX ORDER — LATANOPROST 50 UG/ML
1 SOLUTION/ DROPS OPHTHALMIC NIGHTLY
COMMUNITY
Start: 2021-05-15

## 2021-05-20 RX ORDER — INSULIN GLARGINE 100 [IU]/ML
INJECTION, SOLUTION SUBCUTANEOUS
Qty: 15 ML | Refills: 1 | Status: SHIPPED | OUTPATIENT
Start: 2021-05-20

## 2021-05-20 NOTE — PROGRESS NOTES
Glorious Mass is a 80year old male. Patient presents with:  Diabetes  HTN    HPI:   Stays active - has large yard per pt. Walking regularly and trying to eat healthy. Saw assistant of Teresa Nichols - adjusting medications.  Reports glucose better controlled a Solution, Place 1 drop into both eyes nightly. TAKE AT BEDTIME, Disp: , Rfl:     No current facility-administered medications on file prior to visit.      Past Medical History:   Diagnosis Date   • Acid reflux    • Basal cell carcinoma     right jaw   • Bas mellitus with complication, with long-term current use of insulin (HCC)    - insulin glargine (LANTUS SOLOSTAR) 100 UNIT/ML Subcutaneous Solution Pen-injector; ADMINISTER 25 UNITS UNDER THE SKIN EVERY EVENING  Dispense: 15 mL; Refill: 1    3.  Stage 3b

## 2021-05-24 ENCOUNTER — TELEPHONE (OUTPATIENT)
Dept: FAMILY MEDICINE CLINIC | Facility: CLINIC | Age: 86
End: 2021-05-24

## 2021-05-24 ENCOUNTER — OFFICE VISIT (OUTPATIENT)
Dept: PODIATRY CLINIC | Facility: CLINIC | Age: 86
End: 2021-05-24
Payer: MEDICARE

## 2021-05-24 DIAGNOSIS — E11.65 TYPE 2 DIABETES MELLITUS WITH HYPERGLYCEMIA, WITH LONG-TERM CURRENT USE OF INSULIN (HCC): Primary | ICD-10-CM

## 2021-05-24 DIAGNOSIS — B35.1 ONYCHOMYCOSIS: ICD-10-CM

## 2021-05-24 DIAGNOSIS — Z79.4 TYPE 2 DIABETES MELLITUS WITH HYPERGLYCEMIA, WITH LONG-TERM CURRENT USE OF INSULIN (HCC): Primary | ICD-10-CM

## 2021-05-24 DIAGNOSIS — M79.675 PAIN IN TOES OF BOTH FEET: ICD-10-CM

## 2021-05-24 DIAGNOSIS — M79.674 PAIN IN TOES OF BOTH FEET: ICD-10-CM

## 2021-05-24 PROCEDURE — 11721 DEBRIDE NAIL 6 OR MORE: CPT | Performed by: PODIATRIST

## 2021-05-24 RX ORDER — GLIMEPIRIDE 4 MG/1
4 TABLET ORAL
Qty: 90 TABLET | Refills: 4 | Status: SHIPPED | OUTPATIENT
Start: 2021-05-24

## 2021-05-24 RX ORDER — PEN NEEDLE, DIABETIC 31 GX5/16"
NEEDLE, DISPOSABLE MISCELLANEOUS
Qty: 100 EACH | Refills: 5 | Status: SHIPPED | OUTPATIENT
Start: 2021-05-24

## 2021-05-24 NOTE — PROGRESS NOTES
HPI:    Patient ID: Sarika Finley is a 80year old male. This 80year-old diabetic presents with recurrent frustration associated with his toenails. He saw  on May 20 of this year.   His most recent A1c was 7.7 and his fasting blood sugar today wa Strip TEST EVERY  strip 0   • Pantoprazole Sodium 40 MG Oral Tab EC Take 1 tablet (40 mg total) by mouth every morning before breakfast. 90 tablet 1   • metFORMIN HCl 500 MG Oral Tab Take 1 tablet (500 mg total) by mouth 2 (two) times daily with adilene Referrals:  None       #[id number]

## 2021-05-25 ENCOUNTER — LAB ENCOUNTER (OUTPATIENT)
Dept: LAB | Age: 86
End: 2021-05-25
Attending: NURSE PRACTITIONER
Payer: MEDICARE

## 2021-05-25 DIAGNOSIS — I10 HYPERTENSION: Primary | ICD-10-CM

## 2021-05-25 LAB
ANION GAP SERPL CALC-SCNC: 4 MMOL/L
BUN SERPL-MCNC: 46 MG/DL
CHLORIDE SERPL-SCNC: 111 MMOL/L
CO2 SERPL-SCNC: 27 MMOL/L
CREAT SERPL-MCNC: 2.27 MG/DL
GLUCOSE SERPL-MCNC: 116 MG/DL
POTASSIUM SERPL-SCNC: 4.4 MMOL/L
SODIUM SERPL-SCNC: 142 MMOL/L

## 2021-05-25 PROCEDURE — 36415 COLL VENOUS BLD VENIPUNCTURE: CPT

## 2021-05-25 PROCEDURE — 80048 BASIC METABOLIC PNL TOTAL CA: CPT

## 2021-05-26 ENCOUNTER — CLINICAL ABSTRACT (OUTPATIENT)
Dept: CARDIOLOGY | Age: 86
End: 2021-05-26

## 2021-05-26 ENCOUNTER — OFFICE VISIT (OUTPATIENT)
Dept: CARDIOLOGY | Age: 86
End: 2021-05-26

## 2021-05-26 VITALS
SYSTOLIC BLOOD PRESSURE: 158 MMHG | WEIGHT: 160 LBS | HEIGHT: 67 IN | HEART RATE: 64 BPM | DIASTOLIC BLOOD PRESSURE: 72 MMHG | RESPIRATION RATE: 16 BRPM | BODY MASS INDEX: 25.11 KG/M2

## 2021-05-26 DIAGNOSIS — I44.2 AV BLOCK, 3RD DEGREE (CMD): ICD-10-CM

## 2021-05-26 DIAGNOSIS — E78.00 HYPERCHOLESTEREMIA: ICD-10-CM

## 2021-05-26 DIAGNOSIS — Z95.0 PACEMAKER: ICD-10-CM

## 2021-05-26 DIAGNOSIS — I50.22 CHRONIC SYSTOLIC CONGESTIVE HEART FAILURE (CMD): ICD-10-CM

## 2021-05-26 DIAGNOSIS — I44.7 LBBB (LEFT BUNDLE BRANCH BLOCK): ICD-10-CM

## 2021-05-26 DIAGNOSIS — I10 ESSENTIAL HYPERTENSION: Primary | ICD-10-CM

## 2021-05-26 DIAGNOSIS — E11.9 TYPE 2 DIABETES MELLITUS WITHOUT COMPLICATION, WITHOUT LONG-TERM CURRENT USE OF INSULIN (CMD): ICD-10-CM

## 2021-05-26 PROCEDURE — 99214 OFFICE O/P EST MOD 30 MIN: CPT | Performed by: NURSE PRACTITIONER

## 2021-05-26 ASSESSMENT — PATIENT HEALTH QUESTIONNAIRE - PHQ9
SUM OF ALL RESPONSES TO PHQ9 QUESTIONS 1 AND 2: 0
2. FEELING DOWN, DEPRESSED OR HOPELESS: NOT AT ALL
CLINICAL INTERPRETATION OF PHQ2 SCORE: NO FURTHER SCREENING NEEDED
SUM OF ALL RESPONSES TO PHQ9 QUESTIONS 1 AND 2: 0
CLINICAL INTERPRETATION OF PHQ9 SCORE: NO FURTHER SCREENING NEEDED
1. LITTLE INTEREST OR PLEASURE IN DOING THINGS: NOT AT ALL

## 2021-06-01 ENCOUNTER — TELEPHONE (OUTPATIENT)
Dept: CARDIOLOGY | Age: 86
End: 2021-06-01

## 2021-06-04 ENCOUNTER — OFFICE VISIT (OUTPATIENT)
Dept: CARDIOLOGY | Age: 86
End: 2021-06-04

## 2021-06-04 VITALS
WEIGHT: 159 LBS | RESPIRATION RATE: 18 BRPM | BODY MASS INDEX: 24.96 KG/M2 | HEIGHT: 67 IN | HEART RATE: 64 BPM | DIASTOLIC BLOOD PRESSURE: 62 MMHG | SYSTOLIC BLOOD PRESSURE: 146 MMHG

## 2021-06-04 DIAGNOSIS — I50.30 DIASTOLIC CONGESTIVE HEART FAILURE, UNSPECIFIED HF CHRONICITY (CMD): Primary | ICD-10-CM

## 2021-06-04 PROCEDURE — 99215 OFFICE O/P EST HI 40 MIN: CPT | Performed by: INTERNAL MEDICINE

## 2021-06-04 RX ORDER — AMLODIPINE BESYLATE 5 MG/1
5 TABLET ORAL DAILY
Qty: 90 TABLET | Refills: 3 | Status: SHIPPED | OUTPATIENT
Start: 2021-06-04 | End: 2021-06-04 | Stop reason: SDUPTHER

## 2021-06-04 RX ORDER — AMLODIPINE BESYLATE 5 MG/1
5 TABLET ORAL DAILY
Qty: 14 TABLET | Refills: 0 | Status: SHIPPED | OUTPATIENT
Start: 2021-06-04 | End: 2022-06-09

## 2021-06-04 SDOH — HEALTH STABILITY: PHYSICAL HEALTH: ON AVERAGE, HOW MANY DAYS PER WEEK DO YOU ENGAGE IN MODERATE TO STRENUOUS EXERCISE (LIKE A BRISK WALK)?: 0 DAYS

## 2021-06-04 SDOH — HEALTH STABILITY: PHYSICAL HEALTH: ON AVERAGE, HOW MANY MINUTES DO YOU ENGAGE IN EXERCISE AT THIS LEVEL?: 0 MIN

## 2021-06-04 ASSESSMENT — ENCOUNTER SYMPTOMS
CHILLS: 0
CONSTIPATION: 1
COUGH: 0
ALLERGIC/IMMUNOLOGIC COMMENTS: NO NEW FOOD ALLERGIES
HEMATOCHEZIA: 0
SUSPICIOUS LESIONS: 0
BRUISES/BLEEDS EASILY: 0
FEVER: 0
HEMOPTYSIS: 0
BACK PAIN: 1
WEIGHT GAIN: 0
INSOMNIA: 1
MEMORY LOSS: 1
WEIGHT LOSS: 0

## 2021-06-04 ASSESSMENT — PATIENT HEALTH QUESTIONNAIRE - PHQ9
SUM OF ALL RESPONSES TO PHQ9 QUESTIONS 1 AND 2: 0
1. LITTLE INTEREST OR PLEASURE IN DOING THINGS: NOT AT ALL
2. FEELING DOWN, DEPRESSED OR HOPELESS: NOT AT ALL
SUM OF ALL RESPONSES TO PHQ9 QUESTIONS 1 AND 2: 0
CLINICAL INTERPRETATION OF PHQ2 SCORE: NO FURTHER SCREENING NEEDED
CLINICAL INTERPRETATION OF PHQ9 SCORE: NO FURTHER SCREENING NEEDED

## 2021-06-05 ENCOUNTER — HOSPITAL ENCOUNTER (OUTPATIENT)
Dept: CT IMAGING | Facility: HOSPITAL | Age: 86
Discharge: HOME | End: 2021-06-05
Attending: UROLOGY
Payer: MEDICARE

## 2021-06-05 DIAGNOSIS — R31.29 MICROHEMATURIA: ICD-10-CM

## 2021-06-05 PROCEDURE — 74176 CT ABD & PELVIS W/O CONTRAST: CPT | Performed by: UROLOGY

## 2021-06-11 ENCOUNTER — PROCEDURE (OUTPATIENT)
Dept: SURGERY | Facility: CLINIC | Age: 86
End: 2021-06-11
Payer: MEDICARE

## 2021-06-11 VITALS — HEART RATE: 66 BPM | DIASTOLIC BLOOD PRESSURE: 82 MMHG | SYSTOLIC BLOOD PRESSURE: 150 MMHG

## 2021-06-11 DIAGNOSIS — R35.0 BENIGN PROSTATIC HYPERPLASIA WITH URINARY FREQUENCY: ICD-10-CM

## 2021-06-11 DIAGNOSIS — N28.1 COMPLEX RENAL CYST: ICD-10-CM

## 2021-06-11 DIAGNOSIS — R31.29 MICROHEMATURIA: Primary | ICD-10-CM

## 2021-06-11 DIAGNOSIS — N40.1 BENIGN PROSTATIC HYPERPLASIA WITH URINARY FREQUENCY: ICD-10-CM

## 2021-06-11 DIAGNOSIS — R35.1 NOCTURIA: ICD-10-CM

## 2021-06-11 DIAGNOSIS — N20.0 KIDNEY STONE: ICD-10-CM

## 2021-06-11 DIAGNOSIS — K59.01 CONSTIPATION BY DELAYED COLONIC TRANSIT: ICD-10-CM

## 2021-06-11 DIAGNOSIS — N19 RENAL FAILURE, UNSPECIFIED CHRONICITY: ICD-10-CM

## 2021-06-11 DIAGNOSIS — M47.817 SPONDYLOSIS OF LUMBOSACRAL REGION WITHOUT MYELOPATHY OR RADICULOPATHY: ICD-10-CM

## 2021-06-11 DIAGNOSIS — R80.1 PERSISTENT PROTEINURIA: ICD-10-CM

## 2021-06-11 PROCEDURE — 99213 OFFICE O/P EST LOW 20 MIN: CPT | Performed by: UROLOGY

## 2021-06-11 PROCEDURE — 52000 CYSTOURETHROSCOPY: CPT | Performed by: UROLOGY

## 2021-06-11 RX ORDER — CIPROFLOXACIN 500 MG/1
500 TABLET, FILM COATED ORAL ONCE
Status: COMPLETED | OUTPATIENT
Start: 2021-06-11 | End: 2021-06-11

## 2021-06-11 RX ADMIN — CIPROFLOXACIN 500 MG: 500 TABLET, FILM COATED ORAL at 15:12:00

## 2021-06-11 NOTE — PATIENT INSTRUCTIONS
Bhavani Neves M.D.    1.  If you have burning pain with urination, please take over-the-counter Tylenol.     2.  The CT of the abdomen and pelvis on 6/5/2021 shows that the left kidney has a 3.3 cm lesion that on 201

## 2021-06-24 ENCOUNTER — TELEPHONE (OUTPATIENT)
Dept: ENDOCRINOLOGY CLINIC | Facility: CLINIC | Age: 86
End: 2021-06-24

## 2021-06-24 NOTE — TELEPHONE ENCOUNTER
•  glimepiride 4 MG Oral Tab, Take 1 tablet (4 mg total) by mouth before breakfast., Disp: 90 tablet, Rfl: 4

## 2021-06-29 DIAGNOSIS — Z79.4 CONTROLLED TYPE 2 DIABETES MELLITUS WITH COMPLICATION, WITH LONG-TERM CURRENT USE OF INSULIN (HCC): ICD-10-CM

## 2021-06-29 DIAGNOSIS — E11.8 CONTROLLED TYPE 2 DIABETES MELLITUS WITH COMPLICATION, WITH LONG-TERM CURRENT USE OF INSULIN (HCC): ICD-10-CM

## 2021-06-29 RX ORDER — BLOOD SUGAR DIAGNOSTIC
STRIP MISCELLANEOUS
Qty: 100 STRIP | Refills: 0 | Status: CANCELLED | OUTPATIENT
Start: 2021-06-29

## 2021-06-30 RX ORDER — BLOOD SUGAR DIAGNOSTIC
STRIP MISCELLANEOUS
Qty: 200 STRIP | Refills: 1 | Status: SHIPPED | OUTPATIENT
Start: 2021-06-30 | End: 2021-12-31

## 2021-06-30 NOTE — TELEPHONE ENCOUNTER
LOV: 03/18/21    LR: 03/17/21    Future Appointments   Date Time Provider Nash Ken   7/21/2021 10:45 AM Jenna Carmona MD [facility]     Refilled per protocol

## 2021-07-01 RX ORDER — LANCETS 33 GAUGE
EACH MISCELLANEOUS
Qty: 100 EACH | Refills: 3 | Status: SHIPPED | OUTPATIENT
Start: 2021-07-01 | End: 2021-12-31

## 2021-07-02 ENCOUNTER — TELEPHONE (OUTPATIENT)
Dept: SURGERY | Facility: CLINIC | Age: 86
End: 2021-07-02

## 2021-07-02 NOTE — TELEPHONE ENCOUNTER
Pt's son called. Pt has an order for an ultra sound and is scheduled on 7-7-21. [place] When scheduling was asked if pt is allergic to barium. Not allergic but has diabetes, high blood pressure and kidney disease. Please call.   Caller aware office closes on

## 2021-07-06 ENCOUNTER — TELEPHONE (OUTPATIENT)
Dept: NEPHROLOGY | Facility: CLINIC | Age: 86
End: 2021-07-06

## 2021-07-06 NOTE — TELEPHONE ENCOUNTER
Patients son Nate Flores has ultra sound scheduled tomorrow at 3:00 PM, ordered by Dr. Marvin Reagan due to small findings of kidney stones. Patients son asking if its ok or safe for patient tto ricki up contrast form tomorrow ultra sound.  Please call today if po

## 2021-07-06 NOTE — TELEPHONE ENCOUNTER
He has an ultrasound the kidney scheduled which is perfectly safe from a kidney standpoint. I do not see any mention about barium.

## 2021-07-07 ENCOUNTER — HOSPITAL ENCOUNTER (OUTPATIENT)
Dept: ULTRASOUND IMAGING | Facility: HOSPITAL | Age: 86
Discharge: HOME | End: 2021-07-07
Attending: UROLOGY
Payer: MEDICARE

## 2021-07-07 DIAGNOSIS — N28.1 COMPLEX RENAL CYST: ICD-10-CM

## 2021-07-07 PROCEDURE — 76775 US EXAM ABDO BACK WALL LIM: CPT | Performed by: UROLOGY

## 2021-07-07 NOTE — TELEPHONE ENCOUNTER
Called patient no answer left message informing patient that they always ask patients about their health history it is a routine question.

## 2021-07-08 DIAGNOSIS — I50.30 DIASTOLIC CONGESTIVE HEART FAILURE, UNSPECIFIED HF CHRONICITY (CMD): ICD-10-CM

## 2021-07-14 ENCOUNTER — ANCILLARY PROCEDURE (OUTPATIENT)
Dept: CARDIOLOGY | Age: 86
End: 2021-07-14
Attending: INTERNAL MEDICINE

## 2021-07-14 PROCEDURE — 93306 TTE W/DOPPLER COMPLETE: CPT | Performed by: INTERNAL MEDICINE

## 2021-07-19 ENCOUNTER — TELEPHONE (OUTPATIENT)
Dept: CARDIOLOGY | Age: 86
End: 2021-07-19

## 2021-07-19 DIAGNOSIS — I50.22 CHRONIC SYSTOLIC CONGESTIVE HEART FAILURE (CMD): Primary | ICD-10-CM

## 2021-07-21 ENCOUNTER — LAB ENCOUNTER (OUTPATIENT)
Dept: LAB | Age: 86
End: 2021-07-21
Attending: INTERNAL MEDICINE
Payer: MEDICARE

## 2021-07-21 ENCOUNTER — OFFICE VISIT (OUTPATIENT)
Dept: ENDOCRINOLOGY CLINIC | Facility: CLINIC | Age: 86
End: 2021-07-21
Payer: MEDICARE

## 2021-07-21 VITALS
DIASTOLIC BLOOD PRESSURE: 69 MMHG | HEART RATE: 63 BPM | SYSTOLIC BLOOD PRESSURE: 170 MMHG | BODY MASS INDEX: 25 KG/M2 | WEIGHT: 161 LBS

## 2021-07-21 DIAGNOSIS — N18.31 STAGE 3A CHRONIC KIDNEY DISEASE (HCC): ICD-10-CM

## 2021-07-21 DIAGNOSIS — Z79.4 CONTROLLED TYPE 2 DIABETES MELLITUS WITH COMPLICATION, WITH LONG-TERM CURRENT USE OF INSULIN (HCC): Primary | ICD-10-CM

## 2021-07-21 DIAGNOSIS — E11.8 CONTROLLED TYPE 2 DIABETES MELLITUS WITH COMPLICATION, WITH LONG-TERM CURRENT USE OF INSULIN (HCC): Primary | ICD-10-CM

## 2021-07-21 LAB
ALBUMIN SERPL-MCNC: 3.5 G/DL (ref 3.4–5)
ANION GAP SERPL CALC-SCNC: 6 MMOL/L
ANION GAP SERPL CALC-SCNC: 6 MMOL/L (ref 0–18)
BASOPHILS # BLD AUTO: 0.02 X10(3) UL (ref 0–0.2)
BASOPHILS NFR BLD AUTO: 0.4 %
BUN BLD-MCNC: 36 MG/DL (ref 7–18)
BUN SERPL-MCNC: 36 MG/DL
BUN/CREAT SERPL: 15.7 (ref 10–20)
CALCIUM BLD-MCNC: 9.5 MG/DL (ref 8.5–10.1)
CALCIUM SERPL-MCNC: 9.5 MG/DL
CARTRIDGE LOT#: ABNORMAL NUMERIC
CHLORIDE SERPL-SCNC: 109 MMOL/L
CHLORIDE SERPL-SCNC: 109 MMOL/L (ref 98–112)
CO2 SERPL-SCNC: 25 MMOL/L
CO2 SERPL-SCNC: 25 MMOL/L (ref 21–32)
CREAT BLD-MCNC: 2.29 MG/DL
CREAT SERPL-MCNC: 2.29 MG/DL
DEPRECATED RDW RBC AUTO: 45.4 FL (ref 35.1–46.3)
EOSINOPHIL # BLD AUTO: 0.1 X10(3) UL (ref 0–0.7)
EOSINOPHIL NFR BLD AUTO: 2 %
ERYTHROCYTE [DISTWIDTH] IN BLOOD BY AUTOMATED COUNT: 13.3 % (ref 11–15)
GLUCOSE BLD-MCNC: 241 MG/DL (ref 70–99)
GLUCOSE BLOOD: 261
GLUCOSE SERPL-MCNC: 241 MG/DL
HBA1C MFR BLD: 8.2 %
HCT VFR BLD AUTO: 37.5 %
HEMOGLOBIN A1C: 8.2 % (ref 4.3–5.6)
HGB BLD-MCNC: 12 G/DL
IMM GRANULOCYTES # BLD AUTO: 0.02 X10(3) UL (ref 0–1)
IMM GRANULOCYTES NFR BLD: 0.4 %
LYMPHOCYTES # BLD AUTO: 1.39 X10(3) UL (ref 1–4)
LYMPHOCYTES NFR BLD AUTO: 27.2 %
MCH RBC QN AUTO: 29.4 PG (ref 26–34)
MCHC RBC AUTO-ENTMCNC: 32 G/DL (ref 31–37)
MCV RBC AUTO: 91.9 FL
MONOCYTES # BLD AUTO: 0.4 X10(3) UL (ref 0.1–1)
MONOCYTES NFR BLD AUTO: 7.8 %
NEUTROPHILS # BLD AUTO: 3.18 X10 (3) UL (ref 1.5–7.7)
NEUTROPHILS # BLD AUTO: 3.18 X10(3) UL (ref 1.5–7.7)
NEUTROPHILS NFR BLD AUTO: 62.2 %
OSMOLALITY SERPL CALC.SUM OF ELEC: 306 MOSM/KG (ref 275–295)
PHOSPHATE SERPL-MCNC: 3.1 MG/DL (ref 2.5–4.9)
PLATELET # BLD AUTO: 196 10(3)UL (ref 150–450)
POTASSIUM SERPL-SCNC: 5.2 MMOL/L
POTASSIUM SERPL-SCNC: 5.2 MMOL/L (ref 3.5–5.1)
RBC # BLD AUTO: 4.08 X10(6)UL
SODIUM SERPL-SCNC: 140 MMOL/L
SODIUM SERPL-SCNC: 140 MMOL/L (ref 136–145)
TEST STRIP LOT #: NORMAL NUMERIC
WBC # BLD AUTO: 5.1 X10(3) UL (ref 4–11)

## 2021-07-21 PROCEDURE — 99213 OFFICE O/P EST LOW 20 MIN: CPT | Performed by: INTERNAL MEDICINE

## 2021-07-21 PROCEDURE — 80069 RENAL FUNCTION PANEL: CPT

## 2021-07-21 PROCEDURE — 82947 ASSAY GLUCOSE BLOOD QUANT: CPT | Performed by: INTERNAL MEDICINE

## 2021-07-21 PROCEDURE — 83036 HEMOGLOBIN GLYCOSYLATED A1C: CPT | Performed by: INTERNAL MEDICINE

## 2021-07-21 PROCEDURE — 36415 COLL VENOUS BLD VENIPUNCTURE: CPT

## 2021-07-21 PROCEDURE — 36416 COLLJ CAPILLARY BLOOD SPEC: CPT | Performed by: INTERNAL MEDICINE

## 2021-07-21 PROCEDURE — 85025 COMPLETE CBC W/AUTO DIFF WBC: CPT

## 2021-07-21 NOTE — PROGRESS NOTES
Name: Mara Gaviria  Date: 7/21/2021    Referring Physician: No ref. provider found    HISTORY OF PRESENT ILLNESS   Mara Gaviria is a 80year old male who presents for evaluation of diabetes mellitus.        Prior HbA, C or glycohemoglobin were 8.2% 1/20 total) by mouth daily. (Patient not taking: Reported on 6/11/2021 ), Disp: 90 tablet, Rfl: 3  •  COMBIGAN 0.2-0.5 % Ophthalmic Solution, Place 1 drop into both eyes every 12 (twelve) hours. , Disp: 1 Bottle, Rfl: 12  •  furosemide 20 MG Oral Tab, Take 1 pil (Patient taking differently: Take 5 mg by mouth 2 (two) times a day.  ), Disp: 30 tablet, Rfl: 4  •  Glucose Blood (ONETOUCH ULTRA BLUE) In Vitro Strip, TEST ONCE D, Disp: , Rfl: 3       Allergies:     Barium Sulfate          HIVES    Social History:   Soc normal sclera and normal pupils  Ears/Nose/Mouth/Throat/Neck:  no palpable thyroid nodules or cervical lymphadenopathy  Musculoskeletal:  normal muscle strength and tone  Hair & Nails:  normal scalp hair     Hematologic:  no excessive bruising  Neuro:  sen

## 2021-07-26 ENCOUNTER — OFFICE VISIT (OUTPATIENT)
Dept: NEPHROLOGY | Facility: CLINIC | Age: 86
End: 2021-07-26
Payer: MEDICARE

## 2021-07-26 VITALS
HEART RATE: 71 BPM | SYSTOLIC BLOOD PRESSURE: 155 MMHG | WEIGHT: 162 LBS | BODY MASS INDEX: 25.43 KG/M2 | HEIGHT: 67 IN | DIASTOLIC BLOOD PRESSURE: 66 MMHG

## 2021-07-26 DIAGNOSIS — N18.4 CKD (CHRONIC KIDNEY DISEASE) STAGE 4, GFR 15-29 ML/MIN (HCC): Primary | ICD-10-CM

## 2021-07-26 PROCEDURE — 99214 OFFICE O/P EST MOD 30 MIN: CPT | Performed by: INTERNAL MEDICINE

## 2021-07-26 NOTE — PROGRESS NOTES
07/26/21        Patient: Guera Felton   YOB: 1934   Date of Visit: 7/26/2021       Dear  Dr. Darshana Mcdonald MD,      Thank you for referring Guera Felton to my practice. Please find my assessment and plan below.       As you know he is an 86-year- shows his creatinine is crept up a bit but is stabilized most recently at 2.29 but now with an estimated GFR 25 cc/min. Currently his volume status appears to be acceptable. Reinforced importance of maintaining adequate hydration. Avoid nonsteroidals.

## 2021-08-04 ENCOUNTER — APPOINTMENT (OUTPATIENT)
Dept: CARDIOLOGY | Age: 86
End: 2021-08-04
Attending: INTERNAL MEDICINE

## 2021-08-13 ENCOUNTER — OFFICE VISIT (OUTPATIENT)
Dept: PODIATRY CLINIC | Facility: CLINIC | Age: 86
End: 2021-08-13
Payer: MEDICARE

## 2021-08-13 DIAGNOSIS — B35.1 ONYCHOMYCOSIS: ICD-10-CM

## 2021-08-13 DIAGNOSIS — E08.42 DIABETES MELLITUS DUE TO UNDERLYING CONDITION WITH DIABETIC POLYNEUROPATHY, UNSPECIFIED WHETHER LONG TERM INSULIN USE (HCC): Primary | ICD-10-CM

## 2021-08-13 PROCEDURE — 11721 DEBRIDE NAIL 6 OR MORE: CPT | Performed by: PODIATRIST

## 2021-08-13 NOTE — PROGRESS NOTES
[facility] Podiatry  Progress Note    Jose Gomes is a 80year old male.  Patient presents with:  Diabetic Foot Care: seen by Dr Mathieu Herrera on 5/24/21 - last UsS0C=9.7 from 7/21/21 when he was seen by endo Dr Claudia Macias -  here for f/u on nail care - has no daily. (Patient not taking: Reported on 7/26/2021 ) 180 capsule 3   • Pantoprazole Sodium 40 MG Oral Tab EC Take 1 tablet (40 mg total) by mouth every morning before breakfast. (Patient not taking: Reported on 7/26/2021 ) 90 tablet 1   • finasteride 5 MG O PLACEMENT     • COLONOSCOPY  2014   • OTHER SURGICAL HISTORY      hemorrhoids   • OTHER SURGICAL HISTORY      pacemaker placed      Family History   Problem Relation Age of Onset   • Glaucoma Father       Social History    Socioeconomic History      Eloise left.  Parasthesias present  Musculoskeletal Examination:  Muscle Strength is 5/5.   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          LABS & IMAGING:     Lab Results   Component Value Date     (H) 07/21/2021    BUN 36 (H) 07/21/2021 patient. Discussed with patient proper care and hygiene for their feet. Patient tolerated procedures well, without incident. Instrumentation used includes nail nippers and electric  where appropriate.   Procedure: (73198 Debridement of toenails

## 2021-08-23 ENCOUNTER — OFFICE VISIT (OUTPATIENT)
Dept: CARDIOLOGY | Age: 86
End: 2021-08-23

## 2021-08-23 VITALS
HEART RATE: 72 BPM | DIASTOLIC BLOOD PRESSURE: 70 MMHG | BODY MASS INDEX: 25.39 KG/M2 | HEIGHT: 67 IN | WEIGHT: 161.8 LBS | SYSTOLIC BLOOD PRESSURE: 137 MMHG | RESPIRATION RATE: 18 BRPM

## 2021-08-23 DIAGNOSIS — I50.22 CHRONIC SYSTOLIC CONGESTIVE HEART FAILURE (CMD): ICD-10-CM

## 2021-08-23 DIAGNOSIS — E55.9 VITAMIN D DEFICIENCY: ICD-10-CM

## 2021-08-23 DIAGNOSIS — Z01.810 PRE-PROCEDURAL CARDIOVASCULAR EXAMINATION: ICD-10-CM

## 2021-08-23 DIAGNOSIS — E78.00 HYPERCHOLESTEREMIA: Primary | ICD-10-CM

## 2021-08-23 PROCEDURE — 99214 OFFICE O/P EST MOD 30 MIN: CPT | Performed by: INTERNAL MEDICINE

## 2021-08-23 SDOH — HEALTH STABILITY: PHYSICAL HEALTH: ON AVERAGE, HOW MANY MINUTES DO YOU ENGAGE IN EXERCISE AT THIS LEVEL?: 0 MIN

## 2021-08-23 SDOH — HEALTH STABILITY: PHYSICAL HEALTH: ON AVERAGE, HOW MANY DAYS PER WEEK DO YOU ENGAGE IN MODERATE TO STRENUOUS EXERCISE (LIKE A BRISK WALK)?: 0 DAYS

## 2021-08-23 ASSESSMENT — ENCOUNTER SYMPTOMS
WEIGHT GAIN: 0
ALLERGIC/IMMUNOLOGIC COMMENTS: NO NEW FOOD ALLERGIES
BACK PAIN: 0
DIZZINESS: 0
CONSTIPATION: 1
BLOATING: 0
WEAKNESS: 0
SUSPICIOUS LESIONS: 0
SHORTNESS OF BREATH: 0
DIARRHEA: 0
DEPRESSION: 0
HEMATOCHEZIA: 0
LOSS OF BALANCE: 0
CHILLS: 0
ABDOMINAL PAIN: 0
INSOMNIA: 1
WEIGHT LOSS: 0
COUGH: 0
MEMORY LOSS: 1
SLEEP DISTURBANCES DUE TO BREATHING: 0
BRUISES/BLEEDS EASILY: 0
HEADACHES: 0
LIGHT-HEADEDNESS: 0
FEVER: 0
HEMOPTYSIS: 0

## 2021-08-23 ASSESSMENT — PATIENT HEALTH QUESTIONNAIRE - PHQ9
SUM OF ALL RESPONSES TO PHQ9 QUESTIONS 1 AND 2: 0
2. FEELING DOWN, DEPRESSED OR HOPELESS: NOT AT ALL
CLINICAL INTERPRETATION OF PHQ9 SCORE: NO FURTHER SCREENING NEEDED
1. LITTLE INTEREST OR PLEASURE IN DOING THINGS: NOT AT ALL
SUM OF ALL RESPONSES TO PHQ9 QUESTIONS 1 AND 2: 0
CLINICAL INTERPRETATION OF PHQ2 SCORE: NO FURTHER SCREENING NEEDED

## 2021-08-23 ASSESSMENT — PAIN SCALES - GENERAL: PAINLEVEL: 0

## 2021-08-24 ENCOUNTER — CLINICAL ABSTRACT (OUTPATIENT)
Dept: CARDIOLOGY | Age: 86
End: 2021-08-24

## 2021-08-30 ENCOUNTER — LAB ENCOUNTER (OUTPATIENT)
Dept: LAB | Age: 86
End: 2021-08-30
Attending: INTERNAL MEDICINE
Payer: MEDICARE

## 2021-08-30 DIAGNOSIS — I50.22 CHRONIC SYSTOLIC (CONGESTIVE) HEART FAILURE (HCC): Primary | ICD-10-CM

## 2021-08-30 LAB
ANION GAP SERPL CALC-SCNC: 5 MMOL/L
ANION GAP SERPL CALC-SCNC: 5 MMOL/L (ref 0–18)
BUN BLD-MCNC: 47 MG/DL (ref 7–18)
BUN SERPL-MCNC: 47 MG/DL
BUN/CREAT SERPL: 16.7 (ref 10–20)
CALCIUM BLD-MCNC: 8.9 MG/DL (ref 8.5–10.1)
CALCIUM SERPL-MCNC: 8.9 MG/DL
CHLORIDE SERPL-SCNC: 110 MMOL/L
CHLORIDE SERPL-SCNC: 110 MMOL/L (ref 98–112)
CO2 SERPL-SCNC: 26 MMOL/L
CO2 SERPL-SCNC: 26 MMOL/L (ref 21–32)
CREAT BLD-MCNC: 2.81 MG/DL
CREAT SERPL-MCNC: 2.81 MG/DL
GLUCOSE BLD-MCNC: 119 MG/DL (ref 70–99)
GLUCOSE SERPL-MCNC: 119 MG/DL
OSMOLALITY SERPL CALC.SUM OF ELEC: 305 MOSM/KG (ref 275–295)
PATIENT FASTING Y/N/NP: YES
POTASSIUM SERPL-SCNC: 5 MMOL/L
POTASSIUM SERPL-SCNC: 5 MMOL/L (ref 3.5–5.1)
SODIUM SERPL-SCNC: 141 MMOL/L
SODIUM SERPL-SCNC: 141 MMOL/L (ref 136–145)

## 2021-08-30 PROCEDURE — 36415 COLL VENOUS BLD VENIPUNCTURE: CPT

## 2021-08-30 PROCEDURE — 80048 BASIC METABOLIC PNL TOTAL CA: CPT

## 2021-09-01 ENCOUNTER — CLINICAL ABSTRACT (OUTPATIENT)
Dept: CARDIOLOGY | Age: 86
End: 2021-09-01

## 2021-09-02 ENCOUNTER — TELEPHONE (OUTPATIENT)
Dept: CARDIOLOGY | Age: 86
End: 2021-09-02

## 2021-09-02 DIAGNOSIS — I50.22 CHRONIC SYSTOLIC CONGESTIVE HEART FAILURE (CMD): Primary | ICD-10-CM

## 2021-09-02 RX ORDER — LISINOPRIL 10 MG/1
5 TABLET ORAL 2 TIMES DAILY
Qty: 180 TABLET | Refills: 3 | Status: SHIPPED | COMMUNITY
Start: 2021-09-02 | End: 2022-01-03

## 2021-09-10 ENCOUNTER — LAB ENCOUNTER (OUTPATIENT)
Dept: LAB | Age: 86
End: 2021-09-10
Attending: INTERNAL MEDICINE
Payer: MEDICARE

## 2021-09-10 DIAGNOSIS — I50.22 CHRONIC SYSTOLIC HEART FAILURE (HCC): Primary | ICD-10-CM

## 2021-09-10 LAB
ANION GAP SERPL CALC-SCNC: 5 MMOL/L (ref 0–18)
BUN BLD-MCNC: 37 MG/DL (ref 7–18)
BUN SERPL-MCNC: 37 MG/DL
BUN/CREAT SERPL: 15.6 (ref 10–20)
CALCIUM BLD-MCNC: 9.4 MG/DL (ref 8.5–10.1)
CALCIUM SERPL-MCNC: 9.4 MG/DL
CHLORIDE SERPL-SCNC: 113 MMOL/L
CHLORIDE SERPL-SCNC: 113 MMOL/L (ref 98–112)
CO2 SERPL-SCNC: 25 MMOL/L (ref 21–32)
CREAT BLD-MCNC: 2.37 MG/DL
CREAT SERPL-MCNC: 2.37 MG/DL
GLUCOSE BLD-MCNC: 123 MG/DL (ref 70–99)
GLUCOSE SERPL-MCNC: 123 MG/DL
OSMOLALITY SERPL CALC.SUM OF ELEC: 306 MOSM/KG (ref 275–295)
PATIENT FASTING Y/N/NP: YES
POTASSIUM SERPL-SCNC: 4.4 MMOL/L
POTASSIUM SERPL-SCNC: 4.4 MMOL/L (ref 3.5–5.1)
SODIUM SERPL-SCNC: 143 MMOL/L
SODIUM SERPL-SCNC: 143 MMOL/L (ref 136–145)

## 2021-09-10 PROCEDURE — 80048 BASIC METABOLIC PNL TOTAL CA: CPT

## 2021-09-10 PROCEDURE — 36415 COLL VENOUS BLD VENIPUNCTURE: CPT

## 2021-09-13 ENCOUNTER — LAB SERVICES (OUTPATIENT)
Dept: CARDIOLOGY | Age: 86
End: 2021-09-13

## 2021-09-13 ENCOUNTER — OFF PREMISE (OUTPATIENT)
Dept: CARDIOLOGY | Age: 86
End: 2021-09-13

## 2021-09-13 ENCOUNTER — OFFICE VISIT (OUTPATIENT)
Dept: CARDIOLOGY CLINIC | Facility: CLINIC | Age: 86
End: 2021-09-13
Payer: MEDICARE

## 2021-09-13 VITALS
HEART RATE: 60 BPM | OXYGEN SATURATION: 97 % | DIASTOLIC BLOOD PRESSURE: 68 MMHG | HEIGHT: 67 IN | BODY MASS INDEX: 25.58 KG/M2 | WEIGHT: 163 LBS | RESPIRATION RATE: 18 BRPM | SYSTOLIC BLOOD PRESSURE: 135 MMHG

## 2021-09-13 DIAGNOSIS — I44.2 AV BLOCK, 3RD DEGREE (HCC): Primary | ICD-10-CM

## 2021-09-13 DIAGNOSIS — Z95.0 S/P PLACEMENT OF CARDIAC PACEMAKER: ICD-10-CM

## 2021-09-13 DIAGNOSIS — I50.22 CHRONIC SYSTOLIC CONGESTIVE HEART FAILURE (HCC): ICD-10-CM

## 2021-09-13 DIAGNOSIS — E78.2 MIXED HYPERLIPIDEMIA: ICD-10-CM

## 2021-09-13 PROCEDURE — 99214 OFFICE O/P EST MOD 30 MIN: CPT | Performed by: INTERNAL MEDICINE

## 2021-09-13 NOTE — PROGRESS NOTES
[facility] PROGRESS NOTE      Patient Name: Nirav Sanchez MRN: [id number]   : 1934 CSN: [id number] tablet (40 mg total) by mouth daily. , Disp: 90 tablet, Rfl: 4  finasteride 5 MG Oral Tab, Take 1 tablet (5 mg total) by mouth daily. , Disp: 90 tablet, Rfl: 3  tamsulosin (FLOMAX) cap, Take 2 capsules (0.8 mg total) by mouth daily. , Disp: 180 capsule, Rfl: Strip, TEST ONCE D, Disp: , Rfl: 3    No current facility-administered medications on file prior to visit. PHYSICAL EXAM:   Blood pressure 135/68, pulse 60, resp. rate 18, height 5' 7\" (1.702 m), weight 163 lb (73.9 kg), SpO2 97 %.   GEN - no distress Fortunately, his left ventricular function normalized after that. His exertional dyspnea has improved significantly, and his fluid balance has been stable. He is maintaining NYHA class I status currently.     He continues to see advanced heart failure spe

## 2021-09-13 NOTE — PATIENT INSTRUCTIONS
-Keep your in-office Device Clinic appointment for later this month.   The [address] [place] [address] office will call you to confirm the date and time.  -Follow-up with the [address] [place] [address] Drive with remote checks as scheduled.  -Plan to follow-up with Dr. Tru Taylor

## 2021-09-17 ENCOUNTER — APPOINTMENT (OUTPATIENT)
Dept: URBAN - METROPOLITAN AREA CLINIC 321 | Age: 86
Setting detail: DERMATOLOGY
End: 2021-09-17

## 2021-09-17 DIAGNOSIS — L81.4 OTHER MELANIN HYPERPIGMENTATION: ICD-10-CM

## 2021-09-17 DIAGNOSIS — Z85.828 PERSONAL HISTORY OF OTHER MALIGNANT NEOPLASM OF SKIN: ICD-10-CM

## 2021-09-17 DIAGNOSIS — Z86.007 PERSONAL HISTORY OF IN-SITU NEOPLASM OF SKIN: ICD-10-CM

## 2021-09-17 DIAGNOSIS — D22 MELANOCYTIC NEVI: ICD-10-CM

## 2021-09-17 DIAGNOSIS — L82.1 OTHER SEBORRHEIC KERATOSIS: ICD-10-CM

## 2021-09-17 PROBLEM — D22.5 MELANOCYTIC NEVI OF TRUNK: Status: ACTIVE | Noted: 2021-09-17

## 2021-09-17 PROCEDURE — OTHER COUNSELING: OTHER

## 2021-09-17 PROCEDURE — 99213 OFFICE O/P EST LOW 20 MIN: CPT

## 2021-09-17 ASSESSMENT — LOCATION DETAILED DESCRIPTION DERM
LOCATION DETAILED: RIGHT CENTRAL MALAR CHEEK
LOCATION DETAILED: RIGHT SUPERIOR CENTRAL BUCCAL CHEEK
LOCATION DETAILED: RIGHT SUPERIOR MEDIAL UPPER BACK
LOCATION DETAILED: UPPER STERNUM
LOCATION DETAILED: RIGHT SUPERIOR LATERAL LOWER BACK
LOCATION DETAILED: LEFT MEDIAL UPPER BACK

## 2021-09-17 ASSESSMENT — LOCATION SIMPLE DESCRIPTION DERM
LOCATION SIMPLE: LEFT UPPER BACK
LOCATION SIMPLE: CHEST
LOCATION SIMPLE: RIGHT UPPER BACK
LOCATION SIMPLE: RIGHT CHEEK
LOCATION SIMPLE: RIGHT LOWER BACK

## 2021-09-17 ASSESSMENT — LOCATION ZONE DERM
LOCATION ZONE: FACE
LOCATION ZONE: TRUNK

## 2021-09-22 ENCOUNTER — LAB SERVICES (OUTPATIENT)
Dept: LAB | Age: 86
End: 2021-09-22

## 2021-09-22 DIAGNOSIS — I50.22 CHRONIC SYSTOLIC CONGESTIVE HEART FAILURE (CMD): ICD-10-CM

## 2021-09-22 DIAGNOSIS — Z01.810 PRE-PROCEDURAL CARDIOVASCULAR EXAMINATION: ICD-10-CM

## 2021-09-22 PROCEDURE — U0005 INFEC AGEN DETEC AMPLI PROBE: HCPCS | Performed by: CLINICAL MEDICAL LABORATORY

## 2021-09-22 PROCEDURE — U0003 INFECTIOUS AGENT DETECTION BY NUCLEIC ACID (DNA OR RNA); SEVERE ACUTE RESPIRATORY SYNDROME CORONAVIRUS 2 (SARS-COV-2) (CORONAVIRUS DISEASE [COVID-19]), AMPLIFIED PROBE TECHNIQUE, MAKING USE OF HIGH THROUGHPUT TECHNOLOGIES AS DESCRIBED BY CMS-2020-01-R: HCPCS | Performed by: CLINICAL MEDICAL LABORATORY

## 2021-09-23 LAB
SARS-COV-2 RNA RESP QL NAA+PROBE: NOT DETECTED
SERVICE CMNT-IMP: NORMAL
SERVICE CMNT-IMP: NORMAL

## 2021-09-24 ENCOUNTER — ANCILLARY PROCEDURE (OUTPATIENT)
Dept: CARDIOLOGY | Age: 86
End: 2021-09-24
Attending: INTERNAL MEDICINE

## 2021-09-24 DIAGNOSIS — I50.22 CHRONIC SYSTOLIC CONGESTIVE HEART FAILURE (CMD): ICD-10-CM

## 2021-09-24 LAB
HEART RATE RESERVE PREDICTED: 10.53 BPM
LV EF: 46 %
RESTING HR ACHIEVED: 62 BPM
STRESS BASELINE BP: NORMAL MMHG
STRESS O2 SAT REST: 98 %
STRESS PEAK HR: 119 BPM
STRESS PERCENT HR: 89 %
STRESS POST ESTIMATED WORKLOAD: 4.4 METS
STRESS POST EXERCISE DUR MIN: 6 MIN
STRESS POST EXERCISE DUR SEC: 4 SEC
STRESS POST O2 SAT PEAK: 98 %
STRESS POST PEAK BP: NORMAL MMHG
STRESS TARGET HR: 133 BPM

## 2021-09-24 PROCEDURE — G1004 CDSM NDSC: HCPCS | Performed by: INTERNAL MEDICINE

## 2021-09-24 PROCEDURE — 78452 HT MUSCLE IMAGE SPECT MULT: CPT | Performed by: INTERNAL MEDICINE

## 2021-09-24 PROCEDURE — A9502 TC99M TETROFOSMIN: HCPCS

## 2021-09-24 PROCEDURE — 94621 CARDIOPULM EXERCISE TESTING: CPT | Performed by: INTERNAL MEDICINE

## 2021-09-24 ASSESSMENT — EXERCISE STRESS TEST
STAGE_CATEGORIES: RECOVERY 1
PEAK_RPP: 10540
COMMENTS: RPE 7
PEAK_HR: 119
STAGE_CATEGORIES: 3
PEAK_RPP: 23800
PEAK_RPP: 12792
WORKLOAD: 60
PEAK_RPP: 15770
STAGE_CATEGORIES: 2
PEAK_HR: 119
STOPPAGE_REASON: GENERAL FATIGUE
PEAK_HR: 83
PEAK_BP: 190/82
PEAK_O2_SAT: 98
WORKLOAD: 43
PEAK_HR: 83
PEAK_HR: 62
PEAK_BP: 170/84
STAGE_CATEGORIES: RECOVERY 2
PEAK_BP: 196/84
PEAK_HR: 99
STAGE_CATEGORIES: 1
PEAK_RPP: 16830
PEAK_BP: 156/70
PEAK_BP: 200/88
STAGE_CATEGORIES: RECOVERY 1
STAGE_CATEGORIES: RECOVERY 0
PEAK_HR: 82
PEAK_O2_SAT: 97
PEAK_O2_SAT: 97
STAGE_CATEGORIES: RESTING
PEAK_HR: 100
PEAK_RPP: 23800
PEAK_RPP: 19600
PEAK_BP: 170/78
PEAK_BP: 200/80
PEAK_O2_SAT: 98
WORKLOAD: 23
PEAK_METS: 4.4
PEAK_BP: 200/88
PEAK_RPP: 16600

## 2021-09-26 NOTE — TELEPHONE ENCOUNTER
Please review Protocol Failed/No Protocol        Requested Prescriptions   Pending Prescriptions Disp Refills    LANTUS SOLOSTAR 100 UNIT/ML Subcutaneous Solution Pen-injector [Pharmacy Med Name: LANTUS SOLOSTAR PEN 100U/ML] 15 mL 5     Sig: INJECT 25 UNIT with hyperglycemia, with long-term current use of insulin [facility])    [address], [address], [place], [place]    Office Visit

## 2021-09-27 RX ORDER — INSULIN GLARGINE 100 [IU]/ML
INJECTION, SOLUTION SUBCUTANEOUS
Qty: 15 ML | Refills: 5 | Status: SHIPPED | OUTPATIENT
Start: 2021-09-27

## 2021-09-28 LAB
BODY MASS INDEX: 25.3
BREATHING RESERVE (CALCULATED) PREDICTED: 42.44 %
BREATHING RESERVE (MEASURED) ACHIEVED: 51.6 %
CHANGE VO2/ CHANGE WR ACHIEVED: 9.9 ML/MIN/WATT
CHRONOTROPIC INDEX PREDICTED: 1.03
HEART RATE RESERVE PREDICTED: 9.77 BPM
HEIGHT: 170 CM
IDEAL BODY WEIGHT: 74 KG
METS ACHIEVED: 4.4
O2 SATURATION ACHIEVED: 98 %
OUES ACHIEVED: 993.4
PEAK HR ACHIEVED: 120 BPM
PEAK HR PREDICTED: 133 BPM
PEAK O2 PULSE (%) PREDICTED: 92.69 %
PEAK O2 PULSE (ML/BEAT) ACHIEVED: 9.28 ML/BEAT
PEAK O2 PULSE (ML/BEAT) PREDICTED: 10.02 ML/BEAT
PEAK RESPIRATORY RATE ACHIEVED: 32 BRS/MIN
PEAK VE ACHIEVED: 49.5 L/MIN
PECO2 ACHIEVED: 24.4 MMHG
PECO2/PETCO2 AT PREDICTED: 76.25 %
PETCO2 ACHIEVED: 32 MMHG
PREDICTED VO2 % AT AT: 46.2 %
PREDICTED VO2 %: 83.15 %
RER MAX ACHIEVED: 1.13
RESTING HR ACHIEVED: 62 BPM
RESTING MVV: 86 L/MIN
STRESS BASELINE BP: NORMAL MMHG
STRESS O2 SAT REST: 98 %
STRESS PEAK HR: 120 BPM
STRESS PERCENT HR: 90 %
STRESS POST ESTIMATED WORKLOAD: 4.4 METS
STRESS POST EXERCISE DUR MIN: 6 MIN
STRESS POST EXERCISE DUR SEC: 4 SEC
STRESS POST O2 SAT PEAK: 98 %
STRESS POST PEAK BP: NORMAL MMHG
STRESS TARGET HR: 133 BPM
VD/VT ACHIEVED: 0.3
VE/VCO2 AT ACHIEVED: 35
VE/VO2 AT ACHIEVED: 31
VO2 AT ACHIEVED: 8.5 ML/KG/MIN
VO2 AT AT (ML/MIN) ACHIEVED: 615 ML/MIN
VO2 AT, IBW ACHIEVED: 8.31 ML/KG/MIN
VO2 PEAK (ML/KG/MIN) ACHIEVED: 15.3 ML/KG/MIN
VO2 PEAK (ML/KG/MIN) PREDICTED: 18.4 ML/KG/MIN
VO2 PEAK (ML/MIN) ACHIEVED: 1114 ML/MIN
VO2 PEAK (ML/MIN) PREDICTED: 1332 ML/MIN
VO2 PEAK IBW ACHIEVED: 15.05 ML/KG/MIN
VT/IC PREDICTED: 53 %
WEIGHT MEASUREMENT: 73 KG

## 2021-10-01 ENCOUNTER — LAB ENCOUNTER (OUTPATIENT)
Dept: LAB | Age: 86
End: 2021-10-01
Attending: FAMILY MEDICINE
Payer: MEDICARE

## 2021-10-01 DIAGNOSIS — N18.31 STAGE 3A CHRONIC KIDNEY DISEASE (HCC): ICD-10-CM

## 2021-10-01 DIAGNOSIS — N18.4 CKD (CHRONIC KIDNEY DISEASE) STAGE 4, GFR 15-29 ML/MIN (HCC): ICD-10-CM

## 2021-10-01 PROCEDURE — 36415 COLL VENOUS BLD VENIPUNCTURE: CPT

## 2021-10-01 PROCEDURE — 80069 RENAL FUNCTION PANEL: CPT

## 2021-10-01 PROCEDURE — 85025 COMPLETE CBC W/AUTO DIFF WBC: CPT

## 2021-10-01 NOTE — TELEPHONE ENCOUNTER
Please advise in regards to red flagged warning     Refill passed per [facility] protocol.   Cholesterol Medications  Protocol Criteria:  · Appointment scheduled in the past 12 months or in the next 3 months  · ALT & LDL on file in the past 12 months
family

## 2021-10-03 ENCOUNTER — TELEPHONE (OUTPATIENT)
Dept: NEPHROLOGY | Facility: CLINIC | Age: 86
End: 2021-10-03

## 2021-10-03 NOTE — TELEPHONE ENCOUNTER
Kidneys overall look stable. If doing well CPM.  Repeat standing order in 3 months and then see for follow-up.

## 2021-10-08 NOTE — TELEPHONE ENCOUNTER
Patient contacted result message .  Appointment book  for 1/31/22 Spoke with wife regarding potential for change of LOC to routine status. Discussed room and board rate of $256/day. Discussed financial aide application. Wife verbalized understanding.

## 2021-10-15 ENCOUNTER — OFFICE VISIT (OUTPATIENT)
Dept: PODIATRY CLINIC | Facility: CLINIC | Age: 86
End: 2021-10-15
Payer: MEDICARE

## 2021-10-15 ENCOUNTER — TELEPHONE (OUTPATIENT)
Dept: ENDOCRINOLOGY CLINIC | Facility: CLINIC | Age: 86
End: 2021-10-15

## 2021-10-15 DIAGNOSIS — B35.1 ONYCHOMYCOSIS: ICD-10-CM

## 2021-10-15 DIAGNOSIS — E08.42 DIABETES MELLITUS DUE TO UNDERLYING CONDITION WITH DIABETIC POLYNEUROPATHY, UNSPECIFIED WHETHER LONG TERM INSULIN USE (HCC): Primary | ICD-10-CM

## 2021-10-15 PROCEDURE — 11721 DEBRIDE NAIL 6 OR MORE: CPT | Performed by: PODIATRIST

## 2021-10-15 NOTE — PROGRESS NOTES
[facility] Podiatry  Progress Note    Lisa George is a 80year old male. Patient presents with:  Diabetic Foot Care: 721/21 A1c: 8.2. LOV with Dr. Stephanie Krishnan 7/21/21. Morning sugar 150. Denies any pain.         HPI:     This is a pleasant diabetic male w differently: ADMINISTER 28 UNITS UNDER THE SKIN EVERY EVENING) 15 mL 1   • Pantoprazole Sodium 40 MG Oral Tab EC Take 1 tablet (40 mg total) by mouth every morning before breakfast. 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  We reviewed and discussed the fo

## 2021-10-25 RX ORDER — PANTOPRAZOLE SODIUM 40 MG/1
40 TABLET, DELAYED RELEASE ORAL
Qty: 90 TABLET | Refills: 1 | Status: SHIPPED | OUTPATIENT
Start: 2021-10-25

## 2021-10-25 NOTE — TELEPHONE ENCOUNTER
Refill passed per [facility], [facility] protocol.   Requested Prescriptions   Pending Prescriptions Disp Refills    METFORMIN 500 MG Oral Tab [Pharmacy Med Name: METFORMIN HCL TABS 500MG] 180 tablet 3     Sig: TAKE 1 TABLET TWICE A DAY WITH MEALS        Diabetes Department Appt Notes    In 3 weeks Wade Ochoa MD [facility], [facility], [address]     In 1 month Kurt Carmen MD [facility], [facility], [address]     In 1 month Kade Hackett Show, [address] [facility], [place] [address], [place] 2 MONTHS FU

## 2021-10-25 NOTE — TELEPHONE ENCOUNTER
Please review. Protocol Failed / No Protocol.     Requested Prescriptions   Pending Prescriptions Disp Refills    METFORMIN 500 MG Oral Tab [Pharmacy Med Name: METFORMIN HCL TABS 500MG] 180 tablet 3     Sig: TAKE 1 TABLET TWICE A DAY WITH MEALS        Diab Appt Notes    In 3 weeks Nghia Cummings MD [facility], [facility] [address]     In 1 month Mauro Tavarez MD [facility], [facility], [facility], [address]     In 1 month Klarissa Hackett, [address] [facility], [facility], [place] 2 MONTHS FU    In 3 month

## 2021-11-04 ENCOUNTER — TELEPHONE (OUTPATIENT)
Dept: ENDOCRINOLOGY CLINIC | Facility: CLINIC | Age: 86
End: 2021-11-04

## 2021-11-04 NOTE — TELEPHONE ENCOUNTER
CMN form received again from [facility] , signed and faxed to local [facility] in [facility]

## 2021-11-20 ENCOUNTER — OFFICE VISIT (OUTPATIENT)
Dept: FAMILY MEDICINE CLINIC | Facility: CLINIC | Age: 86
End: 2021-11-20
Payer: MEDICARE

## 2021-11-20 VITALS
TEMPERATURE: 97 F | SYSTOLIC BLOOD PRESSURE: 159 MMHG | WEIGHT: 162.63 LBS | HEIGHT: 67 IN | DIASTOLIC BLOOD PRESSURE: 68 MMHG | HEART RATE: 62 BPM | BODY MASS INDEX: 25.53 KG/M2

## 2021-11-20 DIAGNOSIS — N18.32 STAGE 3B CHRONIC KIDNEY DISEASE (HCC): ICD-10-CM

## 2021-11-20 DIAGNOSIS — M48.061 LUMBAR FORAMINAL STENOSIS: ICD-10-CM

## 2021-11-20 DIAGNOSIS — I50.22 CHRONIC SYSTOLIC CONGESTIVE HEART FAILURE (HCC): ICD-10-CM

## 2021-11-20 DIAGNOSIS — E11.65 UNCONTROLLED TYPE 2 DIABETES MELLITUS WITH HYPERGLYCEMIA (HCC): Primary | ICD-10-CM

## 2021-11-20 PROBLEM — N18.4 CHRONIC RENAL DISEASE, STAGE IV (HCC): Status: ACTIVE | Noted: 2021-11-20

## 2021-11-20 PROCEDURE — 99214 OFFICE O/P EST MOD 30 MIN: CPT | Performed by: FAMILY MEDICINE

## 2021-11-20 PROCEDURE — G0008 ADMIN INFLUENZA VIRUS VAC: HCPCS | Performed by: FAMILY MEDICINE

## 2021-11-20 PROCEDURE — 83036 HEMOGLOBIN GLYCOSYLATED A1C: CPT | Performed by: FAMILY MEDICINE

## 2021-11-20 PROCEDURE — 90662 IIV NO PRSV INCREASED AG IM: CPT | Performed by: FAMILY MEDICINE

## 2021-11-20 NOTE — PROGRESS NOTES
Jose Gomes is a 80year old male. Patient presents with:  Diabetes: F/u    HPI:   Pt here for regular exam - reports pain in lower back. No pain within legs. Stays in his back.  Reports had same pain about 4 years ago and improved with injection per  SOLOSTAR) 100 UNIT/ML Subcutaneous Solution Pen-injector, ADMINISTER 25 UNITS UNDER THE SKIN EVERY EVENING (Patient taking differently: ADMINISTER 28 UNITS UNDER THE SKIN EVERY EVENING), Disp: 15 mL, Rfl: 1  Cholecalciferol (VITAMIN D) 50 MCG (2000 UT) Area 1 Security cough  CARDIOVASCULAR: denies chest pain  GI: denies abdominal pain and denies heartburn  NEURO: denies headaches  Musculoskeletal: no joint pain, back pain    EXAM:   /68   Pulse 62   Temp 97 °F (36.1 °C) (Temporal)   Ht 5' 7\" (1.702 m)   Wt 162 lb

## 2021-11-23 ENCOUNTER — PATIENT OUTREACH (OUTPATIENT)
Dept: CASE MANAGEMENT | Age: 86
End: 2021-11-23

## 2021-11-23 NOTE — PROGRESS NOTES
Spoke to patient's son Treasure Graff, [place] program information provided. Patient is unsure at this time, will discuss with family. Program information mailed to patient. I will follow up in 1-2 weeks.

## 2021-12-01 ENCOUNTER — OFFICE VISIT (OUTPATIENT)
Dept: ENDOCRINOLOGY CLINIC | Facility: CLINIC | Age: 86
End: 2021-12-01
Payer: MEDICARE

## 2021-12-01 VITALS
SYSTOLIC BLOOD PRESSURE: 176 MMHG | BODY MASS INDEX: 26 KG/M2 | HEART RATE: 78 BPM | DIASTOLIC BLOOD PRESSURE: 73 MMHG | WEIGHT: 165 LBS

## 2021-12-01 DIAGNOSIS — Z79.4 CONTROLLED TYPE 2 DIABETES MELLITUS WITH COMPLICATION, WITH LONG-TERM CURRENT USE OF INSULIN (HCC): Primary | ICD-10-CM

## 2021-12-01 DIAGNOSIS — E11.8 CONTROLLED TYPE 2 DIABETES MELLITUS WITH COMPLICATION, WITH LONG-TERM CURRENT USE OF INSULIN (HCC): Primary | ICD-10-CM

## 2021-12-01 PROCEDURE — 82947 ASSAY GLUCOSE BLOOD QUANT: CPT | Performed by: INTERNAL MEDICINE

## 2021-12-01 PROCEDURE — 36416 COLLJ CAPILLARY BLOOD SPEC: CPT | Performed by: INTERNAL MEDICINE

## 2021-12-01 PROCEDURE — 83036 HEMOGLOBIN GLYCOSYLATED A1C: CPT | Performed by: INTERNAL MEDICINE

## 2021-12-01 PROCEDURE — 99213 OFFICE O/P EST LOW 20 MIN: CPT | Performed by: INTERNAL MEDICINE

## 2021-12-01 NOTE — PATIENT INSTRUCTIONS
STOP Metformin    CONTINUE Glimepiride    INCREASE Lantus to 30 units subcutaneous daily     SCHEDULE RN visit in 2 weeks

## 2021-12-01 NOTE — PROGRESS NOTES
Name: Khloe Duncan  Date: 12/1/2021    Referring Physician: No ref. provider found    HISTORY OF PRESENT ILLNESS   Khloe Duncan is a 80year old male who presents for evaluation of diabetes mellitus.        Prior HbA, C or glycohemoglobin were 8.2% 1/20 1  •  glimepiride 4 MG Oral Tab, Take 1 tablet (4 mg total) by mouth before breakfast., Disp: 90 tablet, Rfl: 4  •  Insulin Pen Needle (BD PEN NEEDLE MINI U/F) 31G X 5 MM Does not apply Misc, USE EVERY DAY, Disp: 100 each, Rfl: 5  •  latanoprost 0.005 % Op status:     Tobacco Use      Smoking status: Former Smoker        Packs/day: 0.00        Years: 17.00        Pack years: 0        Types: Cigarettes      Smokeless tobacco: Never Used    Vaping Use      Vaping Use: Never used    Substance and Sexual uncontrolled  -uncontrolled, HgA1c 8.1% -->stable   -Congratulated patient on stable BG levels   -Discussed importance of glycemic control to prevent complications of diabetes  -Discussed complications of diabetes include retinopathy, neuropathy, nephropat

## 2021-12-13 ENCOUNTER — OFFICE VISIT (OUTPATIENT)
Dept: PODIATRY CLINIC | Facility: CLINIC | Age: 86
End: 2021-12-13
Payer: MEDICARE

## 2021-12-13 DIAGNOSIS — B35.1 ONYCHOMYCOSIS: ICD-10-CM

## 2021-12-13 DIAGNOSIS — E11.65 TYPE 2 DIABETES MELLITUS WITH HYPERGLYCEMIA, WITH LONG-TERM CURRENT USE OF INSULIN (HCC): ICD-10-CM

## 2021-12-13 DIAGNOSIS — Z79.4 TYPE 2 DIABETES MELLITUS WITH HYPERGLYCEMIA, WITH LONG-TERM CURRENT USE OF INSULIN (HCC): ICD-10-CM

## 2021-12-13 DIAGNOSIS — E08.42 DIABETES MELLITUS DUE TO UNDERLYING CONDITION WITH DIABETIC POLYNEUROPATHY, UNSPECIFIED WHETHER LONG TERM INSULIN USE (HCC): Primary | ICD-10-CM

## 2021-12-13 PROCEDURE — 11721 DEBRIDE NAIL 6 OR MORE: CPT | Performed by: PODIATRIST

## 2021-12-13 NOTE — PROGRESS NOTES
[facility] Podiatry  Progress Note    Susie Reyez is a 80year old male.  Patient presents with:  Diabetic Foot Care: Pt here for routine nail care, FBS this morning 127        HPI:     This is a pleasant diabetic male with CKD stage 3, CHF and pace ADMINISTER 25 UNITS UNDER THE SKIN EVERY EVENING (Patient taking differently: ADMINISTER 28 UNITS UNDER THE SKIN EVERY EVENING) 15 mL 1   • Acetaminophen-Codeine #3 300-30 MG Oral Tab Take 1 tablet by mouth every 4 (four) hours as needed for Pain.  10 table cutting grass        Reaction to local anesthetic: No          REVIEW OF SYSTEMS:   Denies nausea, fever, chills  No calf pain  No other muscle or joint aches  Denies chest pain or shortness of breath.       EXAM:   There were no vitals taken for [facility] current use of insulin ([facility]        Plan:     Diabetic education and instructions have been provided. We reviewed and discussed the following:    -risk categories related to pts with diabetes and foot or lower extremity complications per ADA.     -adherence

## 2021-12-15 ENCOUNTER — NURSE ONLY (OUTPATIENT)
Dept: ENDOCRINOLOGY CLINIC | Facility: CLINIC | Age: 86
End: 2021-12-15
Payer: MEDICARE

## 2021-12-15 DIAGNOSIS — E11.8 CONTROLLED TYPE 2 DIABETES MELLITUS WITH COMPLICATION, WITH LONG-TERM CURRENT USE OF INSULIN (HCC): Primary | ICD-10-CM

## 2021-12-15 DIAGNOSIS — Z79.4 CONTROLLED TYPE 2 DIABETES MELLITUS WITH COMPLICATION, WITH LONG-TERM CURRENT USE OF INSULIN (HCC): Primary | ICD-10-CM

## 2021-12-15 PROCEDURE — 95250 CONT GLUC MNTR PHYS/QHP EQP: CPT | Performed by: INTERNAL MEDICINE

## 2021-12-15 NOTE — PROGRESS NOTES
Patient presents to clinic for BG review after lantus was increased and metformin was stopped. However, patient did not bring his log today. He reports that yesterday sugar was 84 in the morning fasting and today was 140.   RN tried to get more recall of

## 2021-12-22 ENCOUNTER — TELEPHONE (OUTPATIENT)
Dept: ENDOCRINOLOGY CLINIC | Facility: CLINIC | Age: 86
End: 2021-12-22

## 2021-12-22 ENCOUNTER — NURSE ONLY (OUTPATIENT)
Dept: ENDOCRINOLOGY CLINIC | Facility: CLINIC | Age: 86
End: 2021-12-22
Payer: MEDICARE

## 2021-12-22 DIAGNOSIS — Z79.4 CONTROLLED TYPE 2 DIABETES MELLITUS WITH COMPLICATION, WITH LONG-TERM CURRENT USE OF INSULIN (HCC): Primary | ICD-10-CM

## 2021-12-22 DIAGNOSIS — E11.8 CONTROLLED TYPE 2 DIABETES MELLITUS WITH COMPLICATION, WITH LONG-TERM CURRENT USE OF INSULIN (HCC): Primary | ICD-10-CM

## 2021-12-22 RX ORDER — DULAGLUTIDE 0.75 MG/.5ML
0.75 INJECTION, SOLUTION SUBCUTANEOUS
Qty: 2 ML | Refills: 0 | Status: SHIPPED | OUTPATIENT
Start: 2021-12-22

## 2021-12-22 NOTE — TELEPHONE ENCOUNTER
Tried to call patient and RN was able to talk to patient's son Shantell Carvajal and relayed Dr. Sue Cross message as outlined below.   Son had a few questions and concerns about what's happening with plan of care and RN tried to answer them as best as possible and aski

## 2021-12-22 NOTE — TELEPHONE ENCOUNTER
Please call patient - unfortunately he is having a lot of high BG levels after meals and low BG levels overnight. Decrease Lantus to 25 units subcutaneous daily. Continue Glimepiride. Start Trulicity 0.19NA subcutaneous weekly Disp #4 pens with voucher.

## 2021-12-31 ENCOUNTER — TELEPHONE (OUTPATIENT)
Dept: FAMILY MEDICINE CLINIC | Facility: CLINIC | Age: 86
End: 2021-12-31

## 2021-12-31 DIAGNOSIS — Z79.4 CONTROLLED TYPE 2 DIABETES MELLITUS WITH COMPLICATION, WITH LONG-TERM CURRENT USE OF INSULIN (HCC): ICD-10-CM

## 2021-12-31 DIAGNOSIS — E11.8 CONTROLLED TYPE 2 DIABETES MELLITUS WITH COMPLICATION, WITH LONG-TERM CURRENT USE OF INSULIN (HCC): ICD-10-CM

## 2021-12-31 RX ORDER — BLOOD SUGAR DIAGNOSTIC
STRIP MISCELLANEOUS
Qty: 200 STRIP | Refills: 3 | Status: SHIPPED | OUTPATIENT
Start: 2021-12-31

## 2021-12-31 RX ORDER — LANCETS 33 GAUGE
1 EACH MISCELLANEOUS 2 TIMES DAILY
Qty: 200 EACH | Refills: 3 | Status: SHIPPED | OUTPATIENT
Start: 2021-12-31

## 2021-12-31 NOTE — TELEPHONE ENCOUNTER
Refill passed per [address] [place] protocol. Requested Prescriptions   Pending Prescriptions Disp Refills    Glucose Blood (ONETOUCH ULTRA) In Vitro Strip 200 strip 1     Sig: Check blood sugar twice a day.  DX: E11.65, insulin requiring        Diabetic S [place] Follow     In 5 months Steve Brown MD [facility], [facility]

## 2021-12-31 NOTE — TELEPHONE ENCOUNTER
Ramesh the patient's son called. Patient is out of test strip medication: One Touch Ultra Blue strips. To [facility] in [place] at [facility] and [employer]. Call with any questions.  [phone]

## 2022-01-01 ENCOUNTER — EXTERNAL RECORD (OUTPATIENT)
Dept: OTHER | Age: 87
End: 2022-01-01

## 2022-01-03 ENCOUNTER — TELEPHONE (OUTPATIENT)
Dept: ENDOCRINOLOGY CLINIC | Facility: CLINIC | Age: 87
End: 2022-01-03

## 2022-01-03 DIAGNOSIS — Z79.4 CONTROLLED TYPE 2 DIABETES MELLITUS WITH COMPLICATION, WITH LONG-TERM CURRENT USE OF INSULIN (HCC): ICD-10-CM

## 2022-01-03 DIAGNOSIS — E11.8 CONTROLLED TYPE 2 DIABETES MELLITUS WITH COMPLICATION, WITH LONG-TERM CURRENT USE OF INSULIN (HCC): ICD-10-CM

## 2022-01-03 RX ORDER — LISINOPRIL 10 MG/1
TABLET ORAL
Qty: 135 TABLET | Refills: 3 | Status: SHIPPED | OUTPATIENT
Start: 2022-01-03 | End: 2022-05-17

## 2022-01-03 NOTE — TELEPHONE ENCOUNTER
Spoke to patient's son who is requesting Medicare form for diabetic supplies to be filled out by provider. Told patient's son that ordering provider has to sign off on Medicare form so signatures match.  Advised patient's son to call our office if Dr. Carey Schilling

## 2022-01-03 NOTE — TELEPHONE ENCOUNTER
Current Outpatient Medications   Medication Sig Dispense Refill   • Glucose Blood (ONETOUCH ULTRA) In Vitro Strip Check blood sugar twice a day.  200 strip 3

## 2022-01-03 NOTE — TELEPHONE ENCOUNTER
Patient's son called back per [facility] advice. His dad is still waiting for Medicare form to be signed by Dr Brent Mason  so his dad can get his diabetic lancits and test strips. He hasn't been able to test his blood sugar level since 1-2-22.   He started meliza

## 2022-01-03 NOTE — TELEPHONE ENCOUNTER
Called the patients son Ankita Corley.  I informed him that the diabetic testing supplies were ordered

## 2022-01-03 NOTE — TELEPHONE ENCOUNTER
Patient called stating that annual Medicare form needs to be filled out for [facility] to fill prescription  Samia MAURICIO  Currently out of testing supplies

## 2022-01-03 NOTE — TELEPHONE ENCOUNTER
pts [address] needs to fill out form for medicare that gets filled out once a year, please advise

## 2022-01-04 NOTE — TELEPHONE ENCOUNTER
I spoke to the patients son regarding testing supplies for his father . The rx is ready but [facility] will not release until Dr Yelena Burnham completes the CMN form .  I called [facility]  @ [phone] to request they fax the form again  To 21 [phone] has not r

## 2022-01-04 NOTE — TELEPHONE ENCOUNTER
Have not received CMN form. [phone], requested form be sent to alternative fax# [phone].

## 2022-01-05 ENCOUNTER — TELEPHONE (OUTPATIENT)
Dept: ENDOCRINOLOGY CLINIC | Facility: CLINIC | Age: 87
End: 2022-01-05

## 2022-01-05 NOTE — TELEPHONE ENCOUNTER
Second attempt: left message on [facility] voicemail ph# [phone], requested CMN form be sent to alternative fax# [phone].

## 2022-01-05 NOTE — TELEPHONE ENCOUNTER
Patient states Jeniffer has faxed CMN forms to office for Lancets and Test Strips and was told they have not received completed forms. Patient requesting update. Please call. Thank you.

## 2022-01-12 ENCOUNTER — PATIENT MESSAGE (OUTPATIENT)
Dept: ENDOCRINOLOGY CLINIC | Facility: CLINIC | Age: 87
End: 2022-01-12

## 2022-01-12 ENCOUNTER — TELEPHONE (OUTPATIENT)
Dept: ENDOCRINOLOGY CLINIC | Facility: CLINIC | Age: 87
End: 2022-01-12

## 2022-01-12 NOTE — TELEPHONE ENCOUNTER
Pts son Rolando Zambrano states that pts sugar has been running low at night (last night it was 45). Pt can't sleep because he is afraid of low sugar levels. Pt is also having problems getting diabetic supplies. Please call.

## 2022-01-12 NOTE — TELEPHONE ENCOUNTER
Per Dr. Ayo Arteaga, continue to hold lantus and eat snack tonight. Further recommendations to come from Dr. Oli Dukes.

## 2022-01-12 NOTE — TELEPHONE ENCOUNTER
Spoke with Kirsten from pharmacy. She said, the form faxed is not getting accepted because the signature date is wrong. RN had provider re-sign the form w/ 2022 date. Faxed back to [employer] and [employer]/Medicare department.

## 2022-01-12 NOTE — TELEPHONE ENCOUNTER
From: Elsie Hickman  To: Antolin Montgomery MD  Sent: 1/12/2022 4:07 PM CST  Subject: Cortez Montgomery MD,    Attached is my father's Blood Sugar / Pressure Log. Please review and advise. His blood sugar is erratic, he does not sleep, and he is worried not waking up the next day. Also, we have been struggling for the past 2 weeks trying to his testing supplies. Jeniffer claims the doctor's office has not signed and sent in the forms to get approval and release the testing supplies. I have been buying the testing supplies out of pocket, waiting for the approval. Please advise.  Ramesh

## 2022-01-12 NOTE — TELEPHONE ENCOUNTER
Jeniffer has questions regarding CMN forms - states date on form is incorrect. Please call. Thank you.

## 2022-01-12 NOTE — TELEPHONE ENCOUNTER
KELLIE Quiroz,   Patient's son reports low BG for patient. Checks BG fasting and before bed. He wakes up at 2-3am and his BG was 45 last night at 2am. He doesn't have other readings right now but patient is going to come over to his son's house and write down all his BG levels and send it to us later today through [address] Medications:  Patient stopped Lantus 25 units nightly over a week ago due to lows (son has to verify exactly when with patient). Trulicity 6.11WP weekly--started 3 weeks ago Monday  Taking Glimepiride 4mg daily    Son also reports difficulty getting supplies. CMN form filled out and sent to Dr. Agatha Quiroz in [place] to sign and fax to [facility] at [phone]. Has appt with RN 2/2/22.

## 2022-01-12 NOTE — PROGRESS NOTES
Emailed send to [employer] for update on pt's [address]  Name: More Nolasco  Date: 3/18/2021    Referring Physician: No ref. provider found    HISTORY OF PRESENT ILLNESS   More Nolasco is a 80year old male who presents for evaluation of diabetes mellitus.        Prior HbA, C or glycohemoglobin were 8.2% 1/20 by mouth 2 (two) times daily with meals. , Disp: 180 tablet, Rfl: 1  •  traZODone HCl 50 MG Oral Tab, Take 1 tablet (50 mg total) by mouth nightly., Disp: 30 tablet, Rfl: 2  •  Acetaminophen-Codeine #3 300-30 MG Oral Tab, Take 1 tablet by mouth every 4 (fou Alcohol use: Yes        Alcohol/week: 1.0 standard drinks        Types: 1 Cans of beer per week        Comment: 1 beer with dinner      Drug use: No    Other Topics      Concerns:        Caffeine Concern: Yes          coffee and soda        Exercise:  Yes ESOPHAGOGASTRODUODENOSCOPY (EGD) N/A 10/14/2020    Performed by Malgorzata Burton MD at [facility] ENDOSCOPY   • OTHER SURGICAL HISTORY      hemorrhoids   • OTHER SURGICAL HISTORY      pacemaker placed         PHYSICAL EXAM  BP (!) 162/86   Pulse 69   Wt 153

## 2022-01-12 NOTE — TELEPHONE ENCOUNTER
•  Glucose Blood (ONETOUCH ULTRA) In Vitro Strip, Check blood sugar twice a day., Disp: 200 strip, Rfl: 3      •  Lancets (ONETOUCH DELICA PLUS [id number]) Does not apply Misc, 1 strip by In Vitro route 2 (two) times daily. , Disp: 200 each, Rfl: 3

## 2022-01-12 NOTE — TELEPHONE ENCOUNTER
Dr. Ashia Plasencia. Diego Olivier, see patient's attached BG log and TE from 1/12/22. Patient stopped Lantus 25 units nightly over a week ago due to lows (son has to verify exactly when with patient).   Trulicity 6.63TV weekly--started 3 weeks ago Monday  Taking Glimepiride 4mg daily

## 2022-01-13 NOTE — TELEPHONE ENCOUNTER
Left detailed message for patient's son that I am sending MyChart message with instruction. MyChart sent.

## 2022-01-13 NOTE — TELEPHONE ENCOUNTER
CMN form had been faxed over several times. RN spoke to Kirsten at pharmacy and was told to disregard this as they received the form yesterday and today she called Medicare and was advised to run the claim again in 45 minutes.   Nothing further is needed at

## 2022-01-27 ENCOUNTER — LAB ENCOUNTER (OUTPATIENT)
Dept: LAB | Age: 87
End: 2022-01-27
Attending: INTERNAL MEDICINE
Payer: MEDICARE

## 2022-01-27 DIAGNOSIS — N18.4 CKD (CHRONIC KIDNEY DISEASE) STAGE 4, GFR 15-29 ML/MIN (HCC): ICD-10-CM

## 2022-01-27 LAB
ALBUMIN SERPL-MCNC: 3.5 G/DL (ref 3.4–5)
ANION GAP SERPL CALC-SCNC: 8 MMOL/L (ref 0–18)
BASOPHILS # BLD AUTO: 0.03 X10(3) UL (ref 0–0.2)
BASOPHILS NFR BLD AUTO: 0.4 %
BUN BLD-MCNC: 43 MG/DL (ref 7–18)
BUN/CREAT SERPL: 14.6 (ref 10–20)
CALCIUM BLD-MCNC: 9.8 MG/DL (ref 8.5–10.1)
CHLORIDE SERPL-SCNC: 110 MMOL/L (ref 98–112)
CO2 SERPL-SCNC: 24 MMOL/L (ref 21–32)
CREAT BLD-MCNC: 2.94 MG/DL
DEPRECATED RDW RBC AUTO: 44.2 FL (ref 35.1–46.3)
EOSINOPHIL # BLD AUTO: 0.29 X10(3) UL (ref 0–0.7)
EOSINOPHIL NFR BLD AUTO: 3.5 %
ERYTHROCYTE [DISTWIDTH] IN BLOOD BY AUTOMATED COUNT: 12.8 % (ref 11–15)
GLUCOSE BLD-MCNC: 81 MG/DL (ref 70–99)
HCT VFR BLD AUTO: 38.7 %
HGB BLD-MCNC: 12.4 G/DL
IMM GRANULOCYTES # BLD AUTO: 0.03 X10(3) UL (ref 0–1)
IMM GRANULOCYTES NFR BLD: 0.4 %
LYMPHOCYTES # BLD AUTO: 1.6 X10(3) UL (ref 1–4)
LYMPHOCYTES NFR BLD AUTO: 19.4 %
MCH RBC QN AUTO: 30 PG (ref 26–34)
MCHC RBC AUTO-ENTMCNC: 32 G/DL (ref 31–37)
MCV RBC AUTO: 93.7 FL
MONOCYTES # BLD AUTO: 0.68 X10(3) UL (ref 0.1–1)
MONOCYTES NFR BLD AUTO: 8.2 %
NEUTROPHILS # BLD AUTO: 5.62 X10 (3) UL (ref 1.5–7.7)
NEUTROPHILS # BLD AUTO: 5.62 X10(3) UL (ref 1.5–7.7)
NEUTROPHILS NFR BLD AUTO: 68.1 %
OSMOLALITY SERPL CALC.SUM OF ELEC: 304 MOSM/KG (ref 275–295)
PHOSPHATE SERPL-MCNC: 3 MG/DL (ref 2.5–4.9)
PLATELET # BLD AUTO: 243 10(3)UL (ref 150–450)
POTASSIUM SERPL-SCNC: 4.7 MMOL/L (ref 3.5–5.1)
RBC # BLD AUTO: 4.13 X10(6)UL
SODIUM SERPL-SCNC: 142 MMOL/L (ref 136–145)
WBC # BLD AUTO: 8.3 X10(3) UL (ref 4–11)

## 2022-01-27 PROCEDURE — 80069 RENAL FUNCTION PANEL: CPT

## 2022-01-27 PROCEDURE — 85025 COMPLETE CBC W/AUTO DIFF WBC: CPT

## 2022-01-27 PROCEDURE — 36415 COLL VENOUS BLD VENIPUNCTURE: CPT

## 2022-01-28 ENCOUNTER — TELEPHONE (OUTPATIENT)
Dept: NEPHROLOGY | Facility: CLINIC | Age: 87
End: 2022-01-28

## 2022-01-31 ENCOUNTER — OFFICE VISIT (OUTPATIENT)
Dept: NEPHROLOGY | Facility: CLINIC | Age: 87
End: 2022-01-31
Payer: MEDICARE

## 2022-01-31 VITALS
DIASTOLIC BLOOD PRESSURE: 76 MMHG | SYSTOLIC BLOOD PRESSURE: 173 MMHG | HEART RATE: 66 BPM | BODY MASS INDEX: 24.64 KG/M2 | HEIGHT: 67 IN | WEIGHT: 157 LBS

## 2022-01-31 DIAGNOSIS — N18.4 CKD (CHRONIC KIDNEY DISEASE) STAGE 4, GFR 15-29 ML/MIN (HCC): Primary | ICD-10-CM

## 2022-01-31 PROCEDURE — 99214 OFFICE O/P EST MOD 30 MIN: CPT | Performed by: INTERNAL MEDICINE

## 2022-02-01 NOTE — PROGRESS NOTES
01/31/22        Patient: Carlos Eduardo Garrison   YOB: 1934   Date of Visit: 1/31/2022       Dear  Dr. Rolando Raymond MD,      Thank you for referring Carlos Eduardo Garrison to my practice. Please find my assessment and plan below.       As you know he is an 87-year- a.m. glucoscans are borderline low. I will have him repeat his labs in 4 weeks. If his creatinine is still rising may need to place Trulicity on hold. I will see him again in 3 to 6 months depending upon clinical course.     Thank you again for allowing

## 2022-02-03 RX ORDER — DULAGLUTIDE 0.75 MG/.5ML
0.75 INJECTION, SOLUTION SUBCUTANEOUS WEEKLY
Qty: 12 EACH | Refills: 1 | Status: SHIPPED | OUTPATIENT
Start: 2022-02-03

## 2022-02-03 NOTE — TELEPHONE ENCOUNTER
Ramesh replied and attached blood sugar logs  Teir exceptions usually do not work we can send lillycares form for pa program?  Dr. Raul Andino MD,  Attached is my father's updates Blood Sugar / Pressure Log. His blood sugar is high in the evening and balances out in the morning. He is sleeping a better. He checks his blood sugar in the morning and night before he goes to bed. If his blood sugar is greater than 210 he injects himself with 25 units of insulin. You will find a column in log indicating when he has used the insulin. He is taking 1/2 a tablet of Glimepiride (2mg) every day in the morning. His last Trulicity (2.64TC) injection was on 01/17/2022. If you want him to continue taking Trulicity, I would need to you to forward a prescription to [facility]. I know that this is very pricey medication, but [facility] has explained to me that if the doctor fills out a (Tier Exception Form) changing medication classification from Tier 3 to Tier 2 making the medication more affordable for my farther. Staying on the same topic, are you comfortable with my father checking his blood sugar at 10:00pm, before going to bed? Should he check his blood sugar earlier in the evening or it does not matter as long as he is consistent? Please advise.   Ramesh

## 2022-02-04 ENCOUNTER — PATIENT MESSAGE (OUTPATIENT)
Dept: ENDOCRINOLOGY CLINIC | Facility: CLINIC | Age: 87
End: 2022-02-04

## 2022-02-07 ENCOUNTER — PATIENT MESSAGE (OUTPATIENT)
Dept: ENDOCRINOLOGY CLINIC | Facility: CLINIC | Age: 87
End: 2022-02-07

## 2022-02-07 NOTE — TELEPHONE ENCOUNTER
Received fax from [facility] regarding Trulicity. \"After a review of your plan's list of covered drugs, we have found no other biologic drugs, appropriate to treat your diagnosis or condition, on a lower cost-sharing tier. Since the drug you requested is already being provided to you at the lowest cost-share amount allowed by your plan, your request cannot be approved under your part D benefit\". Notified via [address]

## 2022-02-08 NOTE — TELEPHONE ENCOUNTER
I spoke with pts' son Zahida Tavarez regarding Dr Flaco Pedraza orders from 8/3/17. Stated pt did start the flexeril but it is not working and pain persist. Didn't know about the lidoderm patches. Inquiring on md advise for pain relief. no

## 2022-02-08 NOTE — TELEPHONE ENCOUNTER
Provided patient with information regarding [facility] application information to see if he qualifies. Awaiting patient response.

## 2022-02-09 ENCOUNTER — NURSE ONLY (OUTPATIENT)
Dept: ENDOCRINOLOGY CLINIC | Facility: CLINIC | Age: 87
End: 2022-02-09
Payer: MEDICARE

## 2022-02-09 DIAGNOSIS — Z79.4 CONTROLLED TYPE 2 DIABETES MELLITUS WITH COMPLICATION, WITH LONG-TERM CURRENT USE OF INSULIN (HCC): ICD-10-CM

## 2022-02-09 DIAGNOSIS — E11.8 CONTROLLED TYPE 2 DIABETES MELLITUS WITH COMPLICATION, WITH LONG-TERM CURRENT USE OF INSULIN (HCC): ICD-10-CM

## 2022-02-09 LAB
CARTRIDGE LOT#: ABNORMAL NUMERIC
HEMOGLOBIN A1C: 8 % (ref 4.3–5.6)

## 2022-02-09 PROCEDURE — 36416 COLLJ CAPILLARY BLOOD SPEC: CPT | Performed by: INTERNAL MEDICINE

## 2022-02-09 PROCEDURE — 83036 HEMOGLOBIN GLYCOSYLATED A1C: CPT | Performed by: INTERNAL MEDICINE

## 2022-02-09 RX ORDER — LANCETS 33 GAUGE
1 EACH MISCELLANEOUS 2 TIMES DAILY
Qty: 200 EACH | Refills: 3 | Status: SHIPPED | OUTPATIENT
Start: 2022-02-09

## 2022-02-09 RX ORDER — PIOGLITAZONEHYDROCHLORIDE 30 MG/1
30 TABLET ORAL DAILY
Qty: 90 TABLET | Refills: 1 | Status: SHIPPED | OUTPATIENT
Start: 2022-02-09

## 2022-02-09 RX ORDER — BLOOD SUGAR DIAGNOSTIC
STRIP MISCELLANEOUS
Qty: 200 STRIP | Refills: 3 | Status: SHIPPED | OUTPATIENT
Start: 2022-02-09

## 2022-02-09 NOTE — PROGRESS NOTES
Patient came in for Hgb A1C after starting trulicity. He refused to have an  over the phone and requested to talk to [address] [place] [address]. He does not want phone  as he feels like they do not translate accurately. Patient also brought in BG log that showed hyperglycemia at night and sometimes waking up with low sugars. It also showed that his sugars are better with trulicity. However, he's not able to get Trulicity due to cost.      MA and RN showed Dr. Anne Davis the log and patient's concerns regarding sugars and medications. Dr. Anne Davis recommended the following and also came in the room with RN and MA -- acting as  to explain plan of care with patient. - Lantus 20 units daily  -200: 15 units   BG under 150: no insulin    - Continue glimepiride 4 mg daily    - Start pioglitazone 30 mg daily. Dr. Anne Davis discussed w/ patient s/e of leg swelling and instructed patient to call the office if he develops edema.     - Look into Trulicity patient assistance program as this helps with BG. Pt was given [employer] application to look over and see if he qualifies.    - Call with BG update in 2 weeks    - Follow up with Dr. Anne Davis 2 months. Appointment made for 4/20/22 at 1:30 PM.    Patient voiced understanding.     Future Appointments   Date Time Provider Nash Ken   4/20/2022  1:30 PM Jeff Rosenthal MD [facility]

## 2022-02-14 ENCOUNTER — OFFICE VISIT (OUTPATIENT)
Dept: PODIATRY CLINIC | Facility: CLINIC | Age: 87
End: 2022-02-14
Payer: MEDICARE

## 2022-02-14 DIAGNOSIS — B35.3 TINEA PEDIS OF BOTH FEET: Primary | ICD-10-CM

## 2022-02-14 DIAGNOSIS — B35.1 ONYCHOMYCOSIS: ICD-10-CM

## 2022-02-14 DIAGNOSIS — E08.42 DIABETES MELLITUS DUE TO UNDERLYING CONDITION WITH DIABETIC POLYNEUROPATHY, UNSPECIFIED WHETHER LONG TERM INSULIN USE (HCC): ICD-10-CM

## 2022-02-14 PROCEDURE — 11721 DEBRIDE NAIL 6 OR MORE: CPT | Performed by: PODIATRIST

## 2022-02-14 RX ORDER — KETOCONAZOLE 20 MG/G
CREAM TOPICAL
Qty: 60 G | Refills: 3 | Status: SHIPPED | OUTPATIENT
Start: 2022-02-14

## 2022-02-17 NOTE — TELEPHONE ENCOUNTER
Received [employer] PAP form. Attached patients tax information that he brought in 02/16/22. Waiting for response.

## 2022-02-23 ENCOUNTER — OFFICE VISIT (OUTPATIENT)
Dept: FAMILY MEDICINE CLINIC | Facility: CLINIC | Age: 87
End: 2022-02-23
Payer: MEDICARE

## 2022-02-23 ENCOUNTER — OFFICE VISIT (OUTPATIENT)
Dept: CARDIOLOGY | Age: 87
End: 2022-02-23

## 2022-02-23 VITALS
RESPIRATION RATE: 18 BRPM | BODY MASS INDEX: 24.52 KG/M2 | WEIGHT: 156.2 LBS | DIASTOLIC BLOOD PRESSURE: 60 MMHG | HEART RATE: 62 BPM | HEIGHT: 67 IN | SYSTOLIC BLOOD PRESSURE: 140 MMHG

## 2022-02-23 VITALS
HEIGHT: 67 IN | DIASTOLIC BLOOD PRESSURE: 59 MMHG | SYSTOLIC BLOOD PRESSURE: 153 MMHG | BODY MASS INDEX: 24.39 KG/M2 | WEIGHT: 155.38 LBS | TEMPERATURE: 97 F | HEART RATE: 67 BPM

## 2022-02-23 DIAGNOSIS — I50.22 CHRONIC SYSTOLIC CONGESTIVE HEART FAILURE (HCC): ICD-10-CM

## 2022-02-23 DIAGNOSIS — M48.061 LUMBAR FORAMINAL STENOSIS: ICD-10-CM

## 2022-02-23 DIAGNOSIS — E11.65 UNCONTROLLED TYPE 2 DIABETES MELLITUS WITH HYPERGLYCEMIA (HCC): Chronic | ICD-10-CM

## 2022-02-23 DIAGNOSIS — Z79.4 TYPE 2 DIABETES MELLITUS WITH HYPERGLYCEMIA, WITH LONG-TERM CURRENT USE OF INSULIN (HCC): ICD-10-CM

## 2022-02-23 DIAGNOSIS — I44.2 AV BLOCK, 3RD DEGREE (HCC): ICD-10-CM

## 2022-02-23 DIAGNOSIS — E55.9 VITAMIN D DEFICIENCY: ICD-10-CM

## 2022-02-23 DIAGNOSIS — I50.22 CHRONIC SYSTOLIC CONGESTIVE HEART FAILURE (CMD): ICD-10-CM

## 2022-02-23 DIAGNOSIS — I87.1 RENAL VEIN STENOSIS: Primary | ICD-10-CM

## 2022-02-23 DIAGNOSIS — I70.1 RENAL ARTERY STENOSIS (CMD): ICD-10-CM

## 2022-02-23 DIAGNOSIS — Z00.00 ENCOUNTER FOR ANNUAL HEALTH EXAMINATION: ICD-10-CM

## 2022-02-23 DIAGNOSIS — E11.65 TYPE 2 DIABETES MELLITUS WITH HYPERGLYCEMIA, WITH LONG-TERM CURRENT USE OF INSULIN (HCC): ICD-10-CM

## 2022-02-23 DIAGNOSIS — Z95.0 S/P PLACEMENT OF CARDIAC PACEMAKER: Primary | ICD-10-CM

## 2022-02-23 DIAGNOSIS — J42 CHRONIC BRONCHITIS, UNSPECIFIED CHRONIC BRONCHITIS TYPE (HCC): ICD-10-CM

## 2022-02-23 DIAGNOSIS — N18.4 CHRONIC RENAL DISEASE, STAGE IV (HCC): ICD-10-CM

## 2022-02-23 DIAGNOSIS — E78.2 MIXED HYPERLIPIDEMIA: ICD-10-CM

## 2022-02-23 DIAGNOSIS — I10 ESSENTIAL HYPERTENSION: ICD-10-CM

## 2022-02-23 DIAGNOSIS — E78.00 HYPERCHOLESTEREMIA: ICD-10-CM

## 2022-02-23 PROCEDURE — 99214 OFFICE O/P EST MOD 30 MIN: CPT | Performed by: INTERNAL MEDICINE

## 2022-02-23 PROCEDURE — G0439 PPPS, SUBSEQ VISIT: HCPCS | Performed by: FAMILY MEDICINE

## 2022-02-23 RX ORDER — DULAGLUTIDE 0.75 MG/.5ML
0.75 INJECTION, SOLUTION SUBCUTANEOUS
COMMUNITY
Start: 2021-12-22

## 2022-02-23 RX ORDER — PIOGLITAZONEHYDROCHLORIDE 30 MG/1
30 TABLET ORAL
COMMUNITY
Start: 2022-02-09 | End: 2023-11-13 | Stop reason: CLARIF

## 2022-02-23 SDOH — HEALTH STABILITY: PHYSICAL HEALTH: ON AVERAGE, HOW MANY DAYS PER WEEK DO YOU ENGAGE IN MODERATE TO STRENUOUS EXERCISE (LIKE A BRISK WALK)?: 4 DAYS

## 2022-02-23 SDOH — HEALTH STABILITY: PHYSICAL HEALTH: ON AVERAGE, HOW MANY MINUTES DO YOU ENGAGE IN EXERCISE AT THIS LEVEL?: 10 MIN

## 2022-02-23 ASSESSMENT — ENCOUNTER SYMPTOMS
ALLERGIC/IMMUNOLOGIC COMMENTS: NO NEW FOOD ALLERGIES
DIZZINESS: 0
SLEEP DISTURBANCES DUE TO BREATHING: 0
ABDOMINAL PAIN: 0
HEMATOCHEZIA: 0
SHORTNESS OF BREATH: 0
MEMORY LOSS: 1
WEIGHT LOSS: 0
INSOMNIA: 1
HEMOPTYSIS: 0
BRUISES/BLEEDS EASILY: 0
COUGH: 0
CONSTIPATION: 1
FEVER: 0
WEIGHT GAIN: 0
LOSS OF BALANCE: 0
SUSPICIOUS LESIONS: 0
HEADACHES: 0
CHILLS: 0
LIGHT-HEADEDNESS: 0
WEAKNESS: 0
BLOATING: 0
BACK PAIN: 0
DIARRHEA: 0
DEPRESSION: 0

## 2022-02-23 ASSESSMENT — PATIENT HEALTH QUESTIONNAIRE - PHQ9
SUM OF ALL RESPONSES TO PHQ9 QUESTIONS 1 AND 2: 0
CLINICAL INTERPRETATION OF PHQ2 SCORE: NO FURTHER SCREENING NEEDED
SUM OF ALL RESPONSES TO PHQ9 QUESTIONS 1 AND 2: 0
1. LITTLE INTEREST OR PLEASURE IN DOING THINGS: NOT AT ALL
2. FEELING DOWN, DEPRESSED OR HOPELESS: NOT AT ALL

## 2022-02-24 ENCOUNTER — LAB ENCOUNTER (OUTPATIENT)
Dept: LAB | Age: 87
End: 2022-02-24
Attending: FAMILY MEDICINE
Payer: MEDICARE

## 2022-02-24 DIAGNOSIS — E55.9 VITAMIN D DEFICIENCY: ICD-10-CM

## 2022-02-24 DIAGNOSIS — N18.4 CKD (CHRONIC KIDNEY DISEASE) STAGE 4, GFR 15-29 ML/MIN (HCC): ICD-10-CM

## 2022-02-24 DIAGNOSIS — E11.65 UNCONTROLLED TYPE 2 DIABETES MELLITUS WITH HYPERGLYCEMIA (HCC): ICD-10-CM

## 2022-02-24 LAB
ALBUMIN SERPL-MCNC: 3.3 G/DL (ref 3.4–5)
ALP LIVER SERPL-CCNC: 129 U/L
ALT SERPL-CCNC: 22 U/L
BASOPHILS # BLD AUTO: 0.02 X10(3) UL (ref 0–0.2)
BASOPHILS NFR BLD AUTO: 0.3 %
BILIRUB DIRECT SERPL-MCNC: <0.1 MG/DL (ref 0–0.2)
BILIRUB SERPL-MCNC: 0.4 MG/DL (ref 0.1–2)
CHOLEST SERPL-MCNC: 153 MG/DL (ref ?–200)
CREAT UR-SCNC: 106 MG/DL
DEPRECATED RDW RBC AUTO: 42.5 FL (ref 35.1–46.3)
EOSINOPHIL # BLD AUTO: 0.43 X10(3) UL (ref 0–0.7)
EOSINOPHIL NFR BLD AUTO: 6 %
ERYTHROCYTE [DISTWIDTH] IN BLOOD BY AUTOMATED COUNT: 12.6 % (ref 11–15)
FASTING PATIENT LIPID ANSWER: YES
HCT VFR BLD AUTO: 36.3 %
HDLC SERPL-MCNC: 34 MG/DL (ref 40–59)
IMM GRANULOCYTES # BLD AUTO: 0.03 X10(3) UL (ref 0–1)
IMM GRANULOCYTES NFR BLD: 0.4 %
LDLC SERPL CALC-MCNC: 85 MG/DL (ref ?–100)
LYMPHOCYTES # BLD AUTO: 1.6 X10(3) UL (ref 1–4)
LYMPHOCYTES NFR BLD AUTO: 22.4 %
MCH RBC QN AUTO: 29.5 PG (ref 26–34)
MCHC RBC AUTO-ENTMCNC: 32.2 G/DL (ref 31–37)
MCV RBC AUTO: 91.7 FL
MICROALBUMIN UR-MCNC: 108 MG/DL
MICROALBUMIN/CREAT 24H UR-RTO: 1018.9 UG/MG (ref ?–30)
MONOCYTES # BLD AUTO: 0.57 X10(3) UL (ref 0.1–1)
MONOCYTES NFR BLD AUTO: 8 %
NEUTROPHILS # BLD AUTO: 4.5 X10 (3) UL (ref 1.5–7.7)
NEUTROPHILS # BLD AUTO: 4.5 X10(3) UL (ref 1.5–7.7)
NEUTROPHILS NFR BLD AUTO: 62.9 %
NONHDLC SERPL-MCNC: 119 MG/DL (ref ?–130)
PLATELET # BLD AUTO: 244 10(3)UL (ref 150–450)
PROT SERPL-MCNC: 6.4 G/DL (ref 6.4–8.2)
RBC # BLD AUTO: 3.96 X10(6)UL
TRIGL SERPL-MCNC: 201 MG/DL (ref 30–149)
TSI SER-ACNC: 3.33 MIU/ML (ref 0.36–3.74)
VIT B12 SERPL-MCNC: 558 PG/ML (ref 193–986)
VIT D+METAB SERPL-MCNC: 33.4 NG/ML (ref 30–100)
VLDLC SERPL CALC-MCNC: 32 MG/DL (ref 0–30)
WBC # BLD AUTO: 7.2 X10(3) UL (ref 4–11)

## 2022-02-24 PROCEDURE — 82570 ASSAY OF URINE CREATININE: CPT

## 2022-02-24 PROCEDURE — 80076 HEPATIC FUNCTION PANEL: CPT

## 2022-02-24 PROCEDURE — 36415 COLL VENOUS BLD VENIPUNCTURE: CPT

## 2022-02-24 PROCEDURE — 82306 VITAMIN D 25 HYDROXY: CPT

## 2022-02-24 PROCEDURE — 82043 UR ALBUMIN QUANTITATIVE: CPT

## 2022-02-24 PROCEDURE — 84443 ASSAY THYROID STIM HORMONE: CPT

## 2022-02-24 PROCEDURE — 85025 COMPLETE CBC W/AUTO DIFF WBC: CPT

## 2022-02-24 PROCEDURE — 80061 LIPID PANEL: CPT

## 2022-02-24 PROCEDURE — 82607 VITAMIN B-12: CPT

## 2022-02-26 ENCOUNTER — TELEPHONE (OUTPATIENT)
Dept: NEPHROLOGY | Facility: CLINIC | Age: 87
End: 2022-02-26

## 2022-02-26 DIAGNOSIS — N18.4 CKD (CHRONIC KIDNEY DISEASE) STAGE 4, GFR 15-29 ML/MIN (HCC): Primary | ICD-10-CM

## 2022-02-26 NOTE — TELEPHONE ENCOUNTER
Reviewed labs that Dr. Sravanthi Mulligan ordered. They did not include the kidney test.  Please repeat the renal panel sometime this week.

## 2022-03-03 ENCOUNTER — LAB ENCOUNTER (OUTPATIENT)
Dept: LAB | Age: 87
End: 2022-03-03
Attending: INTERNAL MEDICINE
Payer: MEDICARE

## 2022-03-03 DIAGNOSIS — N18.4 CKD (CHRONIC KIDNEY DISEASE) STAGE 4, GFR 15-29 ML/MIN (HCC): ICD-10-CM

## 2022-03-03 LAB
ALBUMIN SERPL-MCNC: 3.1 G/DL (ref 3.4–5)
ANION GAP SERPL CALC-SCNC: 6 MMOL/L (ref 0–18)
BASOPHILS # BLD AUTO: 0.03 X10(3) UL (ref 0–0.2)
BASOPHILS NFR BLD AUTO: 0.5 %
BUN BLD-MCNC: 41 MG/DL (ref 7–18)
BUN/CREAT SERPL: 15.2 (ref 10–20)
CALCIUM BLD-MCNC: 9 MG/DL (ref 8.5–10.1)
CHLORIDE SERPL-SCNC: 106 MMOL/L (ref 98–112)
CO2 SERPL-SCNC: 27 MMOL/L (ref 21–32)
CREAT BLD-MCNC: 2.69 MG/DL
DEPRECATED RDW RBC AUTO: 44.5 FL (ref 35.1–46.3)
EOSINOPHIL # BLD AUTO: 0.35 X10(3) UL (ref 0–0.7)
EOSINOPHIL NFR BLD AUTO: 6.3 %
ERYTHROCYTE [DISTWIDTH] IN BLOOD BY AUTOMATED COUNT: 13 % (ref 11–15)
GLUCOSE BLD-MCNC: 145 MG/DL (ref 70–99)
HCT VFR BLD AUTO: 35.8 %
HGB BLD-MCNC: 11.4 G/DL
IMM GRANULOCYTES # BLD AUTO: 0.02 X10(3) UL (ref 0–1)
IMM GRANULOCYTES NFR BLD: 0.4 %
LYMPHOCYTES # BLD AUTO: 1.73 X10(3) UL (ref 1–4)
LYMPHOCYTES NFR BLD AUTO: 30.9 %
MCH RBC QN AUTO: 29.8 PG (ref 26–34)
MCHC RBC AUTO-ENTMCNC: 31.8 G/DL (ref 31–37)
MCV RBC AUTO: 93.7 FL
MONOCYTES # BLD AUTO: 0.48 X10(3) UL (ref 0.1–1)
MONOCYTES NFR BLD AUTO: 8.6 %
NEUTROPHILS # BLD AUTO: 2.98 X10 (3) UL (ref 1.5–7.7)
NEUTROPHILS # BLD AUTO: 2.98 X10(3) UL (ref 1.5–7.7)
NEUTROPHILS NFR BLD AUTO: 53.3 %
OSMOLALITY SERPL CALC.SUM OF ELEC: 301 MOSM/KG (ref 275–295)
PHOSPHATE SERPL-MCNC: 3.1 MG/DL (ref 2.5–4.9)
PLATELET # BLD AUTO: 223 10(3)UL (ref 150–450)
POTASSIUM SERPL-SCNC: 4.4 MMOL/L (ref 3.5–5.1)
RBC # BLD AUTO: 3.82 X10(6)UL
SODIUM SERPL-SCNC: 139 MMOL/L (ref 136–145)
WBC # BLD AUTO: 5.6 X10(3) UL (ref 4–11)

## 2022-03-03 PROCEDURE — 85025 COMPLETE CBC W/AUTO DIFF WBC: CPT

## 2022-03-03 PROCEDURE — 80069 RENAL FUNCTION PANEL: CPT

## 2022-03-03 PROCEDURE — 36415 COLL VENOUS BLD VENIPUNCTURE: CPT

## 2022-03-03 NOTE — TELEPHONE ENCOUNTER
rn called [facility] and patient was approved but rx has not shipped yet .   Sent message to pt/son informing of this and to contact pawan for further questions

## 2022-04-18 ENCOUNTER — OFFICE VISIT (OUTPATIENT)
Dept: PODIATRY CLINIC | Facility: CLINIC | Age: 87
End: 2022-04-18
Payer: MEDICARE

## 2022-04-18 DIAGNOSIS — E08.42 DIABETES MELLITUS DUE TO UNDERLYING CONDITION WITH DIABETIC POLYNEUROPATHY, UNSPECIFIED WHETHER LONG TERM INSULIN USE (HCC): ICD-10-CM

## 2022-04-18 DIAGNOSIS — B35.1 ONYCHOMYCOSIS: ICD-10-CM

## 2022-04-18 DIAGNOSIS — B35.3 TINEA PEDIS OF BOTH FEET: Primary | ICD-10-CM

## 2022-04-18 PROCEDURE — 11721 DEBRIDE NAIL 6 OR MORE: CPT | Performed by: PODIATRIST

## 2022-04-20 ENCOUNTER — OFFICE VISIT (OUTPATIENT)
Dept: ENDOCRINOLOGY CLINIC | Facility: CLINIC | Age: 87
End: 2022-04-20
Payer: MEDICARE

## 2022-04-20 VITALS
WEIGHT: 155 LBS | BODY MASS INDEX: 24 KG/M2 | SYSTOLIC BLOOD PRESSURE: 156 MMHG | HEART RATE: 85 BPM | DIASTOLIC BLOOD PRESSURE: 90 MMHG

## 2022-04-20 DIAGNOSIS — Z79.4 CONTROLLED TYPE 2 DIABETES MELLITUS WITH COMPLICATION, WITH LONG-TERM CURRENT USE OF INSULIN (HCC): Primary | ICD-10-CM

## 2022-04-20 DIAGNOSIS — E11.8 CONTROLLED TYPE 2 DIABETES MELLITUS WITH COMPLICATION, WITH LONG-TERM CURRENT USE OF INSULIN (HCC): Primary | ICD-10-CM

## 2022-04-20 LAB
CARTRIDGE LOT#: ABNORMAL NUMERIC
GLUCOSE BLOOD: 179
HEMOGLOBIN A1C: 7.8 % (ref 4.3–5.6)
TEST STRIP LOT #: NORMAL NUMERIC

## 2022-04-20 PROCEDURE — 99213 OFFICE O/P EST LOW 20 MIN: CPT | Performed by: INTERNAL MEDICINE

## 2022-04-20 PROCEDURE — 82947 ASSAY GLUCOSE BLOOD QUANT: CPT | Performed by: INTERNAL MEDICINE

## 2022-04-20 PROCEDURE — 36416 COLLJ CAPILLARY BLOOD SPEC: CPT | Performed by: INTERNAL MEDICINE

## 2022-04-20 PROCEDURE — 83036 HEMOGLOBIN GLYCOSYLATED A1C: CPT | Performed by: INTERNAL MEDICINE

## 2022-04-25 RX ORDER — PANTOPRAZOLE SODIUM 40 MG/1
40 TABLET, DELAYED RELEASE ORAL
Qty: 90 TABLET | Refills: 1 | Status: SHIPPED | OUTPATIENT
Start: 2022-04-25

## 2022-04-25 NOTE — TELEPHONE ENCOUNTER
Refill passed per [facility] Bri Arevalo protocol.     Requested Prescriptions   Pending Prescriptions Disp Refills    PANTOPRAZOLE 40 MG Oral Tab EC [Pharmacy Med Name: PANTOPRAZOLE SODIUM DR TABS 40MG] 90 tablet 3     Sig: TAKE 1 TABLET BEFORE BREAKFAST        Gastrointestional Medication Protocol Passed - 4/24/2022 11:20 PM        Passed - Appointment in past 12 or next 3 months               Recent Outpatient Visits              5 days ago Controlled type 2 diabetes mellitus with complication, with long-term current use of insulin [facility])    [facility] [address], Amanda Tsai MD    Office Visit    1 week ago Tinea pedis of both feet    [facility] [address], Claudia Ozuna DPM    Office Visit    2 months ago S/P placement of cardiac pacemaker    [address] [place] [address] [address], Williams Medina MD    Office Visit    2 months ago Tinea pedis of both feet    [address] [place] [address] [address], Claudia Ozuna [place]    Office Visit    2 months ago Controlled type 2 diabetes mellitus with complication, with long-term current use of insulin [facility])    [facility] Bri Arevalo, [address], [address] Only            Future Appointments         Provider Department Appt Notes    In 1 month Azalia Stanley MD [facility], [address]     In 2 months Rosendo Jerry, [address] [facility], [address], [address] [facility] 3 f/u    In 3 months Sana Gomez MD [address] [place] [address] [address], [place] F/u    In 4 months Enrique Cortes MD [address] [place] [address] [address], [place] followup, release of info form

## 2022-05-03 RX ORDER — FUROSEMIDE 20 MG/1
TABLET ORAL
Qty: 30 TABLET | Refills: 4 | Status: SHIPPED | OUTPATIENT
Start: 2022-05-03

## 2022-05-17 RX ORDER — LISINOPRIL 10 MG/1
TABLET ORAL
Qty: 180 TABLET | Refills: 3 | Status: SHIPPED | OUTPATIENT
Start: 2022-05-17

## 2022-05-25 RX ORDER — SIMVASTATIN 40 MG
40 TABLET ORAL DAILY
Qty: 90 TABLET | Refills: 1 | Status: SHIPPED | OUTPATIENT
Start: 2022-05-25 | End: 2022-11-21

## 2022-05-25 NOTE — TELEPHONE ENCOUNTER
Refill passed per [facility] protocol.      Requested Prescriptions   Pending Prescriptions Disp Refills    SIMVASTATIN 40 MG Oral Tab [Pharmacy Med Name: SIMVASTATIN TABS 40MG] 90 tablet 3     Sig: TAKE 1 TABLET DAILY        Cholesterol Medication Protocol Passed - 5/24/2022 11:13 PM        Passed - ALT in past 12 months        Passed - LDL in past 12 months        Passed - Last ALT < 80       Lab Results   Component Value Date    ALT 22 02/24/2022             Passed - Last LDL < 130     Lab Results   Component Value Date    LDL 85 02/24/2022               Passed - Appointment in past 12 or next 3 months                Recent Outpatient Visits              1 month ago Controlled type 2 diabetes mellitus with complication, with long-term current use of insulin [facility])    [facility], [address], Micha Lim MD    Office Visit    1 month ago Tinea pedis of both feet    [facility], Roel Ozuna [facility]    Office Visit    3 months ago S/P placement of cardiac pacemaker    [facility], Jagdeep Duran, Veronika Haney MD    Office Visit    3 months ago Tinea pedis of both feet    [facility], [facility], Roel Ozuna [place]    Office Visit    3 months ago Controlled type 2 diabetes mellitus with complication, with long-term current use of insulin [facility])    [place] [facility], [address], [place]    Nurse Only             Future Appointments         Provider Department Appt Notes    In 1 month Roel Hackett, [address] [facility], [facility], [facility] 3 f/u    In 1 month [address], [address] [place] [address], 59 [facility] follow up     In 2 months Jayce Cartagena MD [place] [facility], [address], [place] F/u    In 3 months Carole Heredia MD [place] [facility], [facility], [place] followup, release of info form

## 2022-06-09 RX ORDER — AMLODIPINE BESYLATE 5 MG/1
TABLET ORAL
Qty: 90 TABLET | Refills: 3 | Status: SHIPPED | OUTPATIENT
Start: 2022-06-09 | End: 2023-06-12

## 2022-06-10 RX ORDER — GLIMEPIRIDE 4 MG/1
4 TABLET ORAL
Qty: 90 TABLET | Refills: 3 | Status: SHIPPED | OUTPATIENT
Start: 2022-06-10

## 2022-06-14 ENCOUNTER — LAB ENCOUNTER (OUTPATIENT)
Dept: LAB | Age: 87
End: 2022-06-14
Attending: INTERNAL MEDICINE
Payer: MEDICARE

## 2022-06-14 DIAGNOSIS — N18.4 CKD (CHRONIC KIDNEY DISEASE) STAGE 4, GFR 15-29 ML/MIN (HCC): ICD-10-CM

## 2022-06-14 LAB
ALBUMIN SERPL-MCNC: 3.4 G/DL (ref 3.4–5)
ANION GAP SERPL CALC-SCNC: 8 MMOL/L (ref 0–18)
BASOPHILS # BLD AUTO: 0.04 X10(3) UL (ref 0–0.2)
BASOPHILS NFR BLD AUTO: 0.6 %
BUN BLD-MCNC: 34 MG/DL (ref 7–18)
BUN/CREAT SERPL: 13.1 (ref 10–20)
CALCIUM BLD-MCNC: 9.6 MG/DL (ref 8.5–10.1)
CHLORIDE SERPL-SCNC: 109 MMOL/L (ref 98–112)
CO2 SERPL-SCNC: 24 MMOL/L (ref 21–32)
CREAT BLD-MCNC: 2.6 MG/DL
DEPRECATED RDW RBC AUTO: 46.5 FL (ref 35.1–46.3)
EOSINOPHIL # BLD AUTO: 0.19 X10(3) UL (ref 0–0.7)
EOSINOPHIL NFR BLD AUTO: 2.9 %
ERYTHROCYTE [DISTWIDTH] IN BLOOD BY AUTOMATED COUNT: 13.6 % (ref 11–15)
GLUCOSE BLD-MCNC: 90 MG/DL (ref 70–99)
HCT VFR BLD AUTO: 37.5 %
HGB BLD-MCNC: 12 G/DL
IMM GRANULOCYTES # BLD AUTO: 0.02 X10(3) UL (ref 0–1)
IMM GRANULOCYTES NFR BLD: 0.3 %
LYMPHOCYTES # BLD AUTO: 2.14 X10(3) UL (ref 1–4)
LYMPHOCYTES NFR BLD AUTO: 32.4 %
MCH RBC QN AUTO: 29.6 PG (ref 26–34)
MCHC RBC AUTO-ENTMCNC: 32 G/DL (ref 31–37)
MCV RBC AUTO: 92.6 FL
MONOCYTES # BLD AUTO: 0.56 X10(3) UL (ref 0.1–1)
MONOCYTES NFR BLD AUTO: 8.5 %
NEUTROPHILS # BLD AUTO: 3.66 X10 (3) UL (ref 1.5–7.7)
NEUTROPHILS # BLD AUTO: 3.66 X10(3) UL (ref 1.5–7.7)
NEUTROPHILS NFR BLD AUTO: 55.3 %
OSMOLALITY SERPL CALC.SUM OF ELEC: 299 MOSM/KG (ref 275–295)
PHOSPHATE SERPL-MCNC: 3.2 MG/DL (ref 2.5–4.9)
PLATELET # BLD AUTO: 204 10(3)UL (ref 150–450)
POTASSIUM SERPL-SCNC: 4.6 MMOL/L (ref 3.5–5.1)
RBC # BLD AUTO: 4.05 X10(6)UL
SODIUM SERPL-SCNC: 141 MMOL/L (ref 136–145)
WBC # BLD AUTO: 6.6 X10(3) UL (ref 4–11)

## 2022-06-14 PROCEDURE — 80069 RENAL FUNCTION PANEL: CPT

## 2022-06-14 PROCEDURE — 36415 COLL VENOUS BLD VENIPUNCTURE: CPT

## 2022-06-14 PROCEDURE — 85025 COMPLETE CBC W/AUTO DIFF WBC: CPT

## 2022-06-16 RX ORDER — FINASTERIDE 5 MG/1
5 TABLET, FILM COATED ORAL DAILY
Qty: 90 TABLET | Refills: 1 | Status: SHIPPED | OUTPATIENT
Start: 2022-06-16 | End: 2023-02-09

## 2022-06-18 ENCOUNTER — TELEPHONE (OUTPATIENT)
Dept: NEPHROLOGY | Facility: CLINIC | Age: 87
End: 2022-06-18

## 2022-06-27 ENCOUNTER — OFFICE VISIT (OUTPATIENT)
Dept: PODIATRY CLINIC | Facility: CLINIC | Age: 87
End: 2022-06-27
Payer: MEDICARE

## 2022-06-27 DIAGNOSIS — E08.42 DIABETES MELLITUS DUE TO UNDERLYING CONDITION WITH DIABETIC POLYNEUROPATHY, UNSPECIFIED WHETHER LONG TERM INSULIN USE (HCC): Primary | ICD-10-CM

## 2022-06-27 DIAGNOSIS — B35.1 ONYCHOMYCOSIS: ICD-10-CM

## 2022-07-06 ENCOUNTER — TELEPHONE (OUTPATIENT)
Dept: SURGERY | Facility: CLINIC | Age: 87
End: 2022-07-06

## 2022-07-07 ENCOUNTER — LAB ENCOUNTER (OUTPATIENT)
Dept: LAB | Age: 87
End: 2022-07-07
Attending: INTERNAL MEDICINE
Payer: MEDICARE

## 2022-07-07 DIAGNOSIS — N18.4 CKD (CHRONIC KIDNEY DISEASE) STAGE 4, GFR 15-29 ML/MIN (HCC): ICD-10-CM

## 2022-07-07 LAB
ALBUMIN SERPL-MCNC: 3.3 G/DL (ref 3.4–5)
ANION GAP SERPL CALC-SCNC: 9 MMOL/L (ref 0–18)
BASOPHILS # BLD AUTO: 0.03 X10(3) UL (ref 0–0.2)
BASOPHILS NFR BLD AUTO: 0.4 %
BUN BLD-MCNC: 37 MG/DL (ref 7–18)
BUN/CREAT SERPL: 14.5 (ref 10–20)
CALCIUM BLD-MCNC: 9.2 MG/DL (ref 8.5–10.1)
CHLORIDE SERPL-SCNC: 109 MMOL/L (ref 98–112)
CO2 SERPL-SCNC: 22 MMOL/L (ref 21–32)
CREAT BLD-MCNC: 2.56 MG/DL
DEPRECATED RDW RBC AUTO: 46.8 FL (ref 35.1–46.3)
EOSINOPHIL # BLD AUTO: 0.21 X10(3) UL (ref 0–0.7)
EOSINOPHIL NFR BLD AUTO: 3.1 %
ERYTHROCYTE [DISTWIDTH] IN BLOOD BY AUTOMATED COUNT: 13.3 % (ref 11–15)
GLUCOSE BLD-MCNC: 124 MG/DL (ref 70–99)
HCT VFR BLD AUTO: 40 %
HGB BLD-MCNC: 12.3 G/DL
IMM GRANULOCYTES # BLD AUTO: 0.02 X10(3) UL (ref 0–1)
IMM GRANULOCYTES NFR BLD: 0.3 %
LYMPHOCYTES # BLD AUTO: 1.89 X10(3) UL (ref 1–4)
LYMPHOCYTES NFR BLD AUTO: 28.3 %
MCH RBC QN AUTO: 29.2 PG (ref 26–34)
MCHC RBC AUTO-ENTMCNC: 30.8 G/DL (ref 31–37)
MCV RBC AUTO: 95 FL
MONOCYTES # BLD AUTO: 0.5 X10(3) UL (ref 0.1–1)
MONOCYTES NFR BLD AUTO: 7.5 %
NEUTROPHILS # BLD AUTO: 4.03 X10 (3) UL (ref 1.5–7.7)
NEUTROPHILS # BLD AUTO: 4.03 X10(3) UL (ref 1.5–7.7)
NEUTROPHILS NFR BLD AUTO: 60.4 %
OSMOLALITY SERPL CALC.SUM OF ELEC: 300 MOSM/KG (ref 275–295)
PHOSPHATE SERPL-MCNC: 3.3 MG/DL (ref 2.5–4.9)
PLATELET # BLD AUTO: 208 10(3)UL (ref 150–450)
POTASSIUM SERPL-SCNC: 5 MMOL/L (ref 3.5–5.1)
RBC # BLD AUTO: 4.21 X10(6)UL
SODIUM SERPL-SCNC: 140 MMOL/L (ref 136–145)
WBC # BLD AUTO: 6.7 X10(3) UL (ref 4–11)

## 2022-07-07 PROCEDURE — 36415 COLL VENOUS BLD VENIPUNCTURE: CPT

## 2022-07-07 PROCEDURE — 80069 RENAL FUNCTION PANEL: CPT

## 2022-07-07 PROCEDURE — 85025 COMPLETE CBC W/AUTO DIFF WBC: CPT

## 2022-07-08 ENCOUNTER — OFFICE VISIT (OUTPATIENT)
Dept: NEPHROLOGY | Facility: CLINIC | Age: 87
End: 2022-07-08
Payer: MEDICARE

## 2022-07-08 VITALS
WEIGHT: 161 LBS | HEIGHT: 67 IN | BODY MASS INDEX: 25.27 KG/M2 | SYSTOLIC BLOOD PRESSURE: 128 MMHG | HEART RATE: 67 BPM | DIASTOLIC BLOOD PRESSURE: 67 MMHG

## 2022-07-08 DIAGNOSIS — N18.4 CKD (CHRONIC KIDNEY DISEASE) STAGE 4, GFR 15-29 ML/MIN (HCC): Primary | ICD-10-CM

## 2022-07-08 PROCEDURE — 99214 OFFICE O/P EST MOD 30 MIN: CPT | Performed by: INTERNAL MEDICINE

## 2022-07-08 NOTE — PROGRESS NOTES
07/08/22        Patient: Karen Panda   YOB: 1934   Date of Visit: 7/8/2022       Dear  Dr. Levy Martinez MD,      Thank you for referring Karen Panda to my practice. Please find my assessment and plan below. As you know he is an 80-year-old male with a history of adult onset diabetes mellitus, hypertension, whitecoat syndrome, congestive heart failure secondary to diastolic dysfunction who I now had the pleasure of seeing for follow-up of chronic kidney disease stage IV and anemia of chronic disease. The patient is here with his son. Overall patient states he has been doing reasonably well. No chest pain, shortness of breath, GI or urinary tract symptoms. Tries to stay reasonably active. They have been monitoring his blood pressures and blood sugars on a regular basis and overall appear to be under reasonable control. Appetite remains decent    On physical exam his blood pressure was 128/67 with a pulse of 67 he weighed and 61 pounds. Neck was supple without JVD. Lungs were clear. Heart revealed a regular rate and rhythm with an S4 but no gallops, murmurs or rubs. Abdomen was soft, flat, nontender without organomegaly, masses or bruits. Extremities revealed no edema. I reviewed his most recent labs done on July 7, 2022. His creatinine actually is slightly better down to 2.56 with an estimated GFR of 22 cc/min. Creatinine was up to 2.94 back in January 2022. His electrolytes were good. Hemoglobin 12.3. Albumin 3.3. I therefore reassured the patient and his son that his renal function is slightly better than it was back in January 2022. Reinforced the importance of maintaining adequate hydration. Avoid nonsteroidals. Keep blood pressures and blood sugars under good control. He will continue to do CBC and renal panels quarterly. I will see him again in 6 months for follow-up or sooner if clinically indicated.     Thank you again for allowing me to participate in the care of your patient. If you have any questions please feel free to call.       Sincerely,   Stephanie Santiago MD   [address]  [address] [place] [address]  [place] [address]    Document electronically generated by:  Stephanie Santiago MD

## 2022-07-11 ENCOUNTER — OFFICE VISIT (OUTPATIENT)
Dept: SURGERY | Facility: CLINIC | Age: 87
End: 2022-07-11
Payer: MEDICARE

## 2022-07-11 DIAGNOSIS — R35.0 BENIGN PROSTATIC HYPERPLASIA WITH URINARY FREQUENCY: ICD-10-CM

## 2022-07-11 DIAGNOSIS — N20.0 KIDNEY STONE: ICD-10-CM

## 2022-07-11 DIAGNOSIS — R35.1 NOCTURIA: ICD-10-CM

## 2022-07-11 DIAGNOSIS — R80.1 PERSISTENT PROTEINURIA: ICD-10-CM

## 2022-07-11 DIAGNOSIS — K59.01 CONSTIPATION BY DELAYED COLONIC TRANSIT: ICD-10-CM

## 2022-07-11 DIAGNOSIS — N19 RENAL FAILURE, UNSPECIFIED CHRONICITY: ICD-10-CM

## 2022-07-11 DIAGNOSIS — R31.29 MICROHEMATURIA: Primary | ICD-10-CM

## 2022-07-11 DIAGNOSIS — M47.817 SPONDYLOSIS OF LUMBOSACRAL REGION WITHOUT MYELOPATHY OR RADICULOPATHY: ICD-10-CM

## 2022-07-11 DIAGNOSIS — N28.1 COMPLEX RENAL CYST: ICD-10-CM

## 2022-07-11 DIAGNOSIS — N40.1 BENIGN PROSTATIC HYPERPLASIA WITH URINARY FREQUENCY: ICD-10-CM

## 2022-07-11 LAB
BILIRUB UR QL: NEGATIVE
CLARITY UR: CLEAR
COLOR UR: YELLOW
GLUCOSE UR-MCNC: 50 MG/DL
HGB UR QL STRIP.AUTO: NEGATIVE
KETONES UR-MCNC: NEGATIVE MG/DL
LEUKOCYTE ESTERASE UR QL STRIP.AUTO: NEGATIVE
NITRITE UR QL STRIP.AUTO: NEGATIVE
PH UR: 5 [PH] (ref 5–8)
PROT UR-MCNC: 100 MG/DL
SP GR UR STRIP: 1.02 (ref 1–1.03)
UROBILINOGEN UR STRIP-ACNC: <2
VIT C UR-MCNC: 20 MG/DL

## 2022-07-11 PROCEDURE — 99214 OFFICE O/P EST MOD 30 MIN: CPT | Performed by: UROLOGY

## 2022-07-11 PROCEDURE — 1126F AMNT PAIN NOTED NONE PRSNT: CPT | Performed by: UROLOGY

## 2022-07-11 RX ORDER — TIMOLOL MALEATE 5 MG/ML
SOLUTION/ DROPS OPHTHALMIC
COMMUNITY
Start: 2022-05-16

## 2022-07-11 NOTE — PATIENT INSTRUCTIONS
Sohail Matthew M.D.    1.  Urine specimen today for complete urinalysis and urine for cytology--we will notify you of the results whether normal or abnormal.    2.  Continue finasteride 5 mg daily    3. Continue tamsulosin 0.4 mg capsule twice daily for a total daily dose of 0.8 mg    4. Visit in one year with either Dr. Aubrie Lua or Dr. Michelle Padgett .   Please submit urine specimen for complete urinalysis 1-7 days

## 2022-07-11 NOTE — TELEPHONE ENCOUNTER
Called pt and left msg letting him know that PVK running behind, wondering if pt can come in later.  LMTCB

## 2022-07-12 LAB — NON GYNE INTERPRETATION: NEGATIVE

## 2022-07-22 ENCOUNTER — HOSPITAL ENCOUNTER (OUTPATIENT)
Dept: ULTRASOUND IMAGING | Facility: HOSPITAL | Age: 87
Discharge: HOME | End: 2022-07-22
Payer: MEDICARE

## 2022-07-22 DIAGNOSIS — I10 BENIGN HYPERTENSION: ICD-10-CM

## 2022-07-22 DIAGNOSIS — I70.1 ATHEROSCLEROSIS OF RENAL ARTERY (HCC): ICD-10-CM

## 2022-07-22 PROCEDURE — 93975 VASCULAR STUDY: CPT

## 2022-07-22 PROCEDURE — 93975 VASCULAR STUDY: CPT | Performed by: INTERNAL MEDICINE

## 2022-07-22 PROCEDURE — 76775 US EXAM ABDO BACK WALL LIM: CPT

## 2022-07-22 PROCEDURE — 76775 US EXAM ABDO BACK WALL LIM: CPT | Performed by: INTERNAL MEDICINE

## 2022-08-03 ENCOUNTER — OFFICE VISIT (OUTPATIENT)
Dept: ENDOCRINOLOGY CLINIC | Facility: CLINIC | Age: 87
End: 2022-08-03
Payer: MEDICARE

## 2022-08-03 VITALS
BODY MASS INDEX: 25 KG/M2 | HEART RATE: 76 BPM | WEIGHT: 159 LBS | DIASTOLIC BLOOD PRESSURE: 78 MMHG | SYSTOLIC BLOOD PRESSURE: 160 MMHG

## 2022-08-03 DIAGNOSIS — E11.8 CONTROLLED TYPE 2 DIABETES MELLITUS WITH COMPLICATION, WITH LONG-TERM CURRENT USE OF INSULIN (HCC): Primary | ICD-10-CM

## 2022-08-03 DIAGNOSIS — Z79.4 CONTROLLED TYPE 2 DIABETES MELLITUS WITH COMPLICATION, WITH LONG-TERM CURRENT USE OF INSULIN (HCC): Primary | ICD-10-CM

## 2022-08-03 LAB
CARTRIDGE LOT#: ABNORMAL NUMERIC
GLUCOSE BLOOD: 257
HEMOGLOBIN A1C: 7.3 % (ref 4.3–5.6)
TEST STRIP LOT #: NORMAL NUMERIC

## 2022-08-03 PROCEDURE — 82947 ASSAY GLUCOSE BLOOD QUANT: CPT | Performed by: INTERNAL MEDICINE

## 2022-08-03 PROCEDURE — 1126F AMNT PAIN NOTED NONE PRSNT: CPT | Performed by: INTERNAL MEDICINE

## 2022-08-03 PROCEDURE — 99213 OFFICE O/P EST LOW 20 MIN: CPT | Performed by: INTERNAL MEDICINE

## 2022-08-03 PROCEDURE — 83036 HEMOGLOBIN GLYCOSYLATED A1C: CPT | Performed by: INTERNAL MEDICINE

## 2022-08-23 ENCOUNTER — OFFICE VISIT (OUTPATIENT)
Dept: FAMILY MEDICINE CLINIC | Facility: CLINIC | Age: 87
End: 2022-08-23
Payer: MEDICARE

## 2022-08-23 VITALS
HEIGHT: 67 IN | WEIGHT: 160.38 LBS | SYSTOLIC BLOOD PRESSURE: 130 MMHG | TEMPERATURE: 98 F | BODY MASS INDEX: 25.17 KG/M2 | HEART RATE: 67 BPM | DIASTOLIC BLOOD PRESSURE: 66 MMHG

## 2022-08-23 DIAGNOSIS — M54.41 CHRONIC RIGHT-SIDED LOW BACK PAIN WITH RIGHT-SIDED SCIATICA: ICD-10-CM

## 2022-08-23 DIAGNOSIS — G89.29 CHRONIC RIGHT-SIDED LOW BACK PAIN WITH RIGHT-SIDED SCIATICA: ICD-10-CM

## 2022-08-23 DIAGNOSIS — M48.061 LUMBAR FORAMINAL STENOSIS: Primary | ICD-10-CM

## 2022-08-23 DIAGNOSIS — N18.4 CHRONIC RENAL DISEASE, STAGE IV (HCC): ICD-10-CM

## 2022-08-23 PROCEDURE — 99214 OFFICE O/P EST MOD 30 MIN: CPT | Performed by: FAMILY MEDICINE

## 2022-08-24 ENCOUNTER — LAB ENCOUNTER (OUTPATIENT)
Dept: LAB | Age: 87
End: 2022-08-24
Attending: INTERNAL MEDICINE
Payer: MEDICARE

## 2022-08-24 DIAGNOSIS — E55.9 VITAMIN D DEFICIENCY: ICD-10-CM

## 2022-08-24 DIAGNOSIS — I10 ESSENTIAL HYPERTENSION, MALIGNANT: ICD-10-CM

## 2022-08-24 DIAGNOSIS — I70.1 ATHEROSCLEROSIS OF RENAL ARTERY (HCC): Primary | ICD-10-CM

## 2022-08-24 DIAGNOSIS — I50.9 CHRONIC HEART FAILURE (HCC): ICD-10-CM

## 2022-08-24 LAB
25(OH)D3+25(OH)D2 SERPL-MCNC: 45.9 NG/ML (ref 30–100)
ABSOLUTE IMMATURE GRANULOCYTES (OFFPRE24): 0.02 10(3)UL (ref 0–1)
ALBUMIN SERPL-MCNC: 3.5 G/DL (ref 3.4–5)
ALBUMIN SERPL-MCNC: 3.5 G/DL (ref 3.4–5)
ALBUMIN/GLOB SERPL: 1.1 {RATIO} (ref 1–2)
ALBUMIN/GLOB SERPL: 1.1 {RATIO} (ref 1–2)
ALP LIVER SERPL-CCNC: 127 U/L
ALP SERPL-CCNC: 127 U/L (ref 45–117)
ALT SERPL-CCNC: 26 U/L
ALT SERPL-CCNC: 26 U/L (ref 16–61)
ANION GAP SERPL CALC-SCNC: 7 MMOL/L (ref 0–18)
ANION GAP SERPL CALC-SCNC: 7 MMOL/L (ref 0–18)
AST SERPL-CCNC: 18 U/L (ref 15–37)
AST SERPL-CCNC: 18 U/L (ref 15–37)
BASOPHILS # BLD AUTO: 0.03 X10(3) UL (ref 0–0.2)
BASOPHILS # BLD: 0.03 10(3)UL (ref 0–0.2)
BASOPHILS NFR BLD AUTO: 0.5 %
BASOPHILS NFR BLD: 0.5 %
BILIRUB SERPL-MCNC: 0.4 MG/DL (ref 0.1–2)
BILIRUB SERPL-MCNC: 0.4 MG/DL (ref 0.1–2)
BUN BLD-MCNC: 46 MG/DL (ref 7–18)
BUN SERPL-MCNC: 46 MG/DL (ref 7–18)
BUN/CREAT SERPL: 15.6 (ref 10–20)
BUN/CREAT SERPL: 15.6 (ref 10–20)
CALCIUM BLD-MCNC: 9.3 MG/DL (ref 8.5–10.1)
CALCIUM SERPL-MCNC: 9.3 MG/DL (ref 8.5–10.1)
CALCULATED OSMO: 309 MOSM/KG (ref 275–295)
CHLORIDE SERPL-SCNC: 110 MMOL/L (ref 98–112)
CHLORIDE SERPL-SCNC: 110 MMOL/L (ref 98–112)
CHOLEST SERPL-MCNC: 125 MG/DL
CHOLEST SERPL-MCNC: 125 MG/DL (ref ?–200)
CO2 SERPL-SCNC: 25 MMOL/L (ref 21–32)
CO2 SERPL-SCNC: 25 MMOL/L (ref 21–32)
CREAT BLD-MCNC: 2.95 MG/DL
CREAT SERPL-MCNC: 2.95 MG/DL (ref 0.7–1.3)
DEPRECATED RDW RBC AUTO: 43.8 FL (ref 35.1–46.3)
EOSINOPHIL # BLD AUTO: 0.15 X10(3) UL (ref 0–0.7)
EOSINOPHIL # BLD: 0.15 X10(3) UL (ref 0–0.7)
EOSINOPHIL NFR BLD AUTO: 2.3 %
EOSINOPHIL NFR BLD: 2.3 %
ERYTHROCYTE [DISTWIDTH] IN BLOOD BY AUTOMATED COUNT: 12.8 % (ref 11–15)
ERYTHROCYTE [DISTWIDTH] IN BLOOD BY AUTOMATED COUNT: 43.8 FL (ref 35.1–46.3)
ERYTHROCYTE [DISTWIDTH] IN BLOOD: 12.8 % (ref 11–15)
FASTING PATIENT LIPID ANSWER: YES
FASTING STATUS PATIENT QL REPORTED: YES
GFR SERPLBLD BASED ON 1.73 SQ M-ARVRAT: 20 ML/MIN/1.73M2 (ref 60–?)
GFR SERPLBLD SCHWARTZ-ARVRAT: 20 ML/MIN/1.73M2
GLOBULIN PLAS-MCNC: 3.1 G/DL (ref 2.8–4.4)
GLOBULIN SER-MCNC: 3.1 G/DL (ref 2.8–4.4)
GLUCOSE BLD-MCNC: 155 MG/DL (ref 70–99)
GLUCOSE SERPL-MCNC: 155 MG/DL (ref 70–99)
HCT VFR BLD AUTO: 38.2 %
HCT VFR BLD CALC: 38.2 % (ref 39–53)
HDLC SERPL-MCNC: 29 MG/DL (ref 40–59)
HDLC SERPL-MCNC: 29 MG/DL (ref 40–59)
HGB BLD-MCNC: 12.3 G/DL
HGB BLD-MCNC: 12.3 G/DL (ref 13–17.5)
IMM GRANULOCYTES # BLD AUTO: 0.02 X10(3) UL (ref 0–1)
IMM GRANULOCYTES NFR BLD: 0.3 %
IMMATURE GRANULOCYTES (OFFPRE25): 0.3 %
LDLC SERPL CALC-MCNC: 46 MG/DL
LDLC SERPL CALC-MCNC: 46 MG/DL (ref ?–100)
LENGTH OF FAST TIME PATIENT: YES H
LENGTH OF FAST TIME PATIENT: YES H
LYMPHOCYTES # BLD AUTO: 2.21 X10(3) UL (ref 1–4)
LYMPHOCYTES # BLD: 2.21 10(3)UL (ref 1–4)
LYMPHOCYTES NFR BLD AUTO: 34.5 %
LYMPHOCYTES NFR BLD: 34.5 %
MCH RBC QN AUTO: 29.9 PG (ref 26–34)
MCH RBC QN AUTO: 29.9 PG (ref 26–34)
MCHC RBC AUTO-ENTMCNC: 32.2 G/DL (ref 31–37)
MCHC RBC AUTO-ENTMCNC: 32.2 G/DL (ref 31–37)
MCV RBC AUTO: 92.7 FL
MCV RBC AUTO: 92.7 FL (ref 80–100)
MONOCYTES # BLD AUTO: 0.49 X10(3) UL (ref 0.1–1)
MONOCYTES # BLD: 0.49 10(3)UL (ref 0.1–1)
MONOCYTES NFR BLD AUTO: 7.7 %
MONOCYTES NFR BLD: 7.7 %
NEUTROPHILS # BLD AUTO: 3.5 X10 (3) UL (ref 1.5–7.7)
NEUTROPHILS # BLD AUTO: 3.5 X10(3) UL (ref 1.5–7.7)
NEUTROPHILS # BLD: 3.5 10(3)UL (ref 1.5–7.7)
NEUTROPHILS NFR BLD AUTO: 54.7 %
NEUTROPHILS NFR BLD: 54.7 %
NONHDLC SERPL-MCNC: 96 MG/DL
NONHDLC SERPL-MCNC: 96 MG/DL (ref ?–130)
NT-PROBNP SERPL-MCNC: 165 PG/ML
NT-PROBNP SERPL-MCNC: 165 PG/ML (ref ?–450)
OSMOLALITY SERPL CALC.SUM OF ELEC: 309 MOSM/KG (ref 275–295)
PLATELET # BLD AUTO: 203 10(3)UL (ref 150–450)
PLATELET # BLD: 203 10(3)UL (ref 150–450)
POTASSIUM SERPL-SCNC: 5.2 MMOL/L (ref 3.5–5.1)
POTASSIUM SERPL-SCNC: 5.2 MMOL/L (ref 3.5–5.1)
PROT SERPL-MCNC: 6.6 G/DL (ref 6.4–8.2)
PROT SERPL-MCNC: 6.6 G/DL (ref 6.4–8.2)
RBC # BLD AUTO: 4.12 X10(6)UL
RBC # BLD: 4.12 X10(6)UL (ref 3.8–5.8)
SODIUM SERPL-SCNC: 142 MMOL/L (ref 136–145)
SODIUM SERPL-SCNC: 142 MMOL/L (ref 136–145)
TRIGL SERPL-MCNC: 333 MG/DL (ref 30–149)
TRIGL SERPL-MCNC: 333 MG/DL (ref 30–149)
TSH SERPL-ACNC: 3.91 MIU/ML (ref 0.36–3.74)
TSI SER-ACNC: 3.91 MIU/ML (ref 0.36–3.74)
VIT D+METAB SERPL-MCNC: 45.9 NG/ML (ref 30–100)
VLDLC SERPL CALC-MCNC: 47 MG/DL (ref 0–30)
VLDLC SERPL CALC-MCNC: 47 MG/DL (ref 0–30)
WBC # BLD AUTO: 6.4 X10(3) UL (ref 4–11)
WBC # BLD: 6.4 X10(3) UL (ref 4–11)

## 2022-08-24 PROCEDURE — 80053 COMPREHEN METABOLIC PANEL: CPT

## 2022-08-24 PROCEDURE — 84443 ASSAY THYROID STIM HORMONE: CPT

## 2022-08-24 PROCEDURE — 80061 LIPID PANEL: CPT

## 2022-08-24 PROCEDURE — 85025 COMPLETE CBC W/AUTO DIFF WBC: CPT

## 2022-08-24 PROCEDURE — 83880 ASSAY OF NATRIURETIC PEPTIDE: CPT

## 2022-08-24 PROCEDURE — 36415 COLL VENOUS BLD VENIPUNCTURE: CPT

## 2022-08-24 PROCEDURE — 82306 VITAMIN D 25 HYDROXY: CPT

## 2022-08-26 ENCOUNTER — ANCILLARY PROCEDURE (OUTPATIENT)
Dept: CARDIOLOGY | Age: 87
End: 2022-08-26
Attending: INTERNAL MEDICINE

## 2022-08-26 DIAGNOSIS — I50.22 CHRONIC SYSTOLIC CONGESTIVE HEART FAILURE (CMD): ICD-10-CM

## 2022-08-26 DIAGNOSIS — I70.1 RENAL ARTERY STENOSIS (CMD): ICD-10-CM

## 2022-08-26 DIAGNOSIS — I10 ESSENTIAL HYPERTENSION: ICD-10-CM

## 2022-08-26 PROCEDURE — 93306 TTE W/DOPPLER COMPLETE: CPT | Performed by: INTERNAL MEDICINE

## 2022-08-29 RX ORDER — TAMSULOSIN HYDROCHLORIDE 0.4 MG/1
CAPSULE ORAL
Qty: 180 CAPSULE | Refills: 3 | OUTPATIENT
Start: 2022-08-29

## 2022-08-31 ENCOUNTER — OFFICE VISIT (OUTPATIENT)
Dept: CARDIOLOGY | Age: 87
End: 2022-08-31

## 2022-08-31 VITALS
BODY MASS INDEX: 25.29 KG/M2 | WEIGHT: 161.16 LBS | SYSTOLIC BLOOD PRESSURE: 154 MMHG | DIASTOLIC BLOOD PRESSURE: 70 MMHG | HEART RATE: 64 BPM | RESPIRATION RATE: 18 BRPM | HEIGHT: 67 IN

## 2022-08-31 DIAGNOSIS — I10 ESSENTIAL HYPERTENSION: ICD-10-CM

## 2022-08-31 DIAGNOSIS — I50.22 CHRONIC SYSTOLIC CONGESTIVE HEART FAILURE (CMD): ICD-10-CM

## 2022-08-31 DIAGNOSIS — E78.00 HYPERCHOLESTEREMIA: Primary | ICD-10-CM

## 2022-08-31 DIAGNOSIS — E55.9 VITAMIN D DEFICIENCY: ICD-10-CM

## 2022-08-31 PROCEDURE — 99215 OFFICE O/P EST HI 40 MIN: CPT | Performed by: INTERNAL MEDICINE

## 2022-08-31 RX ORDER — FUROSEMIDE 20 MG/1
20 TABLET ORAL
Qty: 90 TABLET | Refills: 3 | Status: SHIPPED | OUTPATIENT
Start: 2022-09-01

## 2022-08-31 RX ORDER — TAMSULOSIN HYDROCHLORIDE 0.4 MG/1
0.8 CAPSULE ORAL DAILY
Qty: 180 CAPSULE | Refills: 3 | Status: SHIPPED | OUTPATIENT
Start: 2022-08-31

## 2022-08-31 SDOH — HEALTH STABILITY: PHYSICAL HEALTH: ON AVERAGE, HOW MANY MINUTES DO YOU ENGAGE IN EXERCISE AT THIS LEVEL?: 10 MIN

## 2022-08-31 SDOH — HEALTH STABILITY: PHYSICAL HEALTH: ON AVERAGE, HOW MANY DAYS PER WEEK DO YOU ENGAGE IN MODERATE TO STRENUOUS EXERCISE (LIKE A BRISK WALK)?: 7 DAYS

## 2022-08-31 ASSESSMENT — ENCOUNTER SYMPTOMS
BRUISES/BLEEDS EASILY: 0
HEMOPTYSIS: 0
LOSS OF BALANCE: 0
HEADACHES: 0
FEVER: 0
LIGHT-HEADEDNESS: 0
WEIGHT LOSS: 0
CHILLS: 0
INSOMNIA: 1
DIZZINESS: 0
CONSTIPATION: 1
WEIGHT GAIN: 1
BLOATING: 0
SUSPICIOUS LESIONS: 0
ABDOMINAL PAIN: 0
WEAKNESS: 0
HEMATOCHEZIA: 0
COUGH: 0
ALLERGIC/IMMUNOLOGIC COMMENTS: NO NEW FOOD ALLERGIES

## 2022-08-31 ASSESSMENT — PATIENT HEALTH QUESTIONNAIRE - PHQ9
CLINICAL INTERPRETATION OF PHQ2 SCORE: NO FURTHER SCREENING NEEDED
2. FEELING DOWN, DEPRESSED OR HOPELESS: NOT AT ALL
1. LITTLE INTEREST OR PLEASURE IN DOING THINGS: NOT AT ALL
SUM OF ALL RESPONSES TO PHQ9 QUESTIONS 1 AND 2: 0
SUM OF ALL RESPONSES TO PHQ9 QUESTIONS 1 AND 2: 0

## 2022-09-02 ENCOUNTER — TELEPHONE (OUTPATIENT)
Dept: FAMILY MEDICINE CLINIC | Facility: CLINIC | Age: 87
End: 2022-09-02

## 2022-09-07 ENCOUNTER — APPOINTMENT (OUTPATIENT)
Dept: URBAN - METROPOLITAN AREA CLINIC 244 | Age: 87
Setting detail: DERMATOLOGY
End: 2022-09-08

## 2022-09-07 DIAGNOSIS — D22 MELANOCYTIC NEVI: ICD-10-CM

## 2022-09-07 DIAGNOSIS — L81.4 OTHER MELANIN HYPERPIGMENTATION: ICD-10-CM

## 2022-09-07 DIAGNOSIS — L82.0 INFLAMED SEBORRHEIC KERATOSIS: ICD-10-CM

## 2022-09-07 DIAGNOSIS — Z86.007 PERSONAL HISTORY OF IN-SITU NEOPLASM OF SKIN: ICD-10-CM

## 2022-09-07 DIAGNOSIS — L82.1 OTHER SEBORRHEIC KERATOSIS: ICD-10-CM

## 2022-09-07 DIAGNOSIS — Z85.828 PERSONAL HISTORY OF OTHER MALIGNANT NEOPLASM OF SKIN: ICD-10-CM

## 2022-09-07 DIAGNOSIS — L57.0 ACTINIC KERATOSIS: ICD-10-CM

## 2022-09-07 PROBLEM — D22.5 MELANOCYTIC NEVI OF TRUNK: Status: ACTIVE | Noted: 2022-09-07

## 2022-09-07 PROCEDURE — 17000 DESTRUCT PREMALG LESION: CPT | Mod: 59

## 2022-09-07 PROCEDURE — OTHER COUNSELING: OTHER

## 2022-09-07 PROCEDURE — 17003 DESTRUCT PREMALG LES 2-14: CPT | Mod: 59

## 2022-09-07 PROCEDURE — OTHER LIQUID NITROGEN: OTHER

## 2022-09-07 PROCEDURE — 99213 OFFICE O/P EST LOW 20 MIN: CPT | Mod: 25

## 2022-09-07 PROCEDURE — 17110 DESTRUCT B9 LESION 1-14: CPT

## 2022-09-07 ASSESSMENT — LOCATION ZONE DERM
LOCATION ZONE: FACE
LOCATION ZONE: SCALP
LOCATION ZONE: TRUNK

## 2022-09-07 ASSESSMENT — LOCATION DETAILED DESCRIPTION DERM
LOCATION DETAILED: RIGHT SUPERIOR LATERAL LOWER BACK
LOCATION DETAILED: RIGHT CENTRAL MALAR CHEEK
LOCATION DETAILED: RIGHT INFERIOR LATERAL MIDBACK
LOCATION DETAILED: LEFT MEDIAL UPPER BACK
LOCATION DETAILED: RIGHT SUPERIOR MEDIAL UPPER BACK
LOCATION DETAILED: MID-FRONTAL SCALP
LOCATION DETAILED: UPPER STERNUM
LOCATION DETAILED: LEFT INFERIOR CENTRAL MALAR CHEEK
LOCATION DETAILED: RIGHT SUPERIOR CENTRAL BUCCAL CHEEK

## 2022-09-07 ASSESSMENT — LOCATION SIMPLE DESCRIPTION DERM
LOCATION SIMPLE: RIGHT UPPER BACK
LOCATION SIMPLE: RIGHT LOWER BACK
LOCATION SIMPLE: LEFT CHEEK
LOCATION SIMPLE: RIGHT CHEEK
LOCATION SIMPLE: LEFT UPPER BACK
LOCATION SIMPLE: CHEST
LOCATION SIMPLE: ANTERIOR SCALP

## 2022-09-07 NOTE — PROCEDURE: LIQUID NITROGEN
Total Number Of Aks Treated: 5
Duration Of Freeze Thaw-Cycle (Seconds): 0
Medical Necessity Clause: This procedure was medically necessary because the lesions that were treated were:
Detail Level: Detailed
Consent: The patient's consent was obtained including but not limited to risks of crusting, scabbing, blistering, scarring, darker or lighter pigmentary change, recurrence, incomplete removal and infection.
Post-Care Instructions: I reviewed with the patient in detail post-care instructions. Patient is to wear sunprotection, and avoid picking at any of the treated lesions. Pt may apply Vaseline to crusted or scabbing areas.
Detail Level: Zone
Number Of Freeze-Thaw Cycles: 1 freeze-thaw cycle
Duration Of Freeze Thaw-Cycle (Seconds): 5-10
Render Post-Care Instructions In Note?: no
Show Aperture Variable?: Yes
Medical Necessity Information: It is in your best interest to select a reason for this procedure from the list below. All of these items fulfill various CMS LCD requirements except the new and changing color options.

## 2022-09-08 ENCOUNTER — HOSPITAL ENCOUNTER (OUTPATIENT)
Dept: CT IMAGING | Facility: HOSPITAL | Age: 87
Discharge: HOME | End: 2022-09-08
Attending: FAMILY MEDICINE
Payer: MEDICARE

## 2022-09-08 DIAGNOSIS — M48.061 LUMBAR FORAMINAL STENOSIS: ICD-10-CM

## 2022-09-08 PROCEDURE — 72131 CT LUMBAR SPINE W/O DYE: CPT | Performed by: FAMILY MEDICINE

## 2022-09-08 NOTE — PROGRESS NOTES
Clemente Kyle has multiple levels of disc disease and stenosis.  Dr. Ayala Moctezuma will review at your appointment and determine if epidural injection can help your pain as it has in the past. - Dr. Levy Martinez

## 2022-09-13 ENCOUNTER — OFFICE VISIT (OUTPATIENT)
Dept: PHYSICAL MEDICINE AND REHAB | Facility: CLINIC | Age: 87
End: 2022-09-13
Payer: MEDICARE

## 2022-09-13 ENCOUNTER — TELEPHONE (OUTPATIENT)
Dept: NEUROLOGY | Facility: CLINIC | Age: 87
End: 2022-09-13

## 2022-09-13 VITALS
SYSTOLIC BLOOD PRESSURE: 148 MMHG | BODY MASS INDEX: 25.11 KG/M2 | HEART RATE: 71 BPM | HEIGHT: 67 IN | DIASTOLIC BLOOD PRESSURE: 72 MMHG | OXYGEN SATURATION: 99 % | WEIGHT: 160 LBS

## 2022-09-13 DIAGNOSIS — E11.65 TYPE 2 DIABETES MELLITUS WITH HYPERGLYCEMIA, WITH LONG-TERM CURRENT USE OF INSULIN (HCC): ICD-10-CM

## 2022-09-13 DIAGNOSIS — M51.9 LUMBAR DISC DISEASE: ICD-10-CM

## 2022-09-13 DIAGNOSIS — M48.061 LUMBAR FORAMINAL STENOSIS: Primary | ICD-10-CM

## 2022-09-13 DIAGNOSIS — M54.16 LUMBAR RADICULOPATHY: ICD-10-CM

## 2022-09-13 DIAGNOSIS — M43.26 FUSION OF LUMBAR SPINE: ICD-10-CM

## 2022-09-13 DIAGNOSIS — M48.061 SPINAL STENOSIS OF LUMBAR REGION WITHOUT NEUROGENIC CLAUDICATION: ICD-10-CM

## 2022-09-13 DIAGNOSIS — Z79.4 TYPE 2 DIABETES MELLITUS WITH HYPERGLYCEMIA, WITH LONG-TERM CURRENT USE OF INSULIN (HCC): ICD-10-CM

## 2022-09-13 PROCEDURE — 99204 OFFICE O/P NEW MOD 45 MIN: CPT | Performed by: PHYSICAL MEDICINE & REHABILITATION

## 2022-09-13 NOTE — TELEPHONE ENCOUNTER
Per Medicare guidelines authorization is not required for Bilateral L5 TFESI  cpt codes 68934-02, 81804. Will inform Nursing.

## 2022-09-13 NOTE — PATIENT INSTRUCTIONS
Plan  I will do the bilateral L5 TFESI's. If he does not have 100% resolution of the symptoms, then he will need to have PT. The patient will follow up in 3 months, but the patient will call me 2 weeks after having the injection to let me know how the injection worked.

## 2022-09-14 NOTE — TELEPHONE ENCOUNTER
S/w pt's son. Patient has been scheduled for Bilateral L5 TFESI on 9/20/22 at the [address] [place] [address] with . -Anesthesia type: LOCAL. -If scheduling [facility] covid testing required for all procedures whether patient is vaccinated or not. -Patient informed not to eat or drink anything after midnight the night prior to the procedure, if being sedated. -Patient was advised that if he/she does receive the covid vaccine it needs to be at least 2 weeks before or after the injection. -Medications and allergies reviewed. -Patient reminded to hold NSAIDs (Ibuprofen, ASA 81, Aleve, Naproxen, Mobic, Diclofenac, Etodolac, Celebrex etc.) for 3 [facility] to [facility]  if BMI is greater than 35. For Cervical injections only hold multivitamins, Vitamin E, Fish Oil, Phentermine (Lomaira) for 7 days prior to injection and NSAIDS.  mg to be held for 7 days prior to injections.  -If patient is receiving MAC/IVCS Phentermine Jackqueline [place]) will need to be held for 7 days prior to injection.  -If on blood thinner clearance has been received to hold this medication by provider.   -Patient informed he/she will need a  to and from procedure. -[facility] is located in the [facility] 1st floor. Patient may park in the yellow parking. Patient verbalized understanding and agrees with plan.  -----> Scheduled in Epic: Yes  -----> Scheduled in Cas[employer]:  Yes

## 2022-09-16 ENCOUNTER — LAB ENCOUNTER (OUTPATIENT)
Dept: LAB | Age: 87
End: 2022-09-16
Attending: INTERNAL MEDICINE

## 2022-09-16 DIAGNOSIS — N18.4 CKD (CHRONIC KIDNEY DISEASE) STAGE 4, GFR 15-29 ML/MIN (HCC): ICD-10-CM

## 2022-09-16 DIAGNOSIS — I50.22 CHRONIC SYSTOLIC CONGESTIVE HEART FAILURE, NYHA CLASS 1 (HCC): Primary | ICD-10-CM

## 2022-09-16 DIAGNOSIS — R31.29 MICROHEMATURIA: ICD-10-CM

## 2022-09-16 LAB
ALBUMIN SERPL-MCNC: 3.4 G/DL (ref 3.4–5)
ANION GAP SERPL CALC-SCNC: 6 MMOL/L (ref 0–18)
BASOPHILS # BLD AUTO: 0.03 X10(3) UL (ref 0–0.2)
BASOPHILS NFR BLD AUTO: 0.5 %
BILIRUB UR QL: NEGATIVE
BUN BLD-MCNC: 42 MG/DL (ref 7–18)
BUN/CREAT SERPL: 15.3 (ref 10–20)
CALCIUM BLD-MCNC: 8.9 MG/DL (ref 8.5–10.1)
CHLORIDE SERPL-SCNC: 109 MMOL/L (ref 98–112)
CLARITY UR: CLEAR
CO2 SERPL-SCNC: 24 MMOL/L (ref 21–32)
COLOR UR: YELLOW
CREAT BLD-MCNC: 2.74 MG/DL
DEPRECATED RDW RBC AUTO: 45.5 FL (ref 35.1–46.3)
EOSINOPHIL # BLD AUTO: 0.12 X10(3) UL (ref 0–0.7)
EOSINOPHIL NFR BLD AUTO: 1.9 %
ERYTHROCYTE [DISTWIDTH] IN BLOOD BY AUTOMATED COUNT: 13.2 % (ref 11–15)
GFR SERPLBLD BASED ON 1.73 SQ M-ARVRAT: 22 ML/MIN/1.73M2 (ref 60–?)
GLUCOSE BLD-MCNC: 150 MG/DL (ref 70–99)
GLUCOSE UR-MCNC: NEGATIVE MG/DL
HCT VFR BLD AUTO: 37.4 %
HGB BLD-MCNC: 11.7 G/DL
IMM GRANULOCYTES # BLD AUTO: 0.02 X10(3) UL (ref 0–1)
IMM GRANULOCYTES NFR BLD: 0.3 %
KETONES UR-MCNC: NEGATIVE MG/DL
LEUKOCYTE ESTERASE UR QL STRIP.AUTO: NEGATIVE
LYMPHOCYTES # BLD AUTO: 1.85 X10(3) UL (ref 1–4)
LYMPHOCYTES NFR BLD AUTO: 29.1 %
MCH RBC QN AUTO: 29.5 PG (ref 26–34)
MCHC RBC AUTO-ENTMCNC: 31.3 G/DL (ref 31–37)
MCV RBC AUTO: 94.2 FL
MONOCYTES # BLD AUTO: 0.55 X10(3) UL (ref 0.1–1)
MONOCYTES NFR BLD AUTO: 8.7 %
NEUTROPHILS # BLD AUTO: 3.78 X10 (3) UL (ref 1.5–7.7)
NEUTROPHILS # BLD AUTO: 3.78 X10(3) UL (ref 1.5–7.7)
NEUTROPHILS NFR BLD AUTO: 59.5 %
NITRITE UR QL STRIP.AUTO: NEGATIVE
OSMOLALITY SERPL CALC.SUM OF ELEC: 301 MOSM/KG (ref 275–295)
PH UR: 5.5 [PH] (ref 5–8)
PHOSPHATE SERPL-MCNC: 2.8 MG/DL (ref 2.5–4.9)
PLATELET # BLD AUTO: 203 10(3)UL (ref 150–450)
POTASSIUM SERPL-SCNC: 4.5 MMOL/L (ref 3.5–5.1)
RBC # BLD AUTO: 3.97 X10(6)UL
SODIUM SERPL-SCNC: 139 MMOL/L (ref 136–145)
SP GR UR STRIP: 1.01 (ref 1–1.03)
UROBILINOGEN UR STRIP-ACNC: 0.2
WBC # BLD AUTO: 6.4 X10(3) UL (ref 4–11)

## 2022-09-16 PROCEDURE — 81015 MICROSCOPIC EXAM OF URINE: CPT

## 2022-09-16 PROCEDURE — 80069 RENAL FUNCTION PANEL: CPT

## 2022-09-16 PROCEDURE — 36415 COLL VENOUS BLD VENIPUNCTURE: CPT

## 2022-09-16 PROCEDURE — 85025 COMPLETE CBC W/AUTO DIFF WBC: CPT

## 2022-09-16 PROCEDURE — 81001 URINALYSIS AUTO W/SCOPE: CPT

## 2022-09-19 ENCOUNTER — TELEPHONE (OUTPATIENT)
Dept: NEPHROLOGY | Facility: CLINIC | Age: 87
End: 2022-09-19

## 2022-09-20 ENCOUNTER — OFFICE VISIT (OUTPATIENT)
Dept: SURGERY | Facility: CLINIC | Age: 87
End: 2022-09-20

## 2022-09-20 ENCOUNTER — TELEPHONE (OUTPATIENT)
Dept: ENDOCRINOLOGY CLINIC | Facility: CLINIC | Age: 87
End: 2022-09-20

## 2022-09-20 DIAGNOSIS — M48.061 LUMBAR FORAMINAL STENOSIS: ICD-10-CM

## 2022-09-20 DIAGNOSIS — M51.9 LUMBAR DISC DISEASE: ICD-10-CM

## 2022-09-20 DIAGNOSIS — M54.16 LUMBAR RADICULOPATHY: Primary | ICD-10-CM

## 2022-09-20 PROCEDURE — 64483 NJX AA&/STRD TFRM EPI L/S 1: CPT | Performed by: PHYSICAL MEDICINE & REHABILITATION

## 2022-09-20 NOTE — PROCEDURES
[address]    BILATERAL LUMBAR TRANSFORAMINAL   NAME:  Jeanmarie Early    MR #:    [id number] :  1934     PHYSICIAN:  Akash Ramos MD        Operative Report    DATE OF PROCEDURE: 2022   PREOPERATIVE DIAGNOSES: 1. bilateral L5-S1 radiculopathies     2. L5-S1 left mod-severe/right severe, L4-5 bilateral mod-severe, L3-4 right mod-severe/left mod foraminal stenosis    3. L5-S1 bilateral mod-large far lateral, L4-5 right mod-large/left far lateral & mild-mod central, L3-4 right large foraminal & mod diffuse, L2-3 mild-mod diffuse, L1-2 mod diffuse bulging discs        POSTOPERATIVE DIAGNOSES:   1. bilateral L5-S1 radiculopathies     2. L5-S1 left mod-severe/right severe, L4-5 bilateral mod-severe, L3-4 right mod-severe/left mod foraminal stenosis    3. L5-S1 bilateral mod-large far lateral, L4-5 right mod-large/left far lateral & mild-mod central, L3-4 right large foraminal & mod diffuse, L2-3 mild-mod diffuse, L1-2 mod diffuse bulging discs        PROCEDURES: Bilateral L5 transforaminal epidural steroid injections done under fluoroscopic guidance with contrast enhancement. SURGEON: Akash Gage MD   ANESTHESIA: Local   INDICATIONS:      OPERATIVE PROCEDURE:  Written consent was obtained from the patient. The patient was brought into the operating room and placed in the prone position on the fluoroscopy table with pillow underneath her abdomen. The patient's skin was cleaned and draped in a normal sterile fashion. Using AP fluoroscopy, all five lumbar vertebrae were identified. When the fifth vertebra was identified, fluoroscopy was left anterior obliqued opening up the right L5-S1 intervertebral foramen. At this point in time, the patient's skin was anesthetized with 1% PF lidocaine without epinephrine. Then, a 3.5 inch, 22-gauge spinal needle was inserted and directed towards the right L5-S1 intervertebral foramen.   When it felt to be in good position, AP fluoroscopy was used to advance the needle to the 6 o'clock position on the right L5 pedicle. At this point in time, Omnipaque-240 contrast was used to obtain a good epidurogram indicating correct needle placement. Then, aspiration was performed. No blood, fluid, or air was aspirated. Then, the patient was injected with a 3 cc solution of 1.5 cc of 40 mg/cc of Kenalog and 1.5 cc of 1% PF lidocaine without epinephrine. After this, the needle was removed. Then  fluoroscopy was right anterior obliqued opening up the left L5-S1 intervertebral foramen. At this point in time, the patient's skin was anesthetized with 1 to 2 cc of 1% PF lidocaine without epinephrine. Then, a 3.5 inch, 22-gauge spinal needle was inserted and directed towards the left L5-S1 intervertebral foramen. When it felt to be in good position, AP fluoroscopy was used to advance the needle to the 6 o'clock position on the left L5 pedicle. At this point in time, Omnipaque-240 contrast was used to obtain a good epidurogram indicating correct needle placement. Then, aspiration was performed. No blood, fluid, or air was aspirated. Then, the patient was injected with a 3 cc solution of 1.5 cc of 40 mg/cc of Kenalog and 1.5 cc of 1% PF lidocaine without epinephrine. After this, the needle was removed. The patient's skin was cleaned. Band-Aids were applied. The patient was transferred to the cart and into [facility]. The patient was given discharge instructions and will follow up in the clinic as scheduled. Throughout the whole procedure, the patient's pulse oximetry and vital signs were monitored and they remained completely stable. Also, throughout the whole procedure, prior to injection of any medication, aspiration was performed. No blood, fluid, or air was aspirated at anytime.

## 2022-09-20 NOTE — TELEPHONE ENCOUNTER
Spoke to patients son and relayed Dr. Peter Knife message as shown below. Patients son verbalized understanding and had no further questions at this time.

## 2022-10-10 NOTE — PROGRESS NOTES
Several attempts made to reach patient with no return phone call. Patient completed TCM HFU on 5/10/18. Closing encounter.
TCM OUTREACH    Call made to attempt to pts son patient for TCM follow up. Son Amirah Barbosa states he was out of town during his fathers admission. Requested we contact pt directly on his cell [phone].  States his father has been feeling well, but does not ha
TCM OUTREACH    Call made to attempt to reach patient for TCM follow up. No answer, Voicemail left requesting call back at [phone].     Book by date: 5/12/18    (Triage Team if pt returns call please transfer to ext [phone])
TCM OUTREACH    Call made to attempt to reach patient for TCM follow up. No answer/then busy, unable to leave VM.      Book by date: 5/12/18    (Triage Team if pt returns call please transfer to ext [phone])
TCM OUTREACH    Call made to attempt to reach patients son (verbal release as listed in EMR)  for TCM follow up. No answer, Voicemail left requesting call back at [phone].     Book by date: 5/12/18    (Triage Team if pt returns call please transfer t
[id number]

## 2022-10-14 ENCOUNTER — PATIENT OUTREACH (OUTPATIENT)
Dept: CASE MANAGEMENT | Age: 87
End: 2022-10-14

## 2022-10-14 NOTE — PROGRESS NOTES
Left message for patient regarding Chronic Care Management program.     Liz [address] Management   [address]   Phone: [phone]. com

## 2022-10-17 ENCOUNTER — OFFICE VISIT (OUTPATIENT)
Dept: PODIATRY CLINIC | Facility: CLINIC | Age: 87
End: 2022-10-17
Payer: MEDICARE

## 2022-10-17 DIAGNOSIS — B35.1 ONYCHOMYCOSIS: ICD-10-CM

## 2022-10-17 DIAGNOSIS — E08.42 DIABETES MELLITUS DUE TO UNDERLYING CONDITION WITH DIABETIC POLYNEUROPATHY, UNSPECIFIED WHETHER LONG TERM INSULIN USE (HCC): Primary | ICD-10-CM

## 2022-10-21 RX ORDER — PANTOPRAZOLE SODIUM 40 MG/1
40 TABLET, DELAYED RELEASE ORAL
Qty: 90 TABLET | Refills: 1 | Status: SHIPPED | OUTPATIENT
Start: 2022-10-21

## 2022-10-21 NOTE — TELEPHONE ENCOUNTER
Refill passed per [place] [facility] protocol. Requested Prescriptions   Pending Prescriptions Disp Refills    PANTOPRAZOLE 40 MG Oral Tab EC [Pharmacy Med Name: PANTOPRAZOLE SODIUM DR TABS 40MG] 90 tablet 3     Sig: TAKE 1 TABLET BEFORE BREAKFAST        Gastrointestional Medication Protocol Passed - 10/20/2022 11:08 PM        Passed - In person appointment or virtual visit in the past 12 mos or appointment in next 3 mos       Recent Outpatient Visits              4 days ago Diabetes mellitus due to underlying condition with diabetic polyneuropathy, unspecified whether long term insulin use ([facility] [address]    [place] [facility], [address], [place], [place]    Office Visit    1 month ago bilateral L5-S1 radiculopathies     [facility] Lamberto Gage MD    Office Visit    1 month ago L5-S1 left mod-severe/right severe, L4-5 bilateral mod, L3-4 right mod foraminal stenosis    [address] [facility] Lamberto Gage MD    Office Visit    1 month ago L5-S1 left mod-severe/right severe, L4-5 bilateral mod, L3-4 right mod foraminal stenosis    [place] [facility], [address], Keo Stanley MD    Office Visit    2 months ago Controlled type 2 diabetes mellitus with complication, with long-term current use of insulin [facility])    [place] [facility], [address], Abdiel Springer MD    Office Visit     Future Appointments         Provider Department Appt Notes    In 2 weeks Ismael Reyes MD [facility], [address], [address]    In 1 month Shira LUNDBERG, [address] [facility], [address], [place] [address]     In 1 month Lamberto Mckoy  [facility] Return in about 3 months (around 12/13/2022    In 2 months Gloria Marquez MD [place] [facility], [address] [facility] 6 months    In 3 months Sahra Hackett, [address] [place] [address].  [address], [place] [address]     In 8 months Flo Anna Styles MD [facility], [address], [address] [place] [address],[address]                      Recent Outpatient Visits              4 days ago Diabetes mellitus due to underlying condition with diabetic polyneuropathy, unspecified whether long term insulin use [facility])    [facility], [address], [place], [place], [place]    Office Visit    1 month ago bilateral L5-S1 radiculopathies     [facility] Grzegorz Gage MD    Office Visit    1 month ago L5-S1 left mod-severe/right severe, L4-5 bilateral mod, L3-4 right mod foraminal stenosis    [address] [facility] Grzegorz Gage MD    Office Visit    1 month ago L5-S1 left mod-severe/right severe, L4-5 bilateral mod, L3-4 right mod foraminal stenosis    [facility], [address], Collin Lala MD    Office Visit    2 months ago Controlled type 2 diabetes mellitus with complication, with long-term current use of insulin [facility])    [facility], [address], Jovana Zayas MD    Office Visit            Future Appointments         Provider Department Appt Notes    In 2 weeks Vanessa Dumont MD [facility], [address], [address]    In 1 month Christianne Weaver MD [facility], [address], [place] [address]     In 1 month [address], Grzegorz Quevedo  [facility] Return in about 3 months (around 12/13/2022    In 2 months Chago Martell MD [facility], [address] 6 months    In 3 months [place], [place], [address] [facility].  [address], [place] [address]     In 8 months Dari Bee MD [facility], [address], [facility]

## 2022-11-08 ENCOUNTER — OFFICE VISIT (OUTPATIENT)
Dept: FAMILY MEDICINE CLINIC | Facility: CLINIC | Age: 87
End: 2022-11-08
Payer: MEDICARE

## 2022-11-08 VITALS
TEMPERATURE: 98 F | SYSTOLIC BLOOD PRESSURE: 126 MMHG | HEART RATE: 69 BPM | BODY MASS INDEX: 24.67 KG/M2 | DIASTOLIC BLOOD PRESSURE: 68 MMHG | WEIGHT: 157.19 LBS | HEIGHT: 67 IN

## 2022-11-08 DIAGNOSIS — M43.26 FUSION OF LUMBAR SPINE: ICD-10-CM

## 2022-11-08 DIAGNOSIS — M51.9 LUMBAR DISC DISEASE: ICD-10-CM

## 2022-11-08 DIAGNOSIS — I50.22 CHRONIC SYSTOLIC CONGESTIVE HEART FAILURE (HCC): ICD-10-CM

## 2022-11-08 DIAGNOSIS — N18.4 CHRONIC RENAL DISEASE, STAGE IV (HCC): ICD-10-CM

## 2022-11-08 DIAGNOSIS — E11.65 TYPE 2 DIABETES MELLITUS WITH HYPERGLYCEMIA, WITH LONG-TERM CURRENT USE OF INSULIN (HCC): ICD-10-CM

## 2022-11-08 DIAGNOSIS — Z79.4 TYPE 2 DIABETES MELLITUS WITH HYPERGLYCEMIA, WITH LONG-TERM CURRENT USE OF INSULIN (HCC): ICD-10-CM

## 2022-11-08 DIAGNOSIS — E78.2 MIXED HYPERLIPIDEMIA: ICD-10-CM

## 2022-11-08 DIAGNOSIS — J42 CHRONIC BRONCHITIS, UNSPECIFIED CHRONIC BRONCHITIS TYPE (HCC): ICD-10-CM

## 2022-11-08 DIAGNOSIS — M54.16 LUMBAR RADICULOPATHY: ICD-10-CM

## 2022-11-08 DIAGNOSIS — M48.061 SPINAL STENOSIS OF LUMBAR REGION WITHOUT NEUROGENIC CLAUDICATION: ICD-10-CM

## 2022-11-08 DIAGNOSIS — M48.061 LUMBAR FORAMINAL STENOSIS: ICD-10-CM

## 2022-11-08 DIAGNOSIS — Z00.00 ENCOUNTER FOR ANNUAL HEALTH EXAMINATION: ICD-10-CM

## 2022-11-08 DIAGNOSIS — Z95.0 S/P PLACEMENT OF CARDIAC PACEMAKER: Primary | ICD-10-CM

## 2022-11-08 DIAGNOSIS — I44.2 AV BLOCK, 3RD DEGREE (HCC): ICD-10-CM

## 2022-11-08 RX ORDER — LEVOCETIRIZINE DIHYDROCHLORIDE 5 MG/1
5 TABLET, FILM COATED ORAL EVERY EVENING
Qty: 90 TABLET | Refills: 1 | Status: SHIPPED | OUTPATIENT
Start: 2022-11-08

## 2022-11-08 RX ORDER — FLUTICASONE PROPIONATE 50 MCG
2 SPRAY, SUSPENSION (ML) NASAL DAILY
Qty: 16 G | Refills: 4 | Status: SHIPPED | OUTPATIENT
Start: 2022-11-08 | End: 2023-11-03

## 2022-11-08 RX ORDER — LEVOCETIRIZINE DIHYDROCHLORIDE 5 MG/1
5 TABLET, FILM COATED ORAL EVERY EVENING
Qty: 30 TABLET | Refills: 0 | Status: SHIPPED | OUTPATIENT
Start: 2022-11-08 | End: 2022-11-08

## 2022-11-08 NOTE — TELEPHONE ENCOUNTER
Refill passed per [place] [facility] protocol. Requested Prescriptions   Pending Prescriptions Disp Refills    LEVOCETIRIZINE 5 MG Oral Tab [Pharmacy Med Name: LEVOCETIRIZINE 5MG TABLETS] 90 tablet 0     Sig: TAKE 1 TABLET(5 MG) BY MOUTH EVERY EVENING       Allergy Medication Protocol Passed - 11/8/2022  1:29 PM        Passed - In person appointment or virtual visit in the past 12 mos or appointment in next 3 mos     Recent Outpatient Visits              Today S/P placement of cardiac pacemaker    [facility], [address], Zana Martyr, Camillia Aase, MD    Office Visit    3 weeks ago Diabetes mellitus due to underlying condition with diabetic polyneuropathy, unspecified whether long term insulin use [facility])    [place] [facility], [place] Louretta E[place]    Office Visit    1 month ago bilateral L5-S1 radiculopathies     [facility] Felicita Gage MD    Office Visit    1 month ago L5-S1 left mod-severe/right severe, L4-5 bilateral mod, L3-4 right mod foraminal stenosis    [address] [facility] Felicita Gage MD    Office Visit    2 months ago L5-S1 left mod-severe/right severe, L4-5 bilateral mod, L3-4 right mod foraminal stenosis    [facility], [address], Zana Martyr, Camillia Aase, MD    Office Visit          Future Appointments         Provider Department Appt Notes    In 1 month Arlette Hollis MD [facility], [address] [address]     In 1 month Felicita Mauro  [facility] in about 3 months (around 12/13/2022    In 1 month Laquita Hatfield MD [facility], [address] 6 months    In 2 months Ethel Hackett, [address] [place] [address].  [address], [place] [address]     In 6 months Ladarius Selby MD [facility], [place] follow up    In 8 months Stephen Marquez MD [facility], [address], [address] [facility],[address] Future Appointments         Provider Department Appt Notes    In 1 month Nirav Bedolla MD [place] [employer] [facility], [address], [place] [address]     In 1 month Jacquie Gage  [facility] Return in about 3 months (around 12/13/2022    In 1 month Milton Beard MD [place] [employer] [facility], [address] [facility] 6 months    In 2 months Chioma Brown, [address] [place] [address].  [address], [place] [address]     In 6 months Dee Carr MD [place] [employer] [facility], [address], [place] follow up    In 8 months Madie Rossi MD [facility], [place], [facility]             Recent Outpatient Visits              Today S/P placement of cardiac pacemaker    [place] [employer], [address], Juan Diego Mera MD    Office Visit    3 weeks ago Diabetes mellitus due to underlying condition with diabetic polyneuropathy, unspecified whether long term insulin use [facility])    [place] [employer], [address], Valerie [place]    Office Visit    1 month ago bilateral L5-S1 radiculopathies     [facility] Jacquie Gage MD    Office Visit    1 month ago L5-S1 left mod-severe/right severe, L4-5 bilateral mod, L3-4 right mod foraminal stenosis    [address] [facility] Sachi Sierra MD    Office Visit    2 months ago L5-S1 left mod-severe/right severe, L4-5 bilateral mod, L3-4 right mod foraminal stenosis    [address] MD    Office Visit

## 2022-11-21 RX ORDER — SIMVASTATIN 40 MG
40 TABLET ORAL DAILY
Qty: 90 TABLET | Refills: 1 | Status: SHIPPED | OUTPATIENT
Start: 2022-11-21

## 2022-12-14 ENCOUNTER — OFFICE VISIT (OUTPATIENT)
Dept: ENDOCRINOLOGY CLINIC | Facility: CLINIC | Age: 87
End: 2022-12-14
Payer: MEDICARE

## 2022-12-14 VITALS
WEIGHT: 159 LBS | DIASTOLIC BLOOD PRESSURE: 76 MMHG | HEART RATE: 77 BPM | BODY MASS INDEX: 25 KG/M2 | SYSTOLIC BLOOD PRESSURE: 152 MMHG

## 2022-12-14 DIAGNOSIS — Z79.4 CONTROLLED TYPE 2 DIABETES MELLITUS WITH COMPLICATION, WITH LONG-TERM CURRENT USE OF INSULIN (HCC): Primary | ICD-10-CM

## 2022-12-14 DIAGNOSIS — E11.8 CONTROLLED TYPE 2 DIABETES MELLITUS WITH COMPLICATION, WITH LONG-TERM CURRENT USE OF INSULIN (HCC): Primary | ICD-10-CM

## 2022-12-14 LAB
CARTRIDGE LOT#: ABNORMAL NUMERIC
GLUCOSE BLOOD: 290
HEMOGLOBIN A1C: 8.4 % (ref 4.3–5.6)
TEST STRIP LOT #: NORMAL NUMERIC

## 2022-12-14 PROCEDURE — 83036 HEMOGLOBIN GLYCOSYLATED A1C: CPT | Performed by: INTERNAL MEDICINE

## 2022-12-14 PROCEDURE — 99214 OFFICE O/P EST MOD 30 MIN: CPT | Performed by: INTERNAL MEDICINE

## 2022-12-14 PROCEDURE — 82947 ASSAY GLUCOSE BLOOD QUANT: CPT | Performed by: INTERNAL MEDICINE

## 2022-12-14 PROCEDURE — 1126F AMNT PAIN NOTED NONE PRSNT: CPT | Performed by: INTERNAL MEDICINE

## 2022-12-14 RX ORDER — BLOOD SUGAR DIAGNOSTIC
STRIP MISCELLANEOUS
Qty: 200 STRIP | Refills: 3 | Status: SHIPPED | OUTPATIENT
Start: 2022-12-14

## 2022-12-14 RX ORDER — GLIMEPIRIDE 2 MG/1
2 TABLET ORAL
Qty: 90 TABLET | Refills: 1 | Status: SHIPPED | OUTPATIENT
Start: 2022-12-14

## 2022-12-14 NOTE — PATIENT INSTRUCTIONS
CONTINUE Trulicity     Lantus 16 units subcutaneous bedtime if blood glucose above 250    IF blood glucose 200-250 Lantus 8 units     Glimepiride 2mg daily

## 2022-12-15 ENCOUNTER — OFFICE VISIT (OUTPATIENT)
Dept: PHYSICAL MEDICINE AND REHAB | Facility: CLINIC | Age: 87
End: 2022-12-15
Payer: MEDICARE

## 2022-12-15 VITALS
BODY MASS INDEX: 25 KG/M2 | OXYGEN SATURATION: 98 % | SYSTOLIC BLOOD PRESSURE: 130 MMHG | DIASTOLIC BLOOD PRESSURE: 76 MMHG | WEIGHT: 159 LBS | HEART RATE: 62 BPM

## 2022-12-15 DIAGNOSIS — Z95.0 S/P PLACEMENT OF CARDIAC PACEMAKER: ICD-10-CM

## 2022-12-15 DIAGNOSIS — M54.16 LUMBAR RADICULOPATHY: Primary | ICD-10-CM

## 2022-12-15 DIAGNOSIS — E11.65 TYPE 2 DIABETES MELLITUS WITH HYPERGLYCEMIA, WITH LONG-TERM CURRENT USE OF INSULIN (HCC): ICD-10-CM

## 2022-12-15 DIAGNOSIS — N18.4 CHRONIC RENAL DISEASE, STAGE IV (HCC): ICD-10-CM

## 2022-12-15 DIAGNOSIS — J42 CHRONIC BRONCHITIS, UNSPECIFIED CHRONIC BRONCHITIS TYPE (HCC): ICD-10-CM

## 2022-12-15 DIAGNOSIS — M48.061 SPINAL STENOSIS OF LUMBAR REGION WITHOUT NEUROGENIC CLAUDICATION: ICD-10-CM

## 2022-12-15 DIAGNOSIS — M48.061 LUMBAR FORAMINAL STENOSIS: ICD-10-CM

## 2022-12-15 DIAGNOSIS — Z79.4 TYPE 2 DIABETES MELLITUS WITH HYPERGLYCEMIA, WITH LONG-TERM CURRENT USE OF INSULIN (HCC): ICD-10-CM

## 2022-12-15 DIAGNOSIS — M43.26 FUSION OF LUMBAR SPINE: ICD-10-CM

## 2022-12-15 DIAGNOSIS — M51.9 LUMBAR DISC DISEASE: ICD-10-CM

## 2022-12-15 PROCEDURE — 99214 OFFICE O/P EST MOD 30 MIN: CPT | Performed by: PHYSICAL MEDICINE & REHABILITATION

## 2022-12-15 PROCEDURE — 1125F AMNT PAIN NOTED PAIN PRSNT: CPT | Performed by: PHYSICAL MEDICINE & REHABILITATION

## 2022-12-15 RX ORDER — CARVEDILOL 6.25 MG/1
TABLET ORAL
COMMUNITY
Start: 2022-09-13

## 2022-12-15 NOTE — PATIENT INSTRUCTIONS
Plan  He will get into the PT for the lumbar spine. I will hold on doing the repeat bilateral L5 TFESI's for now. He will follow up in 3 months or sooner if needed. If the PT is not helping the cramping in the calves, then I will have him trial gabapentin.

## 2023-01-04 ENCOUNTER — OFFICE VISIT (OUTPATIENT)
Dept: PHYSICAL THERAPY | Age: 88
End: 2023-01-04
Attending: PHYSICAL MEDICINE & REHABILITATION
Payer: MEDICARE

## 2023-01-04 DIAGNOSIS — M43.26 FUSION OF LUMBAR SPINE: ICD-10-CM

## 2023-01-04 DIAGNOSIS — M54.16 LUMBAR RADICULOPATHY: ICD-10-CM

## 2023-01-04 DIAGNOSIS — N18.4 CHRONIC RENAL DISEASE, STAGE IV (HCC): ICD-10-CM

## 2023-01-04 DIAGNOSIS — M48.061 LUMBAR FORAMINAL STENOSIS: ICD-10-CM

## 2023-01-04 DIAGNOSIS — Z95.0 S/P PLACEMENT OF CARDIAC PACEMAKER: ICD-10-CM

## 2023-01-04 DIAGNOSIS — M48.061 SPINAL STENOSIS OF LUMBAR REGION WITHOUT NEUROGENIC CLAUDICATION: ICD-10-CM

## 2023-01-04 DIAGNOSIS — M51.9 LUMBAR DISC DISEASE: ICD-10-CM

## 2023-01-04 DIAGNOSIS — J42 CHRONIC BRONCHITIS, UNSPECIFIED CHRONIC BRONCHITIS TYPE (HCC): ICD-10-CM

## 2023-01-04 DIAGNOSIS — Z79.4 TYPE 2 DIABETES MELLITUS WITH HYPERGLYCEMIA, WITH LONG-TERM CURRENT USE OF INSULIN (HCC): ICD-10-CM

## 2023-01-04 DIAGNOSIS — E11.65 TYPE 2 DIABETES MELLITUS WITH HYPERGLYCEMIA, WITH LONG-TERM CURRENT USE OF INSULIN (HCC): ICD-10-CM

## 2023-01-04 PROCEDURE — 97161 PT EVAL LOW COMPLEX 20 MIN: CPT | Performed by: PHYSICAL THERAPIST

## 2023-01-04 PROCEDURE — 97110 THERAPEUTIC EXERCISES: CPT | Performed by: PHYSICAL THERAPIST

## 2023-01-06 ENCOUNTER — LAB ENCOUNTER (OUTPATIENT)
Dept: LAB | Age: 88
End: 2023-01-06
Attending: INTERNAL MEDICINE
Payer: MEDICARE

## 2023-01-06 ENCOUNTER — TELEPHONE (OUTPATIENT)
Dept: NEPHROLOGY | Facility: CLINIC | Age: 88
End: 2023-01-06

## 2023-01-06 ENCOUNTER — OFFICE VISIT (OUTPATIENT)
Dept: NEPHROLOGY | Facility: CLINIC | Age: 88
End: 2023-01-06
Payer: MEDICARE

## 2023-01-06 VITALS
WEIGHT: 160 LBS | DIASTOLIC BLOOD PRESSURE: 77 MMHG | SYSTOLIC BLOOD PRESSURE: 155 MMHG | HEART RATE: 60 BPM | BODY MASS INDEX: 25.11 KG/M2 | HEIGHT: 67 IN

## 2023-01-06 DIAGNOSIS — N18.4 CKD (CHRONIC KIDNEY DISEASE) STAGE 4, GFR 15-29 ML/MIN (HCC): ICD-10-CM

## 2023-01-06 DIAGNOSIS — N18.4 CKD (CHRONIC KIDNEY DISEASE) STAGE 4, GFR 15-29 ML/MIN (HCC): Primary | ICD-10-CM

## 2023-01-06 LAB
ALBUMIN SERPL-MCNC: 3.4 G/DL (ref 3.4–5)
ANION GAP SERPL CALC-SCNC: 4 MMOL/L (ref 0–18)
BASOPHILS # BLD AUTO: 0.03 X10(3) UL (ref 0–0.2)
BASOPHILS NFR BLD AUTO: 0.4 %
BUN BLD-MCNC: 46 MG/DL (ref 7–18)
BUN/CREAT SERPL: 15.6 (ref 10–20)
CALCIUM BLD-MCNC: 8.9 MG/DL (ref 8.5–10.1)
CHLORIDE SERPL-SCNC: 108 MMOL/L (ref 98–112)
CO2 SERPL-SCNC: 27 MMOL/L (ref 21–32)
CREAT BLD-MCNC: 2.94 MG/DL
DEPRECATED RDW RBC AUTO: 46.2 FL (ref 35.1–46.3)
EOSINOPHIL # BLD AUTO: 0.19 X10(3) UL (ref 0–0.7)
EOSINOPHIL NFR BLD AUTO: 2.6 %
ERYTHROCYTE [DISTWIDTH] IN BLOOD BY AUTOMATED COUNT: 13.4 % (ref 11–15)
GFR SERPLBLD BASED ON 1.73 SQ M-ARVRAT: 20 ML/MIN/1.73M2 (ref 60–?)
GLUCOSE BLD-MCNC: 118 MG/DL (ref 70–99)
HCT VFR BLD AUTO: 37.2 %
HGB BLD-MCNC: 12 G/DL
IMM GRANULOCYTES # BLD AUTO: 0.02 X10(3) UL (ref 0–1)
IMM GRANULOCYTES NFR BLD: 0.3 %
LYMPHOCYTES # BLD AUTO: 1.79 X10(3) UL (ref 1–4)
LYMPHOCYTES NFR BLD AUTO: 24.2 %
MCH RBC QN AUTO: 30.2 PG (ref 26–34)
MCHC RBC AUTO-ENTMCNC: 32.3 G/DL (ref 31–37)
MCV RBC AUTO: 93.7 FL
MONOCYTES # BLD AUTO: 0.71 X10(3) UL (ref 0.1–1)
MONOCYTES NFR BLD AUTO: 9.6 %
NEUTROPHILS # BLD AUTO: 4.66 X10 (3) UL (ref 1.5–7.7)
NEUTROPHILS # BLD AUTO: 4.66 X10(3) UL (ref 1.5–7.7)
NEUTROPHILS NFR BLD AUTO: 62.9 %
OSMOLALITY SERPL CALC.SUM OF ELEC: 301 MOSM/KG (ref 275–295)
PHOSPHATE SERPL-MCNC: 3.5 MG/DL (ref 2.5–4.9)
PLATELET # BLD AUTO: 203 10(3)UL (ref 150–450)
POTASSIUM SERPL-SCNC: 4.8 MMOL/L (ref 3.5–5.1)
RBC # BLD AUTO: 3.97 X10(6)UL
SODIUM SERPL-SCNC: 139 MMOL/L (ref 136–145)
WBC # BLD AUTO: 7.4 X10(3) UL (ref 4–11)

## 2023-01-06 PROCEDURE — 80069 RENAL FUNCTION PANEL: CPT

## 2023-01-06 PROCEDURE — 36415 COLL VENOUS BLD VENIPUNCTURE: CPT

## 2023-01-06 PROCEDURE — 85025 COMPLETE CBC W/AUTO DIFF WBC: CPT

## 2023-01-06 PROCEDURE — 99214 OFFICE O/P EST MOD 30 MIN: CPT | Performed by: INTERNAL MEDICINE

## 2023-01-06 NOTE — TELEPHONE ENCOUNTER
Kidney function a little worse than it was 3 months ago but similar to what it was 4 months ago. Make sure he is drinking plenty of fluids. Repeat labs in 1 month to ensure stability.

## 2023-01-07 NOTE — PROGRESS NOTES
01/06/23        Patient: Kavitha Wong   YOB: 1934   Date of Visit: 1/6/2023       Dear  Dr. Bassem Mills MD,      Thank you for referring Kavitha Wong to my practice. Please find my assessment and plan below. As you know he is an 77-year-old male with a history of adult onset diabetes mellitus, hypertension, whitecoat syndrome, congestive heart failure secondary to diastolic dysfunction who I now had the pleasure of seeing for follow-up of chronic kidney disease stage IV and an anemia of chronic disease. The patient is here with his son. Overall states he has been doing well without any chest pain, shortness of breath, GI or urinary tract symptoms. Appetite remains good. Energy fair. He has however been having problems with low back pain. Had an epidural injection which did seem to help and is now undergoing physical therapy. Knows to avoid nonsteroidals. They brought in his blood pressure readings and they appear to be under good control at home. Systolics usually in the 583Z with diastolics in the 75H and heart rates in the 60s. Physical exam his blood pressure 155/77 with a pulse of 60 and he weighed under 60 pounds. Neck was supple without JVD. Lungs were clear. Heart revealed a regular rate and rhythm with an S4 but no gallops, murmurs or rubs. Abdomen was soft, flat, nontender without organomegaly, masses or bruits. Extremities revealed no edema. I reviewed his most recent laboratory studies which were just done today. His creatinine was 2.74 in September 2022 and is now crept up to 2.94 with an estimated GFR 20 cc/min. Electrolytes were good. Hemoglobin 12.0. I therefore informed the patient and son that there has been some mild deterioration in his renal function. He will try to drink a bit more fluids. They know to avoid any nonsteroidals. Blood pressure readings appear to be good at home. Last A1c was 8.4.   We will have him  repeat a CBC and renal panel in 1 month to ensure stability. If stable will continue quarterly. I will see him again in 6 months for follow-up or sooner if clinically indicated. Thank you again for allowing me to participate in the care of your patient. If you have any questions please feel free to call.            Sincerely,   Watson Davies MD   [address]  [address] [place] [address]  [address] [facility] [address]    Document electronically generated by:  Watson Davies MD

## 2023-01-10 ENCOUNTER — OFFICE VISIT (OUTPATIENT)
Dept: PHYSICAL THERAPY | Age: 88
End: 2023-01-10
Attending: PHYSICAL MEDICINE & REHABILITATION
Payer: MEDICARE

## 2023-01-10 PROCEDURE — 97110 THERAPEUTIC EXERCISES: CPT | Performed by: PHYSICAL THERAPIST

## 2023-01-10 NOTE — PROGRESS NOTES
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 States he has been doing his exercises at home. Pt describes pain level 3/10. Objective:  Trunk ROM is moderately limited into all planes. Assessment: Patient did well with progression of therapeutic exercises. Patient and PT goals are in progress.       Plan: Continue PT.     1/10/2023  Visit#: 2/Medicare   Thera Ex   x40min  - standing trunk extensions 10x  - supine single knee to chest 10x  - supine trunk rotation stretch 10x  - supine LE on swiss ball hip and knee flexion 10 x 2  - supine alternate UE raise with 1lb wt, LE up on swiss ball  - calf stretch on slant board 30sec x 3  - B heel raise 20x  - R/L knee flexion stretch on stairs 20 x   - forward step up on 6 inch stairs 10 x 2  - B shoulder rows, extension with green sportcord 20x  - B shoulder extension with green sportcord 20x  - Shuttle B LE extensions 5 bands 10 x 2  - Shuttle single LE extensions 4 bands 10 x 2  - Nustep L4 x 10min                     Charges: EX3       Total Timed Treatment: 40 min  Total Treatment Time: 40 min

## 2023-01-17 ENCOUNTER — OFFICE VISIT (OUTPATIENT)
Dept: PHYSICAL THERAPY | Age: 88
End: 2023-01-17
Attending: PHYSICAL MEDICINE & REHABILITATION
Payer: MEDICARE

## 2023-01-17 PROCEDURE — 97110 THERAPEUTIC EXERCISES: CPT | Performed by: PHYSICAL THERAPIST

## 2023-01-17 NOTE — PROGRESS NOTES
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 States he has been doing his exercises at home. Pt describes pain level 3/10. Objective:  Trunk ROM is moderately limited into all planes. Assessment: Patient did well with progression of therapeutic exercises. Patient and PT goals are in progress.       Plan: Continue PT.     1/17/2023  Visit#: 3/Medicare 1/10/2023  Visit#: 2/Medicare   Thera Ex   x40min  - supine single knee to chest 10x  - supine trunk rotation stretch 10x  - supine LE on swiss ball hip and knee flexion 10 x 2  - supine alternate UE raise with 1lb wt, LE up on swiss ball  - standing trunk extension 10x  - calf stretch on slant board 30sec x 3  - B heel raise 20x  - R/L knee flexion stretch on stairs 20 x   - forward step up on 6 inch stairs 10 x 2  - B shoulder rows, extension with green sportcord 20x  - B shoulder extension with green sportcord 20x  - Shuttle B LE extensions 6 bands 10 x 2  - Shuttle single LE extensions 4 bands 10 x 2  - Nustep L4 x 10min   x40min  - standing trunk extensions 10x  - supine single knee to chest 10x  - supine trunk rotation stretch 10x  - supine LE on swiss ball hip and knee flexion 10 x 2  - supine alternate UE raise with 1lb wt, LE up on swiss ball  - calf stretch on slant board 30sec x 3  - B heel raise 20x  - R/L knee flexion stretch on stairs 20 x   - forward step up on 6 inch stairs 10 x 2  - B shoulder rows, extension with green sportcord 20x  - B shoulder extension with green sportcord 20x  - Shuttle B LE extensions 5 bands 10 x 2  - Shuttle single LE extensions 4 bands 10 x 2  - Nustep L4 x 10min                       Charges: EX3       Total Timed Treatment: 40 min  Total Treatment Time: 40 min

## 2023-01-18 ENCOUNTER — PATIENT MESSAGE (OUTPATIENT)
Dept: ENDOCRINOLOGY CLINIC | Facility: CLINIC | Age: 88
End: 2023-01-18

## 2023-01-18 NOTE — TELEPHONE ENCOUNTER
From: El Kaminski  To: Iftikhar Black MD  Sent: 1/18/2023 6:21 AM CST  Subject: [facility]    Dr. Chai Liu,  My father's enrollment period ends 12/31/2022 with [facility] to receive Trulicity. To continue to receive Trulicity through [facility], the patient must reapply and meet the program eligibility requirements. An application must be resubmitted and completed by the healthcare provider. I am sure you are more familiar with process. Here is some information that the [employer] has on file. I hope this helps. Patient: Melia Pittman. Charito Chris, 09/05/1934, [id number]  Order#: [id number]  Rx #: [id number]  Drug: Trulicity 2.80/8.5 INJ PEN (2ML. PK)    Please advise. ...  Ramesh

## 2023-01-19 ENCOUNTER — PATIENT MESSAGE (OUTPATIENT)
Dept: ENDOCRINOLOGY CLINIC | Facility: CLINIC | Age: 88
End: 2023-01-19

## 2023-01-19 ENCOUNTER — APPOINTMENT (OUTPATIENT)
Dept: PHYSICAL THERAPY | Age: 88
End: 2023-01-19
Attending: PHYSICAL MEDICINE & REHABILITATION
Payer: MEDICARE

## 2023-01-23 ENCOUNTER — OFFICE VISIT (OUTPATIENT)
Dept: PHYSICAL THERAPY | Age: 88
End: 2023-01-23
Attending: PHYSICAL MEDICINE & REHABILITATION
Payer: MEDICARE

## 2023-01-23 ENCOUNTER — OFFICE VISIT (OUTPATIENT)
Dept: PODIATRY CLINIC | Facility: CLINIC | Age: 88
End: 2023-01-23

## 2023-01-23 DIAGNOSIS — E11.65 TYPE 2 DIABETES MELLITUS WITH HYPERGLYCEMIA, WITH LONG-TERM CURRENT USE OF INSULIN (HCC): ICD-10-CM

## 2023-01-23 DIAGNOSIS — B35.1 ONYCHOMYCOSIS: Primary | ICD-10-CM

## 2023-01-23 DIAGNOSIS — Z79.4 TYPE 2 DIABETES MELLITUS WITH HYPERGLYCEMIA, WITH LONG-TERM CURRENT USE OF INSULIN (HCC): ICD-10-CM

## 2023-01-23 PROCEDURE — 99213 OFFICE O/P EST LOW 20 MIN: CPT | Performed by: STUDENT IN AN ORGANIZED HEALTH CARE EDUCATION/TRAINING PROGRAM

## 2023-01-23 PROCEDURE — 97110 THERAPEUTIC EXERCISES: CPT | Performed by: PHYSICAL THERAPIST

## 2023-01-23 PROCEDURE — 1126F AMNT PAIN NOTED NONE PRSNT: CPT | Performed by: STUDENT IN AN ORGANIZED HEALTH CARE EDUCATION/TRAINING PROGRAM

## 2023-01-23 NOTE — PROGRESS NOTES
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 Objective:  Trunk ROM is moderately limited into all planes. B LE ROM is WFL into all planes. Assessment: Patient continues to do well with progression of therapeutic exercises. Presents with residual back pain. Patient and PT goals are in progress. Plan: Continue PT. Progress therapeutic exercises to improve mobility.      1/23/2023  Visit#: 4/Medicare 1/17/2023  Visit#: 3/Medicare 1/10/2023  Visit#: 2/Medicare   Thera Ex   x40min  - supine single knee to chest 10x  - supine trunk rotation stretch 10x  - supine SLR 10x  - supine LE on swiss ball hip and knee flexion 10 x 2  - supine alternate UE raise with 1lb wt, LE up on swiss ball  - supine swiss ball bridging 10 x 5 sec hold  - standing trunk extension 10x  - calf stretch on slant board 30sec x 3  - B heel raise 20x  - R/L knee flexion stretch on stairs 20 x   - forward step up on 6 inch stairs 10 x 2  - Shuttle B LE extensions 5 - 6 bands 10 x 2  - Shuttle single LE extensions 4 bands 10 x 2  - B shoulder rows, extension with green sportcord 20x  - B shoulder extension with green sportcord 20x  - repeat supine knee to chest 10x  - Nustep L4 x 10min   x40min  - supine single knee to chest 10x  - supine trunk rotation stretch 10x  - supine LE on swiss ball hip and knee flexion 10 x 2  - supine alternate UE raise with 1lb wt, LE up on swiss ball  - standing trunk extension 10x  - calf stretch on slant board 30sec x 3  - B heel raise 20x  - R/L knee flexion stretch on stairs 20 x   - forward step up on 6 inch stairs 10 x 2  - B shoulder rows, extension with green sportcord 20x  - B shoulder extension with green sportcord 20x  - Shuttle B LE extensions 6 bands 10 x 2  - Shuttle single LE extensions 4 bands 10 x 2  - Nustep L4 x 10min   x40min  - standing trunk extensions 10x  - supine single knee to chest 10x  - supine trunk rotation stretch 10x  - supine LE on swiss ball hip and knee flexion 10 x 2  - supine alternate UE raise with 1lb wt, LE up on swiss ball  - calf stretch on slant board 30sec x 3  - B heel raise 20x  - R/L knee flexion stretch on stairs 20 x   - forward step up on 6 inch stairs 10 x 2  - B shoulder rows, extension with green sportcord 20x  - B shoulder extension with green sportcord 20x  - Shuttle B LE extensions 5 bands 10 x 2  - Shuttle single LE extensions 4 bands 10 x 2  - Nustep L4 x 10min                         Charges: EX3       Total Timed Treatment: 40 min  Total Treatment Time: 45 min

## 2023-01-25 ENCOUNTER — OFFICE VISIT (OUTPATIENT)
Dept: PHYSICAL THERAPY | Age: 88
End: 2023-01-25
Attending: PHYSICAL MEDICINE & REHABILITATION
Payer: MEDICARE

## 2023-01-25 PROCEDURE — 97110 THERAPEUTIC EXERCISES: CPT | Performed by: PHYSICAL THERAPIST

## 2023-01-25 NOTE — PROGRESS NOTES
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 Pt describes pain level 0/10. Objective:  Trunk ROM is moderately limited into all planes. B LE ROM is WFL into all planes. Assessment: Reports no back pain today and presents with improved trunk mobility and tolerance to activity. Still with residual limitations in trunk ROM. Patient and PT goals are in progress. Plan: Continue PT. Progress therapeutic exercises to improve mobility.      1/25/2023  Visit#: 5/Medicare 1/23/2023  Visit#: 4/Medicare 1/17/2023  Visit#: 3/Medicare 1/10/2023  Visit#: 2/Medicare   Thera Ex   x40min  - standing trunk extensions 10x  - supine single knee to chest 10x  - supine trunk rotation stretch 10x  - supine SLR 10 x 2  - supine LE on swiss ball hip and knee flexion 10 x 2  - supine bridging on swiss ball 10 x 5 sec hold  - supine alternate UE raise with 1lb wt, LE up on swiss ball 10 x 2  - seated LE extension 10 x 5 sec hold  - LE abduction, extension in standing while holding swiss ball 10 x 3  - B LE heel raise on slant board 10 x 2  - R/L knee flexion/extension on stairs 20x  - forward step up on 6 inch step 10 x 2  - Shuttle B LE extensions 5 bands 10 x 3  - Shuttle single LE extensions 4 bands 10 x 3  - B shoulder rows, extension with green sportcord 20x  - B shoulder extension with green sportcord 20x  - Nustep L5 x 10min     x40min  - supine single knee to chest 10x  - supine trunk rotation stretch 10x  - supine SLR 10x  - supine LE on swiss ball hip and knee flexion 10 x 2  - supine alternate UE raise with 1lb wt, LE up on swiss ball  - supine swiss ball bridging 10 x 5 sec hold  - standing trunk extension 10x  - calf stretch on slant board 30sec x 3  - B heel raise 20x  - R/L knee flexion stretch on stairs 20 x   - forward step up on 6 inch stairs 10 x 2  - Shuttle B LE extensions 5 - 6 bands 10 x 2  - Shuttle single LE extensions 4 bands 10 x 2  - B shoulder rows, extension with green sportcord 20x  - B shoulder extension with green sportcord 20x  - repeat supine knee to chest 10x  - Nustep L4 x 10min   x40min  - supine single knee to chest 10x  - supine trunk rotation stretch 10x  - supine LE on swiss ball hip and knee flexion 10 x 2  - supine alternate UE raise with 1lb wt, LE up on swiss ball  - standing trunk extension 10x  - calf stretch on slant board 30sec x 3  - B heel raise 20x  - R/L knee flexion stretch on stairs 20 x   - forward step up on 6 inch stairs 10 x 2  - B shoulder rows, extension with green sportcord 20x  - B shoulder extension with green sportcord 20x  - Shuttle B LE extensions 6 bands 10 x 2  - Shuttle single LE extensions 4 bands 10 x 2  - Nustep L4 x 10min   x40min  - standing trunk extensions 10x  - supine single knee to chest 10x  - supine trunk rotation stretch 10x  - supine LE on swiss ball hip and knee flexion 10 x 2  - supine alternate UE raise with 1lb wt, LE up on swiss ball  - calf stretch on slant board 30sec x 3  - B heel raise 20x  - R/L knee flexion stretch on stairs 20 x   - forward step up on 6 inch stairs 10 x 2  - B shoulder rows, extension with green sportcord 20x  - B shoulder extension with green sportcord 20x  - Shuttle B LE extensions 5 bands 10 x 2  - Shuttle single LE extensions 4 bands 10 x 2  - Nustep L4 x 10min                           Charges: EX3      Total Timed Treatment: 40 min  Total Treatment Time: 40 min

## 2023-01-30 ENCOUNTER — OFFICE VISIT (OUTPATIENT)
Dept: PHYSICAL THERAPY | Age: 88
End: 2023-01-30
Attending: PHYSICAL MEDICINE & REHABILITATION
Payer: MEDICARE

## 2023-01-30 PROCEDURE — 97110 THERAPEUTIC EXERCISES: CPT | Performed by: PHYSICAL THERAPIST

## 2023-01-30 NOTE — TELEPHONE ENCOUNTER
Dr. Beata Montalvo,     Patient will be dropping off forms to [place]. Please sign on Wednesday when on site. Thank you.     [employer] message sent, awaiting patient's response

## 2023-01-30 NOTE — PROGRESS NOTES
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 Complains of residual back pain. Pt describes pain level 3/10. Objective:  Trunk ROM is minimally limited into all planes. B LE ROM is WFL into all planes. Trunk strength grossly 4/5. Assessment: Reports no back pain today and presents with improved trunk mobility and tolerance to activity. Still with residual limitations in trunk ROM. Patient and PT goals are in progress. Plan: Continue PT. Progress therapeutic exercises to improve mobility.      1/30/2023  VIsit#: 6/Medicare 1/25/2023  Visit#: 5/Medicare 1/23/2023  Visit#: 4/Medicare   Thera Ex   x55min  - standing trunk extensions 10x  - supine single knee to chest 10x  - supine trunk rotation stretch 10x  - supine SLR 10 x 2  - supine LE on swiss ball hip and knee flexion 10 x 2  - supine bridging on swiss ball 10 x 5 sec hold  - supine alternate UE raise with 1lb wt, LE up on swiss ball 10 x 2  - seated LE extension 10 x 5 sec hold  - seated trunk flexion 10x  - repeat standing trunk extensions 10x  - LE abduction, extension in standing while holding swiss ball 10 x 3  - B LE heel raise 10 x 2  - R/L knee flexion/extension on stairs 20x  - forward step up on 6 inch step 10 x 2  - Shuttle B LE extensions 5 bands 10 x 3  - Shuttle single LE extensions 4 bands 10 x 3  - B shoulder rows, extension with green sportcord 20x  - B shoulder extension with green sportcord 20x  - Nustep L5 x 10min   x40min  - standing trunk extensions 10x  - supine single knee to chest 10x  - supine trunk rotation stretch 10x  - supine SLR 10 x 2  - supine LE on swiss ball hip and knee flexion 10 x 2  - supine bridging on swiss ball 10 x 5 sec hold  - supine alternate UE raise with 1lb wt, LE up on swiss ball 10 x 2  - seated LE extension 10 x 5 sec hold  - LE abduction, extension in standing while holding swiss ball 10 x 3  - B LE heel raise on slant board 10 x 2  - R/L knee flexion/extension on stairs 20x  - forward step up on 6 inch step 10 x 2  - Shuttle B LE extensions 5 bands 10 x 3  - Shuttle single LE extensions 4 bands 10 x 3  - B shoulder rows, extension with green sportcord 20x  - B shoulder extension with green sportcord 20x  - Nustep L5 x 10min     x40min  - supine single knee to chest 10x  - supine trunk rotation stretch 10x  - supine SLR 10x  - supine LE on swiss ball hip and knee flexion 10 x 2  - supine alternate UE raise with 1lb wt, LE up on swiss ball  - supine swiss ball bridging 10 x 5 sec hold  - standing trunk extension 10x  - calf stretch on slant board 30sec x 3  - B heel raise 20x  - R/L knee flexion stretch on stairs 20 x   - forward step up on 6 inch stairs 10 x 2  - Shuttle B LE extensions 5 - 6 bands 10 x 2  - Shuttle single LE extensions 4 bands 10 x 2  - B shoulder rows, extension with green sportcord 20x  - B shoulder extension with green sportcord 20x  - repeat supine knee to chest 10x  - Nustep L4 x 10min                         Charges: EX4      Total Timed Treatment: 55 min  Total Treatment Time: 55 min

## 2023-02-01 ENCOUNTER — OFFICE VISIT (OUTPATIENT)
Dept: PHYSICAL THERAPY | Age: 88
End: 2023-02-01
Attending: PHYSICAL MEDICINE & REHABILITATION
Payer: MEDICARE

## 2023-02-01 ENCOUNTER — TELEPHONE (OUTPATIENT)
Dept: ENDOCRINOLOGY CLINIC | Facility: CLINIC | Age: 88
End: 2023-02-01

## 2023-02-01 PROCEDURE — 97110 THERAPEUTIC EXERCISES: CPT | Performed by: PHYSICAL THERAPIST

## 2023-02-01 NOTE — PROGRESS NOTES
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 Reports no pain today. Pt describes pain level 3/10. Objective:  Trunk ROM is minimally limited into all planes. B LE ROM is WFL into all planes. Trunk strength grossly 4/5. Assessment: Patient with improved trunk mobility and tolerance to activity. Still with residual pain during prolonged walking and standing activities. Patient and PT goals are in progress. Plan: Continue PT. Progress therapeutic exercises to improve mobility.      2/1/2023  Visit#: 7/Medicare 1/30/2023  VIsit#: 6/Medicare 1/25/2023  Visit#: 5/Medicare   Thera Ex   x45min  - standing trunk extensions 10x  - supine single knee to chest 10x  - supine trunk rotation stretch 10x  - supine LE on swiss ball hip and knee flexion 10 x 2  - supine bridging on swiss ball 10 x 5 sec hold  - supine alternate UE raise with 2lb wt, LE up on swiss ball 10 x 2  - seated LE extension, hip flexion with 4lbs 10 x 5 sec hold  - repeat standing trunk extensions 10x  - B LE heel raise 10 x 2  - R/L knee flexion/extension on stairs 20x  - forward step up on 6 inch step 10 x 2  - B shoulder rows, extension with green sportcord 20x  - B shoulder extension with green sportcord 20x  - Shuttle B LE extensions 5 bands 10 x 3  - Shuttle single LE extensions 4 bands 10 x 3  - Nustep L5 x 10min x55min  - standing trunk extensions 10x  - supine single knee to chest 10x  - supine trunk rotation stretch 10x  - supine SLR 10 x 2  - supine LE on swiss ball hip and knee flexion 10 x 2  - supine bridging on swiss ball 10 x 5 sec hold  - supine alternate UE raise with 1lb wt, LE up on swiss ball 10 x 2  - seated LE extension 10 x 5 sec hold  - seated trunk flexion 10x  - repeat standing trunk extensions 10x  - LE abduction, extension in standing while holding swiss ball 10 x 3  - B LE heel raise 10 x 2  - R/L knee flexion/extension on stairs 20x  - forward step up on 6 inch step 10 x 2  - Shuttle B LE extensions 5 bands 10 x 3  - Shuttle single LE extensions 4 bands 10 x 3  - B shoulder rows, extension with green sportcord 20x  - B shoulder extension with green sportcord 20x  - Nustep L5 x 10min   x40min  - standing trunk extensions 10x  - supine single knee to chest 10x  - supine trunk rotation stretch 10x  - supine SLR 10 x 2  - supine LE on swiss ball hip and knee flexion 10 x 2  - supine bridging on swiss ball 10 x 5 sec hold  - supine alternate UE raise with 1lb wt, LE up on swiss ball 10 x 2  - seated LE extension 10 x 5 sec hold  - LE abduction, extension in standing while holding swiss ball 10 x 3  - B LE heel raise on slant board 10 x 2  - R/L knee flexion/extension on stairs 20x  - forward step up on 6 inch step 10 x 2  - Shuttle B LE extensions 5 bands 10 x 3  - Shuttle single LE extensions 4 bands 10 x 3  - B shoulder rows, extension with green sportcord 20x  - B shoulder extension with green sportcord 20x  - Nustep L5 x 10min                           Charges: EX3      Total Timed Treatment: 45 min  Total Treatment Time: 45 min

## 2023-02-07 NOTE — TELEPHONE ENCOUNTER
Forms completed by patient and signed by [employer] to [employer] at [phone]    Confirmation received     Documents placed in brown file folder in Nurses office, awaiting determination.

## 2023-02-09 ENCOUNTER — TELEPHONE (OUTPATIENT)
Dept: FAMILY MEDICINE CLINIC | Facility: CLINIC | Age: 88
End: 2023-02-09

## 2023-02-09 ENCOUNTER — PATIENT MESSAGE (OUTPATIENT)
Dept: FAMILY MEDICINE CLINIC | Facility: CLINIC | Age: 88
End: 2023-02-09

## 2023-02-09 RX ORDER — FINASTERIDE 5 MG/1
5 TABLET, FILM COATED ORAL DAILY
Qty: 90 TABLET | Refills: 5 | Status: SHIPPED | OUTPATIENT
Start: 2023-02-09 | End: 2023-02-09

## 2023-02-09 RX ORDER — FINASTERIDE 5 MG/1
5 TABLET, FILM COATED ORAL DAILY
Qty: 10 TABLET | Refills: 0 | Status: SHIPPED | OUTPATIENT
Start: 2023-02-09

## 2023-02-09 NOTE — TELEPHONE ENCOUNTER
From: Rosita Rosenberg MD  To: Shmuel Hint  Sent: 2/11/2021 12:36 PM CST  Subject: Test results    José Manuel Mccall - There is slight worsening of your diabetes/glucose. Try to be better with your diet. Avoid sweets and extra breads, rice, tortillas. Make sure you have follow up with Dr. Jodee Maradiaga. Your cholesterol is stable.  I will not change your medications for now. - Dr. Sujey Peter

## 2023-02-13 ENCOUNTER — OFFICE VISIT (OUTPATIENT)
Dept: FAMILY MEDICINE CLINIC | Facility: CLINIC | Age: 88
End: 2023-02-13

## 2023-02-13 ENCOUNTER — OFFICE VISIT (OUTPATIENT)
Dept: PHYSICAL THERAPY | Age: 88
End: 2023-02-13
Attending: PHYSICAL MEDICINE & REHABILITATION
Payer: MEDICARE

## 2023-02-13 ENCOUNTER — PATIENT MESSAGE (OUTPATIENT)
Dept: FAMILY MEDICINE CLINIC | Facility: CLINIC | Age: 88
End: 2023-02-13

## 2023-02-13 VITALS
HEIGHT: 67 IN | WEIGHT: 160.38 LBS | SYSTOLIC BLOOD PRESSURE: 146 MMHG | HEART RATE: 74 BPM | TEMPERATURE: 98 F | DIASTOLIC BLOOD PRESSURE: 68 MMHG | BODY MASS INDEX: 25.17 KG/M2

## 2023-02-13 DIAGNOSIS — Z79.4 TYPE 2 DIABETES MELLITUS WITH HYPERGLYCEMIA, WITH LONG-TERM CURRENT USE OF INSULIN (HCC): ICD-10-CM

## 2023-02-13 DIAGNOSIS — L30.9 DERMATITIS: Primary | ICD-10-CM

## 2023-02-13 DIAGNOSIS — E11.65 TYPE 2 DIABETES MELLITUS WITH HYPERGLYCEMIA, WITH LONG-TERM CURRENT USE OF INSULIN (HCC): ICD-10-CM

## 2023-02-13 DIAGNOSIS — L29.9 PRURITUS: Primary | ICD-10-CM

## 2023-02-13 PROCEDURE — 99213 OFFICE O/P EST LOW 20 MIN: CPT | Performed by: FAMILY MEDICINE

## 2023-02-13 PROCEDURE — 97110 THERAPEUTIC EXERCISES: CPT | Performed by: PHYSICAL THERAPIST

## 2023-02-13 RX ORDER — LEVOCETIRIZINE DIHYDROCHLORIDE 5 MG/1
5 TABLET, FILM COATED ORAL EVERY EVENING
Qty: 90 TABLET | Refills: 1 | Status: SHIPPED | OUTPATIENT
Start: 2023-02-13

## 2023-02-13 RX ORDER — HYDROXYZINE HYDROCHLORIDE 25 MG/1
25 TABLET, FILM COATED ORAL EVERY 8 HOURS PRN
Qty: 30 TABLET | Refills: 0 | Status: SHIPPED | OUTPATIENT
Start: 2023-02-13

## 2023-02-13 RX ORDER — BETAMETHASONE DIPROPIONATE 0.5 MG/G
CREAM TOPICAL
Qty: 45 G | Refills: 0 | Status: SHIPPED | OUTPATIENT
Start: 2023-02-13

## 2023-02-13 RX ORDER — HYDROXYZINE HYDROCHLORIDE 25 MG/1
25 TABLET, FILM COATED ORAL EVERY 8 HOURS PRN
Qty: 30 TABLET | Refills: 0 | Status: SHIPPED | OUTPATIENT
Start: 2023-02-13 | End: 2023-02-13

## 2023-02-13 RX ORDER — PERMETHRIN 50 MG/G
1 CREAM TOPICAL ONCE
Qty: 60 G | Refills: 0 | Status: SHIPPED | OUTPATIENT
Start: 2023-02-13 | End: 2023-02-13

## 2023-02-13 NOTE — PROGRESS NOTES
Progress Summary  Pt has attended 8 visits in Physical Therapy. 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 Reports no pain today. Still complains of back pain after prolonged standing and gets tired after prolonged walking. Pt describes pain level 3/10. Objective:  Trunk ROM is minimally limited into all planes. B LE ROM is WFL into all planes. Trunk strength grossly 4/5. B LE strength is grossly 5/5    Assessment: Patient referred to PT due to initial complaints of back pain. Patient has completed 8 PT visits. States his back is feeling much better and reports 0/10 pain when at rest. Reports improved trunk mobility and tolerance to activity. Trunk ROM is minimally limited into all planes. Strength is grossly WFL throughout. Patient still haswith residual pain during prolonged walking and standing activities. Patient is independent with his home exercises and management of residual symptoms.      2/13/2023  Visit#: 8/Medicare 2/1/2023  Visit#: 7/Medicare 1/30/2023  VIsit#: 6/Medicare   Thera Ex   x55min  - standing trunk extensions 10x  - supine single knee to chest 10x  - supine trunk rotation stretch 10x  - supine LE on swiss ball hip and knee flexion 10 x 2  - supine LE SLR with LE up on swiss ball  - supine bridging on swiss ball 10 x 5 sec hold  - supine alternate UE raise with 2lb wt, LE up on swiss ball 10 x 2  - prone LE extensions 10 x 2  - instructed to add to HEP  - repeat standing trunk extensions 10x  - Heel stretch on slant board 1min x 2  - B LE heel raise 10 x 2  - R/L LE abduction, extension with 3lb wt 10 x 2  - forward step up on 6 inch step 10 x 2  - B shoulder rows, extension with green sportcord 20x  - B shoulder extension with green sportcord 20x  - Shuttle B LE extensions 5 bands 10 x 3  - Shuttle single LE extensions 4 bands 10 x 3  - Nustep L5 x 10min   x45min  - standing trunk extensions 10x  - supine single knee to chest 10x  - supine trunk rotation stretch 10x  - supine LE on swiss ball hip and knee flexion 10 x 2  - supine bridging on swiss ball 10 x 5 sec hold  - supine alternate UE raise with 2lb wt, LE up on swiss ball 10 x 2  - seated LE extension, hip flexion with 4lbs 10 x 5 sec hold  - repeat standing trunk extensions 10x  - B LE heel raise 10 x 2  - R/L knee flexion/extension on stairs 20x  - forward step up on 6 inch step 10 x 2  - B shoulder rows, extension with green sportcord 20x  - B shoulder extension with green sportcord 20x  - Shuttle B LE extensions 5 bands 10 x 3  - Shuttle single LE extensions 4 bands 10 x 3  - Nustep L5 x 10min x55min  - standing trunk extensions 10x  - supine single knee to chest 10x  - supine trunk rotation stretch 10x  - supine SLR 10 x 2  - supine LE on swiss ball hip and knee flexion 10 x 2  - supine bridging on swiss ball 10 x 5 sec hold  - supine alternate UE raise with 1lb wt, LE up on swiss ball 10 x 2  - seated LE extension 10 x 5 sec hold  - seated trunk flexion 10x  - repeat standing trunk extensions 10x  - LE abduction, extension in standing while holding swiss ball 10 x 3  - B LE heel raise 10 x 2  - R/L knee flexion/extension on stairs 20x  - forward step up on 6 inch step 10 x 2  - Shuttle B LE extensions 5 bands 10 x 3  - Shuttle single LE extensions 4 bands 10 x 3  - B shoulder rows, extension with green sportcord 20x  - B shoulder extension with green sportcord 20x  - Nustep L5 x 10min                         Charges: EX3      Total Timed Treatment: 45 min  Total Treatment Time: 45 min    Plan: Discharge from PT with patient continuing with home exercises. Will follow up with MD in a few weeks. Patient/Family/Caregiver was advised of these findings, precautions, and treatment options and has agreed to actively participate in planning and for this course of care. Thank you for your referral. If you have any questions, please contact me at Dept: [phone].     Sincerely,  Electronically signed by therapist: Carolann Holter., PT

## 2023-02-13 NOTE — TELEPHONE ENCOUNTER
From: Bia Segura MD  To: Deanne Barahona  Sent: 2/11/2021 12:36 PM CST  Subject: Test results    Hi Cal - There is slight worsening of your diabetes/glucose. Try to be better with your diet. Avoid sweets and extra breads, rice, tortillas. Make sure you have follow up with Dr. Kate Valenzuela. Your cholesterol is stable.  I will not change your medications for now. - Dr. Tammie Tobar

## 2023-02-15 ENCOUNTER — TELEPHONE (OUTPATIENT)
Dept: FAMILY MEDICINE CLINIC | Facility: CLINIC | Age: 88
End: 2023-02-15

## 2023-02-15 NOTE — TELEPHONE ENCOUNTER
A Prior authorizatiom has been started for Hydroxyzine HCI 25 mg   Tablets    Key [id number]  Patient last name Layla Méndez  RAYRAY 1934

## 2023-02-22 NOTE — TELEPHONE ENCOUNTER
Phoned [facility]#[id number], still Pending review , fill f/u 2/24

## 2023-02-23 ENCOUNTER — ANCILLARY PROCEDURE (OUTPATIENT)
Dept: CARDIOLOGY | Age: 88
End: 2023-02-23
Attending: INTERNAL MEDICINE

## 2023-02-23 DIAGNOSIS — I50.22 CHRONIC SYSTOLIC CONGESTIVE HEART FAILURE (CMD): ICD-10-CM

## 2023-02-23 LAB
AORTIC VALVE AREA (AVA): 0.84
AORTIC VALVE AREA: 2.99
ASCENDING AORTA (AAD): 3
AV MEAN GRADIENT (AVMG): 4
AV MEAN VELOCITY (AVMV): 0.87
AV PEAK GRADIENT (AVPG): 7
AV PEAK VELOCITY (AVPV): 1.31
AV STENOSIS SEVERITY TEXT: NORMAL
AVI LVOT PEAK GRADIENT (LVOTMG): 1.1
E WAVE DECELARATION TIME (MDT): 25.18
INTERVENTRICULAR SEPTUM IN END DIASTOLE (IVSD): 1.59
LEFT INTERNAL DIMENSION IN SYSTOLE (LVSD): 1.1
LEFT VENTRICULAR INTERNAL DIMENSION IN DIASTOLE (LVDD): 3
LEFT VENTRICULAR POSTERIOR WALL IN END DIASTOLE (LVPW): 4.4
LV EF: NORMAL %
LVOT 2D (LVOTD): 24.7
LVOT VTI (LVOTVTI): 1.32
MV E TISSUE VEL MED (MESV): 4.87
MV E WAVE VEL/E TISSUE VEL MED(MSR): 3.32
MV PEAK A VELOCITY (MVPAV): 285
MV PEAK E VELOCITY (MVPEV): 1.04
RV END SYSTOLIC LONGITUDINAL STRAIN FREE WALL (RVGS): 2
TRICUSPID VALVE ANNULAR PEAK VELOCITY (TVAPV): 34
TRICUSPID VALVE PEAK REGURGITATION VELOCITY (TRPV): 3.6
TV ESTIMATED RIGHT ARTERIAL PRESSURE (RAP): 13.2

## 2023-02-23 PROCEDURE — 93306 TTE W/DOPPLER COMPLETE: CPT | Performed by: INTERNAL MEDICINE

## 2023-02-23 PROCEDURE — 76376 3D RENDER W/INTRP POSTPROCES: CPT | Performed by: INTERNAL MEDICINE

## 2023-02-27 ENCOUNTER — OFF PREMISE (OUTPATIENT)
Dept: CARDIOLOGY | Age: 88
End: 2023-02-27

## 2023-02-27 ENCOUNTER — LAB ENCOUNTER (OUTPATIENT)
Dept: LAB | Age: 88
End: 2023-02-27
Attending: FAMILY MEDICINE
Payer: MEDICARE

## 2023-02-27 DIAGNOSIS — Z79.4 TYPE 2 DIABETES MELLITUS WITH HYPERGLYCEMIA, WITH LONG-TERM CURRENT USE OF INSULIN (HCC): ICD-10-CM

## 2023-02-27 DIAGNOSIS — E11.65 TYPE 2 DIABETES MELLITUS WITH HYPERGLYCEMIA, WITH LONG-TERM CURRENT USE OF INSULIN (HCC): ICD-10-CM

## 2023-02-27 DIAGNOSIS — L30.9 DERMATITIS: ICD-10-CM

## 2023-02-27 LAB
ALBUMIN SERPL-MCNC: 3.5 G/DL (ref 3.4–5)
ALBUMIN/GLOB SERPL: 1.3 {RATIO} (ref 1–2)
ALP LIVER SERPL-CCNC: 120 U/L
ALT SERPL-CCNC: 25 U/L
ANION GAP SERPL CALC-SCNC: 4 MMOL/L (ref 0–18)
AST SERPL-CCNC: 17 U/L (ref 15–37)
BASOPHILS # BLD AUTO: 0.03 X10(3) UL (ref 0–0.2)
BASOPHILS NFR BLD AUTO: 0.5 %
BILIRUB SERPL-MCNC: 0.4 MG/DL (ref 0.1–2)
BUN BLD-MCNC: 49 MG/DL (ref 7–18)
BUN/CREAT SERPL: 17.6 (ref 10–20)
CALCIUM BLD-MCNC: 8.8 MG/DL (ref 8.5–10.1)
CHLORIDE SERPL-SCNC: 111 MMOL/L (ref 98–112)
CO2 SERPL-SCNC: 23 MMOL/L (ref 21–32)
CREAT BLD-MCNC: 2.78 MG/DL
DEPRECATED RDW RBC AUTO: 44.5 FL (ref 35.1–46.3)
EOSINOPHIL # BLD AUTO: 0.17 X10(3) UL (ref 0–0.7)
EOSINOPHIL NFR BLD AUTO: 2.8 %
ERYTHROCYTE [DISTWIDTH] IN BLOOD BY AUTOMATED COUNT: 13.1 % (ref 11–15)
ERYTHROCYTE [SEDIMENTATION RATE] IN BLOOD: 4 MM/HR
FASTING STATUS PATIENT QL REPORTED: NO
GFR SERPLBLD BASED ON 1.73 SQ M-ARVRAT: 21 ML/MIN/1.73M2 (ref 60–?)
GLOBULIN PLAS-MCNC: 2.8 G/DL (ref 2.8–4.4)
GLUCOSE BLD-MCNC: 315 MG/DL (ref 70–99)
HCT VFR BLD AUTO: 34.9 %
HGB BLD-MCNC: 11.1 G/DL
IMM GRANULOCYTES # BLD AUTO: 0.02 X10(3) UL (ref 0–1)
IMM GRANULOCYTES NFR BLD: 0.3 %
LYMPHOCYTES # BLD AUTO: 1.86 X10(3) UL (ref 1–4)
LYMPHOCYTES NFR BLD AUTO: 30.5 %
MCH RBC QN AUTO: 29.4 PG (ref 26–34)
MCHC RBC AUTO-ENTMCNC: 31.8 G/DL (ref 31–37)
MCV RBC AUTO: 92.3 FL
MONOCYTES # BLD AUTO: 0.41 X10(3) UL (ref 0.1–1)
MONOCYTES NFR BLD AUTO: 6.7 %
NEUTROPHILS # BLD AUTO: 3.6 X10 (3) UL (ref 1.5–7.7)
NEUTROPHILS # BLD AUTO: 3.6 X10(3) UL (ref 1.5–7.7)
NEUTROPHILS NFR BLD AUTO: 59.2 %
OSMOLALITY SERPL CALC.SUM OF ELEC: 311 MOSM/KG (ref 275–295)
PLATELET # BLD AUTO: 187 10(3)UL (ref 150–450)
POTASSIUM SERPL-SCNC: 5 MMOL/L (ref 3.5–5.1)
PROT SERPL-MCNC: 6.3 G/DL (ref 6.4–8.2)
RBC # BLD AUTO: 3.78 X10(6)UL
SODIUM SERPL-SCNC: 138 MMOL/L (ref 136–145)
TSI SER-ACNC: 2.34 MIU/ML (ref 0.36–3.74)
WBC # BLD AUTO: 6.1 X10(3) UL (ref 4–11)

## 2023-02-27 PROCEDURE — 85652 RBC SED RATE AUTOMATED: CPT

## 2023-02-27 PROCEDURE — 85025 COMPLETE CBC W/AUTO DIFF WBC: CPT

## 2023-02-27 PROCEDURE — 36415 COLL VENOUS BLD VENIPUNCTURE: CPT

## 2023-02-27 PROCEDURE — 84443 ASSAY THYROID STIM HORMONE: CPT

## 2023-02-27 PROCEDURE — 80053 COMPREHEN METABOLIC PANEL: CPT

## 2023-02-27 RX ORDER — PREDNISONE 20 MG/1
TABLET ORAL
Qty: 13 TABLET | Refills: 0 | Status: SHIPPED | OUTPATIENT
Start: 2023-02-27

## 2023-02-27 NOTE — TELEPHONE ENCOUNTER
Endo staff or Bertha Garcia - please follow up with patient/son later today regarding plan below. Thanks.

## 2023-02-27 NOTE — TELEPHONE ENCOUNTER
José Manuel Plasencia - I would like to place [facility] on prednisone taper for chronic dermatitis. Will do 60 mg for 2 days then 40 mg for 2 days and 20 mg 2 days and 10 for 2 days. How much should he increase his insulin during this time?  Thank you

## 2023-02-27 NOTE — TELEPHONE ENCOUNTER
Spoke to son regarding Dr. Ann Stewart recommendation. Son verbalized understanding and acknowledged. Patient is aware of Prednisone Rx, lantus dosages, and labs that were ordered    RN advised patient to follow up with Pawan ngo regarding PAP. PAP was faxed on 2/7/23 and we have not received any determination letter. Son agreed to call pawan ngo to follow up.

## 2023-02-27 NOTE — TELEPHONE ENCOUNTER
Morning,  I would recommend increasing Lantus to 20 units subcutaneous daily. To start if BG increase above 300 then increase to 24 units. Do you want me to have our staff call him? THanks.

## 2023-02-27 NOTE — TELEPHONE ENCOUNTER
please see [employer] message below and advise. Thanks        ----- Message -----  From: Bekah Lyon RN  Sent: 2/27/2023   5:10 AM CST  To: Em Rn Triage  Subject: FW: Test results                                     ----- Message -----  From: Rea Jacome  Sent: 2/26/2023   9:09 PM CST  To: Em Triage Support  Subject: Test results                                     Dr. Rm Lies,  The creams and medications that you prescribed seems to not working. He would like some blood work ordered to help you see what's going on or would you like him to setup an appointment to see you.   Respectfully,  Natalie Marsh

## 2023-03-01 ENCOUNTER — TELEPHONE (OUTPATIENT)
Dept: FAMILY MEDICINE CLINIC | Facility: CLINIC | Age: 88
End: 2023-03-01

## 2023-03-02 ENCOUNTER — APPOINTMENT (OUTPATIENT)
Dept: CARDIOLOGY | Age: 88
End: 2023-03-02
Attending: INTERNAL MEDICINE

## 2023-03-08 ENCOUNTER — LAB ENCOUNTER (OUTPATIENT)
Dept: LAB | Age: 88
End: 2023-03-08
Attending: INTERNAL MEDICINE
Payer: MEDICARE

## 2023-03-08 DIAGNOSIS — N18.4 CKD (CHRONIC KIDNEY DISEASE) STAGE 4, GFR 15-29 ML/MIN (HCC): ICD-10-CM

## 2023-03-08 LAB
ALBUMIN SERPL-MCNC: 3.2 G/DL (ref 3.4–5)
ANION GAP SERPL CALC-SCNC: 5 MMOL/L (ref 0–18)
BASOPHILS # BLD AUTO: 0.02 X10(3) UL (ref 0–0.2)
BASOPHILS NFR BLD AUTO: 0.3 %
BUN BLD-MCNC: 42 MG/DL (ref 7–18)
BUN/CREAT SERPL: 15.8 (ref 10–20)
CALCIUM BLD-MCNC: 9.1 MG/DL (ref 8.5–10.1)
CHLORIDE SERPL-SCNC: 111 MMOL/L (ref 98–112)
CO2 SERPL-SCNC: 25 MMOL/L (ref 21–32)
CREAT BLD-MCNC: 2.66 MG/DL
DEPRECATED RDW RBC AUTO: 43.7 FL (ref 35.1–46.3)
EOSINOPHIL # BLD AUTO: 0.22 X10(3) UL (ref 0–0.7)
EOSINOPHIL NFR BLD AUTO: 3 %
ERYTHROCYTE [DISTWIDTH] IN BLOOD BY AUTOMATED COUNT: 13.2 % (ref 11–15)
GFR SERPLBLD BASED ON 1.73 SQ M-ARVRAT: 22 ML/MIN/1.73M2 (ref 60–?)
GLUCOSE BLD-MCNC: 157 MG/DL (ref 70–99)
HCT VFR BLD AUTO: 37.2 %
HGB BLD-MCNC: 12.2 G/DL
IMM GRANULOCYTES # BLD AUTO: 0.09 X10(3) UL (ref 0–1)
IMM GRANULOCYTES NFR BLD: 1.2 %
LYMPHOCYTES # BLD AUTO: 1.79 X10(3) UL (ref 1–4)
LYMPHOCYTES NFR BLD AUTO: 24.4 %
MCH RBC QN AUTO: 29.8 PG (ref 26–34)
MCHC RBC AUTO-ENTMCNC: 32.8 G/DL (ref 31–37)
MCV RBC AUTO: 90.7 FL
MONOCYTES # BLD AUTO: 0.7 X10(3) UL (ref 0.1–1)
MONOCYTES NFR BLD AUTO: 9.5 %
NEUTROPHILS # BLD AUTO: 4.51 X10 (3) UL (ref 1.5–7.7)
NEUTROPHILS # BLD AUTO: 4.51 X10(3) UL (ref 1.5–7.7)
NEUTROPHILS NFR BLD AUTO: 61.6 %
OSMOLALITY SERPL CALC.SUM OF ELEC: 306 MOSM/KG (ref 275–295)
PHOSPHATE SERPL-MCNC: 2.9 MG/DL (ref 2.5–4.9)
PLATELET # BLD AUTO: 194 10(3)UL (ref 150–450)
POTASSIUM SERPL-SCNC: 4.6 MMOL/L (ref 3.5–5.1)
RBC # BLD AUTO: 4.1 X10(6)UL
SODIUM SERPL-SCNC: 141 MMOL/L (ref 136–145)
WBC # BLD AUTO: 7.3 X10(3) UL (ref 4–11)

## 2023-03-08 PROCEDURE — 80069 RENAL FUNCTION PANEL: CPT

## 2023-03-08 PROCEDURE — 85025 COMPLETE CBC W/AUTO DIFF WBC: CPT

## 2023-03-08 PROCEDURE — 36415 COLL VENOUS BLD VENIPUNCTURE: CPT

## 2023-03-10 ENCOUNTER — APPOINTMENT (OUTPATIENT)
Dept: CARDIOLOGY | Age: 88
End: 2023-03-10

## 2023-03-10 ENCOUNTER — OFFICE VISIT (OUTPATIENT)
Dept: CARDIOLOGY | Age: 88
End: 2023-03-10

## 2023-03-10 VITALS
RESPIRATION RATE: 18 BRPM | BODY MASS INDEX: 26.31 KG/M2 | SYSTOLIC BLOOD PRESSURE: 171 MMHG | HEART RATE: 73 BPM | DIASTOLIC BLOOD PRESSURE: 60 MMHG | HEIGHT: 67 IN | WEIGHT: 167.66 LBS

## 2023-03-10 DIAGNOSIS — E78.00 HYPERCHOLESTEREMIA: ICD-10-CM

## 2023-03-10 DIAGNOSIS — Z95.0 PACEMAKER: Primary | ICD-10-CM

## 2023-03-10 DIAGNOSIS — E55.9 VITAMIN D DEFICIENCY: ICD-10-CM

## 2023-03-10 DIAGNOSIS — I10 ESSENTIAL HYPERTENSION: ICD-10-CM

## 2023-03-10 DIAGNOSIS — E11.22 TYPE 2 DIABETES MELLITUS WITH CHRONIC KIDNEY DISEASE, WITHOUT LONG-TERM CURRENT USE OF INSULIN, UNSPECIFIED CKD STAGE (CMD): ICD-10-CM

## 2023-03-10 DIAGNOSIS — I50.22 CHRONIC SYSTOLIC CONGESTIVE HEART FAILURE (CMD): ICD-10-CM

## 2023-03-10 PROCEDURE — 99215 OFFICE O/P EST HI 40 MIN: CPT | Performed by: INTERNAL MEDICINE

## 2023-03-10 RX ORDER — BETAMETHASONE DIPROPIONATE 0.5 MG/G
CREAM TOPICAL
COMMUNITY
Start: 2023-02-13

## 2023-03-10 SDOH — HEALTH STABILITY: PHYSICAL HEALTH: ON AVERAGE, HOW MANY MINUTES DO YOU ENGAGE IN EXERCISE AT THIS LEVEL?: 10 MIN

## 2023-03-10 SDOH — HEALTH STABILITY: PHYSICAL HEALTH: ON AVERAGE, HOW MANY DAYS PER WEEK DO YOU ENGAGE IN MODERATE TO STRENUOUS EXERCISE (LIKE A BRISK WALK)?: 7 DAYS

## 2023-03-10 ASSESSMENT — PATIENT HEALTH QUESTIONNAIRE - PHQ9
SUM OF ALL RESPONSES TO PHQ9 QUESTIONS 1 AND 2: 0
1. LITTLE INTEREST OR PLEASURE IN DOING THINGS: NOT AT ALL
CLINICAL INTERPRETATION OF PHQ2 SCORE: NO FURTHER SCREENING NEEDED
SUM OF ALL RESPONSES TO PHQ9 QUESTIONS 1 AND 2: 0
2. FEELING DOWN, DEPRESSED OR HOPELESS: NOT AT ALL

## 2023-03-16 ENCOUNTER — OFFICE VISIT (OUTPATIENT)
Dept: PHYSICAL MEDICINE AND REHAB | Facility: CLINIC | Age: 88
End: 2023-03-16
Payer: MEDICARE

## 2023-03-16 VITALS — OXYGEN SATURATION: 99 % | HEART RATE: 63 BPM

## 2023-03-16 DIAGNOSIS — N18.4 CHRONIC RENAL DISEASE, STAGE IV (HCC): ICD-10-CM

## 2023-03-16 DIAGNOSIS — M54.16 LUMBAR RADICULOPATHY: Primary | ICD-10-CM

## 2023-03-16 DIAGNOSIS — M48.061 SPINAL STENOSIS OF LUMBAR REGION WITHOUT NEUROGENIC CLAUDICATION: ICD-10-CM

## 2023-03-16 DIAGNOSIS — M43.26 FUSION OF LUMBAR SPINE: ICD-10-CM

## 2023-03-16 DIAGNOSIS — I50.22 CHRONIC SYSTOLIC CONGESTIVE HEART FAILURE (HCC): ICD-10-CM

## 2023-03-16 DIAGNOSIS — Z95.0 S/P PLACEMENT OF CARDIAC PACEMAKER: ICD-10-CM

## 2023-03-16 DIAGNOSIS — M51.9 LUMBAR DISC DISEASE: ICD-10-CM

## 2023-03-16 DIAGNOSIS — R26.9 GAIT DISTURBANCE: ICD-10-CM

## 2023-03-16 DIAGNOSIS — M48.061 LUMBAR FORAMINAL STENOSIS: ICD-10-CM

## 2023-03-16 PROCEDURE — 99213 OFFICE O/P EST LOW 20 MIN: CPT | Performed by: PHYSICAL MEDICINE & REHABILITATION

## 2023-03-16 PROCEDURE — 1125F AMNT PAIN NOTED PAIN PRSNT: CPT | Performed by: PHYSICAL MEDICINE & REHABILITATION

## 2023-03-16 NOTE — PATIENT INSTRUCTIONS
Plan  He will do the PT for his gait and balance. The patient will continue with his home exercise program for the lumbar spine. He will follow up in 3 months or sooner if needed.

## 2023-03-19 DIAGNOSIS — Z79.4 CONTROLLED TYPE 2 DIABETES MELLITUS WITH COMPLICATION, WITH LONG-TERM CURRENT USE OF INSULIN (HCC): ICD-10-CM

## 2023-03-19 DIAGNOSIS — E11.8 CONTROLLED TYPE 2 DIABETES MELLITUS WITH COMPLICATION, WITH LONG-TERM CURRENT USE OF INSULIN (HCC): ICD-10-CM

## 2023-03-19 NOTE — TELEPHONE ENCOUNTER
LOV: 12/14/2022 RTC 4M (no upcoming)  Last refill: 4/26/2021    -Only taking Lantus if BG above 250 - Lantus 16 units subcutaneous at bedtime    -If Blood glucose between 200-250 then take 8 units of Lantus (added this instruction)     Dr. Shawn Hassan review and sign pended prescription if agreeable.

## 2023-03-20 RX ORDER — INSULIN GLARGINE 100 [IU]/ML
INJECTION, SOLUTION SUBCUTANEOUS
Qty: 15 ML | Refills: 1 | Status: SHIPPED | OUTPATIENT
Start: 2023-03-20

## 2023-03-24 DIAGNOSIS — E11.8 CONTROLLED TYPE 2 DIABETES MELLITUS WITH COMPLICATION, WITH LONG-TERM CURRENT USE OF INSULIN (HCC): ICD-10-CM

## 2023-03-24 DIAGNOSIS — Z79.4 CONTROLLED TYPE 2 DIABETES MELLITUS WITH COMPLICATION, WITH LONG-TERM CURRENT USE OF INSULIN (HCC): ICD-10-CM

## 2023-03-25 RX ORDER — LANCETS 33 GAUGE
1 EACH MISCELLANEOUS 2 TIMES DAILY
Qty: 200 EACH | Refills: 0 | Status: SHIPPED | OUTPATIENT
Start: 2023-03-25

## 2023-03-25 RX ORDER — BLOOD SUGAR DIAGNOSTIC
STRIP MISCELLANEOUS
Qty: 200 STRIP | Refills: 0 | Status: SHIPPED | OUTPATIENT
Start: 2023-03-25

## 2023-03-25 NOTE — TELEPHONE ENCOUNTER
LOV 12/14/22  RTC 4 months  Future Appointments   Date Time Provider Nahs Ken   4/10/2023  3:00 PM Kaylee Gagnon MD [facility] [address] [place]   4/19/2023 11:00 AM Stephanie Pierre MD [facility]   4/24/2023 11:00 AM Zoey Jin DPM [facility]   5/8/2023 10:30 AM Keagan Krause MD [facility]   6/12/2023  2:30 PM Keagan Krause MD [facility] ADO   6/29/2023  4:30 PM Wilber Medellin MD [facility] [address]   7/12/2023  5:00 PM Tabatha Diaz MD [facility]  [facility]     Refills approved per protocol.

## 2023-04-06 ENCOUNTER — LAB ENCOUNTER (OUTPATIENT)
Dept: LAB | Age: 88
End: 2023-04-06
Attending: INTERNAL MEDICINE
Payer: MEDICARE

## 2023-04-06 DIAGNOSIS — N18.4 CKD (CHRONIC KIDNEY DISEASE) STAGE 4, GFR 15-29 ML/MIN (HCC): ICD-10-CM

## 2023-04-06 LAB
ALBUMIN SERPL-MCNC: 3.4 G/DL (ref 3.4–5)
ANION GAP SERPL CALC-SCNC: 8 MMOL/L (ref 0–18)
BASOPHILS # BLD AUTO: 0.03 X10(3) UL (ref 0–0.2)
BASOPHILS NFR BLD AUTO: 0.5 %
BUN BLD-MCNC: 46 MG/DL (ref 7–18)
BUN/CREAT SERPL: 15.4 (ref 10–20)
CALCIUM BLD-MCNC: 9.2 MG/DL (ref 8.5–10.1)
CHLORIDE SERPL-SCNC: 108 MMOL/L (ref 98–112)
CO2 SERPL-SCNC: 23 MMOL/L (ref 21–32)
CREAT BLD-MCNC: 2.98 MG/DL
DEPRECATED RDW RBC AUTO: 42.2 FL (ref 35.1–46.3)
EOSINOPHIL # BLD AUTO: 0.14 X10(3) UL (ref 0–0.7)
EOSINOPHIL NFR BLD AUTO: 2.2 %
ERYTHROCYTE [DISTWIDTH] IN BLOOD BY AUTOMATED COUNT: 12.8 % (ref 11–15)
GFR SERPLBLD BASED ON 1.73 SQ M-ARVRAT: 20 ML/MIN/1.73M2 (ref 60–?)
GLUCOSE BLD-MCNC: 203 MG/DL (ref 70–99)
HCT VFR BLD AUTO: 37.4 %
HGB BLD-MCNC: 11.9 G/DL
IMM GRANULOCYTES # BLD AUTO: 0.01 X10(3) UL (ref 0–1)
IMM GRANULOCYTES NFR BLD: 0.2 %
LYMPHOCYTES # BLD AUTO: 1.96 X10(3) UL (ref 1–4)
LYMPHOCYTES NFR BLD AUTO: 30.7 %
MCH RBC QN AUTO: 28.5 PG (ref 26–34)
MCHC RBC AUTO-ENTMCNC: 31.8 G/DL (ref 31–37)
MCV RBC AUTO: 89.7 FL
MONOCYTES # BLD AUTO: 0.58 X10(3) UL (ref 0.1–1)
MONOCYTES NFR BLD AUTO: 9.1 %
NEUTROPHILS # BLD AUTO: 3.67 X10 (3) UL (ref 1.5–7.7)
NEUTROPHILS # BLD AUTO: 3.67 X10(3) UL (ref 1.5–7.7)
NEUTROPHILS NFR BLD AUTO: 57.3 %
OSMOLALITY SERPL CALC.SUM OF ELEC: 306 MOSM/KG (ref 275–295)
PHOSPHATE SERPL-MCNC: 2.7 MG/DL (ref 2.5–4.9)
PLATELET # BLD AUTO: 216 10(3)UL (ref 150–450)
POTASSIUM SERPL-SCNC: 4.7 MMOL/L (ref 3.5–5.1)
RBC # BLD AUTO: 4.17 X10(6)UL
SODIUM SERPL-SCNC: 139 MMOL/L (ref 136–145)
WBC # BLD AUTO: 6.4 X10(3) UL (ref 4–11)

## 2023-04-06 PROCEDURE — 36415 COLL VENOUS BLD VENIPUNCTURE: CPT

## 2023-04-06 PROCEDURE — 80069 RENAL FUNCTION PANEL: CPT

## 2023-04-06 PROCEDURE — 85025 COMPLETE CBC W/AUTO DIFF WBC: CPT

## 2023-04-07 ENCOUNTER — TELEPHONE (OUTPATIENT)
Dept: ENDOCRINOLOGY CLINIC | Facility: CLINIC | Age: 88
End: 2023-04-07

## 2023-04-07 NOTE — TELEPHONE ENCOUNTER
Received fax from [employer] attached is a [employer] form for Diabetic supplies forms placed in provider folder for review and signature.

## 2023-04-07 NOTE — TELEPHONE ENCOUNTER
Received signed forms from signed folder, faxing [address] forms to pt pharmacy on file.   Awaiting confirmation

## 2023-04-10 ENCOUNTER — OFFICE VISIT (OUTPATIENT)
Dept: NEPHROLOGY | Facility: CLINIC | Age: 88
End: 2023-04-10

## 2023-04-10 VITALS
WEIGHT: 166 LBS | SYSTOLIC BLOOD PRESSURE: 147 MMHG | BODY MASS INDEX: 26 KG/M2 | HEART RATE: 73 BPM | DIASTOLIC BLOOD PRESSURE: 68 MMHG

## 2023-04-10 DIAGNOSIS — N18.4 CKD (CHRONIC KIDNEY DISEASE) STAGE 4, GFR 15-29 ML/MIN (HCC): Primary | ICD-10-CM

## 2023-04-10 PROCEDURE — 99214 OFFICE O/P EST MOD 30 MIN: CPT | Performed by: INTERNAL MEDICINE

## 2023-04-10 NOTE — PATIENT INSTRUCTIONS
Continue to work with endocrine to get your blood sugars under good control. Repeat your kidney blood test in 1 month. Orders are in the computer.

## 2023-04-10 NOTE — PROGRESS NOTES
04/10/23        Patient: Catarina Hathaway   YOB: 1934   Date of Visit: 4/10/2023       Dear  Dr. Dong Terrell MD,      Thank you for referring Catarina Hathaway to my practice. Please find my assessment and plan below. As you know he is an 75-year-old male with a history of adult onset diabetes mellitus, hypertension, whitecoat syndrome, congestive heart failure secondary to diastolic dysfunction who I now had the pleasure of seeing for follow-up of chronic kidney disease stage IV and an anemia of chronic disease. Overall the patient states has been doing well without any chest pain, shortness of breath, GI or urinary tract symptoms. Blood pressures at home are usually in the 005K to 780R systolics with diastolics in the 93Z and 56P. Was on Trulicity. But now off. Trying to get repeat prior authorization. Blood sugars have been creeping up. Physical exam his blood pressure 147/68 with a pulse 73 weight 166 pounds. His neck was supple without JVD. Lungs were clear. Heart revealed a regular rate and rhythm with an S4 but no gallops, murmurs or rubs. Abdomen soft, flat, nontender without organomegaly, masses or bruits. Extremities revealed trace edema. I reviewed his most recent laboratory studies done on April 6, 2023. Creatinine did creep up a bit to 2.98 with an estimated GFR 20 cc/min. Electrolytes were good. Hemoglobin 11.9. Last A1c was 8.4. I therefore informed the patient and his son that his renal function has deteriorated slightly. The significance of a GFR 20 cc/min and the indications for dialysis were reviewed. Reinforced importance of maintaining adequate hydration. Avoid nonsteroidals. Blood pressure and blood sugar control. Hopefully will get approval for Trulicity as his sugars were under better control with this medication. We will have him repeat a CBC and renal panel in 1 month to ensure stability. If stable will continue quarterly.   I will see him again in 6 months for follow-up or sooner if clinically indicated. Thank you again for allowing me to participate in the care of your patient. If you have any questions please feel free to call.            Sincerely,   Gloria Ramos MD   [facility], [address] [place] [address]  [address] [facility] [address]  [address] [facility] [id number]    Document electronically generated by:  Gloria Ramos MD

## 2023-04-19 ENCOUNTER — OFFICE VISIT (OUTPATIENT)
Dept: PHYSICAL THERAPY | Age: 88
End: 2023-04-19
Attending: PHYSICAL MEDICINE & REHABILITATION
Payer: MEDICARE

## 2023-04-19 ENCOUNTER — OFFICE VISIT (OUTPATIENT)
Dept: ENDOCRINOLOGY CLINIC | Facility: CLINIC | Age: 88
End: 2023-04-19

## 2023-04-19 VITALS
BODY MASS INDEX: 26 KG/M2 | HEART RATE: 68 BPM | SYSTOLIC BLOOD PRESSURE: 163 MMHG | DIASTOLIC BLOOD PRESSURE: 74 MMHG | WEIGHT: 166 LBS

## 2023-04-19 DIAGNOSIS — M43.26 FUSION OF LUMBAR SPINE: ICD-10-CM

## 2023-04-19 DIAGNOSIS — R26.9 GAIT DISTURBANCE: ICD-10-CM

## 2023-04-19 DIAGNOSIS — M48.061 LUMBAR FORAMINAL STENOSIS: ICD-10-CM

## 2023-04-19 DIAGNOSIS — Z79.4 CONTROLLED TYPE 2 DIABETES MELLITUS WITH COMPLICATION, WITH LONG-TERM CURRENT USE OF INSULIN (HCC): Primary | ICD-10-CM

## 2023-04-19 DIAGNOSIS — M51.9 LUMBAR DISC DISEASE: ICD-10-CM

## 2023-04-19 DIAGNOSIS — E11.8 CONTROLLED TYPE 2 DIABETES MELLITUS WITH COMPLICATION, WITH LONG-TERM CURRENT USE OF INSULIN (HCC): Primary | ICD-10-CM

## 2023-04-19 DIAGNOSIS — M48.061 SPINAL STENOSIS OF LUMBAR REGION WITHOUT NEUROGENIC CLAUDICATION: ICD-10-CM

## 2023-04-19 DIAGNOSIS — Z95.0 S/P PLACEMENT OF CARDIAC PACEMAKER: ICD-10-CM

## 2023-04-19 DIAGNOSIS — M54.16 LUMBAR RADICULOPATHY: ICD-10-CM

## 2023-04-19 DIAGNOSIS — N18.4 CHRONIC RENAL DISEASE, STAGE IV (HCC): ICD-10-CM

## 2023-04-19 DIAGNOSIS — I50.22 CHRONIC SYSTOLIC CONGESTIVE HEART FAILURE (HCC): ICD-10-CM

## 2023-04-19 LAB
CARTRIDGE LOT#: ABNORMAL NUMERIC
GLUCOSE BLOOD: 340
HEMOGLOBIN A1C: 10.2 % (ref 4.3–5.6)
TEST STRIP LOT #: NORMAL NUMERIC

## 2023-04-19 PROCEDURE — 1126F AMNT PAIN NOTED NONE PRSNT: CPT | Performed by: INTERNAL MEDICINE

## 2023-04-19 PROCEDURE — 97110 THERAPEUTIC EXERCISES: CPT | Performed by: PHYSICAL THERAPIST

## 2023-04-19 PROCEDURE — 97161 PT EVAL LOW COMPLEX 20 MIN: CPT | Performed by: PHYSICAL THERAPIST

## 2023-04-19 PROCEDURE — 83036 HEMOGLOBIN GLYCOSYLATED A1C: CPT | Performed by: INTERNAL MEDICINE

## 2023-04-19 PROCEDURE — 99213 OFFICE O/P EST LOW 20 MIN: CPT | Performed by: INTERNAL MEDICINE

## 2023-04-19 PROCEDURE — 82947 ASSAY GLUCOSE BLOOD QUANT: CPT | Performed by: INTERNAL MEDICINE

## 2023-04-19 RX ORDER — PANTOPRAZOLE SODIUM 40 MG/1
40 TABLET, DELAYED RELEASE ORAL
Qty: 90 TABLET | Refills: 3 | Status: SHIPPED | OUTPATIENT
Start: 2023-04-19

## 2023-04-20 ENCOUNTER — TELEPHONE (OUTPATIENT)
Dept: ENDOCRINOLOGY CLINIC | Facility: CLINIC | Age: 88
End: 2023-04-20

## 2023-04-20 NOTE — TELEPHONE ENCOUNTER
Received signed forms from provider signed folder, faxing forms to pt pharmacy on file [phone].   Awaiting confirmation

## 2023-04-20 NOTE — TELEPHONE ENCOUNTER
Refill passed per [facility] protocol.     .  Requested Prescriptions   Pending Prescriptions Disp Refills    PANTOPRAZOLE 40 MG Oral Tab EC [Pharmacy Med Name: PANTOPRAZOLE SODIUM DR TABS 40MG] 90 tablet 3     Sig: TAKE 1 TABLET BEFORE BREAKFAST       Gastrointestional Medication Protocol Passed - 4/18/2023 11:12 PM        Passed - In person appointment or virtual visit in the past 12 mos or appointment in next 3 mos     Recent Outpatient Visits              1 week ago CKD (chronic kidney disease) stage 4, GFR 15-29 ml/min ([facility] [address]    [facility], [address] [facility], Trish Chavez MD    Office Visit    1 month ago bilateral L5-S1 radiculopathies     [facility], [address], Artemio Hollis MD    Office Visit    2 months ago Pruritus    Sherry Santos, Gianni Bruce MD    Office Visit    2 months ago     [facility] in [place] [address]., Manjit Schroeder, PT    Office Visit    2 months ago     [facility] in [place] [id number]., Manjit Schroeder, [address] [facility]    Office Visit          Future Appointments         Provider Department Appt Notes    In 5 days [address] [facility],[address], Chapis [place], [address], [address], [address] [facility]     In 1 week Monica Camarillo, [address] in [employer] part A/B/[employer] Supple    In 2 weeks Achilles Kyle, [address] in [employer] part A/B/[employer] Supple    In 2 weeks Meka Nguyen MD [address], [address], [place] follow up    In 3 weeks Monica Camarillo, [address] in [employer] part A/B/[employer] Supple    In 4 weeks Achilles Kyle, [address] in [employer] part A/B/[employer] Supple    In 1 month Achilles Kyle, [address] in [employer] part A/B/[employer] Supple    In 1 month Van Monica patino, [address] in [address] [place] [employer] part A/B/BCBS Supple    In 1 month Prudence Colquitt, [address] in [address] [place] Medicare part A/B/[employer]BS Supple    In 1 month Fabi Macdonald MD [address], [address], [place] Medicare physical   son scheduled appt   (policy informed)    In 2 months Roshan Ortega MD [address], [address], [address] 3 Month F/U    In 2 months Wenceslao Laster, MD Harris Litten, [place] [facility]    In 3 months Say Grossman MD [address], [address], [place] 3 mnth                    Recent Outpatient Visits              1 week ago CKD (chronic kidney disease) stage 4, GFR 15-29 ml/min [facility])    [facility], [address] [facility], [place] MD Lonny    Office Visit    1 month ago bilateral L5-S1 radiculopathies     [facility], [address], [facility], Pascual Bennett MD    Office Visit    2 months ago Pruritus    Harris Litten, Lanae Roof, MD    Office Visit    2 months ago     [facility] in [place] [address] Azael Montanez, PT    Office Visit    2 months ago     [facility] in [place] [address] Azael Montanez, [address] [facility]    Office Visit            Future Appointments         Provider Department Appt Notes    In 5 days Edwin Fletcher, [address], [address], [address] [facility]     In 1 week Monica Camarillo, [address] in [employer] part A/B/[employer] Supple    In 2 weeks Prudence Colquitt, [address] in [employer] part A/B/[employer] Supple    In 2 weeks Fabi Macdonald MD [address], [address], [place] follow up    In 3 weeks Prudence Colquitt, [address] in [employer] part A/B/[employer] Supple    In 4 weeks Prudence Colquitt, [address] in [employer] part A/B/[employer] Supple    In 1 month Monica Camarillo [address] in [employer] part A/B/[employer] Supple    In 1 month Ezra Spring, [address] in [employer] part A/B/[employer] Supple    In 1 month [address] [facility],[address], [place], [address] in [employer] part A/B/[employer] Supple    In 1 month Kaye Garcia  [address], [place] Medicare physical   son scheduled appt   (policy informed)    In 2 months Aislinn King MD [address] [place] [address] 3 Month F/U    In 2 months Rand Hoffman MD [address], [address], [place] [facility]    In 3 months Breanne Oviedo MD [address] [place] [address]

## 2023-04-20 NOTE — TELEPHONE ENCOUNTER
Received fax from [employer] attached is a [address] form for Diabetes supplies, form placed in provider folder for review and signature.

## 2023-04-24 NOTE — TELEPHONE ENCOUNTER
I sent a message to the son but probably best for your staff to call him later today or tomorrow to make sure understands the plan.  Thank you removed.

## 2023-04-27 ENCOUNTER — OFFICE VISIT (OUTPATIENT)
Dept: PHYSICAL THERAPY | Age: 88
End: 2023-04-27
Attending: PHYSICAL MEDICINE & REHABILITATION
Payer: MEDICARE

## 2023-04-27 PROCEDURE — 97112 NEUROMUSCULAR REEDUCATION: CPT | Performed by: PHYSICAL THERAPIST

## 2023-04-27 PROCEDURE — 97110 THERAPEUTIC EXERCISES: CPT | Performed by: PHYSICAL THERAPIST

## 2023-05-01 ENCOUNTER — OFFICE VISIT (OUTPATIENT)
Dept: FAMILY MEDICINE CLINIC | Facility: CLINIC | Age: 88
End: 2023-05-01

## 2023-05-01 VITALS
BODY MASS INDEX: 26 KG/M2 | SYSTOLIC BLOOD PRESSURE: 149 MMHG | HEART RATE: 73 BPM | DIASTOLIC BLOOD PRESSURE: 66 MMHG | WEIGHT: 168 LBS

## 2023-05-01 DIAGNOSIS — M79.604 RIGHT LEG PAIN: ICD-10-CM

## 2023-05-01 DIAGNOSIS — M54.16 LUMBAR RADICULOPATHY: Primary | ICD-10-CM

## 2023-05-01 PROBLEM — I70.1 ATHEROSCLEROSIS OF RENAL ARTERY: Status: ACTIVE | Noted: 2023-05-01

## 2023-05-01 PROBLEM — I70.1 ATHEROSCLEROSIS OF RENAL ARTERY (HCC): Status: ACTIVE | Noted: 2023-05-01

## 2023-05-01 PROCEDURE — 1125F AMNT PAIN NOTED PAIN PRSNT: CPT | Performed by: FAMILY MEDICINE

## 2023-05-01 PROCEDURE — 99213 OFFICE O/P EST LOW 20 MIN: CPT | Performed by: FAMILY MEDICINE

## 2023-05-01 RX ORDER — GABAPENTIN 100 MG/1
100 CAPSULE ORAL 3 TIMES DAILY
Qty: 30 CAPSULE | Refills: 0 | Status: SHIPPED | OUTPATIENT
Start: 2023-05-01

## 2023-05-04 ENCOUNTER — APPOINTMENT (OUTPATIENT)
Dept: PHYSICAL THERAPY | Age: 88
End: 2023-05-04
Attending: PHYSICAL MEDICINE & REHABILITATION
Payer: MEDICARE

## 2023-05-11 ENCOUNTER — APPOINTMENT (OUTPATIENT)
Dept: PHYSICAL THERAPY | Age: 88
End: 2023-05-11
Attending: PHYSICAL MEDICINE & REHABILITATION
Payer: MEDICARE

## 2023-05-12 ENCOUNTER — LAB ENCOUNTER (OUTPATIENT)
Dept: LAB | Age: 88
End: 2023-05-12
Attending: INTERNAL MEDICINE
Payer: MEDICARE

## 2023-05-12 DIAGNOSIS — N18.4 CKD (CHRONIC KIDNEY DISEASE) STAGE 4, GFR 15-29 ML/MIN (HCC): ICD-10-CM

## 2023-05-12 LAB
ALBUMIN SERPL-MCNC: 3.1 G/DL (ref 3.4–5)
ANION GAP SERPL CALC-SCNC: 7 MMOL/L (ref 0–18)
BASOPHILS # BLD AUTO: 0.03 X10(3) UL (ref 0–0.2)
BASOPHILS NFR BLD AUTO: 0.5 %
BUN BLD-MCNC: 43 MG/DL (ref 7–18)
BUN/CREAT SERPL: 14.5 (ref 10–20)
CALCIUM BLD-MCNC: 8.9 MG/DL (ref 8.5–10.1)
CHLORIDE SERPL-SCNC: 111 MMOL/L (ref 98–112)
CO2 SERPL-SCNC: 23 MMOL/L (ref 21–32)
CREAT BLD-MCNC: 2.96 MG/DL
DEPRECATED RDW RBC AUTO: 44.2 FL (ref 35.1–46.3)
EOSINOPHIL # BLD AUTO: 0.18 X10(3) UL (ref 0–0.7)
EOSINOPHIL NFR BLD AUTO: 2.9 %
ERYTHROCYTE [DISTWIDTH] IN BLOOD BY AUTOMATED COUNT: 13.3 % (ref 11–15)
GFR SERPLBLD BASED ON 1.73 SQ M-ARVRAT: 20 ML/MIN/1.73M2 (ref 60–?)
GLUCOSE BLD-MCNC: 121 MG/DL (ref 70–99)
HCT VFR BLD AUTO: 36.1 %
HGB BLD-MCNC: 11.5 G/DL
IMM GRANULOCYTES # BLD AUTO: 0.01 X10(3) UL (ref 0–1)
IMM GRANULOCYTES NFR BLD: 0.2 %
LYMPHOCYTES # BLD AUTO: 1.24 X10(3) UL (ref 1–4)
LYMPHOCYTES NFR BLD AUTO: 19.7 %
MCH RBC QN AUTO: 29 PG (ref 26–34)
MCHC RBC AUTO-ENTMCNC: 31.9 G/DL (ref 31–37)
MCV RBC AUTO: 91.2 FL
MONOCYTES # BLD AUTO: 0.61 X10(3) UL (ref 0.1–1)
MONOCYTES NFR BLD AUTO: 9.7 %
NEUTROPHILS # BLD AUTO: 4.24 X10 (3) UL (ref 1.5–7.7)
NEUTROPHILS # BLD AUTO: 4.24 X10(3) UL (ref 1.5–7.7)
NEUTROPHILS NFR BLD AUTO: 67 %
OSMOLALITY SERPL CALC.SUM OF ELEC: 304 MOSM/KG (ref 275–295)
PHOSPHATE SERPL-MCNC: 2.8 MG/DL (ref 2.5–4.9)
PLATELET # BLD AUTO: 217 10(3)UL (ref 150–450)
POTASSIUM SERPL-SCNC: 4.2 MMOL/L (ref 3.5–5.1)
RBC # BLD AUTO: 3.96 X10(6)UL
SODIUM SERPL-SCNC: 141 MMOL/L (ref 136–145)
WBC # BLD AUTO: 6.3 X10(3) UL (ref 4–11)

## 2023-05-12 PROCEDURE — 36415 COLL VENOUS BLD VENIPUNCTURE: CPT

## 2023-05-12 PROCEDURE — 80069 RENAL FUNCTION PANEL: CPT

## 2023-05-12 PROCEDURE — 85025 COMPLETE CBC W/AUTO DIFF WBC: CPT

## 2023-05-18 ENCOUNTER — OFFICE VISIT (OUTPATIENT)
Dept: PHYSICAL THERAPY | Age: 88
End: 2023-05-18
Attending: PHYSICAL MEDICINE & REHABILITATION
Payer: MEDICARE

## 2023-05-18 PROCEDURE — 97110 THERAPEUTIC EXERCISES: CPT | Performed by: PHYSICAL THERAPIST

## 2023-05-18 NOTE — PROGRESS NOTES
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 Tarun Vergara  Next MD visit: none scheduled  Fall Risk: standard         Precautions: n/a           Medication changes per patient? NO  Date of evaluation/PN: 2023  Pain ratin  Subjective: Painfree upon arrival but patient's son over the phone relates message that patient had increased pain from his waist down including his knees after his last session and had to get medications from his primary MD to manage symptoms which have since subsided. Objective: Reduced activity/ex's in loaded positions due to adverse latent effect to load during previous session. Tolerated over 30 mins of [place] ex's without signs of symptom provocation. Assessment: Significant load sensitivity established from adverse response from previous POC and will need to adjust program to limit load to avoid increased tissue irritability and further muscle inhibition. Goals: In progress as follows: Angela Corbett will perform sit to stand and gait initiation consistently without episodes of LOB. Angela Corbett will assume SLS 5-10 secs at a time to allow safe pivot turns during gait. Angela Corbett will be (I) with a home [facility] of function. Plan: Continue to address core strength/stability in [facility] and monitor for latent tissue irritability. Date: 2023  TX#:  Date: 2023                 TX#: 3/8 Date:                 TX#: / Date:                 TX#: 5/ Date:    Tx#: 6/   THERAPEUTIC EX 22 mins 38 mins slantboard stretch L4 3 x 1 min ea L4 (B) 3 x 1 min ea      NuStep (B)UE/LE focus on smooth reciprocal UE/LE movements L5 x 10 mins L5 x 10 mins      Sit to stand 6\" step on seat; feet on airex 3x10       Balance board A-P and lateral wt shift with focus on quiet control 3x10       PAUL  2x10      SKTC  2x10 ea      SB bridging  GSB 3x10      SB LTR  GSB 3x10      SLR   3x10 ea      SL hip abd  3x10 ea      Prone hip ext  2x10 ea      Prone hams curls  3x10 ea      Prone gluteal raises  2x10 ea      Seated marching  3x10      Seated LAQ  3x10                      NEUROMUSCULAR RE-EDUCATION 23 mins       Narrow stance airex EO 3 x 1 min ea head nods and head rotation       Tandem stance airex x 1 min ea lead (R)/(L)       Marching in place airex 3 x 1 min        Single leg balance 1 min ea with intermittent UE support                               HEP: Continue initial HEP    Charges:  Therapeutic ex x 3       Total Timed Treatment: 38 min  Total Treatment Time: 38 min

## 2023-05-25 ENCOUNTER — OFFICE VISIT (OUTPATIENT)
Dept: PHYSICAL THERAPY | Age: 88
End: 2023-05-25
Attending: PHYSICAL MEDICINE & REHABILITATION
Payer: MEDICARE

## 2023-05-25 PROCEDURE — 97110 THERAPEUTIC EXERCISES: CPT | Performed by: PHYSICAL THERAPIST

## 2023-05-25 RX ORDER — GLIMEPIRIDE 2 MG/1
TABLET ORAL
Qty: 90 TABLET | Refills: 3 | Status: SHIPPED | OUTPATIENT
Start: 2023-05-25

## 2023-05-25 NOTE — PROGRESS NOTES
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 Crow Lundy MD visit: none scheduled  Fall Risk: standard         Precautions: n/a           Medication changes per patient? NO  Date of evaluation/PN: 2023  Pain ratin  Subjective: Reports symptoms upon arrival no worse with ex's completed this session. Objective: Continuing to monitor symptom irritability but noted with no symptom aggravation with addition of load via shuttle. Assessment: Continue to monitor symptom irritability to determine readiness for progression to functional activity/exercise. Goals: In progress as follows: Yohannes Lopez will perform sit to stand and gait initiation consistently without episodes of LOB. Yohannes Lopez will assume SLS 5-10 secs at a time to allow safe pivot turns during gait. Yohannes Lopez will be (I) with a home program [facility] of function. Plan: Continue to address core strength/stability in [facility] and monitor for latent tissue irritability. Date: 2023  TX#:  Date: 2023                 TX#: 3/8 Date:2023                 TX#:  Date:                 TX#: 5/ Date:    Tx#: 6/   THERAPEUTIC EX 22 mins 38 mins 45 mins     slantboard stretch L4 3 x 1 min ea L4 (B) 3 x 1 min ea L4 (B) 3 x 1 min ea     NuStep (B)UE/LE focus on smooth reciprocal UE/LE movements L5 x 10 mins L5 x 10 mins L4 x 10 mins     Sit to stand 6\" step on seat; feet on airex 3x10       Balance board A-P and lateral wt shift with focus on quiet control 3x10 PAUL  2x10      SKTC  2x10 ea      SB bridging  GSB 3x10 GSB 3x10     SB LTR  GSB 3x10 GSB 3x10 with 1.5lbs ea LE     SLR   3x10 ea 1.5lbs 3x10 ea     SL hip abd  3x10 ea 1.5lbs 3x10 ea     Prone hip ext  2x10 ea 1.5lbs 3x10 ea     Prone hams curls  3x10 ea 1.5lbs 3x10 ea     Prone gluteal raises  2x10 ea 1.5lbs 3x10 ea     Seated marching  3x10 1.5lbs 3x10 ea     Seated LAQ  3x10 1.5lbs 3x10 ea     HL marching   1.5lbs 3x10 ea     SB hams curls   GSB 3x10     Shuttle single leg   2 band 3x10     Shuttle (B)   4 band 3x10                             NEUROMUSCULAR RE-EDUCATION 23 mins       Narrow stance airex EO 3 x 1 min ea head nods and head rotation       Tandem stance airex x 1 min ea lead (R)/(L)       Marching in place airex 3 x 1 min        Single leg balance 1 min ea with intermittent UE support                               HEP: Continue initial HEP    Charges:  Therapeutic ex x 3       Total Timed Treatment: 45 min  Total Treatment Time: 45 min

## 2023-05-26 ENCOUNTER — OFFICE VISIT (OUTPATIENT)
Dept: PODIATRY CLINIC | Facility: CLINIC | Age: 88
End: 2023-05-26

## 2023-05-26 DIAGNOSIS — B35.1 ONYCHOMYCOSIS: Primary | ICD-10-CM

## 2023-05-26 DIAGNOSIS — E08.42 DIABETES MELLITUS DUE TO UNDERLYING CONDITION WITH DIABETIC POLYNEUROPATHY, UNSPECIFIED WHETHER LONG TERM INSULIN USE (HCC): ICD-10-CM

## 2023-05-26 PROCEDURE — 99213 OFFICE O/P EST LOW 20 MIN: CPT | Performed by: PODIATRIST

## 2023-05-26 PROCEDURE — 1126F AMNT PAIN NOTED NONE PRSNT: CPT | Performed by: PODIATRIST

## 2023-06-01 ENCOUNTER — OFFICE VISIT (OUTPATIENT)
Dept: PHYSICAL THERAPY | Age: 88
End: 2023-06-01
Attending: PHYSICAL MEDICINE & REHABILITATION
Payer: MEDICARE

## 2023-06-01 PROCEDURE — 97110 THERAPEUTIC EXERCISES: CPT | Performed by: PHYSICAL THERAPIST

## 2023-06-01 NOTE — PROGRESS NOTES
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 Tory Lundy MD visit: none scheduled  Fall Risk: standard         Precautions: n/a           Medication changes per patient? NO  Date of evaluation/PN: 2023  Pain ratin  Subjective: Painfree upon arrival and no symptom reproduction during session. Objective: Requires cues for optimal upright posture without producing stenotic symptoms during functional movement patterns in standing. Assessment: Tolerating increased resistance in unloaded positions and introduction of functional movement patterns/load handling in upright positions without signs of tissue irritability. Goals: In progress as follows: Chang Nip will perform sit to stand and gait initiation consistently without episodes of LOB. Chang Nip will assume SLS 5-10 secs at a time to allow safe pivot turns during gait. Chang Nip will be (I) with a [facility] and maintenance of function. Plan: Continue to progress upright activities as tolerated. Date: 2023  TX#:  Date: 2023                 TX#: 3/8 Date:2023                 TX#:  Date:2023                 TX#:  Date:    Tx#: 6/   THERAPEUTIC EX 22 mins 38 mins 45 mins 45 mins    slantboard stretch L4 3 x 1 min ea L4 (B) 3 x 1 min ea L4 (B) 3 x 1 min ea L4 (B) 3 x 1 min ea    NuStep (B)UE/LE focus on smooth reciprocal UE/LE movements L5 x 10 mins L5 x 10 mins L4 x 10 mins L5 x 10 mins    Sit to stand 6\" step on seat; feet on airex 3x10 Balance board A-P and lateral wt shift with focus on quiet control 3x10       PAUL  2x10      SKTC  2x10 ea      SB bridging  GSB 3x10 GSB 3x10 GSB 3x10    SB LTR  GSB 3x10 GSB 3x10 with 1.5lbs ea LE GSB 3x10 with 2.5lbs ea LE    SLR   3x10 ea 1.5lbs 3x10 ea 2.5lbs 3x10 ea    SL hip abd  3x10 ea 1.5lbs 3x10 ea 2.5lbs 3x10 ea    Prone hip ext  2x10 ea 1.5lbs 3x10 ea 2.5lbs 3x10 ea    Prone hams curls  3x10 ea 1.5lbs 3x10 ea 2.5lbs 3x10 ea    Prone gluteal raises  2x10 ea 1.5lbs 3x10 ea 2.5lbs 3x10 ea    Seated marching  3x10 1.5lbs 3x10 ea 2.5lbs 3x10 ea    Seated LAQ  3x10 1.5lbs 3x10 ea 2.5lbs 3x10 ea    HL marching   1.5lbs 3x10 ea 2.5lbs 3x10 ea    SB hams curls   GSB 3x10 GSB 3x10    Shuttle single leg   2 band 3x10 3 band 3x10 ea    Shuttle (B)   4 band 3x10 5 band 3x10    Lat pull downs    Multi pull 10lbs 2x10    Low rows    Multi pull 10lbs 2x10            NEUROMUSCULAR RE-EDUCATION 23 mins       Narrow stance airex EO 3 x 1 min ea head nods and head rotation       Tandem stance airex x 1 min ea lead (R)/(L)       Marching in place airex 3 x 1 min        Single leg balance 1 min ea with intermittent UE support                               HEP: Continue current HEP. Charges:  Therapeutic ex x 3       Total Timed Treatment: 45 min  Total Treatment Time: 45 min

## 2023-06-08 ENCOUNTER — OFFICE VISIT (OUTPATIENT)
Dept: PHYSICAL THERAPY | Age: 88
End: 2023-06-08
Attending: PHYSICAL MEDICINE & REHABILITATION
Payer: MEDICARE

## 2023-06-08 PROCEDURE — 97110 THERAPEUTIC EXERCISES: CPT | Performed by: PHYSICAL THERAPIST

## 2023-06-08 NOTE — PROGRESS NOTES
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 Melba Lundy MD visit: none scheduled  Fall Risk: standard         Precautions: n/a           Medication changes per patient? NO  Date of evaluation/PN: 2023  Pain ratin  Subjective: Denies pain upon arrival.    Objective: Improved upright posture noted with no LOB during gait in open level surfaces and navigating through obstacles. Assessment: Demonstrating tolerance to 15 mins of loaded/upright activities including load handling without tissue irritability with improved core strength/stability allowing improved upright posture control. Goals: In progress as follows: Elena Bernheim will perform sit to stand and gait initiation consistently without episodes of LOB. Elena Bernheim will assume SLS 5-10 secs at a time to allow safe pivot turns during gait. Elena Bernheim will be (I) with a home program [facility] and [facility] of function. Plan: Continue to progress upright activities as tolerated.    Date: 2023                 TX#: 3/8 Date:2023                 TX#:  Date:2023                 TX#:  Date:2023   Tx#:    THERAPEUTIC EX 38 mins 45 mins 45 mins 45 mins   slantboard stretch L4 (B) 3 x 1 min ea L4 (B) 3 x 1 min ea L4 (B) 3 x 1 min ea L4 (B) 3 x 1 min ea   NuStep (B)UE/LE focus on smooth reciprocal UE/LE movements L5 x 10 mins L4 x 10 mins L5 x 10 mins L5 x 10 mins   HL bent knee fall out    Yellow tband 2x10 ea   Balance board       PAUL 2x10      SKTC 2x10 ea      SB bridging GSB 3x10 GSB 3x10 GSB 3x10 GSB 3x10   SB LTR GSB 3x10 GSB 3x10 with 1.5lbs ea LE GSB 3x10 with 2.5lbs ea LE GSB 3x10 with 2.5lbs ea LE   SLR  3x10 ea 1.5lbs 3x10 ea 2.5lbs 3x10 ea    SL hip abd 3x10 ea 1.5lbs 3x10 ea 2.5lbs 3x10 ea    Prone hip ext 2x10 ea 1.5lbs 3x10 ea 2.5lbs 3x10 ea    Prone hams curls 3x10 ea 1.5lbs 3x10 ea 2.5lbs 3x10 ea    Prone gluteal raises 2x10 ea 1.5lbs 3x10 ea 2.5lbs 3x10 ea    Seated marching 3x10 1.5lbs 3x10 ea 2.5lbs 3x10 ea    Seated LAQ 3x10 1.5lbs 3x10 ea 2.5lbs 3x10 ea    HL marching  1.5lbs 3x10 ea 2.5lbs 3x10 ea 2.5lbs 3x10 ea   SB hams curls  GSB 3x10 GSB 3x10 GSB 3x10 with 2.5lbs ea LE   Shuttle single leg  2 band 3x10 3 band 3x10 ea 4 band 3x10 ea   Shuttle (B)  4 band 3x10 5 band 3x10 6 band 3x10 ea   Lat pull downs   Multi pull 10lbs 2x10 Multi-pull 10lbs 3x10   Low rows   Multi pull 10lbs 2x10 Multi-pull 15lbs 3x10   Prone hip ext    2.5lbs 3x10 ea   Prone glut raises    2.5lbs 3x10 ea   Prone heel squeeze    2.5lbs ea LE with yoga ball and knee flexion 2x10   SL hip abd    2.5lbs 3x10 ea   HL TA bracing with isometric hip add and LE lifts    Yoga ball and 2.5lbs ea LE 3x10          NEUROMUSCULAR RE-EDUCATION       Narrow stance       Tandem stance       Marching in place       Single leg balance                            HEP: Continue current HEP. Charges:  Therapeutic ex x 3       Total Timed Treatment: 45 min  Total Treatment Time: 45 min

## 2023-06-12 ENCOUNTER — HOSPITAL ENCOUNTER (OUTPATIENT)
Dept: GENERAL RADIOLOGY | Age: 88
Discharge: HOME | End: 2023-06-12
Attending: FAMILY MEDICINE
Payer: MEDICARE

## 2023-06-12 ENCOUNTER — OFFICE VISIT (OUTPATIENT)
Dept: FAMILY MEDICINE CLINIC | Facility: CLINIC | Age: 88
End: 2023-06-12

## 2023-06-12 ENCOUNTER — HOSPITAL ENCOUNTER (OUTPATIENT)
Dept: ULTRASOUND IMAGING | Facility: HOSPITAL | Age: 88
Discharge: HOME | End: 2023-06-12
Attending: FAMILY MEDICINE
Payer: MEDICARE

## 2023-06-12 VITALS
SYSTOLIC BLOOD PRESSURE: 156 MMHG | WEIGHT: 161.19 LBS | BODY MASS INDEX: 25 KG/M2 | DIASTOLIC BLOOD PRESSURE: 80 MMHG | HEART RATE: 60 BPM

## 2023-06-12 DIAGNOSIS — M79.89 LEG SWELLING: ICD-10-CM

## 2023-06-12 DIAGNOSIS — M79.89 LEG SWELLING: Primary | ICD-10-CM

## 2023-06-12 DIAGNOSIS — S89.92XD INJURY OF LEFT LOWER EXTREMITY, SUBSEQUENT ENCOUNTER: ICD-10-CM

## 2023-06-12 PROCEDURE — 99214 OFFICE O/P EST MOD 30 MIN: CPT | Performed by: FAMILY MEDICINE

## 2023-06-12 PROCEDURE — 73590 X-RAY EXAM OF LOWER LEG: CPT | Performed by: FAMILY MEDICINE

## 2023-06-12 PROCEDURE — 93971 EXTREMITY STUDY: CPT | Performed by: FAMILY MEDICINE

## 2023-06-12 PROCEDURE — 73560 X-RAY EXAM OF KNEE 1 OR 2: CPT | Performed by: FAMILY MEDICINE

## 2023-06-12 PROCEDURE — 1125F AMNT PAIN NOTED PAIN PRSNT: CPT | Performed by: FAMILY MEDICINE

## 2023-06-12 RX ORDER — AMLODIPINE BESYLATE 5 MG/1
TABLET ORAL
Qty: 90 TABLET | Refills: 3 | Status: SHIPPED | OUTPATIENT
Start: 2023-06-12

## 2023-06-13 ENCOUNTER — TELEPHONE (OUTPATIENT)
Dept: PHYSICAL MEDICINE AND REHAB | Facility: CLINIC | Age: 88
End: 2023-06-13

## 2023-06-13 DIAGNOSIS — M71.22 BAKER'S CYST OF KNEE, LEFT: Primary | ICD-10-CM

## 2023-06-13 NOTE — TELEPHONE ENCOUNTER
Per CMS Guidelines -no authorization is required for LEFT knee Baker's cyst aspiration and injection under ultrasound CPT 22986,      Status: Authorization is not required however may be subject to review once claim is submitted-Covered Benefit    Patient is already scheduled 6/29/23

## 2023-06-13 NOTE — TELEPHONE ENCOUNTER
Per Dr. Virgilio Carrizales: Carlton Chowdhury MD  [address] [place] [address] Nurse; Hemant Guerrero  \"Would it be possible to see this patient after injections on Friday to aspirate his Baker's cyst? \"    Order placed as written by Dr. Virgilio Carrizales.

## 2023-06-13 NOTE — TELEPHONE ENCOUNTER
LVMTCB - left message for pt's son to give office call back to try and get pt scheduled as soon as possible.

## 2023-06-13 NOTE — PROGRESS NOTES
Hi Dr. Little Devlin - Can you see Rolando Yazmin this week? He is in a lot of pain and swelling.  Not sure if able to drain the cyst.

## 2023-06-14 NOTE — TELEPHONE ENCOUNTER
S/w patient's son - pt & son will be available for Friday: 06/16/23 at 12:30PM.     Need [facility] ANNALISE To open slot, once opened will schedule pt on [facility].

## 2023-06-15 ENCOUNTER — OFFICE VISIT (OUTPATIENT)
Dept: PHYSICAL THERAPY | Age: 88
End: 2023-06-15
Attending: FAMILY MEDICINE
Payer: MEDICARE

## 2023-06-15 PROCEDURE — 97110 THERAPEUTIC EXERCISES: CPT | Performed by: PHYSICAL THERAPIST

## 2023-06-15 NOTE — PROGRESS NOTES
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 César Chavez ref. provider found  Next MD visit: none scheduled  Fall Risk: standard         Precautions: n/a           Medication changes per patient? NO  Date of evaluation/PN: 2023  Pain ratin  Subjective: Painfree with exercise progressions this session. Objective: Good safety awareness noted when navigating obstacles during gait and demonstrates no LOB; tolerating 30 mins of loaded positions without symptom provocation. Assessment: Meeting goals towards (I) safe function and will be ready for DC with updated HEP. Goals: In progress as follows: Kelsey Rolle will perform sit to stand and gait initiation consistently without episodes of LOB. Kelsey Rolle will assume SLS 5-10 secs at a time to allow safe pivot turns during gait. Kelsey Rolle will be (I) with a home program to allow [facility] further self-recovery and maintenance of function. Plan: Finalize HEP then DC with (I) HEP.    Date: 2023                 TX#: 3/8 Date:2023                 TX#:  Date:2023                 TX#:  Date:2023   Tx#:  Date:6/15/2023   Tx#:     THERAPEUTIC EX 38 mins 45 mins 45 mins 45 mins 45 mins    slantboard stretch L4 (B) 3 x 1 min ea L4 (B) 3 x 1 min ea L4 (B) 3 x 1 min ea L4 (B) 3 x 1 min ea L4 (B) 3 x 1 min ea    NuStep (B)UE/LE focus on smooth reciprocal UE/LE movements L5 x 10 mins L4 x 10 mins L5 x 10 mins L5 x 10 mins L6 x 10 mins    HL bent knee fall out    Yellow tband 2x10 ea     Balance board         PAUL 2x10 SKTC 2x10 ea        SB bridging GSB 3x10 GSB 3x10 GSB 3x10 GSB 3x10 GSB 3x10    SB LTR GSB 3x10 GSB 3x10 with 1.5lbs ea LE GSB 3x10 with 2.5lbs ea LE GSB 3x10 with 2.5lbs ea LE GSB 3x10 with 3lbs ea LE    SLR  3x10 ea 1.5lbs 3x10 ea 2.5lbs 3x10 ea  3lbs 3x10    SL hip abd 3x10 ea 1.5lbs 3x10 ea 2.5lbs 3x10 ea  3lbs 3x10    Prone hip ext 2x10 ea 1.5lbs 3x10 ea 2.5lbs 3x10 ea  3lbs 3x10    Prone hams curls 3x10 ea 1.5lbs 3x10 ea 2.5lbs 3x10 ea  3lbs 3x10    Prone gluteal raises 2x10 ea 1.5lbs 3x10 ea 2.5lbs 3x10 ea  3lbs 3x10    Seated marching 3x10 1.5lbs 3x10 ea 2.5lbs 3x10 ea  3lbs 3x10    Seated LAQ 3x10 1.5lbs 3x10 ea 2.5lbs 3x10 ea  3lbs 3x10    HL marching  1.5lbs 3x10 ea 2.5lbs 3x10 ea 2.5lbs 3x10 ea 3lbs 3x10    SB hams curls  GSB 3x10 GSB 3x10 GSB 3x10 with 2.5lbs ea LE GSB 3x10 with 2.5lbs ea LE    Shuttle single leg  2 band 3x10 3 band 3x10 ea 4 band 3x10 ea 5 band 3x10 ea    Shuttle (B)  4 band 3x10 5 band 3x10 6 band 3x10 ea 7 band 3x10    Lat pull downs   Multi pull 10lbs 2x10 Multi-pull 10lbs 3x10 Multi-pull 15lbs 3x10    Low rows   Multi pull 10lbs 2x10 Multi-pull 15lbs 3x10 Multi-pull 15lbs 3x10    Prone hip ext    2.5lbs 3x10 ea     Prone glut raises    2.5lbs 3x10 ea     Prone heel squeeze    2.5lbs ea LE with yoga ball and knee flexion 2x10     SL hip abd    2.5lbs 3x10 ea     HL TA bracing with isometric hip add and LE lifts    Yoga ball and 2.5lbs ea LE 3x10 Yoga ball and 3lbs ea LE 3x10    Seated ball roll     GSB 3x10                                                                            HEP: Continue current HEP. Charges:  Therapeutic ex x 3       Total Timed Treatment: 45 min  Total Treatment Time: 45 min

## 2023-06-16 ENCOUNTER — MED REC SCAN ONLY (OUTPATIENT)
Dept: PHYSICAL MEDICINE AND REHAB | Facility: CLINIC | Age: 88
End: 2023-06-16

## 2023-06-16 ENCOUNTER — OFFICE VISIT (OUTPATIENT)
Dept: PHYSICAL MEDICINE AND REHAB | Facility: CLINIC | Age: 88
End: 2023-06-16
Payer: MEDICARE

## 2023-06-16 DIAGNOSIS — M71.22 BAKER'S CYST OF KNEE, LEFT: Primary | ICD-10-CM

## 2023-06-16 DIAGNOSIS — M43.26 FUSION OF LUMBAR SPINE: ICD-10-CM

## 2023-06-16 DIAGNOSIS — M19.072 PRIMARY OSTEOARTHRITIS OF LEFT ANKLE: ICD-10-CM

## 2023-06-16 DIAGNOSIS — M25.562 ACUTE PAIN OF LEFT KNEE: ICD-10-CM

## 2023-06-16 DIAGNOSIS — M17.12 PRIMARY OSTEOARTHRITIS OF LEFT KNEE: ICD-10-CM

## 2023-06-16 DIAGNOSIS — N18.4 CHRONIC RENAL DISEASE, STAGE IV (HCC): ICD-10-CM

## 2023-06-16 DIAGNOSIS — M48.061 LUMBAR FORAMINAL STENOSIS: ICD-10-CM

## 2023-06-16 DIAGNOSIS — R26.9 GAIT DISTURBANCE: ICD-10-CM

## 2023-06-16 DIAGNOSIS — M48.061 SPINAL STENOSIS OF LUMBAR REGION WITHOUT NEUROGENIC CLAUDICATION: ICD-10-CM

## 2023-06-16 DIAGNOSIS — M51.9 LUMBAR DISC DISEASE: ICD-10-CM

## 2023-06-16 RX ORDER — LIDOCAINE HYDROCHLORIDE 10 MG/ML
1 INJECTION, SOLUTION INFILTRATION; PERINEURAL ONCE
Status: COMPLETED | OUTPATIENT
Start: 2023-06-16 | End: 2023-06-16

## 2023-06-16 RX ORDER — TRIAMCINOLONE ACETONIDE 40 MG/ML
40 INJECTION, SUSPENSION INTRA-ARTICULAR; INTRAMUSCULAR ONCE
Status: COMPLETED | OUTPATIENT
Start: 2023-06-16 | End: 2023-06-16

## 2023-06-22 ENCOUNTER — OFFICE VISIT (OUTPATIENT)
Dept: PHYSICAL THERAPY | Age: 88
End: 2023-06-22
Attending: FAMILY MEDICINE
Payer: MEDICARE

## 2023-06-22 PROBLEM — M25.562 ACUTE PAIN OF LEFT KNEE: Status: ACTIVE | Noted: 2023-06-22

## 2023-06-22 PROBLEM — M71.22 BAKER'S CYST OF KNEE, LEFT: Status: ACTIVE | Noted: 2023-06-22

## 2023-06-22 PROCEDURE — 97110 THERAPEUTIC EXERCISES: CPT | Performed by: PHYSICAL THERAPIST

## 2023-06-22 NOTE — PROGRESS NOTES
Discharge Summary  Pt has attended 8 visits in Physical Therapy. 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 Chloe Hay ref. provider found  Next MD visit: none scheduled  Fall Risk: standard         Precautions: n/a           Medication changes per patient? NO  Date of evaluation/PN: 2023  Pain ratin  Assessment:   Claus Lindquist has now completed his course of physical therapy regaining (I) and safe functional community mobility without a device with improved LE strength and balance/proprioception preventing LOB/falls. He is (I) with a home program to allow him to continue towards further self-recovery and maintenance of function. Subjective: continues to be painfree and performing his home program without issues. Objective: (B)LE AROM WNL and painfree; strength grossly 4+ to 5/5; SLS at 5-10 secs at a time without UE support. Goals: MET as follows: Claus Lindquist will perform sit to stand and gait initiation consistently without episodes of LOB. Claus Lindquist will assume SLS 5-10 secs at a time to allow safe pivot turns during gait. Claus Lindquist will be (I) with a home program to allow discharge from PT towards further self-recovery and maintenance of function. Plan: Finalize HEP then DC with (I) HEP.    Date: 2023                 TX#: 3/8 Date:2023                 TX#:  Date:2023                 TX#:  Date:2023   Tx#:  Date:6/15/2023   Tx#:  Date:2023   Tx#:    THERAPEUTIC EX 38 mins 45 mins 45 mins 45 mins 45 mins 45 mins   slantboard stretch L4 (B) 3 x 1 min ea L4 (B) 3 x 1 min ea L4 (B) 3 x 1 min ea L4 (B) 3 x 1 min ea L4 (B) 3 x 1 min ea L4 (B) 3 x 1 min ea   NuStep (B)UE/LE focus on smooth reciprocal UE/LE movements L5 x 10 mins L4 x 10 mins L5 x 10 mins L5 x 10 mins L6 x 10 mins L6 x 10 mins   HL bent knee fall out    Yellow tband 2x10 ea     Balance board         PAUL 2x10        SKTC 2x10 ea        SB bridging GSB 3x10 GSB 3x10 GSB 3x10 GSB 3x10 GSB 3x10 GSB 3x10   SB LTR GSB 3x10 GSB 3x10 with 1.5lbs ea LE GSB 3x10 with 2.5lbs ea LE GSB 3x10 with 2.5lbs ea LE GSB 3x10 with 3lbs ea LE GSB 3x10 with 4lbs ea LE   SLR  3x10 ea 1.5lbs 3x10 ea 2.5lbs 3x10 ea  3lbs 3x10 4lbs 3x10   SL hip abd 3x10 ea 1.5lbs 3x10 ea 2.5lbs 3x10 ea  3lbs 3x10 4lbs 3x10   Prone hip ext 2x10 ea 1.5lbs 3x10 ea 2.5lbs 3x10 ea  3lbs 3x10 4lbs 3x10   Prone hams curls 3x10 ea 1.5lbs 3x10 ea 2.5lbs 3x10 ea  3lbs 3x10 4lbs 3x10   Prone gluteal raises 2x10 ea 1.5lbs 3x10 ea 2.5lbs 3x10 ea  3lbs 3x10 4lbs 3x10   Seated marching 3x10 1.5lbs 3x10 ea 2.5lbs 3x10 ea  3lbs 3x10 4lbs 3x10   Seated LAQ 3x10 1.5lbs 3x10 ea 2.5lbs 3x10 ea  3lbs 3x10 4lbs 3x10   HL marching  1.5lbs 3x10 ea 2.5lbs 3x10 ea 2.5lbs 3x10 ea 3lbs 3x10 4lbs 3x10   SB hams curls  GSB 3x10 GSB 3x10 GSB 3x10 with 2.5lbs ea LE GSB 3x10 with 2.5lbs ea LE GSB 3x10 with 4lbs ea LE   Shuttle single leg  2 band 3x10 3 band 3x10 ea 4 band 3x10 ea 5 band 3x10 ea 5 band 3x10 ea   Shuttle (B)  4 band 3x10 5 band 3x10 6 band 3x10 ea 7 band 3x10 7 band 3x10   Lat pull downs   Multi pull 10lbs 2x10 Multi-pull 10lbs 3x10 Multi-pull 15lbs 3x10 Multi-pull 15lbs 3x10   Low rows   Multi pull 10lbs 2x10 Multi-pull 15lbs 3x10 Multi-pull 15lbs 3x10 Multi-pull 15lbs 3x10   Prone hip ext    2.5lbs 3x10 ea  4lbs 3x10   Prone glut raises    2.5lbs 3x10 ea  4lbs 3x10   Prone heel squeeze    2.5lbs ea LE with yoga ball and knee flexion 2x10     SL hip abd    2.5lbs 3x10 ea  4lbs 3x10   HL TA bracing with isometric hip add and LE lifts    Yoga ball and 2.5lbs ea LE 3x10 Yoga ball and 3lbs ea LE 3x10    Seated ball roll     GSB 3x10 GSB 3x10                                                                           HEP: Refer to patient instructions. Charges: Therapeutic ex x 3       Total Timed Treatment: 45 min  Total Treatment Time: 45 min  Post LEFS Score  No data recorded due to language barrier. Plan: Discharge with (I) HEP. Thank you for your referral. If you have any questions, please contact me at Dept: [phone].     Sincerely,  Electronically signed by therapist: Yumiko Otoole PT

## 2023-06-24 PROBLEM — M17.12 PRIMARY OSTEOARTHRITIS OF LEFT KNEE: Status: ACTIVE | Noted: 2023-06-24

## 2023-06-24 PROBLEM — M19.072 PRIMARY OSTEOARTHRITIS OF LEFT ANKLE: Status: ACTIVE | Noted: 2023-06-24

## 2023-06-24 NOTE — PROCEDURES
The patient is here for a left knee Baker's cyst aspiration and injection done under ultrasound guidance. Under ultrasound guidance the left knee Baker's cyst in the medial popliteal fossa was identified and a nathanael was placed on the patient's skin. The skin was cleaned with betadyne swabs x 3 and anesthetized with cold spray. Then a 18 gauge needle was inserted under ultrasound guidance into the left Baker's cyst.  Aspiration was performed and 6 ml of clear yellow fluid was aspirated. Then the patient was then injected with 4 ml of 1.5 ml of 40 mg of Kenalog/ml and 2.5 ml of 1% lidocaine without epinephrine. The needle was removed and a band aid was applied. The patient will follow up in 2 weeks with a message. These images are permanently stored in the ultrasound unit or PACS system.

## 2023-06-29 ENCOUNTER — OFFICE VISIT (OUTPATIENT)
Dept: PHYSICAL MEDICINE AND REHAB | Facility: CLINIC | Age: 88
End: 2023-06-29
Payer: MEDICARE

## 2023-06-29 VITALS — WEIGHT: 161 LBS | OXYGEN SATURATION: 99 % | BODY MASS INDEX: 25 KG/M2 | HEART RATE: 78 BPM

## 2023-06-29 DIAGNOSIS — M25.562 ACUTE PAIN OF LEFT KNEE: ICD-10-CM

## 2023-06-29 DIAGNOSIS — M51.9 LUMBAR DISC DISEASE: ICD-10-CM

## 2023-06-29 DIAGNOSIS — M48.061 LUMBAR FORAMINAL STENOSIS: ICD-10-CM

## 2023-06-29 DIAGNOSIS — M48.061 SPINAL STENOSIS OF LUMBAR REGION WITHOUT NEUROGENIC CLAUDICATION: ICD-10-CM

## 2023-06-29 DIAGNOSIS — M43.26 FUSION OF LUMBAR SPINE: ICD-10-CM

## 2023-06-29 DIAGNOSIS — M17.12 PRIMARY OSTEOARTHRITIS OF LEFT KNEE: ICD-10-CM

## 2023-06-29 DIAGNOSIS — M54.16 LUMBAR RADICULOPATHY: Primary | ICD-10-CM

## 2023-06-29 DIAGNOSIS — M71.22 BAKER'S CYST OF KNEE, LEFT: ICD-10-CM

## 2023-06-29 PROCEDURE — 1125F AMNT PAIN NOTED PAIN PRSNT: CPT | Performed by: PHYSICAL MEDICINE & REHABILITATION

## 2023-06-29 PROCEDURE — 99213 OFFICE O/P EST LOW 20 MIN: CPT | Performed by: PHYSICAL MEDICINE & REHABILITATION

## 2023-07-12 ENCOUNTER — OFFICE VISIT (OUTPATIENT)
Dept: SURGERY | Facility: CLINIC | Age: 88
End: 2023-07-12

## 2023-07-12 VITALS — SYSTOLIC BLOOD PRESSURE: 158 MMHG | HEART RATE: 69 BPM | DIASTOLIC BLOOD PRESSURE: 70 MMHG

## 2023-07-12 DIAGNOSIS — R31.29 MICROHEMATURIA: ICD-10-CM

## 2023-07-12 DIAGNOSIS — R82.90 URINE FINDING: ICD-10-CM

## 2023-07-12 DIAGNOSIS — R35.0 BENIGN PROSTATIC HYPERPLASIA WITH URINARY FREQUENCY: Primary | ICD-10-CM

## 2023-07-12 DIAGNOSIS — N40.1 BENIGN PROSTATIC HYPERPLASIA WITH URINARY FREQUENCY: Primary | ICD-10-CM

## 2023-07-12 LAB
APPEARANCE: CLEAR
BILIRUBIN: NEGATIVE
GLUCOSE (URINE DIPSTICK): 100 MG/DL
KETONES (URINE DIPSTICK): NEGATIVE MG/DL
LEUKOCYTES: NEGATIVE
MULTISTIX LOT#: ABNORMAL NUMERIC
NITRITE, URINE: NEGATIVE
PH, URINE: 5 (ref 4.5–8)
PROTEIN (URINE DIPSTICK): >=300 MG/DL
SPECIFIC GRAVITY: 1.02 (ref 1–1.03)
URINE-COLOR: YELLOW
UROBILINOGEN,SEMI-QN: 0.2 MG/DL (ref 0–1.9)

## 2023-07-12 PROCEDURE — 99214 OFFICE O/P EST MOD 30 MIN: CPT | Performed by: UROLOGY

## 2023-07-12 PROCEDURE — 81002 URINALYSIS NONAUTO W/O SCOPE: CPT | Performed by: UROLOGY

## 2023-07-12 NOTE — PROGRESS NOTES
[facility]  Follow-Up Visit    HPI: Robbie Hsieh is a 80year old male presents for a follow up visit. Patient was last seen on 7/11/22 by Dr. Aries Saenz. Accompanied by his son, Judah Johnson who is translating. INTERVAL HISTORY: Former patient of Dr. Fara Cohn. Here to establish continuity of urological care following Dr. Yael Correa group home    BPH with LUTS: On maximal medical therapy with tamsulosin 0.4 mg twice daily and finasteride 5 mg daily. Stable lower urinary tract symptoms. IPSS is 8 (2/0/2/0/1/0/3) with quality-of-life score of 1. No gross hematuria or dysuria. History of microscopic hematuria 1/2021. Underwent evaluation with CT and cystoscopy 6/2021 which was unrevealing. Urinalysis 7/2022 without evidence of microscopic hematuria. Urinalysis 9/2022 also without evidence of microscopic hematuria. Dipstick UA in the office today revealing trace blood-lysed. No fevers or chills, chest pain or shortness of breath. Reviewed past medical, surgical, family, and social history. Reviewed med list and allergies. REVIEW OF SYSTEMS:  Pertinent positives and negatives per HPI. A 10-point ROS was performed and is otherwise negative. EXAM:  /70   Pulse 69     Physical Exam  Constitutional:       General: He is not in acute distress. Appearance: He is well-developed. HENT:      Head: Normocephalic. Eyes:      General: No scleral icterus. Cardiovascular:      Rate and Rhythm: Normal rate. Pulmonary:      Effort: Pulmonary effort is normal.   Skin:     General: Skin is warm and dry. Neurological:      Mental Status: He is alert and oriented to person, place, and time. Psychiatric:         Mood and Affect: Mood normal.         Behavior: Behavior normal.       PATHOLOGY:  No results found. LABS:  See HPI for details. IMAGING:  No results found. UROLOGY PROCEDURE:  None performed today.       IMPRESSION:  80year old male with BPH and LUTS, on maximal medical therapy with tamsulosin 0.4 mg twice daily and finasteride 5 mg daily. History of microscopic hematuria status post negative evaluation 2021. Findings reviewed with patient and accompanying son at length. Stable lower urinary tract symptoms on maximal medical therapy for BPH. Discussed continuing current management versus considering minimally invasive surgical treatment options for BPH. Patient is currently not significantly bothered by his symptoms that are stable. He elects continuing current medical management. We then discussed previous history of microscopic hematuria for which she underwent a negative evaluation 2021. Repeat UA 7/2022 was negative. Dipstick UA today revealing trace amount of blood. Recommend sending urine down to the lab for microscopic evaluation. If no evidence of microscopic hematuria, No further urological work-up indicated and no further UA for the purpose of evaluation of asymptomatic microhematuria is necessary. Persistent microscopic hematuria would trigger a repeat evaluation in 3-5 years since last work-up. Patient and son both verbalized understanding and agreed to the plan of care. All questions answered. PLAN:  1. Continue tamsulosin and finasteride for management of BPH. Refills as needed. 2.  Send urine for microscopy. They will be notified of the results. If no evidence of microscopic hematuria, No further urological work-up indicated and no further UA for the purpose of evaluation of asymptomatic microhematuria is necessary. Persistent microscopic hematuria would trigger a repeat evaluation in 3-5 years since last work-up. RTC for follow-up in 1 year with urology APN.       27 mintues were spent on this visit including face-to-face discussion involving counseling, further management and treatment planning, reviewing testing and interpreting imaging results, ordering new testing, and documenting in patient's medical record.     Melissa Barclay MD  7/12/2023

## 2023-07-17 DIAGNOSIS — Z79.4 CONTROLLED TYPE 2 DIABETES MELLITUS WITH COMPLICATION, WITH LONG-TERM CURRENT USE OF INSULIN (HCC): ICD-10-CM

## 2023-07-17 DIAGNOSIS — E11.8 CONTROLLED TYPE 2 DIABETES MELLITUS WITH COMPLICATION, WITH LONG-TERM CURRENT USE OF INSULIN (HCC): ICD-10-CM

## 2023-07-18 DIAGNOSIS — Z79.4 CONTROLLED TYPE 2 DIABETES MELLITUS WITH COMPLICATION, WITH LONG-TERM CURRENT USE OF INSULIN (HCC): ICD-10-CM

## 2023-07-18 DIAGNOSIS — E11.8 CONTROLLED TYPE 2 DIABETES MELLITUS WITH COMPLICATION, WITH LONG-TERM CURRENT USE OF INSULIN (HCC): ICD-10-CM

## 2023-07-19 ENCOUNTER — OFFICE VISIT (OUTPATIENT)
Dept: ENDOCRINOLOGY CLINIC | Facility: CLINIC | Age: 88
End: 2023-07-19

## 2023-07-19 VITALS
WEIGHT: 160 LBS | DIASTOLIC BLOOD PRESSURE: 75 MMHG | BODY MASS INDEX: 25 KG/M2 | HEART RATE: 65 BPM | SYSTOLIC BLOOD PRESSURE: 160 MMHG

## 2023-07-19 DIAGNOSIS — Z79.4 CONTROLLED TYPE 2 DIABETES MELLITUS WITH COMPLICATION, WITH LONG-TERM CURRENT USE OF INSULIN (HCC): Primary | ICD-10-CM

## 2023-07-19 DIAGNOSIS — E11.8 CONTROLLED TYPE 2 DIABETES MELLITUS WITH COMPLICATION, WITH LONG-TERM CURRENT USE OF INSULIN (HCC): Primary | ICD-10-CM

## 2023-07-19 LAB
CARTRIDGE LOT#: ABNORMAL NUMERIC
GLUCOSE BLOOD: 180
HEMOGLOBIN A1C: 7.7 % (ref 4.3–5.6)
TEST STRIP LOT #: NORMAL NUMERIC

## 2023-07-19 PROCEDURE — 83036 HEMOGLOBIN GLYCOSYLATED A1C: CPT | Performed by: INTERNAL MEDICINE

## 2023-07-19 PROCEDURE — 1126F AMNT PAIN NOTED NONE PRSNT: CPT | Performed by: INTERNAL MEDICINE

## 2023-07-19 PROCEDURE — 99213 OFFICE O/P EST LOW 20 MIN: CPT | Performed by: INTERNAL MEDICINE

## 2023-07-19 PROCEDURE — 82947 ASSAY GLUCOSE BLOOD QUANT: CPT | Performed by: INTERNAL MEDICINE

## 2023-07-19 RX ORDER — INSULIN GLARGINE 100 [IU]/ML
INJECTION, SOLUTION SUBCUTANEOUS
Qty: 15 ML | Refills: 1 | Status: SHIPPED | OUTPATIENT
Start: 2023-07-19

## 2023-07-19 RX ORDER — LANCETS 33 GAUGE
1 EACH MISCELLANEOUS 2 TIMES DAILY
Qty: 200 EACH | Refills: 0 | Status: SHIPPED | OUTPATIENT
Start: 2023-07-19

## 2023-08-11 ENCOUNTER — LAB ENCOUNTER (OUTPATIENT)
Dept: LAB | Age: 88
End: 2023-08-11
Attending: INTERNAL MEDICINE
Payer: MEDICARE

## 2023-08-11 DIAGNOSIS — N18.4 CKD (CHRONIC KIDNEY DISEASE) STAGE 4, GFR 15-29 ML/MIN (HCC): ICD-10-CM

## 2023-08-11 LAB
ALBUMIN SERPL-MCNC: 3.2 G/DL (ref 3.4–5)
ANION GAP SERPL CALC-SCNC: 7 MMOL/L (ref 0–18)
BASOPHILS # BLD AUTO: 0.03 X10(3) UL (ref 0–0.2)
BASOPHILS NFR BLD AUTO: 0.5 %
BUN BLD-MCNC: 40 MG/DL (ref 7–18)
BUN/CREAT SERPL: 15.1 (ref 10–20)
CALCIUM BLD-MCNC: 8.7 MG/DL (ref 8.5–10.1)
CHLORIDE SERPL-SCNC: 112 MMOL/L (ref 98–112)
CO2 SERPL-SCNC: 24 MMOL/L (ref 21–32)
CREAT BLD-MCNC: 2.65 MG/DL
DEPRECATED RDW RBC AUTO: 45.4 FL (ref 35.1–46.3)
EGFRCR SERPLBLD CKD-EPI 2021: 22 ML/MIN/1.73M2 (ref 60–?)
EOSINOPHIL # BLD AUTO: 0.18 X10(3) UL (ref 0–0.7)
EOSINOPHIL NFR BLD AUTO: 2.7 %
ERYTHROCYTE [DISTWIDTH] IN BLOOD BY AUTOMATED COUNT: 13.5 % (ref 11–15)
GLUCOSE BLD-MCNC: 118 MG/DL (ref 70–99)
HCT VFR BLD AUTO: 39 %
HGB BLD-MCNC: 12.6 G/DL
IMM GRANULOCYTES # BLD AUTO: 0.02 X10(3) UL (ref 0–1)
IMM GRANULOCYTES NFR BLD: 0.3 %
LYMPHOCYTES # BLD AUTO: 2.11 X10(3) UL (ref 1–4)
LYMPHOCYTES NFR BLD AUTO: 32 %
MCH RBC QN AUTO: 29.6 PG (ref 26–34)
MCHC RBC AUTO-ENTMCNC: 32.3 G/DL (ref 31–37)
MCV RBC AUTO: 91.5 FL
MONOCYTES # BLD AUTO: 0.56 X10(3) UL (ref 0.1–1)
MONOCYTES NFR BLD AUTO: 8.5 %
NEUTROPHILS # BLD AUTO: 3.69 X10 (3) UL (ref 1.5–7.7)
NEUTROPHILS # BLD AUTO: 3.69 X10(3) UL (ref 1.5–7.7)
NEUTROPHILS NFR BLD AUTO: 56 %
OSMOLALITY SERPL CALC.SUM OF ELEC: 307 MOSM/KG (ref 275–295)
PHOSPHATE SERPL-MCNC: 2.4 MG/DL (ref 2.5–4.9)
PLATELET # BLD AUTO: 199 10(3)UL (ref 150–450)
POTASSIUM SERPL-SCNC: 4 MMOL/L (ref 3.5–5.1)
RBC # BLD AUTO: 4.26 X10(6)UL
SODIUM SERPL-SCNC: 143 MMOL/L (ref 136–145)
WBC # BLD AUTO: 6.6 X10(3) UL (ref 4–11)

## 2023-08-11 PROCEDURE — 85025 COMPLETE CBC W/AUTO DIFF WBC: CPT

## 2023-08-11 PROCEDURE — 80069 RENAL FUNCTION PANEL: CPT

## 2023-08-11 PROCEDURE — 36415 COLL VENOUS BLD VENIPUNCTURE: CPT

## 2023-08-14 ENCOUNTER — OFFICE VISIT (OUTPATIENT)
Dept: NEPHROLOGY | Facility: CLINIC | Age: 88
End: 2023-08-14

## 2023-08-14 VITALS
SYSTOLIC BLOOD PRESSURE: 132 MMHG | DIASTOLIC BLOOD PRESSURE: 65 MMHG | WEIGHT: 162 LBS | HEART RATE: 62 BPM | BODY MASS INDEX: 25 KG/M2

## 2023-08-14 DIAGNOSIS — N18.4 CKD (CHRONIC KIDNEY DISEASE) STAGE 4, GFR 15-29 ML/MIN (HCC): Primary | ICD-10-CM

## 2023-08-14 PROCEDURE — 99214 OFFICE O/P EST MOD 30 MIN: CPT | Performed by: INTERNAL MEDICINE

## 2023-08-14 PROCEDURE — 1126F AMNT PAIN NOTED NONE PRSNT: CPT | Performed by: INTERNAL MEDICINE

## 2023-08-14 NOTE — PATIENT INSTRUCTIONS
Keep your blood pressures and blood sugars under good control. Repeat your kidney blood test in 3 months. Orders are in the computer.

## 2023-08-14 NOTE — PROGRESS NOTES
08/14/23        Patient: Nic Peters   YOB: 1934   Date of Visit: 8/14/2023       Dear  Dr. Geo Matthew MD,      Thank you for referring Nic Peters to my practice. Please find my assessment and plan below. 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 Overall the patient states has been doing well without any chest pain, shortness of breath, GI or urinary tract symptoms. Nocturia 2-3. Diabetes is now under better control as he had an A1c in April 2023 of 10.2 and now in July 2023 it is down to 7.7. Reviewed his blood pressure readings from home and overall they are acceptable with systolics usually in the 856O and 599D and diastolics in the 09Z. Heart rate 60s. On physical exam his blood pressure is 132/65 with a pulse of 62 and he weighed 162 pounds. Neck was supple without JVD. Lungs were clear. Heart revealed a regular rate and rhythm and S4 but no gallops, murmurs or rubs. Abdomen was soft, flat, nontender without organomegaly, masses or bruits. Extremities revealed trace edema. I reviewed his most recent labs done on August 11, 2023. Creatinine was slightly better down to 2.65 now with an estimated GFR of 22 cc/min. Electrolytes were good. Hemoglobin 12.6. I therefore reassured the patient and his son that overall his renal function is a bit better. Reinforced importance of blood pressure and blood sugar control. Maintain adequate hydration. Avoid nonsteroidals. Continue to do CBC and renal panels quarterly. I will see him again in 6 months for follow-up or sooner if clinically indicated. Thank you again for allowing me to participate in the care of your patient. If you have any questions please feel free to call.            Sincerely,   Sejal Montanez MD   [facility], [address] [place] [address]  [place] [address] [place] [address]  [address] [facility] [id number]    Document electronically generated by:  Sejal Montanez MD

## 2023-08-28 ENCOUNTER — OFFICE VISIT (OUTPATIENT)
Dept: PODIATRY CLINIC | Facility: CLINIC | Age: 88
End: 2023-08-28

## 2023-08-28 DIAGNOSIS — B35.1 ONYCHOMYCOSIS: Primary | ICD-10-CM

## 2023-08-28 DIAGNOSIS — E11.65 TYPE 2 DIABETES MELLITUS WITH HYPERGLYCEMIA, WITH LONG-TERM CURRENT USE OF INSULIN (HCC): ICD-10-CM

## 2023-08-28 DIAGNOSIS — Z79.4 TYPE 2 DIABETES MELLITUS WITH HYPERGLYCEMIA, WITH LONG-TERM CURRENT USE OF INSULIN (HCC): ICD-10-CM

## 2023-08-28 PROCEDURE — 99213 OFFICE O/P EST LOW 20 MIN: CPT | Performed by: STUDENT IN AN ORGANIZED HEALTH CARE EDUCATION/TRAINING PROGRAM

## 2023-08-28 NOTE — PROGRESS NOTES
[facility], [facility] Podiatry  Progress Note      Joselo San is a 80year old male. Patient presents with:  Diabetic Foot Care: 3 month f/u- last seen by Dr. Radha Nichols-  Pt here for nail trim. Denies any pain or  concerns. HAC:7.7 on 7/19            HPI:     Pt is a pleasant 80year old diabetic male who PTC for dyan foot evaluation. Pt admits to elongated and thickened toenails which he is unable to trim himself. Allergies: Barium Sulfate and Sulfa Antibiotics    Current Outpatient Medications   Medication Sig Dispense Refill    insulin glargine (LANTUS SOLOSTAR) 100 UNIT/ML Subcutaneous Solution Pen-injector INJECT 15 TO 25 UNITS DAILY IF BLOOD GLUCOSE IS ABOVE 250 15 mL 1    Lancets (ONETOUCH DELICA PLUS [id number]) Does not apply Misc 1 strip by In Vitro route 2 (two) times daily. 200 each 0    GLIMEPIRIDE 2 MG Oral Tab TAKE 1 TABLET DAILY WITH BREAKFAST 90 tablet 3    simvastatin 40 MG Oral Tab Take 1 tablet (40 mg total) by mouth daily. 90 tablet 3    pantoprazole 40 MG Oral Tab EC Take 1 tablet (40 mg total) by mouth before breakfast. 90 tablet 3    furosemide 20 MG Oral Tab TAKE 1 TABLET THREE TIMES A WEEK 30 tablet 4    Glucose Blood (ONETOUCH ULTRA) In Vitro Strip Check blood sugar twice a day. DX: E11.8, insulin dependent 200 strip 0    Insulin Pen Needle (BD PEN NEEDLE MINI U/F) 31G X 5 MM Does not apply Misc USE EVERY  each 5    finasteride 5 MG Oral Tab Take 1 tablet (5 mg total) by mouth daily. 10 tablet 0    carvedilol 6.25 MG Oral Tab       fluticasone propionate 50 MCG/ACT Nasal Suspension 2 sprays by Each Nare route in the morning. 16 g 4    tamsulosin 0.4 MG Oral Cap Take 2 capsules (0.8 mg total) by mouth daily. 180 capsule 3    timolol 0.5 % Ophthalmic Solution       Dulaglutide (TRULICITY) 3.19 OQ/1.6FA Subcutaneous Solution Pen-injector Inject 0.75 mg into the skin once a week. 12 each 1    latanoprost 0.005 % Ophthalmic Solution Place 1 drop into both eyes nightly.  TAKE AT BEDTIME COMBIGAN 0.2-0.5 % Ophthalmic Solution Place 1 drop into both eyes every 12 (twelve) hours. 1 Bottle 12    AMLODIPINE BESYLATE 5 MG Oral Tab TAKE 1 TABLET(5 MG) BY MOUTH DAILY 90 tablet 1    LISINOPRIL 10 MG Oral Tab TAKE 1 TABLET BY MOUTH DAILY (Patient taking differently: Take 0.5 tablets (5 mg total) by mouth in the morning and 0.5 tablets (5 mg total) before bedtime.) 30 tablet 4      Past Medical History:   Diagnosis Date    Acid reflux     Basal cell carcinoma     right jaw    Basal cell carcinoma 2009    left cheek    Basal cell carcinoma 1/13/16    located on the right cheek sup (AO)    BPH (benign prostatic hyperplasia)     Diabetes ([address]     Diabetes mellitus (HCC)     Glaucoma     High blood pressure     High cholesterol     Hyperlipidemia     Kidney disease     Renal disorder     Shortness of breath       Past Surgical History:   Procedure Laterality Date    CARDIAC PACEMAKER PLACEMENT      COLONOSCOPY  2014    OTHER SURGICAL HISTORY      hemorrhoids    OTHER SURGICAL HISTORY      pacemaker placed      Family History   Problem Relation Age of Onset    Glaucoma Father       Social History    Socioeconomic History      Marital status:     Tobacco Use      Smoking status: Former        Packs/day: 0.00        Years: 17.00        Pack years: 0        Types: Cigarettes      Smokeless tobacco: Never    Vaping Use      Vaping Use: Never used    Substance and Sexual Activity      Alcohol use: Yes        Alcohol/week: 1.0 standard drink of alcohol        Types: 1 Cans of beer per week        Comment: 1 beer with dinner      Drug use: No    Other Topics      Concerns:        Caffeine Concern: Yes          coffee and soda        Exercise: Yes          cutting grass        Reaction to local anesthetic: No          REVIEW OF SYSTEMS:     Denies nause, fever, chills  No calf pain  Denies chest pain or SOB      EXAM:   There were no vitals taken for this visit.   GENERAL: well developed, well nourished, in no apparent distress  EXTREMITIES:   1. Integument: Normal skin temperature and turgor. Toenails x10 are elongated, thickened and discolored with subungal derbi. 2. Vascular: Dorsalis pedis two out of four bilateral and posterior tibial pulses two out of   four bilateral, capillary refill normal.   3. Musculoskeletal: All muscle groups are graded 5 out of 5 in the foot and ankle. 4. Neurological: Normal sharp dull sensation; reflexes normal.             ASSESSMENT AND PLAN:   Diagnoses and all orders for this visit:    Onychomycosis    Type 2 diabetes mellitus with hyperglycemia, with long-term current use of insulin ([facility] [address]        Plan:         -Patient examined, chart history reviewed. -Discussed importance of proper pedal hygiene, regular foot checks, and tight glucose control.  -Sharply debrided nails x10 with a sterile nail nipper achieving a 20% reduction in thickness and length, without incident.   -Ambulate with supportive shoes and inserts and avoid walking barefoot.  -Educated patient on acute signs of infection advised patient to seek immediate medical attention if symptoms arise. The patient indicates understanding of these issues and agrees to the plan.         Berta Grossman DPM

## 2023-09-07 ENCOUNTER — APPOINTMENT (OUTPATIENT)
Dept: URBAN - METROPOLITAN AREA CLINIC 244 | Age: 88
Setting detail: DERMATOLOGY
End: 2023-09-07

## 2023-09-07 DIAGNOSIS — L81.4 OTHER MELANIN HYPERPIGMENTATION: ICD-10-CM

## 2023-09-07 DIAGNOSIS — L57.0 ACTINIC KERATOSIS: ICD-10-CM

## 2023-09-07 DIAGNOSIS — D22 MELANOCYTIC NEVI: ICD-10-CM

## 2023-09-07 DIAGNOSIS — L82.1 OTHER SEBORRHEIC KERATOSIS: ICD-10-CM

## 2023-09-07 DIAGNOSIS — Z86.007 PERSONAL HISTORY OF IN-SITU NEOPLASM OF SKIN: ICD-10-CM

## 2023-09-07 DIAGNOSIS — Z85.828 PERSONAL HISTORY OF OTHER MALIGNANT NEOPLASM OF SKIN: ICD-10-CM

## 2023-09-07 PROBLEM — D22.5 MELANOCYTIC NEVI OF TRUNK: Status: ACTIVE | Noted: 2023-09-07

## 2023-09-07 PROCEDURE — OTHER COUNSELING: OTHER

## 2023-09-07 PROCEDURE — OTHER LIQUID NITROGEN: OTHER

## 2023-09-07 PROCEDURE — 17000 DESTRUCT PREMALG LESION: CPT

## 2023-09-07 PROCEDURE — 99213 OFFICE O/P EST LOW 20 MIN: CPT | Mod: 25

## 2023-09-07 ASSESSMENT — LOCATION DETAILED DESCRIPTION DERM
LOCATION DETAILED: RIGHT SUPERIOR LATERAL LOWER BACK
LOCATION DETAILED: UPPER STERNUM
LOCATION DETAILED: LEFT MEDIAL FRONTAL SCALP
LOCATION DETAILED: RIGHT SUPERIOR CENTRAL BUCCAL CHEEK
LOCATION DETAILED: RIGHT SUPERIOR MEDIAL UPPER BACK
LOCATION DETAILED: LEFT MEDIAL UPPER BACK
LOCATION DETAILED: RIGHT CENTRAL MALAR CHEEK

## 2023-09-07 ASSESSMENT — LOCATION SIMPLE DESCRIPTION DERM
LOCATION SIMPLE: LEFT UPPER BACK
LOCATION SIMPLE: LEFT SCALP
LOCATION SIMPLE: RIGHT CHEEK
LOCATION SIMPLE: RIGHT UPPER BACK
LOCATION SIMPLE: CHEST
LOCATION SIMPLE: RIGHT LOWER BACK

## 2023-09-07 ASSESSMENT — LOCATION ZONE DERM
LOCATION ZONE: SCALP
LOCATION ZONE: TRUNK
LOCATION ZONE: FACE

## 2023-09-07 NOTE — PROCEDURE: LIQUID NITROGEN
Duration Of Freeze Thaw-Cycle (Seconds): 0
Consent: The patient's consent was obtained including but not limited to risks of crusting, scabbing, blistering, scarring, darker or lighter pigmentary change, recurrence, incomplete removal and infection.
Render Post-Care Instructions In Note?: no
Post-Care Instructions: I reviewed with the patient in detail post-care instructions. Patient is to wear sunprotection, and avoid picking at any of the treated lesions. Pt may apply Vaseline to crusted or scabbing areas.
Total Number Of Aks Treated: 1
Detail Level: Zone

## 2023-09-08 ENCOUNTER — APPOINTMENT (OUTPATIENT)
Dept: CARDIOLOGY | Age: 88
End: 2023-09-08

## 2023-09-10 ENCOUNTER — TELEPHONE (OUTPATIENT)
Dept: SURGERY | Facility: CLINIC | Age: 88
End: 2023-09-10

## 2023-09-10 RX ORDER — TAMSULOSIN HYDROCHLORIDE 0.4 MG/1
0.4 CAPSULE ORAL DAILY
Qty: 90 CAPSULE | Refills: 3 | Status: SHIPPED | OUTPATIENT
Start: 2023-09-10

## 2023-09-10 NOTE — TELEPHONE ENCOUNTER
Refill per protocol. Benign Prostatic Hypertrophy Medications      Protocol Criteria:  Appointment scheduled in the past 12 months or in the next 2 months  If patients is between the ages of 48 and 79 then PSA done within the last 2 years and is either  Less than or equal to 4.0 ng/ml  Or if greater than 4.0 there is no significant increase (>0.5 ng/ml) in PSA over the last 2 years    Recent Outpatient Visits              1 week ago Onychomycosis    [address], [address], [place] [facility] SPECIALTY [facility]    Office Visit    3 weeks ago CKD (chronic kidney disease) stage 4, GFR 15-29 ml/min [facility])    [address], Zully Francis MD    Office Visit    1 month ago Controlled type 2 diabetes mellitus with complication, with long-term current use of insulin [facility])    [address] [facility] Jovana Zayas MD    Office Visit    2 months ago Benign prostatic hyperplasia with urinary frequency    [address] [facility] Maria C Jacinto MD    Office Visit    2 months ago bilateral L5-S1 radiculopathies     [address] [facility], Tricia Nunes MD    Office Visit          Future Appointments         Provider Department Appt Notes    In 2 weeks Vanessa Dumont MD [address] [facility], [place] annual [place] up    In 1 month [address] [facility],[address] [place] [address], [address], [facility], [place] 6 wks    In 2 months Jarvis Meckel, MD [address], [address], [place] Follow up appointment in regard to lower back pain.     In 2 months Christianne Weaver MD [address] [facility], [address] [place] [address]    In 5 months Chago Martell MD [facility], [address] 6 mos                PSA:  No results found for: PSA    PSA Screen:  No results found for: PSAS

## 2023-09-25 ENCOUNTER — OFFICE VISIT (OUTPATIENT)
Dept: FAMILY MEDICINE CLINIC | Facility: CLINIC | Age: 88
End: 2023-09-25

## 2023-09-25 VITALS
DIASTOLIC BLOOD PRESSURE: 82 MMHG | HEART RATE: 62 BPM | WEIGHT: 160.19 LBS | BODY MASS INDEX: 25 KG/M2 | SYSTOLIC BLOOD PRESSURE: 162 MMHG

## 2023-09-25 DIAGNOSIS — I70.1 ATHEROSCLEROSIS OF RENAL ARTERY (HCC): ICD-10-CM

## 2023-09-25 DIAGNOSIS — E55.9 VITAMIN D DEFICIENCY: ICD-10-CM

## 2023-09-25 DIAGNOSIS — I44.2 AV BLOCK, 3RD DEGREE (HCC): ICD-10-CM

## 2023-09-25 DIAGNOSIS — N18.4 CHRONIC RENAL DISEASE, STAGE IV (HCC): ICD-10-CM

## 2023-09-25 DIAGNOSIS — Z79.4 TYPE 2 DIABETES MELLITUS WITH HYPERGLYCEMIA, WITH LONG-TERM CURRENT USE OF INSULIN (HCC): ICD-10-CM

## 2023-09-25 DIAGNOSIS — E78.2 MIXED HYPERLIPIDEMIA: ICD-10-CM

## 2023-09-25 DIAGNOSIS — M54.16 LUMBAR RADICULOPATHY: ICD-10-CM

## 2023-09-25 DIAGNOSIS — E11.65 TYPE 2 DIABETES MELLITUS WITH HYPERGLYCEMIA, WITH LONG-TERM CURRENT USE OF INSULIN (HCC): ICD-10-CM

## 2023-09-25 DIAGNOSIS — I50.22 CHRONIC SYSTOLIC CONGESTIVE HEART FAILURE (HCC): ICD-10-CM

## 2023-09-25 DIAGNOSIS — M17.12 PRIMARY OSTEOARTHRITIS OF LEFT KNEE: ICD-10-CM

## 2023-09-25 DIAGNOSIS — Z00.00 ENCOUNTER FOR ANNUAL HEALTH EXAMINATION: ICD-10-CM

## 2023-09-25 DIAGNOSIS — J42 CHRONIC BRONCHITIS, UNSPECIFIED CHRONIC BRONCHITIS TYPE (HCC): ICD-10-CM

## 2023-09-25 DIAGNOSIS — Z95.0 S/P PLACEMENT OF CARDIAC PACEMAKER: Primary | ICD-10-CM

## 2023-09-25 DIAGNOSIS — M19.072 PRIMARY OSTEOARTHRITIS OF LEFT ANKLE: ICD-10-CM

## 2023-09-25 DIAGNOSIS — M43.26 FUSION OF LUMBAR SPINE: ICD-10-CM

## 2023-09-25 PROBLEM — M51.9 LUMBAR DISC DISEASE: Status: RESOLVED | Noted: 2017-08-21 | Resolved: 2023-09-25

## 2023-09-25 PROBLEM — M25.562 ACUTE PAIN OF LEFT KNEE: Status: RESOLVED | Noted: 2023-06-22 | Resolved: 2023-09-25

## 2023-09-25 PROBLEM — M71.22 BAKER'S CYST OF KNEE, LEFT: Status: RESOLVED | Noted: 2023-06-22 | Resolved: 2023-09-25

## 2023-09-25 PROBLEM — M48.061 LUMBAR FORAMINAL STENOSIS: Status: RESOLVED | Noted: 2017-08-21 | Resolved: 2023-09-25

## 2023-09-25 PROBLEM — M48.061 SPINAL STENOSIS OF LUMBAR REGION WITHOUT NEUROGENIC CLAUDICATION: Status: RESOLVED | Noted: 2022-09-13 | Resolved: 2023-09-25

## 2023-09-25 PROBLEM — R26.9 GAIT DISTURBANCE: Status: RESOLVED | Noted: 2023-03-16 | Resolved: 2023-09-25

## 2023-09-25 PROCEDURE — 99214 OFFICE O/P EST MOD 30 MIN: CPT | Performed by: FAMILY MEDICINE

## 2023-09-25 PROCEDURE — G0439 PPPS, SUBSEQ VISIT: HCPCS | Performed by: FAMILY MEDICINE

## 2023-09-25 PROCEDURE — 90662 IIV NO PRSV INCREASED AG IM: CPT | Performed by: FAMILY MEDICINE

## 2023-09-25 PROCEDURE — 1126F AMNT PAIN NOTED NONE PRSNT: CPT | Performed by: FAMILY MEDICINE

## 2023-09-25 PROCEDURE — G0008 ADMIN INFLUENZA VIRUS VAC: HCPCS | Performed by: FAMILY MEDICINE

## 2023-09-26 ENCOUNTER — LAB ENCOUNTER (OUTPATIENT)
Dept: LAB | Age: 88
End: 2023-09-26
Attending: FAMILY MEDICINE
Payer: MEDICARE

## 2023-09-26 DIAGNOSIS — E78.2 MIXED HYPERLIPIDEMIA: ICD-10-CM

## 2023-09-26 DIAGNOSIS — E55.9 VITAMIN D DEFICIENCY: ICD-10-CM

## 2023-09-26 DIAGNOSIS — E11.65 TYPE 2 DIABETES MELLITUS WITH HYPERGLYCEMIA, WITH LONG-TERM CURRENT USE OF INSULIN (HCC): ICD-10-CM

## 2023-09-26 DIAGNOSIS — Z79.4 TYPE 2 DIABETES MELLITUS WITH HYPERGLYCEMIA, WITH LONG-TERM CURRENT USE OF INSULIN (HCC): ICD-10-CM

## 2023-09-26 LAB
ALBUMIN SERPL-MCNC: 3.4 G/DL (ref 3.4–5)
ALP LIVER SERPL-CCNC: 136 U/L
ALT SERPL-CCNC: 20 U/L
AST SERPL-CCNC: 17 U/L (ref 15–37)
BILIRUB DIRECT SERPL-MCNC: 0.2 MG/DL (ref 0–0.2)
BILIRUB SERPL-MCNC: 0.6 MG/DL (ref 0.1–2)
CHOLEST SERPL-MCNC: 129 MG/DL (ref ?–200)
CREAT UR-SCNC: 98.9 MG/DL
FASTING PATIENT LIPID ANSWER: YES
HDLC SERPL-MCNC: 33 MG/DL (ref 40–59)
LDLC SERPL CALC-MCNC: 68 MG/DL (ref ?–100)
MICROALBUMIN UR-MCNC: 91.4 MG/DL
MICROALBUMIN/CREAT 24H UR-RTO: 924.2 UG/MG (ref ?–30)
NONHDLC SERPL-MCNC: 96 MG/DL (ref ?–130)
PROT SERPL-MCNC: 6.4 G/DL (ref 6.4–8.2)
TRIGL SERPL-MCNC: 161 MG/DL (ref 30–149)
TSI SER-ACNC: 2.59 MIU/ML (ref 0.36–3.74)
VIT B12 SERPL-MCNC: 385 PG/ML (ref 193–986)
VIT D+METAB SERPL-MCNC: 31.3 NG/ML (ref 30–100)
VLDLC SERPL CALC-MCNC: 24 MG/DL (ref 0–30)

## 2023-09-26 PROCEDURE — 82570 ASSAY OF URINE CREATININE: CPT

## 2023-09-26 PROCEDURE — 84443 ASSAY THYROID STIM HORMONE: CPT

## 2023-09-26 PROCEDURE — 80061 LIPID PANEL: CPT

## 2023-09-26 PROCEDURE — 82043 UR ALBUMIN QUANTITATIVE: CPT

## 2023-09-26 PROCEDURE — 82607 VITAMIN B-12: CPT

## 2023-09-26 PROCEDURE — 36415 COLL VENOUS BLD VENIPUNCTURE: CPT

## 2023-09-26 PROCEDURE — 82306 VITAMIN D 25 HYDROXY: CPT

## 2023-09-26 PROCEDURE — 80076 HEPATIC FUNCTION PANEL: CPT

## 2023-10-03 ENCOUNTER — TELEPHONE (OUTPATIENT)
Dept: CARDIOLOGY | Age: 88
End: 2023-10-03

## 2023-10-15 DIAGNOSIS — Z79.4 CONTROLLED TYPE 2 DIABETES MELLITUS WITH COMPLICATION, WITH LONG-TERM CURRENT USE OF INSULIN (HCC): ICD-10-CM

## 2023-10-15 DIAGNOSIS — E11.8 CONTROLLED TYPE 2 DIABETES MELLITUS WITH COMPLICATION, WITH LONG-TERM CURRENT USE OF INSULIN (HCC): ICD-10-CM

## 2023-10-16 RX ORDER — LANCETS 33 GAUGE
1 EACH MISCELLANEOUS 2 TIMES DAILY
Qty: 200 EACH | Refills: 0 | Status: SHIPPED | OUTPATIENT
Start: 2023-10-16

## 2023-10-30 ENCOUNTER — OFFICE VISIT (OUTPATIENT)
Dept: PODIATRY CLINIC | Facility: CLINIC | Age: 88
End: 2023-10-30

## 2023-10-30 DIAGNOSIS — Z79.4 TYPE 2 DIABETES MELLITUS WITH HYPERGLYCEMIA, WITH LONG-TERM CURRENT USE OF INSULIN (HCC): ICD-10-CM

## 2023-10-30 DIAGNOSIS — E11.65 TYPE 2 DIABETES MELLITUS WITH HYPERGLYCEMIA, WITH LONG-TERM CURRENT USE OF INSULIN (HCC): ICD-10-CM

## 2023-10-30 DIAGNOSIS — B35.1 ONYCHOMYCOSIS: Primary | ICD-10-CM

## 2023-10-30 PROCEDURE — 99213 OFFICE O/P EST LOW 20 MIN: CPT | Performed by: STUDENT IN AN ORGANIZED HEALTH CARE EDUCATION/TRAINING PROGRAM

## 2023-10-30 PROCEDURE — 1126F AMNT PAIN NOTED NONE PRSNT: CPT | Performed by: STUDENT IN AN ORGANIZED HEALTH CARE EDUCATION/TRAINING PROGRAM

## 2023-10-30 NOTE — PROGRESS NOTES
[address] [facility] Podiatry  Progress Note      Bambi Barrera is a 80year old male. Patient presents with:  Diabetic Foot Care: 9 weeks f/u - last CmM9U=5.7 from 7/19/23 - has no c/o regarding his feet - this AM his AY=853            HPI:     Patient is a pleasant 44-year-old male presents to clinic today for bilateral foot evaluation. Patient admits that the topical does not really work for his fungal toenails. Denies any pedal injuries or trauma. Allergies: Barium Sulfate and Sulfa Antibiotics    Current Outpatient Medications   Medication Sig Dispense Refill    Lancets (ONETOUCH DELICA PLUS [id number]) Does not apply Misc 1 strip by In Vitro route 2 (two) times daily. 200 each 0    tamsulosin 0.4 MG Oral Cap Take 1 capsule (0.4 mg total) by mouth daily. Take 1/2 hour following the same meal each day 90 capsule 3    insulin glargine (LANTUS SOLOSTAR) 100 UNIT/ML Subcutaneous Solution Pen-injector INJECT 15 TO 25 UNITS DAILY IF BLOOD GLUCOSE IS ABOVE 250 15 mL 1    GLIMEPIRIDE 2 MG Oral Tab TAKE 1 TABLET DAILY WITH BREAKFAST 90 tablet 3    simvastatin 40 MG Oral Tab Take 1 tablet (40 mg total) by mouth daily. 90 tablet 3    pantoprazole 40 MG Oral Tab EC Take 1 tablet (40 mg total) by mouth before breakfast. 90 tablet 3    furosemide 20 MG Oral Tab TAKE 1 TABLET THREE TIMES A WEEK 30 tablet 4    Glucose Blood (ONETOUCH ULTRA) In Vitro Strip Check blood sugar twice a day. DX: E11.8, insulin dependent 200 strip 0    Insulin Pen Needle (BD PEN NEEDLE MINI U/F) 31G X 5 MM Does not apply Misc USE EVERY  each 5    finasteride 5 MG Oral Tab Take 1 tablet (5 mg total) by mouth daily. 10 tablet 0    carvedilol 6.25 MG Oral Tab       fluticasone propionate 50 MCG/ACT Nasal Suspension 2 sprays by Each Nare route in the morning. 16 g 4    timolol 0.5 % Ophthalmic Solution       Dulaglutide (TRULICITY) 2.73 WV/2.5YP Subcutaneous Solution Pen-injector Inject 0.75 mg into the skin once a week.  12 each 1    latanoprost 0.005 % Ophthalmic Solution Place 1 drop into both eyes nightly. TAKE AT BEDTIME      COMBIGAN 0.2-0.5 % Ophthalmic Solution Place 1 drop into both eyes every 12 (twelve) hours. 1 Bottle 12    AMLODIPINE BESYLATE 5 MG Oral Tab TAKE 1 TABLET(5 MG) BY MOUTH DAILY 90 tablet 1    LISINOPRIL 10 MG Oral Tab TAKE 1 TABLET BY MOUTH DAILY (Patient taking differently: Take 0.5 tablets (5 mg total) by mouth in the morning and 0.5 tablets (5 mg total) before bedtime.) 30 tablet 4      Past Medical History:   Diagnosis Date    Acid reflux     Basal cell carcinoma     right jaw    Basal cell carcinoma 2009    left cheek    Basal cell carcinoma 1/13/16    located on the right cheek sup (AO)    BPH (benign prostatic hyperplasia)     Diabetes ([address]     Diabetes mellitus (HCC)     Glaucoma     High blood pressure     High cholesterol     Hyperlipidemia     Kidney disease     Renal disorder     Shortness of breath       Past Surgical History:   Procedure Laterality Date    CARDIAC PACEMAKER PLACEMENT      COLONOSCOPY  2014    OTHER SURGICAL HISTORY      hemorrhoids    OTHER SURGICAL HISTORY      pacemaker placed      Family History   Problem Relation Age of Onset    Glaucoma Father       Social History    Socioeconomic History      Marital status:     Tobacco Use      Smoking status: Former        Packs/day: 0.00        Years: 17.00        Additional pack years: 0.00        Total pack years: 0.00        Types: Cigarettes      Smokeless tobacco: Never    Vaping Use      Vaping Use: Never used    Substance and Sexual Activity      Alcohol use:  Yes        Alcohol/week: 1.0 standard drink of alcohol        Types: 1 Cans of beer per week        Comment: 1 beer with dinner      Drug use: No    Other Topics      Concerns:        Caffeine Concern: Yes          coffee and soda        Exercise: Yes          cutting grass        Reaction to local anesthetic: No          REVIEW OF SYSTEMS:     Denies nause, fever, chills  No calf pain  Denies chest pain or SOB      EXAM:   There were no vitals taken for this visit. GENERAL: well developed, well nourished, in no apparent distress  EXTREMITIES:   1. Integument: Normal skin temperature and turgor for. Toenails x10 are elongated, thickened and discolored with subungal derbi. 2. Vascular: Dorsalis pedis two out of four bilateral and posterior tibial pulses two out of   four bilateral, capillary refill normal.   3. Musculoskeletal: All muscle groups are graded 5 out of 5 in the foot and ankle. 4. Neurological: Normal sharp dull sensation; reflexes normal.             ASSESSMENT AND PLAN:   Diagnoses and all orders for this visit:    Onychomycosis    Type 2 diabetes mellitus with hyperglycemia, with long-term current use of insulin ([facility] [address]        Plan:       -Patient examined, chart history reviewed. -Discussed importance of proper pedal hygiene, regular foot checks, and tight glucose control.  -Sharply debrided nails x10 with a sterile nail nipper achieving a 20% reduction in thickness and length, without incident.   -Ambulate with supportive shoes and inserts and avoid walking barefoot.  -Educated patient on acute signs of infection advised patient to seek immediate medical attention if symptoms arise. The patient indicates understanding of these issues and agrees to the plan.         Berta Grossman DPM

## 2023-11-04 ENCOUNTER — PATIENT MESSAGE (OUTPATIENT)
Dept: FAMILY MEDICINE CLINIC | Facility: CLINIC | Age: 88
End: 2023-11-04

## 2023-11-13 ENCOUNTER — OFFICE VISIT (OUTPATIENT)
Dept: CARDIOLOGY | Age: 88
End: 2023-11-13

## 2023-11-13 VITALS
HEART RATE: 60 BPM | WEIGHT: 165.34 LBS | HEIGHT: 67 IN | BODY MASS INDEX: 25.95 KG/M2 | DIASTOLIC BLOOD PRESSURE: 70 MMHG | SYSTOLIC BLOOD PRESSURE: 158 MMHG | RESPIRATION RATE: 18 BRPM

## 2023-11-13 DIAGNOSIS — E78.00 HYPERCHOLESTEREMIA: Primary | ICD-10-CM

## 2023-11-13 DIAGNOSIS — E55.9 VITAMIN D DEFICIENCY: ICD-10-CM

## 2023-11-13 DIAGNOSIS — I50.22 CHRONIC SYSTOLIC CONGESTIVE HEART FAILURE (CMD): ICD-10-CM

## 2023-11-13 PROCEDURE — 99215 OFFICE O/P EST HI 40 MIN: CPT | Performed by: INTERNAL MEDICINE

## 2023-11-13 SDOH — HEALTH STABILITY: PHYSICAL HEALTH: ON AVERAGE, HOW MANY DAYS PER WEEK DO YOU ENGAGE IN MODERATE TO STRENUOUS EXERCISE (LIKE A BRISK WALK)?: 0 DAYS

## 2023-11-13 SDOH — HEALTH STABILITY: PHYSICAL HEALTH: ON AVERAGE, HOW MANY MINUTES DO YOU ENGAGE IN EXERCISE AT THIS LEVEL?: 0 MIN

## 2023-11-13 ASSESSMENT — ENCOUNTER SYMPTOMS
BRUISES/BLEEDS EASILY: 0
BLOATING: 0
WEIGHT GAIN: 1
HEADACHES: 0
CONSTIPATION: 1
WEAKNESS: 0
LOSS OF BALANCE: 0
ABDOMINAL PAIN: 0
DIZZINESS: 0
CHILLS: 0
HEMATOCHEZIA: 0
FEVER: 0
COUGH: 0
INSOMNIA: 1
SUSPICIOUS LESIONS: 0
WEIGHT LOSS: 0
ALLERGIC/IMMUNOLOGIC COMMENTS: NO NEW FOOD ALLERGIES
LIGHT-HEADEDNESS: 0
HEMOPTYSIS: 0

## 2023-11-13 ASSESSMENT — PATIENT HEALTH QUESTIONNAIRE - PHQ9
1. LITTLE INTEREST OR PLEASURE IN DOING THINGS: NOT AT ALL
SUM OF ALL RESPONSES TO PHQ9 QUESTIONS 1 AND 2: 0
2. FEELING DOWN, DEPRESSED OR HOPELESS: NOT AT ALL
SUM OF ALL RESPONSES TO PHQ9 QUESTIONS 1 AND 2: 0
CLINICAL INTERPRETATION OF PHQ2 SCORE: NO FURTHER SCREENING NEEDED

## 2023-11-14 DIAGNOSIS — Z79.4 CONTROLLED TYPE 2 DIABETES MELLITUS WITH COMPLICATION, WITH LONG-TERM CURRENT USE OF INSULIN (HCC): ICD-10-CM

## 2023-11-14 DIAGNOSIS — E11.8 CONTROLLED TYPE 2 DIABETES MELLITUS WITH COMPLICATION, WITH LONG-TERM CURRENT USE OF INSULIN (HCC): ICD-10-CM

## 2023-11-15 RX ORDER — INSULIN GLARGINE 100 [IU]/ML
INJECTION, SOLUTION SUBCUTANEOUS
Qty: 15 ML | Refills: 5 | Status: SHIPPED | OUTPATIENT
Start: 2023-11-15

## 2023-11-16 ENCOUNTER — TELEPHONE (OUTPATIENT)
Dept: ENDOCRINOLOGY CLINIC | Facility: CLINIC | Age: 88
End: 2023-11-16

## 2023-11-16 RX ORDER — TAMSULOSIN HYDROCHLORIDE 0.4 MG/1
0.8 CAPSULE ORAL DAILY
Qty: 180 CAPSULE | Refills: 3 | Status: SHIPPED | OUTPATIENT
Start: 2023-11-16

## 2023-11-16 RX ORDER — TAMSULOSIN HYDROCHLORIDE 0.4 MG/1
0.8 CAPSULE ORAL DAILY
Qty: 14 CAPSULE | Refills: 0 | Status: SHIPPED | OUTPATIENT
Start: 2023-11-16

## 2023-11-16 NOTE — TELEPHONE ENCOUNTER
Received fax rom [facility] attached is pt visit notes dated on 11/13/23,notes placed in provider folder for review.

## 2023-12-03 RX ORDER — TAMSULOSIN HYDROCHLORIDE 0.4 MG/1
0.4 CAPSULE ORAL DAILY
Qty: 90 CAPSULE | Refills: 3 | OUTPATIENT
Start: 2023-12-03

## 2023-12-04 ENCOUNTER — OFFICE VISIT (OUTPATIENT)
Dept: PHYSICAL MEDICINE AND REHAB | Facility: CLINIC | Age: 88
End: 2023-12-04
Payer: MEDICARE

## 2023-12-04 VITALS — BODY MASS INDEX: 25 KG/M2 | WEIGHT: 160 LBS

## 2023-12-04 DIAGNOSIS — M17.12 PRIMARY OSTEOARTHRITIS OF LEFT KNEE: Primary | ICD-10-CM

## 2023-12-04 DIAGNOSIS — M54.16 LUMBAR RADICULOPATHY: ICD-10-CM

## 2023-12-04 DIAGNOSIS — E11.65 TYPE 2 DIABETES MELLITUS WITH HYPERGLYCEMIA, WITH LONG-TERM CURRENT USE OF INSULIN (HCC): ICD-10-CM

## 2023-12-04 DIAGNOSIS — G89.29 CHRONIC PAIN OF LEFT KNEE: ICD-10-CM

## 2023-12-04 DIAGNOSIS — M51.9 LUMBAR DISC DISEASE: ICD-10-CM

## 2023-12-04 DIAGNOSIS — M43.26 FUSION OF LUMBAR SPINE: ICD-10-CM

## 2023-12-04 DIAGNOSIS — M25.562 CHRONIC PAIN OF LEFT KNEE: ICD-10-CM

## 2023-12-04 DIAGNOSIS — M48.061 LUMBAR FORAMINAL STENOSIS: ICD-10-CM

## 2023-12-04 DIAGNOSIS — Z79.4 TYPE 2 DIABETES MELLITUS WITH HYPERGLYCEMIA, WITH LONG-TERM CURRENT USE OF INSULIN (HCC): ICD-10-CM

## 2023-12-04 DIAGNOSIS — M48.061 SPINAL STENOSIS OF LUMBAR REGION WITHOUT NEUROGENIC CLAUDICATION: ICD-10-CM

## 2023-12-04 RX ORDER — TAMSULOSIN HYDROCHLORIDE 0.4 MG/1
0.4 CAPSULE ORAL DAILY
Qty: 90 CAPSULE | Refills: 3 | OUTPATIENT
Start: 2023-12-04

## 2023-12-04 NOTE — PATIENT INSTRUCTIONS
Plan  I did a left knee Monovisc injection in the office today under ultrasound guidance. (See procedure note)    If the above does not help him that much, then I will do a repeat of the left knee steroid injection. The patient will continue with his home exercise program.    The patient will follow up in 2-3 months, but the patient will call me 2-4 weeks to let me know how the injection worked.

## 2023-12-04 NOTE — PROGRESS NOTES
Low Back Pain H & P    Chief Complaint:   Chief Complaint   Patient presents with    Knee Pain     LOV 6/29/2023 Patient c/o L knee pain, received CSI and drained his knee at his last visit which provided relief until about 2 months ago, pain has progressively gotten worse. Denies N/T. LOP 7/10     Nursing note reviewed and verified. Patient was last seen on 6/29/2023. I did a left knee steroid injection at that time which helped significantly until 2 months ago when the pain started to return. He states that his low back is doing ok. The worst pain is in the left medial knee. Past Medical History   Past Medical History:   Diagnosis Date    Acid reflux     Basal cell carcinoma     right jaw    Basal cell carcinoma 2009    left cheek    Basal cell carcinoma 1/13/16    located on the right cheek sup (AO)    BPH (benign prostatic hyperplasia)     Diabetes ([address]     Diabetes mellitus (HCC)     Glaucoma     High blood pressure     High cholesterol     Hyperlipidemia     Kidney disease     Renal disorder     Shortness of breath        Past Surgical History   Past Surgical History:   Procedure Laterality Date    CARDIAC PACEMAKER PLACEMENT      COLONOSCOPY  2014    OTHER SURGICAL HISTORY      hemorrhoids    OTHER SURGICAL HISTORY      pacemaker placed       Family History   Family History   Problem Relation Age of Onset    Glaucoma Father        Social History   Social History     Socioeconomic History    Marital status:      Spouse name: Not on file    Number of children: Not on file    Years of education: Not on file    Highest education level: Not on file   Occupational History    Not on file   Tobacco Use    Smoking status: Former     Packs/day: 0.00     Years: 17.00     Additional pack years: 0.00     Total pack years: 0.00     Types: Cigarettes    Smokeless tobacco: Never   Vaping Use    Vaping Use: Never used   Substance and Sexual Activity    Alcohol use:  Yes     Alcohol/week: 1.0 standard drink of alcohol     Types: 1 Cans of beer per week     Comment: 1 beer with dinner    Drug use: No    Sexual activity: Not on file   Other Topics Concern     Service Not Asked    Blood Transfusions Not Asked    Caffeine Concern Yes     Comment: coffee and soda    Occupational Exposure Not Asked    Hobby Hazards Not Asked    Sleep Concern Not Asked    Stress Concern Not Asked    Weight Concern Not Asked    Special Diet Not Asked    Back Care Not Asked    Exercise Yes     Comment: cutting grass    Bike Helmet Not Asked    Seat Belt Not Asked    Self-Exams Not Asked    Grew up on a farm Not Asked    History of tanning Not Asked    Outdoor occupation Not Asked    Pt has a pacemaker Not Asked    Pt has a defibrillator Not Asked    Reaction to local anesthetic No   Social History Narrative    The patient does not use an assistive device. .      The patient does live in a home with stairs. Social Determinants of Health     Financial Resource Strain: Not on file   Food Insecurity: Not on file   Transportation Needs: Not on file   Physical Activity: Not on file   Stress: Not on file   Social Connections: Not on file   Housing Stability: Not on file       PE:  The patient does appear in his stated age in no distress. The patient is well groomed. Psychiatric:  The patient is alert and oriented x 3. The patient has a normal affect and mood. Respiratory:  No acute respiratory distress. Patient does not have a cough. HEENT:  Extraocular muscles are intact. There is no kern icterus. Pupils are equal, round, and reactive to light. No redness or discharge bilaterally. Skin:  There are no rashes or lesions. Vitals: There were no vitals filed for this visit.     Left Knee  Inspection: No ecchymosis, no erythema, no swelling   Palpation: Tender at  left medial joint line  left pes anserine bursa   ROM: Extension full   Tests: No medial laxity, lateral laxity, anterior drawer sign, or posterior drawer sign Assessment  1. Primary osteoarthritis of left knee: moderate medial joint and patellar    2. bilateral L5-S1 radiculopathies     3. Type 2 diabetes mellitus with hyperglycemia, with long-term current use of insulin ([address]    4. 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 Chronic pain of left knee    6. L4-5 mod. L3-4 mild-mod, L2-3 mild, L1-2 mod central stenosis    7. L5-S1 bilateral mod-large far lateral, L4-5 right mod-large/left far lateral & mild-mod central, L3-4 right large foraminal & mod diffuse, L2-3 mild-mod diffuse, L1-2 mod diffuse bulging discs    8. L5-S1 left mod-severe/right severe, L4-5 bilateral mod-severe, L3-4 right mod-severe/left mod foraminal stenosis         Plan  I did a left knee Monovisc injection in the office today under ultrasound guidance. (See procedure note)    If the above does not help him that much, then I will do a repeat of the left knee steroid injection. The patient will continue with his home exercise program.    The patient will follow up in 2-3 months, but the patient will call me 2-4 weeks to let me know how the injection worked. The patient understands and agrees with the stated plan. Peace Love MD  12/4/2023

## 2023-12-04 NOTE — PROCEDURES
The patient is here for left knee Monovisc injection done under ultrasound guidance. Under ultrasound guidance the left suprapatellar bursa was identified and a nathanael was placed on the patient's skin. The skin was cleaned with betadyne swabs x 3. Then a 20 gauge needle was inserted under ultrasound guidance into the left suprapatellar bursa. Aspiration was performed and no blood, fluid, or air was aspirated. The patient was then injected with the standard 4.0 ml Monovisc solution. The needle was removed and a band aid was applied. The patient will follow up in 8 weeks. These images are permanently stored in the ultrasound unit or PACS system.

## 2023-12-05 ENCOUNTER — TELEPHONE (OUTPATIENT)
Dept: PHYSICAL MEDICINE AND REHAB | Facility: CLINIC | Age: 88
End: 2023-12-05

## 2023-12-05 NOTE — TELEPHONE ENCOUNTER
Per CMS Guidelines -no authorization is required for Left knee Monovisc injection under ultrasound guidance CPT 92168,    Viscosupplementation with Hyaluronans will only be covered for Osteoarthritis of the knee or shoulder joint when radiological evidence to support the diagnosis of osteoarthritis; and there is adequate documentation that simple pharmacologic therapy (ie aspirin), or exercise and physical therapy has been tried and the patient has failed to respond satisfactorily.  FYI-Synvisc One is limited to Osteoarthritis of the knee    Status: Authorization is not required based on medical necessity however may be subject to review once claim is submitted-Covered Benefit (Buy&Bill)    Inj done in office

## 2023-12-06 ENCOUNTER — OFFICE VISIT (OUTPATIENT)
Dept: ENDOCRINOLOGY CLINIC | Facility: CLINIC | Age: 88
End: 2023-12-06

## 2023-12-06 VITALS
DIASTOLIC BLOOD PRESSURE: 87 MMHG | WEIGHT: 162 LBS | HEART RATE: 79 BPM | BODY MASS INDEX: 25 KG/M2 | SYSTOLIC BLOOD PRESSURE: 147 MMHG

## 2023-12-06 DIAGNOSIS — E11.8 CONTROLLED TYPE 2 DIABETES MELLITUS WITH COMPLICATION, WITH LONG-TERM CURRENT USE OF INSULIN (HCC): Primary | ICD-10-CM

## 2023-12-06 DIAGNOSIS — Z79.4 CONTROLLED TYPE 2 DIABETES MELLITUS WITH COMPLICATION, WITH LONG-TERM CURRENT USE OF INSULIN (HCC): Primary | ICD-10-CM

## 2023-12-06 LAB
CARTRIDGE LOT#: ABNORMAL NUMERIC
GLUCOSE BLOOD: 124
HEMOGLOBIN A1C: 7.9 % (ref 4.3–5.6)
TEST STRIP LOT #: NORMAL NUMERIC

## 2023-12-06 PROCEDURE — 99214 OFFICE O/P EST MOD 30 MIN: CPT | Performed by: INTERNAL MEDICINE

## 2023-12-06 PROCEDURE — 82947 ASSAY GLUCOSE BLOOD QUANT: CPT | Performed by: INTERNAL MEDICINE

## 2023-12-06 PROCEDURE — 83036 HEMOGLOBIN GLYCOSYLATED A1C: CPT | Performed by: INTERNAL MEDICINE

## 2023-12-06 PROCEDURE — 1126F AMNT PAIN NOTED NONE PRSNT: CPT | Performed by: INTERNAL MEDICINE

## 2023-12-11 ENCOUNTER — LAB ENCOUNTER (OUTPATIENT)
Dept: LAB | Age: 88
End: 2023-12-11
Attending: INTERNAL MEDICINE
Payer: MEDICARE

## 2023-12-11 DIAGNOSIS — N18.4 CKD (CHRONIC KIDNEY DISEASE) STAGE 4, GFR 15-29 ML/MIN (HCC): ICD-10-CM

## 2023-12-11 LAB
ALBUMIN SERPL-MCNC: 4.1 G/DL (ref 3.2–4.8)
ANION GAP SERPL CALC-SCNC: 6 MMOL/L (ref 0–18)
BASOPHILS # BLD AUTO: 0.03 X10(3) UL (ref 0–0.2)
BASOPHILS NFR BLD AUTO: 0.5 %
BUN BLD-MCNC: 37 MG/DL (ref 9–23)
BUN/CREAT SERPL: 14.3 (ref 10–20)
CALCIUM BLD-MCNC: 9.5 MG/DL (ref 8.7–10.4)
CHLORIDE SERPL-SCNC: 105 MMOL/L (ref 98–112)
CO2 SERPL-SCNC: 26 MMOL/L (ref 21–32)
CREAT BLD-MCNC: 2.59 MG/DL
DEPRECATED RDW RBC AUTO: 43.3 FL (ref 35.1–46.3)
EGFRCR SERPLBLD CKD-EPI 2021: 23 ML/MIN/1.73M2 (ref 60–?)
EOSINOPHIL # BLD AUTO: 0.13 X10(3) UL (ref 0–0.7)
EOSINOPHIL NFR BLD AUTO: 2.1 %
ERYTHROCYTE [DISTWIDTH] IN BLOOD BY AUTOMATED COUNT: 13.3 % (ref 11–15)
GLUCOSE BLD-MCNC: 93 MG/DL (ref 70–99)
HCT VFR BLD AUTO: 36.7 %
HGB BLD-MCNC: 12.1 G/DL
IMM GRANULOCYTES # BLD AUTO: 0.02 X10(3) UL (ref 0–1)
IMM GRANULOCYTES NFR BLD: 0.3 %
LYMPHOCYTES # BLD AUTO: 2.18 X10(3) UL (ref 1–4)
LYMPHOCYTES NFR BLD AUTO: 34.6 %
MCH RBC QN AUTO: 29.3 PG (ref 26–34)
MCHC RBC AUTO-ENTMCNC: 33 G/DL (ref 31–37)
MCV RBC AUTO: 88.9 FL
MONOCYTES # BLD AUTO: 0.49 X10(3) UL (ref 0.1–1)
MONOCYTES NFR BLD AUTO: 7.8 %
NEUTROPHILS # BLD AUTO: 3.45 X10 (3) UL (ref 1.5–7.7)
NEUTROPHILS # BLD AUTO: 3.45 X10(3) UL (ref 1.5–7.7)
NEUTROPHILS NFR BLD AUTO: 54.7 %
OSMOLALITY SERPL CALC.SUM OF ELEC: 292 MOSM/KG (ref 275–295)
PHOSPHATE SERPL-MCNC: 3.3 MG/DL (ref 2.4–5.1)
PLATELET # BLD AUTO: 212 10(3)UL (ref 150–450)
POTASSIUM SERPL-SCNC: 3.8 MMOL/L (ref 3.5–5.1)
RBC # BLD AUTO: 4.13 X10(6)UL
SODIUM SERPL-SCNC: 137 MMOL/L (ref 136–145)
WBC # BLD AUTO: 6.3 X10(3) UL (ref 4–11)

## 2023-12-11 PROCEDURE — 85025 COMPLETE CBC W/AUTO DIFF WBC: CPT

## 2023-12-11 PROCEDURE — 80069 RENAL FUNCTION PANEL: CPT

## 2023-12-11 PROCEDURE — 36415 COLL VENOUS BLD VENIPUNCTURE: CPT

## 2023-12-27 RX ORDER — FLUTICASONE PROPIONATE 50 MCG
2 SPRAY, SUSPENSION (ML) NASAL EVERY MORNING
Qty: 16 G | Refills: 3 | Status: SHIPPED | OUTPATIENT
Start: 2023-12-27

## 2023-12-28 NOTE — TELEPHONE ENCOUNTER
Refill Passed Per Protocol    Requested Prescriptions   Pending Prescriptions Disp Refills    FLUTICASONE PROPIONATE 50 MCG/ACT Nasal Suspension [Pharmacy Med Name: FLUTICASONE PROP NASAL SPRAY 16GM 50MCG] 16 g 11     Sig: USE 2 SPRAYS IN EACH NOSTRIL IN THE MORNING       Allergy Medication Protocol Passed - 12/26/2023  2:17 PM        Passed - In person appointment or virtual visit in the past 12 mos or appointment in next 3 mos     Recent Outpatient Visits              3 weeks ago Controlled type 2 diabetes mellitus with complication, with long-term current use of insulin ([facility] [address]    [address], Matheus Dalal MD    Office Visit    3 weeks ago Primary osteoarthritis of left knee: moderate medial joint and patellar    [facility], [address], Booker Gutiérrez MD    Office Visit    1 month ago Onychomycosis    [facility], [address], [place] [address], [place]    Office Visit    3 months ago S/P placement of cardiac pacemaker    [address], Rosendo Cutler MD    Office Visit    4 months ago Onychomycosis    [address], [address], [place]    Office Visit          Future Appointments         Provider Department Appt Notes    In 1 month Armin Barbosa, [place] follow up 3 months    In 1 month MD Kathya Scott, [address] [facility], [facility] 6 mos    In 2 months MD Kathya Alvarez [address], [place] 6 mth    In 3 months MD Kathya Richardson [address], [place] Follow up                    Future Appointments         Provider Department Appt Notes    In 1 month Romi Momin, [address], [address], [place] follow up 3 months    In 1 month Abdon West MD [address] 6 mos    In 2 months Mariaa Nava MD [address] [facility], [place] 6 mth    In 3 months Moris Daugherty MD [address] [facility], [place] Follow up          Recent Outpatient Visits              3 weeks ago Controlled type 2 diabetes mellitus with complication, with long-term current use of insulin [facility])    Ramya Boateng, [facility] [address], Patricia Flanagan MD    Office Visit    3 weeks ago Primary osteoarthritis of left knee: moderate medial joint and patellar    [facility], [address], Jackie Hunter MD    Office Visit    1 month ago Onychomycosis    [address] [facility], [place], [place]    Office Visit    3 months ago S/P placement of cardiac pacemaker    [address] [facility], Tana Adrian MD    Office Visit    4 months ago Onychomycosis    [address] [facility], [address] [place], [place]    Office Visit

## 2024-01-08 ENCOUNTER — TELEPHONE (OUTPATIENT)
Dept: NEUROLOGY | Facility: CLINIC | Age: 89
End: 2024-01-08

## 2024-01-08 DIAGNOSIS — M17.12 PRIMARY OSTEOARTHRITIS OF LEFT KNEE: Primary | ICD-10-CM

## 2024-01-08 NOTE — TELEPHONE ENCOUNTER
Patient son called as they saw  over the weekend and per  call his office and schedule another injection as the last one did not work. Inform that a member of the clinical team will call to schedule inj once approval is received from ins.

## 2024-01-10 ENCOUNTER — TELEPHONE (OUTPATIENT)
Dept: PHYSICAL MEDICINE AND REHAB | Facility: CLINIC | Age: 89
End: 2024-01-10

## 2024-01-10 NOTE — TELEPHONE ENCOUNTER
Per Dr.Couri bunch to place order for: \"left knee steroid injection \"    Order placed.     Will reach out to schedule patient once insurance approval is received.

## 2024-01-10 NOTE — TELEPHONE ENCOUNTER
Per  at LOV 12/4/23: \"I did a left knee Monovisc injection in the office today under ultrasound guidance.  (See procedure note)     If the above does not help him that much, then I will do a repeat of the left knee steroid injection.\"    Message sent to  to confirm next injection order.

## 2024-01-11 NOTE — TELEPHONE ENCOUNTER
Per CMS Guidelines -no authorization is required for left knee steroid injection under ultrasound CPT 09499,     Status: Authorization is not required based on medical necessity however may be subject to review once claim is submitted-Covered Benefit

## 2024-01-12 ENCOUNTER — TELEPHONE (OUTPATIENT)
Dept: FAMILY MEDICINE CLINIC | Facility: CLINIC | Age: 89
End: 2024-01-12

## 2024-01-15 RX ORDER — LANCETS 33 GAUGE
EACH MISCELLANEOUS
Qty: 100 EACH | Refills: 2 | Status: SHIPPED | OUTPATIENT
Start: 2024-01-15

## 2024-01-23 RX ORDER — LANCETS 33 GAUGE
EACH MISCELLANEOUS
Qty: 100 EACH | Refills: 2 | Status: SHIPPED | OUTPATIENT
Start: 2024-01-23

## 2024-01-23 NOTE — TELEPHONE ENCOUNTER
Fax received from [facility]. Trueplus pen needles 32G 5/32\" is not on formulary. Pended alternative pen needles. Routed to provider.

## 2024-01-30 ENCOUNTER — OFFICE VISIT (OUTPATIENT)
Dept: PHYSICAL MEDICINE AND REHAB | Facility: CLINIC | Age: 89
End: 2024-01-30
Payer: MEDICARE

## 2024-01-30 DIAGNOSIS — G89.29 CHRONIC PAIN OF LEFT KNEE: Primary | ICD-10-CM

## 2024-01-30 DIAGNOSIS — M25.562 ACUTE PAIN OF LEFT KNEE: ICD-10-CM

## 2024-01-30 DIAGNOSIS — M17.12 PRIMARY OSTEOARTHRITIS OF LEFT KNEE: ICD-10-CM

## 2024-01-30 DIAGNOSIS — M25.562 CHRONIC PAIN OF LEFT KNEE: Primary | ICD-10-CM

## 2024-01-30 PROCEDURE — 20611 DRAIN/INJ JOINT/BURSA W/US: CPT | Performed by: PHYSICAL MEDICINE & REHABILITATION

## 2024-01-30 RX ORDER — LIDOCAINE HYDROCHLORIDE 10 MG/ML
2.5 INJECTION, SOLUTION INFILTRATION; PERINEURAL ONCE
Status: COMPLETED | OUTPATIENT
Start: 2024-01-30 | End: 2024-01-30

## 2024-01-30 RX ORDER — TRIAMCINOLONE ACETONIDE 40 MG/ML
60 INJECTION, SUSPENSION INTRA-ARTICULAR; INTRAMUSCULAR ONCE
Status: COMPLETED | OUTPATIENT
Start: 2024-01-30 | End: 2024-01-30

## 2024-01-30 RX ADMIN — LIDOCAINE HYDROCHLORIDE 2.5 ML: 10 INJECTION, SOLUTION INFILTRATION; PERINEURAL at 11:55:00

## 2024-01-30 RX ADMIN — TRIAMCINOLONE ACETONIDE 60 MG: 40 INJECTION, SUSPENSION INTRA-ARTICULAR; INTRAMUSCULAR at 11:56:00

## 2024-01-31 NOTE — PROCEDURES
The patient is here for a left knee injection done under ultrasound guidance.    Under ultrasound guidance the left suprapatellar bursa was identified and a nathanael was placed on the patient's skin.The skin was cleaned with betadyne swabs x 3 and anesthetized with cold spray.  Then a 25 gauge needle was inserted under ultrasound guidance into the left suprapatellar bursa.  Aspiration was performed and no blood, fluid, or air was aspirated.  The patient was then injected with 4 ml of 1.5 ml of 40 mg of Kenalog/ml and 2.5 ml of 1% lidocaine without epinephrine.  The needle was removed and a band aid was applied.  The patient will follow up in 2 weeks with a message.    These images are permanently stored in the ultrasound unit or PACS system.

## 2024-02-02 ENCOUNTER — OFFICE VISIT (OUTPATIENT)
Dept: PODIATRY CLINIC | Facility: CLINIC | Age: 89
End: 2024-02-02

## 2024-02-02 DIAGNOSIS — B35.1 ONYCHOMYCOSIS: Primary | ICD-10-CM

## 2024-02-02 DIAGNOSIS — E11.65 TYPE 2 DIABETES MELLITUS WITH HYPERGLYCEMIA, WITH LONG-TERM CURRENT USE OF INSULIN (HCC): ICD-10-CM

## 2024-02-02 DIAGNOSIS — Z79.4 TYPE 2 DIABETES MELLITUS WITH HYPERGLYCEMIA, WITH LONG-TERM CURRENT USE OF INSULIN (HCC): ICD-10-CM

## 2024-02-02 PROCEDURE — 99213 OFFICE O/P EST LOW 20 MIN: CPT | Performed by: STUDENT IN AN ORGANIZED HEALTH CARE EDUCATION/TRAINING PROGRAM

## 2024-02-02 PROCEDURE — 1126F AMNT PAIN NOTED NONE PRSNT: CPT | Performed by: STUDENT IN AN ORGANIZED HEALTH CARE EDUCATION/TRAINING PROGRAM

## 2024-02-02 NOTE — PROGRESS NOTES
[facility]  Progress Note      Cal Cates is a 89 year old male.   Chief Complaint   Patient presents with    Diabetic Foot Care     Nail trim and foot check f/u- last AIC=7.9 on 12/06/2023- FBS this PV=790- denies any pain at this time             HPI:     Patient is a pleasant 89-year-old diabetic male presents to clinic for bilateral foot evaluation    Allergies: Barium sulfate and Sulfa antibiotics    Current Outpatient Medications   Medication Sig Dispense Refill    Insulin Pen Needle (COMFORT EZ PEN NEEDLES) 32G X 4 MM Does not apply Misc Inject once daily 100 each 2    fluticasone propionate 50 MCG/ACT Nasal Suspension 2 sprays by Each Nare route every morning. 16 g 3    tamsulosin 0.4 MG Oral Cap Take 2 capsules (0.8 mg total) by mouth daily. Take 1/2 hour following the same meal each day 14 capsule 0    tamsulosin 0.4 MG Oral Cap Take 2 capsules (0.8 mg total) by mouth daily. 180 capsule 3    insulin glargine (LANTUS SOLOSTAR) 100 UNIT/ML Subcutaneous Solution Pen-injector INJECT 15 TO 25 UNITS DAILY IF BLOOD GLUCOSE IS ABOVE 250 15 mL 5    Lancets (ONETOUCH DELICA PLUS HREQWP37L) Does not apply Misc 1 strip by In Vitro route 2 (two) times daily. 200 each 0    GLIMEPIRIDE 2 MG Oral Tab TAKE 1 TABLET DAILY WITH BREAKFAST 90 tablet 3    simvastatin 40 MG Oral Tab Take 1 tablet (40 mg total) by mouth daily. 90 tablet 3    pantoprazole 40 MG Oral Tab EC Take 1 tablet (40 mg total) by mouth before breakfast. 90 tablet 3    furosemide 20 MG Oral Tab TAKE 1 TABLET THREE TIMES A WEEK 30 tablet 4    Glucose Blood (ONETOUCH ULTRA) In Vitro Strip Check blood sugar twice a day. DX: E11.8, insulin dependent 200 strip 0    Insulin Pen Needle (BD PEN NEEDLE MINI U/F) 31G X 5 MM Does not apply Misc USE EVERY  each 5    finasteride 5 MG Oral Tab Take 1 tablet (5 mg total) by mouth daily. 10 tablet 0    carvedilol 6.25 MG Oral Tab       timolol 0.5 % Ophthalmic Solution       Dulaglutide (TRULICITY)  0.75 MG/0.5ML Subcutaneous Solution Pen-injector Inject 0.75 mg into the skin once a week. 12 each 1    latanoprost 0.005 % Ophthalmic Solution Place 1 drop into both eyes nightly. TAKE AT BEDTIME      COMBIGAN 0.2-0.5 % Ophthalmic Solution Place 1 drop into both eyes every 12 (twelve) hours. 1 Bottle 12    AMLODIPINE BESYLATE 5 MG Oral Tab TAKE 1 TABLET(5 MG) BY MOUTH DAILY 90 tablet 1    LISINOPRIL 10 MG Oral Tab TAKE 1 TABLET BY MOUTH DAILY (Patient taking differently: Take 0.5 tablets (5 mg total) by mouth in the morning and 0.5 tablets (5 mg total) before bedtime.) 30 tablet 4      Past Medical History:   Diagnosis Date    Acid reflux     Basal cell carcinoma     right jaw    Basal cell carcinoma 2009    left cheek    Basal cell carcinoma 1/13/16    located on the right cheek sup (AO)    BPH (benign prostatic hyperplasia)     Diabetes (HCC)     Diabetes mellitus (HCC)     Glaucoma     High blood pressure     High cholesterol     Hyperlipidemia     Kidney disease     Renal disorder     Shortness of breath       Past Surgical History:   Procedure Laterality Date    CARDIAC PACEMAKER PLACEMENT      COLONOSCOPY  2014    OTHER SURGICAL HISTORY      hemorrhoids    OTHER SURGICAL HISTORY      pacemaker placed      Family History   Problem Relation Age of Onset    Glaucoma Father       Social History     Socioeconomic History    Marital status:    Tobacco Use    Smoking status: Former     Packs/day: 0.00     Years: 17.00     Additional pack years: 0.00     Total pack years: 0.00     Types: Cigarettes    Smokeless tobacco: Never   Vaping Use    Vaping Use: Never used   Substance and Sexual Activity    Alcohol use: Yes     Alcohol/week: 1.0 standard drink of alcohol     Types: 1 Cans of beer per week     Comment: 1 beer with dinner    Drug use: No   Other Topics Concern    Caffeine Concern Yes     Comment: coffee and soda    Exercise Yes     Comment: cutting grass    Reaction to local anesthetic No            REVIEW OF SYSTEMS:     Denies nause, fever, chills  No calf pain  Denies chest pain or SOB      EXAM:   There were no vitals taken for this visit.  GENERAL: well developed, well nourished, in no apparent distress  EXTREMITIES:   1. Integument: Normal skin temperature and turgor. Toenails x10 are elongated, thickened and discolored with subungal derbi.     2. Vascular: Dorsalis pedis two out of four bilateral and posterior tibial pulses two out of   four bilateral, capillary refill normal.   3. Musculoskeletal: All muscle groups are graded 5 out of 5 in the foot and ankle.   4. Neurological: Normal sharp dull sensation; reflexes normal.      Bilateral barefoot skin diabetic exam is normal, visualized feet and the appearance is normal.  Bilateral monofilament/sensation of both feet is normal.  Pulsation pedal pulse exam of both lower legs/feet is normal as well.               ASSESSMENT AND PLAN:   Diagnoses and all orders for this visit:    Onychomycosis    Type 2 diabetes mellitus with hyperglycemia, with long-term current use of insulin ([facility])        Plan:       -Patient examined, chart history reviewed.  -Discussed importance of proper pedal hygiene, regular foot checks, and tight glucose control.  -Sharply debrided nails x10 with a sterile nail nipper achieving a 20% reduction in thickness and length, without incident.   -Ambulate with supportive shoes and inserts and avoid walking barefoot.  -Educated patient on acute signs of infection advised patient to seek immediate medical attention if symptoms arise.    RTC in 3 months     The patient indicates understanding of these issues and agrees to the plan.        Nohemy Jin DPM

## 2024-02-05 ENCOUNTER — E-ADVICE (OUTPATIENT)
Dept: CARDIOLOGY | Age: 89
End: 2024-02-05

## 2024-02-15 DIAGNOSIS — Z79.4 CONTROLLED TYPE 2 DIABETES MELLITUS WITH COMPLICATION, WITH LONG-TERM CURRENT USE OF INSULIN (HCC): ICD-10-CM

## 2024-02-15 DIAGNOSIS — E11.8 CONTROLLED TYPE 2 DIABETES MELLITUS WITH COMPLICATION, WITH LONG-TERM CURRENT USE OF INSULIN (HCC): ICD-10-CM

## 2024-02-16 ENCOUNTER — LAB ENCOUNTER (OUTPATIENT)
Dept: LAB | Age: 89
End: 2024-02-16
Attending: INTERNAL MEDICINE
Payer: MEDICARE

## 2024-02-16 DIAGNOSIS — N18.4 CKD (CHRONIC KIDNEY DISEASE) STAGE 4, GFR 15-29 ML/MIN (HCC): ICD-10-CM

## 2024-02-16 LAB
ALBUMIN SERPL-MCNC: 3.9 G/DL (ref 3.2–4.8)
ANION GAP SERPL CALC-SCNC: 8 MMOL/L (ref 0–18)
BASOPHILS # BLD AUTO: 0.02 X10(3) UL (ref 0–0.2)
BASOPHILS NFR BLD AUTO: 0.4 %
BUN BLD-MCNC: 36 MG/DL (ref 9–23)
BUN/CREAT SERPL: 13.2 (ref 10–20)
CALCIUM BLD-MCNC: 9.6 MG/DL (ref 8.7–10.4)
CHLORIDE SERPL-SCNC: 109 MMOL/L (ref 98–112)
CO2 SERPL-SCNC: 24 MMOL/L (ref 21–32)
CREAT BLD-MCNC: 2.72 MG/DL
DEPRECATED RDW RBC AUTO: 45.1 FL (ref 35.1–46.3)
EGFRCR SERPLBLD CKD-EPI 2021: 22 ML/MIN/1.73M2 (ref 60–?)
EOSINOPHIL # BLD AUTO: 0.27 X10(3) UL (ref 0–0.7)
EOSINOPHIL NFR BLD AUTO: 4.8 %
ERYTHROCYTE [DISTWIDTH] IN BLOOD BY AUTOMATED COUNT: 13.3 % (ref 11–15)
GLUCOSE BLD-MCNC: 88 MG/DL (ref 70–99)
HCT VFR BLD AUTO: 39.1 %
HGB BLD-MCNC: 12.2 G/DL
IMM GRANULOCYTES # BLD AUTO: 0.02 X10(3) UL (ref 0–1)
IMM GRANULOCYTES NFR BLD: 0.4 %
LYMPHOCYTES # BLD AUTO: 1.13 X10(3) UL (ref 1–4)
LYMPHOCYTES NFR BLD AUTO: 20.1 %
MCH RBC QN AUTO: 28.6 PG (ref 26–34)
MCHC RBC AUTO-ENTMCNC: 31.2 G/DL (ref 31–37)
MCV RBC AUTO: 91.8 FL
MONOCYTES # BLD AUTO: 0.49 X10(3) UL (ref 0.1–1)
MONOCYTES NFR BLD AUTO: 8.7 %
NEUTROPHILS # BLD AUTO: 3.7 X10 (3) UL (ref 1.5–7.7)
NEUTROPHILS # BLD AUTO: 3.7 X10(3) UL (ref 1.5–7.7)
NEUTROPHILS NFR BLD AUTO: 65.6 %
OSMOLALITY SERPL CALC.SUM OF ELEC: 300 MOSM/KG (ref 275–295)
PHOSPHATE SERPL-MCNC: 3.6 MG/DL (ref 2.4–5.1)
PLATELET # BLD AUTO: 206 10(3)UL (ref 150–450)
POTASSIUM SERPL-SCNC: 4.9 MMOL/L (ref 3.5–5.1)
RBC # BLD AUTO: 4.26 X10(6)UL
SODIUM SERPL-SCNC: 141 MMOL/L (ref 136–145)
WBC # BLD AUTO: 5.6 X10(3) UL (ref 4–11)

## 2024-02-16 PROCEDURE — 36415 COLL VENOUS BLD VENIPUNCTURE: CPT

## 2024-02-16 PROCEDURE — 80069 RENAL FUNCTION PANEL: CPT

## 2024-02-16 PROCEDURE — 85025 COMPLETE CBC W/AUTO DIFF WBC: CPT

## 2024-02-16 RX ORDER — LANCETS 33 GAUGE
1 EACH MISCELLANEOUS 2 TIMES DAILY
Qty: 200 EACH | Refills: 1 | Status: SHIPPED | OUTPATIENT
Start: 2024-02-16

## 2024-02-19 ENCOUNTER — OFFICE VISIT (OUTPATIENT)
Dept: NEPHROLOGY | Facility: CLINIC | Age: 89
End: 2024-02-19

## 2024-02-19 VITALS
DIASTOLIC BLOOD PRESSURE: 62 MMHG | SYSTOLIC BLOOD PRESSURE: 142 MMHG | WEIGHT: 164 LBS | BODY MASS INDEX: 26 KG/M2 | HEART RATE: 70 BPM

## 2024-02-19 DIAGNOSIS — N18.4 CKD (CHRONIC KIDNEY DISEASE) STAGE 4, GFR 15-29 ML/MIN (HCC): Primary | ICD-10-CM

## 2024-02-19 PROCEDURE — 99214 OFFICE O/P EST MOD 30 MIN: CPT | Performed by: INTERNAL MEDICINE

## 2024-02-19 NOTE — PROGRESS NOTES
02/19/24        Patient: Cal Cates   YOB: 1934   Date of Visit: 2/19/2024       Dear  Dr. Charly MD,      Thank you for referring Cal Cates to my practice.  Please find my assessment and plan below.      As you know he is an 89-year-old male with a history of adult onset diabetes mellitus, hypertension, whitecoat syndrome, congestive heart failure secondary to diastolic dysfunction who I now had the pleasure of seeing for follow-up of chronic kidney disease stage IV with proteinuria thought to be secondary to diabetes and anemia of chronic disease.  Overall the patient states he is doing well except during the last month he has noted some sense of orthopnea.  Still remains fairly active for his age.  Driving.  No chest pain, GI or urinary tract symptoms..  He is scheduled to see his cardiologist Dr. Sellers tomorrow with a repeat echocardiogram.  Nocturia x 2.  Chronic lower extremity edema stable.  Checks his blood pressure readings at home and usually has readings in the 130/70 range.    On physical exam his blood pressure 142/62 with a pulse of 70 weight 164 pounds.  His neck was supple without JVD.  Lungs were clear.  Heart revealed a regular rate and rhythm with an S4 but no gallops, murmurs or rubs.  Abdomen was soft, flat, nontender without organomegaly, masses or bruits.  Extremities revealed trace edema bilaterally.    I reviewed his most recent labs done on February 16, 2024.  Creatinine fluctuates a bit but fairly stable at 2.72 with an estimated GFR of 22 cc/min.  Electrolytes were good.  Hemoglobin 12.2.  Last A1c was 7.9.    I therefore informed the patient and his son that overall renal function is fairly stable.  Reinforced importance of maintaining adequate hydration.  Blood pressure and blood sugar control.  On furosemide 3 times per week.  Scheduled to see cardiology tomorrow.  Avoid nonsteroidals.  Continue to do CBC and renal panels quarterly.  I will see him again in 6  months for follow-up or sooner if clinically indicated.    Thank you again for allowing me to participate in the care of your patient.  If you have any questions please feel free to call.           Sincerely,   Trevor Rocha MD   [facility], [facility], [place]  [address]  [facility] [id number]    Document electronically generated by:  rTevor Rocha MD

## 2024-02-19 NOTE — PATIENT INSTRUCTIONS
Repeat your kidney blood test in 3 months.  Orders updated in the computer.  Continue to monitor your blood pressures regularly.  Keep your blood sugars under good control.

## 2024-02-20 ENCOUNTER — OFFICE VISIT (OUTPATIENT)
Dept: CARDIOLOGY | Age: 89
End: 2024-02-20

## 2024-02-20 ENCOUNTER — APPOINTMENT (OUTPATIENT)
Dept: CARDIOLOGY | Age: 89
End: 2024-02-20
Attending: INTERNAL MEDICINE

## 2024-02-20 VITALS
HEART RATE: 60 BPM | DIASTOLIC BLOOD PRESSURE: 79 MMHG | SYSTOLIC BLOOD PRESSURE: 165 MMHG | WEIGHT: 164.79 LBS | BODY MASS INDEX: 25.87 KG/M2 | RESPIRATION RATE: 18 BRPM | HEIGHT: 67 IN

## 2024-02-20 DIAGNOSIS — I10 ESSENTIAL HYPERTENSION: Primary | ICD-10-CM

## 2024-02-20 DIAGNOSIS — I50.22 CHRONIC SYSTOLIC CONGESTIVE HEART FAILURE (CMD): ICD-10-CM

## 2024-02-20 DIAGNOSIS — I50.32 CHRONIC HEART FAILURE WITH PRESERVED EJECTION FRACTION (HFPEF) (CMD): ICD-10-CM

## 2024-02-20 DIAGNOSIS — I34.0 MITRAL VALVE INSUFFICIENCY, UNSPECIFIED ETIOLOGY: ICD-10-CM

## 2024-02-20 LAB
AORTIC VALVE AREA (AVA): 0.93
AORTIC VALVE AREA: 2.61
ASCENDING AORTA (AAD): 3
AV MEAN GRADIENT (AVMG): 5
AV MEAN VELOCITY (AVMV): 0.99
AV STENOSIS SEVERITY TEXT: NORMAL
AVI LVOT PEAK GRADIENT (LVOTMG): 1.1
E WAVE DECELARATION TIME (MDT): 20.67
INTERVENTRICULAR SEPTUM IN END DIASTOLE (IVSD): 1.43
LEFT INTERNAL DIMENSION IN SYSTOLE (LVSD): 1.1
LEFT VENTRICULAR INTERNAL DIMENSION IN DIASTOLE (LVDD): 2.7
LEFT VENTRICULAR POSTERIOR WALL IN END DIASTOLE (LVPW): 4.4
LV EF: NORMAL %
LVOT 2D (LVOTD): 27.9
LVOT VTI (LVOTVTI): 1.39
MV E TISSUE VEL LAT (MELV): 1.02
MV E TISSUE VEL MED (MESV): 5.1
MV E WAVE VEL/E TISSUE VEL MED(MSR): 4.5
MV PEAK A VELOCITY (MVPAV): 267
MV PEAK E VELOCITY (MVPEV): 1.11
RV END SYSTOLIC LONGITUDINAL STRAIN FREE WALL (RVGS): 2
TRICUSPID VALVE ANNULAR PEAK VELOCITY (TVAPV): 40
TRICUSPID VALVE PEAK REGURGITATION VELOCITY (TRPV): 3.5
TV ESTIMATED RIGHT ARTERIAL PRESSURE (RAP): 14.8

## 2024-02-20 PROCEDURE — 93306 TTE W/DOPPLER COMPLETE: CPT | Performed by: INTERNAL MEDICINE

## 2024-02-20 PROCEDURE — 76376 3D RENDER W/INTRP POSTPROCES: CPT | Performed by: INTERNAL MEDICINE

## 2024-02-20 SDOH — HEALTH STABILITY: PHYSICAL HEALTH: ON AVERAGE, HOW MANY MINUTES DO YOU ENGAGE IN EXERCISE AT THIS LEVEL?: 0 MIN

## 2024-02-20 SDOH — HEALTH STABILITY: PHYSICAL HEALTH: ON AVERAGE, HOW MANY DAYS PER WEEK DO YOU ENGAGE IN MODERATE TO STRENUOUS EXERCISE (LIKE A BRISK WALK)?: 0 DAYS

## 2024-02-20 ASSESSMENT — ENCOUNTER SYMPTOMS
CHILLS: 0
LIGHT-HEADEDNESS: 0
HEADACHES: 0
SUSPICIOUS LESIONS: 0
COUGH: 0
WEIGHT LOSS: 0
WEAKNESS: 0
ALLERGIC/IMMUNOLOGIC COMMENTS: NO NEW FOOD ALLERGIES
HEMATOCHEZIA: 0
BLOATING: 0
CONSTIPATION: 1
ABDOMINAL PAIN: 0
BRUISES/BLEEDS EASILY: 0
LOSS OF BALANCE: 0
INSOMNIA: 1
FEVER: 0
WEIGHT GAIN: 0
HEMOPTYSIS: 0
DIZZINESS: 0

## 2024-02-20 ASSESSMENT — PATIENT HEALTH QUESTIONNAIRE - PHQ9
SUM OF ALL RESPONSES TO PHQ9 QUESTIONS 1 AND 2: 0
1. LITTLE INTEREST OR PLEASURE IN DOING THINGS: NOT AT ALL
SUM OF ALL RESPONSES TO PHQ9 QUESTIONS 1 AND 2: 0
2. FEELING DOWN, DEPRESSED OR HOPELESS: NOT AT ALL
CLINICAL INTERPRETATION OF PHQ2 SCORE: NO FURTHER SCREENING NEEDED

## 2024-02-22 ENCOUNTER — TELEPHONE (OUTPATIENT)
Dept: CARDIOLOGY | Age: 89
End: 2024-02-22

## 2024-02-22 ENCOUNTER — PREP FOR CASE (OUTPATIENT)
Dept: CARDIOLOGY | Age: 89
End: 2024-02-22

## 2024-02-22 DIAGNOSIS — I50.22 CHRONIC SYSTOLIC CONGESTIVE HEART FAILURE (CMD): Primary | ICD-10-CM

## 2024-02-22 RX ORDER — 0.9 % SODIUM CHLORIDE 0.9 %
2 VIAL (ML) INJECTION EVERY 12 HOURS SCHEDULED
Status: CANCELLED | OUTPATIENT
Start: 2024-02-22

## 2024-02-22 RX ORDER — SODIUM CHLORIDE 9 MG/ML
INJECTION, SOLUTION INTRAVENOUS CONTINUOUS
Status: CANCELLED | OUTPATIENT
Start: 2024-02-22

## 2024-03-07 ENCOUNTER — HOSPITAL ENCOUNTER (OUTPATIENT)
Dept: CARDIOLOGY | Age: 89
Discharge: HOME | End: 2024-03-07
Attending: INTERNAL MEDICINE | Admitting: INTERNAL MEDICINE

## 2024-03-07 ENCOUNTER — HOSPITAL ENCOUNTER (OUTPATIENT)
Age: 89
Discharge: HOME | End: 2024-03-07
Attending: INTERNAL MEDICINE | Admitting: INTERNAL MEDICINE

## 2024-03-07 VITALS
OXYGEN SATURATION: 96 % | BODY MASS INDEX: 25.19 KG/M2 | WEIGHT: 160.5 LBS | DIASTOLIC BLOOD PRESSURE: 68 MMHG | RESPIRATION RATE: 14 BRPM | TEMPERATURE: 96.9 F | HEART RATE: 60 BPM | HEIGHT: 67 IN | SYSTOLIC BLOOD PRESSURE: 139 MMHG

## 2024-03-07 DIAGNOSIS — I50.32 CHRONIC DIASTOLIC CONGESTIVE HEART FAILURE (CMD): ICD-10-CM

## 2024-03-07 DIAGNOSIS — I42.9 CHRONIC DIASTOLIC HEART FAILURE SECONDARY TO IDIOPATHIC CARDIOMYOPATHY (CMD): ICD-10-CM

## 2024-03-07 DIAGNOSIS — I50.22 CHRONIC SYSTOLIC CONGESTIVE HEART FAILURE (CMD): ICD-10-CM

## 2024-03-07 DIAGNOSIS — I50.32 CHRONIC DIASTOLIC HEART FAILURE SECONDARY TO IDIOPATHIC CARDIOMYOPATHY (CMD): ICD-10-CM

## 2024-03-07 DIAGNOSIS — I10 ESSENTIAL HYPERTENSION: ICD-10-CM

## 2024-03-07 DIAGNOSIS — I50.32 CHRONIC HEART FAILURE WITH PRESERVED EJECTION FRACTION (HFPEF) (CMD): Primary | ICD-10-CM

## 2024-03-07 LAB
ANION GAP SERPL CALC-SCNC: 6 MMOL/L (ref 7–19)
AV STENOSIS SEVERITY TEXT: NORMAL
BUN SERPL-MCNC: 40 MG/DL (ref 6–20)
BUN/CREAT SERPL: 14 (ref 7–25)
CALCIUM SERPL-MCNC: 9.3 MG/DL (ref 8.4–10.2)
CHLORIDE SERPL-SCNC: 112 MMOL/L (ref 97–110)
CO2 SERPL-SCNC: 25 MMOL/L (ref 21–32)
CREAT SERPL-MCNC: 2.91 MG/DL (ref 0.67–1.17)
EGFRCR SERPLBLD CKD-EPI 2021: 20 ML/MIN/{1.73_M2}
FASTING DURATION TIME PATIENT: ABNORMAL H
GLUCOSE BLDC GLUCOMTR-MCNC: 117 MG/DL (ref 70–99)
GLUCOSE SERPL-MCNC: 118 MG/DL (ref 70–99)
HCT VFR BLD CALC: 35 % (ref 39–51)
HCT VFR BLD CALC: 35 % (ref 39–51)
LV EF: NORMAL %
POTASSIUM SERPL-SCNC: 4.4 MMOL/L (ref 3.4–5.1)
SODIUM SERPL-SCNC: 139 MMOL/L (ref 135–145)

## 2024-03-07 PROCEDURE — 10002800 HB RX 250 W HCPCS: Performed by: INTERNAL MEDICINE

## 2024-03-07 PROCEDURE — 93320 DOPPLER ECHO COMPLETE: CPT | Performed by: INTERNAL MEDICINE

## 2024-03-07 PROCEDURE — 10002801 HB RX 250 W/O HCPCS: Performed by: INTERNAL MEDICINE

## 2024-03-07 PROCEDURE — 10006023 HB SUPPLY 272: Performed by: INTERNAL MEDICINE

## 2024-03-07 PROCEDURE — 13000001 HB PHASE II RECOVERY EA 30 MINUTES: Performed by: INTERNAL MEDICINE

## 2024-03-07 PROCEDURE — 82962 GLUCOSE BLOOD TEST: CPT

## 2024-03-07 PROCEDURE — 76376 3D RENDER W/INTRP POSTPROCES: CPT | Performed by: INTERNAL MEDICINE

## 2024-03-07 PROCEDURE — 93319 3D ECHO IMG CGEN CAR ANOMAL: CPT

## 2024-03-07 PROCEDURE — 80048 BASIC METABOLIC PNL TOTAL CA: CPT | Performed by: INTERNAL MEDICINE

## 2024-03-07 PROCEDURE — 93325 DOPPLER ECHO COLOR FLOW MAPG: CPT | Performed by: INTERNAL MEDICINE

## 2024-03-07 PROCEDURE — 93312 ECHO TRANSESOPHAGEAL: CPT | Performed by: INTERNAL MEDICINE

## 2024-03-07 PROCEDURE — C1894 INTRO/SHEATH, NON-LASER: HCPCS | Performed by: INTERNAL MEDICINE

## 2024-03-07 PROCEDURE — 93451 RIGHT HEART CATH: CPT | Performed by: INTERNAL MEDICINE

## 2024-03-07 PROCEDURE — 36415 COLL VENOUS BLD VENIPUNCTURE: CPT | Performed by: INTERNAL MEDICINE

## 2024-03-07 RX ORDER — MIDAZOLAM HYDROCHLORIDE 1 MG/ML
INJECTION, SOLUTION INTRAMUSCULAR; INTRAVENOUS PRN
Status: COMPLETED | OUTPATIENT
Start: 2024-03-07 | End: 2024-03-07

## 2024-03-07 RX ORDER — 0.9 % SODIUM CHLORIDE 0.9 %
2 VIAL (ML) INJECTION EVERY 12 HOURS SCHEDULED
Status: DISCONTINUED | OUTPATIENT
Start: 2024-03-07 | End: 2024-03-07 | Stop reason: HOSPADM

## 2024-03-07 RX ORDER — LIDOCAINE HYDROCHLORIDE 20 MG/ML
INJECTION, SOLUTION EPIDURAL; INFILTRATION; INTRACAUDAL; PERINEURAL PRN
Status: DISCONTINUED | OUTPATIENT
Start: 2024-03-07 | End: 2024-03-07 | Stop reason: HOSPADM

## 2024-03-07 RX ORDER — SODIUM CHLORIDE 9 MG/ML
INJECTION, SOLUTION INTRAVENOUS CONTINUOUS
Status: DISCONTINUED | OUTPATIENT
Start: 2024-03-07 | End: 2024-03-07 | Stop reason: HOSPADM

## 2024-03-07 RX ORDER — MIDAZOLAM HYDROCHLORIDE 1 MG/ML
INJECTION, SOLUTION INTRAMUSCULAR; INTRAVENOUS
Status: DISCONTINUED
Start: 2024-03-07 | End: 2024-03-07 | Stop reason: HOSPADM

## 2024-03-07 RX ORDER — FUROSEMIDE 20 MG/1
20 TABLET ORAL
Qty: 90 TABLET | Refills: 3 | Status: SHIPPED | OUTPATIENT
Start: 2024-03-08

## 2024-03-07 RX ORDER — NALOXONE HCL 0.4 MG/ML
VIAL (ML) INJECTION
Status: DISCONTINUED
Start: 2024-03-07 | End: 2024-03-07 | Stop reason: WASHOUT

## 2024-03-07 RX ORDER — FLUMAZENIL 0.1 MG/ML
INJECTION, SOLUTION INTRAVENOUS
Status: DISCONTINUED
Start: 2024-03-07 | End: 2024-03-07 | Stop reason: WASHOUT

## 2024-03-07 RX ADMIN — MIDAZOLAM HYDROCHLORIDE 1 MG: 1 INJECTION, SOLUTION INTRAMUSCULAR; INTRAVENOUS at 10:18

## 2024-03-07 RX ADMIN — MIDAZOLAM HYDROCHLORIDE 1 MG: 1 INJECTION, SOLUTION INTRAMUSCULAR; INTRAVENOUS at 10:13

## 2024-03-07 RX ADMIN — FENTANYL CITRATE 25 MCG: 50 INJECTION INTRAMUSCULAR; INTRAVENOUS at 10:13

## 2024-03-07 RX ADMIN — MIDAZOLAM HYDROCHLORIDE 1 MG: 1 INJECTION, SOLUTION INTRAMUSCULAR; INTRAVENOUS at 10:23

## 2024-03-07 ASSESSMENT — PAIN SCALES - GENERAL
PAINLEVEL_OUTOF10: 0

## 2024-03-07 ASSESSMENT — PAIN SCALES - WONG BAKER
WONGBAKER_NUMERICALRESPONSE: 0

## 2024-03-19 ENCOUNTER — TELEPHONE (OUTPATIENT)
Dept: CARDIOLOGY | Age: 89
End: 2024-03-19

## 2024-03-20 ENCOUNTER — EXTERNAL LAB (OUTPATIENT)
Dept: CARDIOLOGY | Age: 89
End: 2024-03-20

## 2024-03-20 ENCOUNTER — LAB ENCOUNTER (OUTPATIENT)
Dept: LAB | Age: 89
End: 2024-03-20
Attending: INTERNAL MEDICINE
Payer: MEDICARE

## 2024-03-20 DIAGNOSIS — I50.32 CHRONIC HEART FAILURE WITH PRESERVED EJECTION FRACTION (HCC): Primary | ICD-10-CM

## 2024-03-20 LAB
ANION GAP SERPL CALC-SCNC: 7 MMOL/L (ref 0–18)
ANION GAP SERPL CALC-SCNC: 7 MMOL/L (ref 0–18)
BNP SERPL-MCNC: 45 PG/ML
BNP SERPL-MCNC: 45 PG/ML
BUN BLD-MCNC: 44 MG/DL (ref 9–23)
BUN SERPL-MCNC: 44 MG/DL (ref 9–23)
BUN/CREAT SERPL: 16.6 (ref 10–20)
BUN/CREAT SERPL: 16.6 (ref 10–20)
CALCIUM BLD-MCNC: 9.4 MG/DL (ref 8.7–10.4)
CALCIUM SERPL-MCNC: 9.4 MG/DL (ref 8.7–10.4)
CALCULATED OSMO: 303 MOSM/KG (ref 275–295)
CHLORIDE SERPL-SCNC: 109 MMOL/L (ref 98–112)
CHLORIDE SERPL-SCNC: 109 MMOL/L (ref 98–112)
CO2 SERPL-SCNC: 24 MMOL/L (ref 21–32)
CO2 SERPL-SCNC: 24 MMOL/L (ref 21–32)
CREAT BLD-MCNC: 2.65 MG/DL
CREAT SERPL-MCNC: 2.66 MG/DL (ref 0.7–1.3)
EGFRCR SERPLBLD CKD-EPI 2021: 22 ML/MIN/1.73M2 (ref 60–?)
FASTING STATUS PATIENT QL REPORTED: YES
GFR SERPLBLD SCHWARTZ-ARVRAT: 22 ML/MIN/1.73M2
GLUCOSE BLD-MCNC: 140 MG/DL (ref 70–99)
GLUCOSE SERPL-MCNC: 140 MG/DL (ref 70–99)
LENGTH OF FAST TIME PATIENT: YES H
OSMOLALITY SERPL CALC.SUM OF ELEC: 303 MOSM/KG (ref 275–295)
POTASSIUM SERPL-SCNC: 4.6 MMOL/L (ref 3.5–5.1)
POTASSIUM SERPL-SCNC: 4.6 MMOL/L (ref 3.5–5.1)
SODIUM SERPL-SCNC: 140 MMOL/L (ref 136–145)
SODIUM SERPL-SCNC: 140 MMOL/L (ref 136–145)

## 2024-03-20 PROCEDURE — 83880 ASSAY OF NATRIURETIC PEPTIDE: CPT

## 2024-03-20 PROCEDURE — 36415 COLL VENOUS BLD VENIPUNCTURE: CPT

## 2024-03-20 PROCEDURE — 80048 BASIC METABOLIC PNL TOTAL CA: CPT

## 2024-03-25 ENCOUNTER — OFFICE VISIT (OUTPATIENT)
Dept: FAMILY MEDICINE CLINIC | Facility: CLINIC | Age: 89
End: 2024-03-25

## 2024-03-25 ENCOUNTER — LAB ENCOUNTER (OUTPATIENT)
Dept: LAB | Age: 89
End: 2024-03-25
Attending: FAMILY MEDICINE
Payer: MEDICARE

## 2024-03-25 ENCOUNTER — OFFICE VISIT (OUTPATIENT)
Dept: CARDIOLOGY | Age: 89
End: 2024-03-25

## 2024-03-25 VITALS
WEIGHT: 161 LBS | DIASTOLIC BLOOD PRESSURE: 73 MMHG | SYSTOLIC BLOOD PRESSURE: 162 MMHG | BODY MASS INDEX: 25 KG/M2 | HEART RATE: 65 BPM

## 2024-03-25 VITALS
HEART RATE: 60 BPM | SYSTOLIC BLOOD PRESSURE: 147 MMHG | WEIGHT: 162.04 LBS | BODY MASS INDEX: 25.43 KG/M2 | DIASTOLIC BLOOD PRESSURE: 74 MMHG | RESPIRATION RATE: 18 BRPM | HEIGHT: 67 IN

## 2024-03-25 DIAGNOSIS — I50.22 CHRONIC SYSTOLIC CONGESTIVE HEART FAILURE (HCC): ICD-10-CM

## 2024-03-25 DIAGNOSIS — Z91.018 FOOD ALLERGY: ICD-10-CM

## 2024-03-25 DIAGNOSIS — E11.65 TYPE 2 DIABETES MELLITUS WITH HYPERGLYCEMIA, WITH LONG-TERM CURRENT USE OF INSULIN (HCC): ICD-10-CM

## 2024-03-25 DIAGNOSIS — N18.4 CHRONIC RENAL DISEASE, STAGE IV (HCC): ICD-10-CM

## 2024-03-25 DIAGNOSIS — L50.9 URTICARIA, UNSPECIFIED: ICD-10-CM

## 2024-03-25 DIAGNOSIS — I44.2 AV BLOCK, 3RD DEGREE (HCC): ICD-10-CM

## 2024-03-25 DIAGNOSIS — Z79.4 TYPE 2 DIABETES MELLITUS WITH HYPERGLYCEMIA, WITH LONG-TERM CURRENT USE OF INSULIN (HCC): ICD-10-CM

## 2024-03-25 DIAGNOSIS — I50.32 CHRONIC DIASTOLIC CONGESTIVE HEART FAILURE (CMD): Primary | ICD-10-CM

## 2024-03-25 DIAGNOSIS — E78.2 MIXED HYPERLIPIDEMIA: ICD-10-CM

## 2024-03-25 DIAGNOSIS — Z95.0 S/P PLACEMENT OF CARDIAC PACEMAKER: Primary | ICD-10-CM

## 2024-03-25 PROCEDURE — 99213 OFFICE O/P EST LOW 20 MIN: CPT | Performed by: FAMILY MEDICINE

## 2024-03-25 PROCEDURE — 86003 ALLG SPEC IGE CRUDE XTRC EA: CPT

## 2024-03-25 PROCEDURE — 36415 COLL VENOUS BLD VENIPUNCTURE: CPT

## 2024-03-25 PROCEDURE — 82785 ASSAY OF IGE: CPT

## 2024-03-25 PROCEDURE — 99215 OFFICE O/P EST HI 40 MIN: CPT | Performed by: INTERNAL MEDICINE

## 2024-03-25 SDOH — HEALTH STABILITY: PHYSICAL HEALTH: ON AVERAGE, HOW MANY DAYS PER WEEK DO YOU ENGAGE IN MODERATE TO STRENUOUS EXERCISE (LIKE A BRISK WALK)?: 0 DAYS

## 2024-03-25 SDOH — HEALTH STABILITY: PHYSICAL HEALTH: ON AVERAGE, HOW MANY MINUTES DO YOU ENGAGE IN EXERCISE AT THIS LEVEL?: 0 MIN

## 2024-03-25 ASSESSMENT — ENCOUNTER SYMPTOMS
DIZZINESS: 0
ALLERGIC/IMMUNOLOGIC COMMENTS: NO NEW FOOD ALLERGIES
SUSPICIOUS LESIONS: 0
LOSS OF BALANCE: 0
HEMATOCHEZIA: 0
FEVER: 0
INSOMNIA: 1
BRUISES/BLEEDS EASILY: 0
HEMOPTYSIS: 0
CONSTIPATION: 1
WEIGHT GAIN: 0
COUGH: 0
CHILLS: 0
BLOATING: 0
WEAKNESS: 0
WEIGHT LOSS: 1
ABDOMINAL PAIN: 0
LIGHT-HEADEDNESS: 0
HEADACHES: 0

## 2024-03-25 ASSESSMENT — PATIENT HEALTH QUESTIONNAIRE - PHQ9
1. LITTLE INTEREST OR PLEASURE IN DOING THINGS: NOT AT ALL
SUM OF ALL RESPONSES TO PHQ9 QUESTIONS 1 AND 2: 0
2. FEELING DOWN, DEPRESSED OR HOPELESS: NOT AT ALL
CLINICAL INTERPRETATION OF PHQ2 SCORE: NO FURTHER SCREENING NEEDED
SUM OF ALL RESPONSES TO PHQ9 QUESTIONS 1 AND 2: 0

## 2024-03-25 NOTE — PROGRESS NOTES
Cal Cates is a 89 year old male.   Chief Complaint   Patient presents with    Follow - Up     6 months     HPI:   Seeing cardiologist for ongoing stenosis and CHF. Feeling better with new medications. Has appt later today also to discuss. Reports glucose has been controlled. BP always high here but normal at home.    Reports certain foods is affecting him -milk is one of those. Has issues with skin off and on/ itching and rash. Currently no symptoms.   Current Outpatient Medications on File Prior to Visit   Medication Sig Dispense Refill    Lancets (ONETOUCH DELICA PLUS [id number]) Does not apply Misc Place 1 Device onto the skin in the morning and 1 Device before bedtime. 200 each 1    Insulin Pen Needle (COMFORT EZ PEN NEEDLES) 32G X 4 MM Does not apply Misc Inject once daily 100 each 2    fluticasone propionate 50 MCG/ACT Nasal Suspension 2 sprays by Each Nare route every morning. 16 g 3    tamsulosin 0.4 MG Oral Cap Take 2 capsules (0.8 mg total) by mouth daily. Take 1/2 hour following the same meal each day 14 capsule 0    tamsulosin 0.4 MG Oral Cap Take 2 capsules (0.8 mg total) by mouth daily. 180 capsule 3    insulin glargine (LANTUS SOLOSTAR) 100 UNIT/ML Subcutaneous Solution Pen-injector INJECT 15 TO 25 UNITS DAILY IF BLOOD GLUCOSE IS ABOVE 250 15 mL 5    GLIMEPIRIDE 2 MG Oral Tab TAKE 1 TABLET DAILY WITH BREAKFAST 90 tablet 3    simvastatin 40 MG Oral Tab Take 1 tablet (40 mg total) by mouth daily. 90 tablet 3    pantoprazole 40 MG Oral Tab EC Take 1 tablet (40 mg total) by mouth before breakfast. 90 tablet 3    furosemide 20 MG Oral Tab TAKE 1 TABLET THREE TIMES A WEEK 30 tablet 4    Glucose Blood (ONETOUCH ULTRA) In Vitro Strip Check blood sugar twice a day. DX: E11.8, insulin dependent 200 strip 0    Insulin Pen Needle (BD PEN NEEDLE MINI U/F) 31G X 5 MM Does not apply Misc USE EVERY  each 5    finasteride 5 MG Oral Tab Take 1 tablet (5 mg total) by mouth daily. 10 tablet 0    carvedilol  6.25 MG Oral Tab       timolol 0.5 % Ophthalmic Solution       Dulaglutide (TRULICITY) 0.75 MG/0.5ML Subcutaneous Solution Pen-injector Inject 0.75 mg into the skin once a week. 12 each 1    latanoprost 0.005 % Ophthalmic Solution Place 1 drop into both eyes nightly. TAKE AT BEDTIME      COMBIGAN 0.2-0.5 % Ophthalmic Solution Place 1 drop into both eyes every 12 (twelve) hours. 1 Bottle 12    AMLODIPINE BESYLATE 5 MG Oral Tab TAKE 1 TABLET(5 MG) BY MOUTH DAILY 90 tablet 1    LISINOPRIL 10 MG Oral Tab TAKE 1 TABLET BY MOUTH DAILY (Patient taking differently: Take 0.5 tablets (5 mg total) by mouth in the morning and 0.5 tablets (5 mg total) before bedtime.) 30 tablet 4     No current facility-administered medications on file prior to visit.      Past Medical History:   Diagnosis Date    Acid reflux     Basal cell carcinoma     right jaw    Basal cell carcinoma 2009    left cheek    Basal cell carcinoma 1/13/16    located on the right cheek sup (AO)    BPH (benign prostatic hyperplasia)     Diabetes (HCC)     Diabetes mellitus (HCC)     Glaucoma     High blood pressure     High cholesterol     Hyperlipidemia     Kidney disease     Renal disorder     Shortness of breath       Social History:  Social History     Socioeconomic History    Marital status:    Tobacco Use    Smoking status: Former     Packs/day: 0.00     Years: 17.00     Additional pack years: 0.00     Total pack years: 0.00     Types: Cigarettes    Smokeless tobacco: Never   Vaping Use    Vaping Use: Never used   Substance and Sexual Activity    Alcohol use: Yes     Alcohol/week: 1.0 standard drink of alcohol     Types: 1 Cans of beer per week     Comment: occ    Drug use: No   Other Topics Concern    Caffeine Concern Yes     Comment: coffee and soda    Exercise Yes     Comment: cutting grass    Reaction to local anesthetic No   Social History Narrative    The patient does not use an assistive device..      The patient does live in a home with stairs.         REVIEW OF SYSTEMS:   GENERAL HEALTH: feels well otherwise  SKIN: denies rash   HEENT: denies eye complaints,denies sore throat, denies ear pain  RESPIRATORY: denies shortness of breath, denies cough  CARDIOVASCULAR: denies chest pain      EXAM:   BP (!) 162/73   Pulse 65   Wt 161 lb (73 kg)   BMI 25.22 kg/m²   BP (!) 162/73   Pulse 65   Wt 161 lb (73 kg)   BMI 25.22 kg/m²   Repeat in office 138/72   GENERAL: well developed, well nourished,in no apparent distress  LUNGS: clear to auscultation  CARDIO: RRR without murmur  EXTREMITIES: no cyanosis, clubbing or edema      ASSESSMENT AND PLAN:   1. S/P placement of cardiac pacemaker  Per cards.     2. Chronic systolic congestive heart failure (HCC)  Stable.     3. Chronic renal disease, stage IV (HCC)      4. Mixed hyperlipidemia      5. Type 2 diabetes mellitus with hyperglycemia, with long-term current use of insulin (HCC)  Per endo     6. 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 Food allergy  Checking for food allergy.   - Adult Food Allergy Prof [E]; Future    8. Urticaria, unspecified    - Adult Food Allergy Prof [E]; Future      The patient indicates understanding of these issues and agrees to the plan.      Tracey Parks MD  3/25/2024  1:07 PM

## 2024-03-26 LAB
ALLERGEN BRAZIL NUT: <0.1 KUA/L (ref ?–0.1)
ALMOND IGE QN: <0.1 KUA/L (ref ?–0.1)
CASHEW NUT IGE QN: <0.1 KUA/L (ref ?–0.1)
CLAM IGE QN: <0.1 KUA/L (ref ?–0.1)
CODFISH IGE QN: <0.1 KUA/L (ref ?–0.1)
CORN IGE QN: <0.1 KUA/L (ref ?–0.1)
COW MILK IGE QN: <0.1 KUA/L (ref ?–0.1)
EGG WHITE IGE QN: <0.1 KUA/L (ref ?–0.1)
HAZELNUT IGE QN: <0.1 KUA/L (ref ?–0.1)
IGE SERPL-ACNC: 31.1 KU/L (ref 2–214)
PEANUT IGE QN: <0.1 KUA/L (ref ?–0.1)
SALMON IGE QN: <0.1 KUA/L (ref ?–0.1)
SCALLOP IGE QN: <0.1 KUA/L (ref ?–0.1)
SESAME SEED IGE QN: <0.1 KUA/L (ref ?–0.1)
SHRIMP IGE QN: <0.1 KUA/L (ref ?–0.1)
SOYBEAN IGE QN: <0.1 KUA/L (ref ?–0.1)
WALNUT IGE QN: <0.1 KUA/L (ref ?–0.1)
WHEAT IGE QN: <0.1 KUA/L (ref ?–0.1)

## 2024-03-27 NOTE — PROGRESS NOTES
Please let pt know that had no food allergies but still possible to be sensitive to certain but nothing that is dangerous. - Dr. Parks

## 2024-04-10 ENCOUNTER — OFFICE VISIT (OUTPATIENT)
Dept: ENDOCRINOLOGY CLINIC | Facility: CLINIC | Age: 89
End: 2024-04-10

## 2024-04-10 VITALS
SYSTOLIC BLOOD PRESSURE: 167 MMHG | HEART RATE: 60 BPM | WEIGHT: 164.38 LBS | BODY MASS INDEX: 25.8 KG/M2 | HEIGHT: 67 IN | DIASTOLIC BLOOD PRESSURE: 76 MMHG

## 2024-04-10 DIAGNOSIS — E11.65 UNCONTROLLED TYPE 2 DIABETES MELLITUS WITH HYPERGLYCEMIA (HCC): Primary | ICD-10-CM

## 2024-04-10 LAB
CARTRIDGE LOT#: ABNORMAL NUMERIC
GLUCOSE BLOOD: 211
HEMOGLOBIN A1C: 8 % (ref 4.3–5.6)
TEST STRIP LOT #: NORMAL NUMERIC

## 2024-04-10 PROCEDURE — 83036 HEMOGLOBIN GLYCOSYLATED A1C: CPT | Performed by: INTERNAL MEDICINE

## 2024-04-10 PROCEDURE — 82947 ASSAY GLUCOSE BLOOD QUANT: CPT | Performed by: INTERNAL MEDICINE

## 2024-04-10 PROCEDURE — 99213 OFFICE O/P EST LOW 20 MIN: CPT | Performed by: INTERNAL MEDICINE

## 2024-04-10 RX ORDER — DULAGLUTIDE 0.75 MG/.5ML
0.75 INJECTION, SOLUTION SUBCUTANEOUS WEEKLY
Qty: 12 EACH | Refills: 1 | Status: SHIPPED | OUTPATIENT
Start: 2024-04-10 | End: 2024-04-10

## 2024-04-10 RX ORDER — FLURBIPROFEN SODIUM 0.3 MG/ML
SOLUTION/ DROPS OPHTHALMIC
Qty: 90 EACH | Refills: 3 | Status: SHIPPED | OUTPATIENT
Start: 2024-04-10

## 2024-04-10 RX ORDER — DULAGLUTIDE 0.75 MG/.5ML
0.75 INJECTION, SOLUTION SUBCUTANEOUS WEEKLY
Qty: 6 ML | Refills: 1 | Status: SHIPPED | OUTPATIENT
Start: 2024-04-10 | End: 2024-05-31

## 2024-04-10 NOTE — PROGRESS NOTES
Name: Cal Cates  Date: 4/10/2024    Referring Physician: No ref. provider found    HISTORY OF PRESENT ILLNESS   Cal Cates is a 89 year old male who presents for evaluation of diabetes mellitus.      Prior HbA, C or glycohemoglobin were 8.2% 1/2014, 7.4% 7/2014; 7.1% 10/2014; 7.6% 1/2015; 7.4% 4/2015; 7.3% 10/2015; 7.5% 2/2016; 7.4% 5/2016; 7.5% 5/2017; 8.0% 5/2017; 8.7% 11/2017; 7.9% 1/2018; 9.3% 8/2018; 8.8% 10/2018; 8.2% 2/2019; 7.8% 3/2019; 8.0% 6/2019; 8.2% 12/2019; 8.3% 7/2020; 8.8% 3/2021; 8.2% 7/2021; 8.1% 12/2021; 7.8% 4/2022; 7.3% 8/2022; 8.4% 12/2022; 10.2% 4/2023; 7.7% 7/2023; 7.9% 12/2023; 8.0% POC TOday     Dietary compliance: Good  Exercise: No  Polyuria/polydipsia: No  Blurred vision: No    Episodes of hypoglycemia: No  Blood Glucose:  Checking 1-2 times per day  Reviewed BG level logbook     Medications for DM   Trulicity 0.75mg subcutaneous weekly    Glimepiride 2mg PO daily     Lantus 16 units SQ at bedtime (only taking if BG above 250)     REVIEW OF SYSTEMS  Eyes: Diabetic retinopathy present: No            Most recent visit to eye doctor in last 12 months: Yes    CV: Cardiovascular disease present: No         Hypertension present: Yes         Hyperlipidemia present: Yes         Peripheral Vascular Disease present: No    : Nephropathy present: Yes, Cr 2.4    Neuro: Neuropathy present: No    Skin: Infection or ulceration: No    Osteoporosis: No    Thyroid disease: No      Medications:     Current Outpatient Medications:     BD PEN NEEDLE MINI U/F 31G X 5 MM Does not apply Misc, USE EVERY DAY, Disp: 90 each, Rfl: 3    Lancets (ONETOUCH DELICA PLUS [id number]) Does not apply Misc, Place 1 Device onto the skin in the morning and 1 Device before bedtime., Disp: 200 each, Rfl: 1    Insulin Pen Needle (COMFORT EZ PEN NEEDLES) 32G X 4 MM Does not apply Misc, Inject once daily, Disp: 100 each, Rfl: 2    fluticasone propionate 50 MCG/ACT Nasal Suspension, 2 sprays by Each Nare route every  morning., Disp: 16 g, Rfl: 3    tamsulosin 0.4 MG Oral Cap, Take 2 capsules (0.8 mg total) by mouth daily. Take 1/2 hour following the same meal each day, Disp: 14 capsule, Rfl: 0    tamsulosin 0.4 MG Oral Cap, Take 2 capsules (0.8 mg total) by mouth daily., Disp: 180 capsule, Rfl: 3    insulin glargine (LANTUS SOLOSTAR) 100 UNIT/ML Subcutaneous Solution Pen-injector, INJECT 15 TO 25 UNITS DAILY IF BLOOD GLUCOSE IS ABOVE 250, Disp: 15 mL, Rfl: 5    GLIMEPIRIDE 2 MG Oral Tab, TAKE 1 TABLET DAILY WITH BREAKFAST, Disp: 90 tablet, Rfl: 3    simvastatin 40 MG Oral Tab, Take 1 tablet (40 mg total) by mouth daily., Disp: 90 tablet, Rfl: 3    pantoprazole 40 MG Oral Tab EC, Take 1 tablet (40 mg total) by mouth before breakfast., Disp: 90 tablet, Rfl: 3    furosemide 20 MG Oral Tab, TAKE 1 TABLET THREE TIMES A WEEK, Disp: 30 tablet, Rfl: 4    Glucose Blood (ONETOUCH ULTRA) In Vitro Strip, Check blood sugar twice a day. DX: E11.8, insulin dependent, Disp: 200 strip, Rfl: 0    finasteride 5 MG Oral Tab, Take 1 tablet (5 mg total) by mouth daily., Disp: 10 tablet, Rfl: 0    carvedilol 6.25 MG Oral Tab, , Disp: , Rfl:     timolol 0.5 % Ophthalmic Solution, , Disp: , Rfl:     Dulaglutide (TRULICITY) 0.75 MG/0.5ML Subcutaneous Solution Pen-injector, Inject 0.75 mg into the skin once a week., Disp: 12 each, Rfl: 1    latanoprost 0.005 % Ophthalmic Solution, Place 1 drop into both eyes nightly. TAKE AT BEDTIME, Disp: , Rfl:     COMBIGAN 0.2-0.5 % Ophthalmic Solution, Place 1 drop into both eyes every 12 (twelve) hours., Disp: 1 Bottle, Rfl: 12    AMLODIPINE BESYLATE 5 MG Oral Tab, TAKE 1 TABLET(5 MG) BY MOUTH DAILY, Disp: 90 tablet, Rfl: 1    LISINOPRIL 10 MG Oral Tab, TAKE 1 TABLET BY MOUTH DAILY (Patient taking differently: Take 0.5 tablets (5 mg total) by mouth in the morning and 0.5 tablets (5 mg total) before bedtime.), Disp: 30 tablet, Rfl: 4       Allergies:   Allergies   Allergen Reactions    Barium Sulfate HIVES    Sulfa  Antibiotics OTHER (SEE COMMENTS)       Social History:   Social History     Socioeconomic History    Marital status:    Tobacco Use    Smoking status: Former     Current packs/day: 0.00     Types: Cigarettes    Smokeless tobacco: Never   Vaping Use    Vaping status: Never Used   Substance and Sexual Activity    Alcohol use: Yes     Alcohol/week: 1.0 standard drink of alcohol     Types: 1 Cans of beer per week     Comment: occ    Drug use: No   Other Topics Concern    Caffeine Concern Yes     Comment: coffee and soda    Exercise Yes     Comment: cutting grass    Reaction to local anesthetic No       Medical History:   Past Medical History:    Acid reflux    Basal cell carcinoma    right jaw    Basal cell carcinoma    left cheek    Basal cell carcinoma    located on the right cheek sup (AO)    BPH (benign prostatic hyperplasia)    Diabetes (HCC)    Diabetes mellitus (HCC)    Glaucoma    High blood pressure    High cholesterol    Hyperlipidemia    Kidney disease    Renal disorder    Shortness of breath       Surgical history:   Past Surgical History:   Procedure Laterality Date    Cardiac pacemaker placement      Colonoscopy  2014    Other surgical history      hemorrhoids    Other surgical history      pacemaker placed         PHYSICAL EXAM  BP (!) 167/76   Pulse 60   Ht 5' 7\" (1.702 m)   Wt 164 lb 6.4 oz (74.6 kg)   BMI 25.75 kg/m²   Home /78    General Appearance:  alert, well developed, in no acute distress  Eyes:  normal conjunctivae, sclera., normal sclera and normal pupils  Ears/Nose/Mouth/Throat/Neck:  no palpable thyroid nodules or cervical lymphadenopathy  Musculoskeletal:  normal muscle strength and tone  Hair & Nails:  normal scalp hair     Hematologic:  no excessive bruising  Neuro:  sensory grossly intact and motor grossly intact  Psychiatric:  oriented to time, self, and place  Nutritional:  no abnormal weight gain or loss      ASSESSMENT/PLAN:      1. Diabetes Mellitus,  uncontrolled  -uncontrolled, HgA1c 8.0% -->stable   -Discussed importance of glycemic control to prevent complications of diabetes  -Discussed complications of diabetes include retinopathy, neuropathy, nephropathy and cardiovascular disease  -Discussed importance of SBGM  -Discussed importance of low CHO diet  -Continue Glimepiride 2mg PO daily   -Continue Trulicity 0.75mg subcutaneous weekly (he is getting medication from [facility])  -Discussed with Dr. Rocha and prefer no SGLT-2  -Only taking Lantus if BG above 250 - Lantus 16 units subcutaneous at bedtime    -Normotensive at home (reviewed home BP logbook)   -UTD with podiatry   -Renal function stable       RTC 4 months     4/10/2024  Britta May MD

## 2024-04-10 NOTE — TELEPHONE ENCOUNTER
Please call Sujey Verma to renew his prescription for Trulicity.  Can contact son for assistance.

## 2024-04-10 NOTE — TELEPHONE ENCOUNTER
Rx for Trulicity 0.75 mg sent to [facility] per protocol.

## 2024-04-10 NOTE — TELEPHONE ENCOUNTER
Refill passed per [facility] protocol.    Requested Prescriptions   Pending Prescriptions Disp Refills    BD PEN NEEDLE MINI U/F 31G X 5 MM Does not apply Misc [Pharmacy Med Name: BD PEN PURVI UF MINI 5MM 31G3/16] 90 each 3     Sig: USE EVERY DAY       Diabetic Supplies Protocol Passed - 4/9/2024 12:22 AM        Passed - In person appointment or virtual visit in the past 12 mos or appointment in next 3 mos     Recent Outpatient Visits              2 weeks ago S/P placement of cardiac pacemaker    [facility] Tracey Parks MD    Office Visit    1 month ago CKD (chronic kidney disease) stage 4, GFR 15-29 ml/min ([facility])    [facility] Trevor Rocha MD    Office Visit    2 months ago Onychomycosis    [facility] Nohemy Jin DPM    Office Visit    2 months ago Chronic pain of left knee    [facility] Alexander Gage MD    Office Visit    4 months ago Controlled type 2 diabetes mellitus with complication, with long-term current use of insulin ([facility])    [facility] Britta May MD    Office Visit          Future Appointments         Provider Department Appt Notes    Today Britta May MD [facility] Follow up    In 2 weeks Alexander Gage MD [facility] 3 MO F/U    In 3 weeks Nohemy Jin DPM [facility] 3 month f/u    In 4 months Trevor Rocha MD [facility] 6 mos    In 5 months Tracey Parks MD [facility] 6 mnth                         Recent Outpatient Visits              2 weeks ago S/P placement of cardiac pacemaker    [facility]  Tracey Parks MD    Office Visit    1 month ago CKD (chronic kidney disease) stage 4, GFR 15-29 ml/min ([facility])    [facility] Trevor Rocha MD    Office Visit    2 months ago Onychomycosis    [facility] Nohemy Jin DPM    Office Visit    2 months ago Chronic pain of left knee    [facility] Alexander Gage MD    Office Visit    4 months ago Controlled type 2 diabetes mellitus with complication, with long-term current use of insulin ([facility])    [facility] Britta May MD    Office Visit            Future Appointments         Provider Department Appt Notes    Today Britta May MD [facility] Follow up    In 2 weeks Alexander Gage MD [facility] 3 MO F/U    In 3 weeks Nohemy Jin DPM [facility] 3 month f/u    In 4 months Trevor Rocha MD [facility] 6 mos    In 5 months Tracey Parks MD [address]

## 2024-04-16 RX ORDER — PANTOPRAZOLE SODIUM 40 MG/1
40 TABLET, DELAYED RELEASE ORAL
Qty: 90 TABLET | Refills: 3 | Status: SHIPPED | OUTPATIENT
Start: 2024-04-16

## 2024-04-16 NOTE — TELEPHONE ENCOUNTER
Refill Passed Per Protocol    Requested Prescriptions   Pending Prescriptions Disp Refills    PANTOPRAZOLE 40 MG Oral Tab EC [Pharmacy Med Name: PANTOPRAZOLE SODIUM DR TABS 40MG] 90 tablet 3     Sig: TAKE 1 TABLET BEFORE BREAKFAST       Gastrointestional Medication Protocol Passed - 4/15/2024 12:42 AM        Passed - In person appointment or virtual visit in the past 12 mos or appointment in next 3 mos     Recent Outpatient Visits              6 days ago Uncontrolled type 2 diabetes mellitus with hyperglycemia ([facility])    [facility] Britta May MD    Office Visit    3 weeks ago S/P placement of cardiac pacemaker    [facility] Tracey Parks MD    Office Visit    1 month ago CKD (chronic kidney disease) stage 4, GFR 15-29 ml/min ([facility])    [facility] Trevor Rocha MD    Office Visit    2 months ago Onychomycosis    [facility] Nohemy Jin DPM    Office Visit    2 months ago Chronic pain of left knee    [facility] Alexander Gage MD    Office Visit          Future Appointments         Provider Department Appt Notes    In 2 weeks Alexander Gage MD [facility] 3 MO F/U    In 2 weeks Nohemy Jin DPM [facility] 3 month f/u    In 4 months Trevor Rocha MD [facility] 6 mos    In 4 months Britta May MD [facility] Follow up    In 5 months Tracey Parks MD [facility] 6 mnth                         Future Appointments         Provider Department Appt Notes    In 2 weeks Alexander Gage MD [facility] 3 MO F/U    In 2 weeks  Nohemy Jin DPM [facility] 3 month f/u    In 4 months Trevor Rocha MD [facility] 6 mos    In 4 months Britta May MD [facility] Follow up    In 5 months Tracey Parks MD [facility] 6 mnth          Recent Outpatient Visits              6 days ago Uncontrolled type 2 diabetes mellitus with hyperglycemia ([facility])    [facility] Britta May MD    Office Visit    3 weeks ago S/P placement of cardiac pacemaker    [facility] Tracey Parks MD    Office Visit    1 month ago CKD (chronic kidney disease) stage 4, GFR 15-29 ml/min ([facility])    [facility] Trevor Rocha MD    Office Visit    2 months ago Onychomycosis    [facility] Nohemy Jin DPM    Office Visit    2 months ago Chronic pain of left knee    [facility] Alexander Gage MD    Office Visit

## 2024-04-22 ENCOUNTER — TELEPHONE (OUTPATIENT)
Dept: ENDOCRINOLOGY | Facility: HOSPITAL | Age: 89
End: 2024-04-22

## 2024-04-30 ENCOUNTER — OFFICE VISIT (OUTPATIENT)
Dept: PHYSICAL MEDICINE AND REHAB | Facility: CLINIC | Age: 89
End: 2024-04-30
Payer: MEDICARE

## 2024-04-30 VITALS — BODY MASS INDEX: 25.74 KG/M2 | WEIGHT: 164 LBS | HEIGHT: 67 IN

## 2024-04-30 DIAGNOSIS — M17.12 PRIMARY OSTEOARTHRITIS OF LEFT KNEE: ICD-10-CM

## 2024-04-30 DIAGNOSIS — M54.16 LUMBAR RADICULOPATHY: ICD-10-CM

## 2024-04-30 DIAGNOSIS — G89.29 CHRONIC PAIN OF LEFT KNEE: Primary | ICD-10-CM

## 2024-04-30 DIAGNOSIS — Z95.0 S/P PLACEMENT OF CARDIAC PACEMAKER: ICD-10-CM

## 2024-04-30 DIAGNOSIS — M48.061 SPINAL STENOSIS OF LUMBAR REGION WITHOUT NEUROGENIC CLAUDICATION: ICD-10-CM

## 2024-04-30 DIAGNOSIS — I50.22 CHRONIC SYSTOLIC CONGESTIVE HEART FAILURE (HCC): ICD-10-CM

## 2024-04-30 DIAGNOSIS — J42 CHRONIC BRONCHITIS, UNSPECIFIED CHRONIC BRONCHITIS TYPE (HCC): ICD-10-CM

## 2024-04-30 DIAGNOSIS — M25.562 CHRONIC PAIN OF LEFT KNEE: Primary | ICD-10-CM

## 2024-04-30 DIAGNOSIS — M51.9 LUMBAR DISC DISEASE: ICD-10-CM

## 2024-04-30 DIAGNOSIS — M48.061 LUMBAR FORAMINAL STENOSIS: ICD-10-CM

## 2024-04-30 PROCEDURE — 99214 OFFICE O/P EST MOD 30 MIN: CPT | Performed by: PHYSICAL MEDICINE & REHABILITATION

## 2024-04-30 NOTE — PATIENT INSTRUCTIONS
Plan  He will start doing his home exercises 2 times a day now and he will let know in 2 weeks if this is helping to control the pain better.    If the above is not helping, then he will need to get back into the PT for the lumbar spine and the left knee.    I will hold on doing left knee and lumbar spine injections for now.  He might need bilateral L5 TFESI's if the low back pain worsens.    He will follow up in 3 months or sooner if needed.

## 2024-04-30 NOTE — PROGRESS NOTES
Low Back Pain H & P    Chief Complaint:   Chief Complaint   Patient presents with    Follow - Up     LOV 1/30/24 pt is here for a follow up. No N/T but reports aching pain in his left knee  and states he always has intermittent pain in his lower back. Currently not taking pain meds or muscle relaxer's nor is he in physical therapy. Was given a left knee steroid injection and received 80% relief. ( 1/10/24)     Nursing note reviewed and verified.    Patient was last seen on 12/4/2023 and I did a left knee Monovisc injection at that time which did not help.  On 1/30/2024, I did a left knee injection which helped him about 70%.  He will have left anterior knee pain when he is standing and walking.  This pain is a 5/10.  He will have low back pain when he is walking.  This pain is a 5-6/10 when he has this pain.  The low back pain is worse than the left knee pain.  The low back pain is at the waist line in the midline.  He denies numbness, tingling, and weakness in the legs.  He has been continuing with his HEP for the PT.  This pain will limit him about 15-20%.      Lumbar extension will give him the low back pain.  This pain is a sharp sensation.  He is doing the HEP everyday.  He can also have this pain when going from sitting to standing.  Standing extensions will decrease the pain.    Past Medical History   Past Medical History:    Acid reflux    Basal cell carcinoma    right jaw    Basal cell carcinoma    left cheek    Basal cell carcinoma    located on the right cheek sup (AO)    BPH (benign prostatic hyperplasia)    Diabetes (HCC)    Diabetes mellitus (HCC)    Glaucoma    High blood pressure    High cholesterol    Hyperlipidemia    Kidney disease    Renal disorder    Shortness of breath       Past Surgical History   Past Surgical History:   Procedure Laterality Date    Cardiac pacemaker placement      Colonoscopy  2014    Other surgical history      hemorrhoids    Other surgical history      pacemaker placed        Family History   Family History   Problem Relation Age of Onset    Glaucoma Father        Social History   Social History     Socioeconomic History    Marital status:      Spouse name: Not on file    Number of children: Not on file    Years of education: Not on file    Highest education level: Not on file   Occupational History    Not on file   Tobacco Use    Smoking status: Former     Current packs/day: 0.00     Types: Cigarettes    Smokeless tobacco: Never   Vaping Use    Vaping status: Never Used   Substance and Sexual Activity    Alcohol use: Yes     Alcohol/week: 1.0 standard drink of alcohol     Types: 1 Cans of beer per week     Comment: occ    Drug use: No    Sexual activity: Not on file   Other Topics Concern     Service Not Asked    Blood Transfusions Not Asked    Caffeine Concern Yes     Comment: coffee and soda    Occupational Exposure Not Asked    Hobby Hazards Not Asked    Sleep Concern Not Asked    Stress Concern Not Asked    Weight Concern Not Asked    Special Diet Not Asked    Back Care Not Asked    Exercise Yes     Comment: cutting grass    Bike Helmet Not Asked    Seat Belt Not Asked    Self-Exams Not Asked    Grew up on a farm Not Asked    History of tanning Not Asked    Outdoor occupation Not Asked    Pt has a pacemaker Not Asked    Pt has a defibrillator Not Asked    Reaction to local anesthetic No   Social History Narrative    The patient does not use an assistive device..      The patient does live in a home with stairs.     Social Determinants of Health     Financial Resource Strain: Not on file   Food Insecurity: Not on file   Transportation Needs: Not on file   Physical Activity: High Risk (3/25/2024)    [facility], [facility]    Exercise Vital Sign     On average, how many days per week do you engage in moderate to strenuous exercise (like a brisk walk)?: 0 days     On average, how many minutes do you engage in exercise at this  level?: 0 min   Stress: Not on file   Social Connections: Not on file   Housing Stability: Not on file       PE:  The patient does appear in his stated age in no distress.  The patient is well groomed.    Psychiatric:  The patient is alert and oriented x 3.  The patient has a normal affect and mood.      Respiratory:  No acute respiratory distress. Patient does not have a cough.    HEENT:  Extraocular muscles are intact. There is no kern icterus. Pupils are equal, round, and reactive to light. No redness or discharge bilaterally.    Skin:  There are no rashes or lesions.    Vitals:  There were no vitals filed for this visit.    Gait:    Gait: Normal gait   Sit to Stand: no difficulty     Lumbar Spine Palpation:    Spinous Processes: Tender at L4 and L5   Z-joints: Non-tender for all Z-joints   SIJ: Non-tender for bilateral SIJ   Piriformis Muscle: Non-tender bilateral Piriformis muscles   Greater Trochanteric Bursa: Non-tender for bilateral Greater Trochanteric Bursa     Lumbar Spine:    Scoliosis: Thoracic levoscoliosis that is moderate, Lumbar dextroscoliosis that is moderate, and Right shift that is moderate and he stands with a mild flexed posture     Neurological Lower Extremity:    Light Touch Sensation: Intact in bilateral Lower Extremities   LE Muscle Strength: All LE strength measurements 5/5 except:  Hamstring RIGHT:   4/5  Hamstring LEFT:   4+/5   RIGHT plantar reflexes: downward response   LEFT plantar reflexes: downward response   Reflexes: 2+ in bilateral lower extremities except:  Right Achilles tendon which are absent  Left Achilles tendon which are absent     Knee  Inspection: No ecchymosis, no erythema, no swelling   Palpation: Tender at  left medial joint line   ROM: Extension lacks 10 degrees bilaterally   Tests: No medial laxity, lateral laxity, anterior drawer sign, or posterior drawer sign     Vascular lower extremity:   Dorsalis pedis pulse-RIGHT 2+   Dorsalis pedis pulse-LEFT 2+      Assessment  1. Chronic pain of left knee    2. Primary osteoarthritis of left knee: moderate medial joint and patellar    3. bilateral L5-S1 radiculopathies     4. L4-5 mod. L3-4 mild-mod, L2-3 mild, L1-2 mod central stenosis    5. L5-S1 left mod-severe/right severe, L4-5 bilateral mod-severe, L3-4 right mod-severe/left mod foraminal stenosis    6. L5-S1 bilateral mod-large far lateral, L4-5 right mod-large/left far lateral & mild-mod central, L3-4 right large foraminal & mod diffuse, L2-3 mild-mod diffuse, L1-2 mod diffuse bulging discs    7. Chronic systolic congestive heart failure (HCC)    8. Chronic bronchitis, unspecified chronic bronchitis type (HCC)    9. S/P placement of cardiac pacemaker         Plan  He will start doing his home exercises 2 times a day now and he will let know in 2 weeks if this is helping to control the pain better.    If the above is not helping, then he will need to get back into the PT for the lumbar spine and the left knee.    I will hold on doing left knee and lumbar spine injections for now.  He might need bilateral L5 TFESI's if the low back pain worsens.    He will follow up in 3 months or sooner if needed.    The patient understands and agrees with the stated plan.  Alexander Gage MD  4/30/2024

## 2024-05-03 ENCOUNTER — OFFICE VISIT (OUTPATIENT)
Dept: PODIATRY CLINIC | Facility: CLINIC | Age: 89
End: 2024-05-03

## 2024-05-03 DIAGNOSIS — Z79.4 TYPE 2 DIABETES MELLITUS WITH HYPERGLYCEMIA, WITH LONG-TERM CURRENT USE OF INSULIN (HCC): ICD-10-CM

## 2024-05-03 DIAGNOSIS — E11.65 TYPE 2 DIABETES MELLITUS WITH HYPERGLYCEMIA, WITH LONG-TERM CURRENT USE OF INSULIN (HCC): ICD-10-CM

## 2024-05-03 DIAGNOSIS — B35.1 ONYCHOMYCOSIS: Primary | ICD-10-CM

## 2024-05-03 PROCEDURE — 99213 OFFICE O/P EST LOW 20 MIN: CPT | Performed by: STUDENT IN AN ORGANIZED HEALTH CARE EDUCATION/TRAINING PROGRAM

## 2024-05-03 NOTE — PROGRESS NOTES
[facility]  Progress Note      Cal Cates is a 89 year old male.   Chief Complaint   Patient presents with    Diabetic Foot Care     Nail trim and foot check f/u- last AIC= 8.0 on 4/10/2024- FBS this am= 112- denies pain             HPI:     Patient is a pleasant 89-year-old diabetic male presents to clinic for bilateral foot evaluation    Allergies: Barium sulfate and Sulfa antibiotics    Current Outpatient Medications   Medication Sig Dispense Refill    pantoprazole 40 MG Oral Tab EC Take 1 tablet (40 mg total) by mouth before breakfast. 90 tablet 3    BD PEN NEEDLE MINI U/F 31G X 5 MM Does not apply Misc USE EVERY DAY 90 each 3    Dulaglutide (TRULICITY) 0.75 MG/0.5ML Subcutaneous Solution Pen-injector Inject 0.75 mg into the skin once a week. 6 mL 1    Lancets (ONETOUCH DELICA PLUS [id number]) Does not apply Misc Place 1 Device onto the skin in the morning and 1 Device before bedtime. 200 each 1    Insulin Pen Needle (COMFORT EZ PEN NEEDLES) 32G X 4 MM Does not apply Misc Inject once daily 100 each 2    fluticasone propionate 50 MCG/ACT Nasal Suspension 2 sprays by Each Nare route every morning. 16 g 3    tamsulosin 0.4 MG Oral Cap Take 2 capsules (0.8 mg total) by mouth daily. Take 1/2 hour following the same meal each day 14 capsule 0    tamsulosin 0.4 MG Oral Cap Take 2 capsules (0.8 mg total) by mouth daily. 180 capsule 3    insulin glargine (LANTUS SOLOSTAR) 100 UNIT/ML Subcutaneous Solution Pen-injector INJECT 15 TO 25 UNITS DAILY IF BLOOD GLUCOSE IS ABOVE 250 15 mL 5    GLIMEPIRIDE 2 MG Oral Tab TAKE 1 TABLET DAILY WITH BREAKFAST 90 tablet 3    simvastatin 40 MG Oral Tab Take 1 tablet (40 mg total) by mouth daily. 90 tablet 3    furosemide 20 MG Oral Tab TAKE 1 TABLET THREE TIMES A WEEK 30 tablet 4    Glucose Blood (ONETOUCH ULTRA) In Vitro Strip Check blood sugar twice a day. DX: E11.8, insulin dependent 200 strip 0    finasteride 5 MG Oral Tab Take 1 tablet (5 mg total) by mouth daily.  10 tablet 0    carvedilol 6.25 MG Oral Tab       timolol 0.5 % Ophthalmic Solution       latanoprost 0.005 % Ophthalmic Solution Place 1 drop into both eyes nightly. TAKE AT BEDTIME      COMBIGAN 0.2-0.5 % Ophthalmic Solution Place 1 drop into both eyes every 12 (twelve) hours. 1 Bottle 12    AMLODIPINE BESYLATE 5 MG Oral Tab TAKE 1 TABLET(5 MG) BY MOUTH DAILY 90 tablet 1    LISINOPRIL 10 MG Oral Tab TAKE 1 TABLET BY MOUTH DAILY (Patient taking differently: Take 0.5 tablets (5 mg total) by mouth in the morning and 0.5 tablets (5 mg total) before bedtime.) 30 tablet 4      Past Medical History:    Acid reflux    Basal cell carcinoma    right jaw    Basal cell carcinoma    left cheek    Basal cell carcinoma    located on the right cheek sup (AO)    BPH (benign prostatic hyperplasia)    Diabetes (HCC)    Diabetes mellitus (HCC)    Glaucoma    High blood pressure    High cholesterol    Hyperlipidemia    Kidney disease    Renal disorder    Shortness of breath      Past Surgical History:   Procedure Laterality Date    Cardiac pacemaker placement      Colonoscopy  2014    Other surgical history      hemorrhoids    Other surgical history      pacemaker placed      Family History   Problem Relation Age of Onset    Glaucoma Father       Social History     Socioeconomic History    Marital status:    Tobacco Use    Smoking status: Former     Current packs/day: 0.00     Types: Cigarettes    Smokeless tobacco: Never   Vaping Use    Vaping status: Never Used   Substance and Sexual Activity    Alcohol use: Yes     Alcohol/week: 1.0 standard drink of alcohol     Types: 1 Cans of beer per week     Comment: occ    Drug use: No   Other Topics Concern    Caffeine Concern Yes     Comment: coffee and soda    Exercise Yes     Comment: cutting grass    Reaction to local anesthetic No           REVIEW OF SYSTEMS:     Denies nause, fever, chills  No calf pain  Denies chest pain or SOB      EXAM:   There were no vitals taken for this  visit.  GENERAL: well developed, well nourished, in no apparent distress  EXTREMITIES:   1. Integument: Normal skin temperature and turgor. Toenails x10 are elongated, thickened and discolored with subungal derbi.     2. Vascular: Dorsalis pedis two out of four bilateral and posterior tibial pulses two out of   four bilateral, capillary refill normal.   3. Musculoskeletal: All muscle groups are graded 5 out of 5 in the foot and ankle.   4. Neurological: Normal sharp dull sensation; reflexes normal.      Bilateral barefoot skin diabetic exam is normal, visualized feet and the appearance is normal.  Bilateral monofilament/sensation of both feet is normal.  Pulsation pedal pulse exam of both lower legs/feet is normal as well.               ASSESSMENT AND PLAN:   Diagnoses and all orders for this visit:    Onychomycosis    Type 2 diabetes mellitus with hyperglycemia, with long-term current use of insulin (HCC)        Plan:       -Patient examined, chart history reviewed.  -Discussed importance of proper pedal hygiene, regular foot checks, and tight glucose control.  -Sharply debrided nails x10 with a sterile nail nipper achieving a 20% reduction in thickness and length, without incident.   -Ambulate with supportive shoes and inserts and avoid walking barefoot.  -Educated patient on acute signs of infection advised patient to seek immediate medical attention if symptoms arise.    RTC in 3 months     The patient indicates understanding of these issues and agrees to the plan.        Nohemy Jin DPM

## 2024-05-08 ENCOUNTER — EXTERNAL LAB (OUTPATIENT)
Dept: HEALTH INFORMATION MANAGEMENT | Facility: OTHER | Age: 89
End: 2024-05-08

## 2024-05-08 ENCOUNTER — LAB ENCOUNTER (OUTPATIENT)
Dept: LAB | Age: 89
End: 2024-05-08
Attending: INTERNAL MEDICINE
Payer: MEDICARE

## 2024-05-08 DIAGNOSIS — I50.32 CHRONIC DIASTOLIC (CONGESTIVE) HEART FAILURE (HCC): Primary | ICD-10-CM

## 2024-05-08 DIAGNOSIS — N18.4 CKD (CHRONIC KIDNEY DISEASE) STAGE 4, GFR 15-29 ML/MIN (HCC): ICD-10-CM

## 2024-05-08 LAB
ALBUMIN SERPL-MCNC: 4 G/DL (ref 3.2–4.8)
ANION GAP SERPL CALC-SCNC: 6 MMOL/L (ref 0–18)
BASOPHILS # BLD AUTO: 0.04 X10(3) UL (ref 0–0.2)
BASOPHILS NFR BLD AUTO: 0.8 %
BNP SERPL-MCNC: 55 PG/ML
BNP SERPL-MCNC: 55 PG/ML
BUN BLD-MCNC: 41 MG/DL (ref 9–23)
BUN/CREAT SERPL: 14.4 (ref 10–20)
CALCIUM BLD-MCNC: 9.3 MG/DL (ref 8.7–10.4)
CHLORIDE SERPL-SCNC: 110 MMOL/L (ref 98–112)
CO2 SERPL-SCNC: 25 MMOL/L (ref 21–32)
CREAT BLD-MCNC: 2.84 MG/DL
DEPRECATED RDW RBC AUTO: 44 FL (ref 35.1–46.3)
EGFRCR SERPLBLD CKD-EPI 2021: 21 ML/MIN/1.73M2 (ref 60–?)
EOSINOPHIL # BLD AUTO: 0.12 X10(3) UL (ref 0–0.7)
EOSINOPHIL NFR BLD AUTO: 2.4 %
ERYTHROCYTE [DISTWIDTH] IN BLOOD BY AUTOMATED COUNT: 13.1 % (ref 11–15)
GLUCOSE BLD-MCNC: 107 MG/DL (ref 70–99)
HCT VFR BLD AUTO: 37.4 %
HGB BLD-MCNC: 12.4 G/DL
IMM GRANULOCYTES # BLD AUTO: 0.01 X10(3) UL (ref 0–1)
IMM GRANULOCYTES NFR BLD: 0.2 %
LYMPHOCYTES # BLD AUTO: 1.51 X10(3) UL (ref 1–4)
LYMPHOCYTES NFR BLD AUTO: 30.5 %
MCH RBC QN AUTO: 30.2 PG (ref 26–34)
MCHC RBC AUTO-ENTMCNC: 33.2 G/DL (ref 31–37)
MCV RBC AUTO: 91 FL
MONOCYTES # BLD AUTO: 0.53 X10(3) UL (ref 0.1–1)
MONOCYTES NFR BLD AUTO: 10.7 %
NEUTROPHILS # BLD AUTO: 2.74 X10 (3) UL (ref 1.5–7.7)
NEUTROPHILS # BLD AUTO: 2.74 X10(3) UL (ref 1.5–7.7)
NEUTROPHILS NFR BLD AUTO: 55.4 %
OSMOLALITY SERPL CALC.SUM OF ELEC: 303 MOSM/KG (ref 275–295)
PHOSPHATE SERPL-MCNC: 3.3 MG/DL (ref 2.4–5.1)
PLATELET # BLD AUTO: 204 10(3)UL (ref 150–450)
POTASSIUM SERPL-SCNC: 4.5 MMOL/L (ref 3.5–5.1)
RBC # BLD AUTO: 4.11 X10(6)UL
SODIUM SERPL-SCNC: 141 MMOL/L (ref 136–145)
WBC # BLD AUTO: 5 X10(3) UL (ref 4–11)

## 2024-05-08 PROCEDURE — 83880 ASSAY OF NATRIURETIC PEPTIDE: CPT

## 2024-05-08 PROCEDURE — 80069 RENAL FUNCTION PANEL: CPT

## 2024-05-08 PROCEDURE — 36415 COLL VENOUS BLD VENIPUNCTURE: CPT

## 2024-05-08 PROCEDURE — 85025 COMPLETE CBC W/AUTO DIFF WBC: CPT

## 2024-05-09 ENCOUNTER — TELEPHONE (OUTPATIENT)
Dept: NEPHROLOGY | Facility: CLINIC | Age: 89
End: 2024-05-09

## 2024-05-09 ENCOUNTER — APPOINTMENT (OUTPATIENT)
Dept: CARDIOLOGY | Age: 89
End: 2024-05-09
Attending: INTERNAL MEDICINE

## 2024-05-09 NOTE — TELEPHONE ENCOUNTER
Kidney function getting worse again.  Anything new going on?.  Any change in medications.  Cardiology says he can drink up to 64 ounces per day.  Make sure he is doing so.  Avoid nonsteroidals.  Repeat labs in 1 month.

## 2024-05-10 NOTE — TELEPHONE ENCOUNTER
Notify patient son (Cal Yusuf)  of note below ,  Father is not taken any new medicines only Cardiology increase  furosamide  20 mg  3 time per week.  From 2 times a week .  No other medicine change

## 2024-05-15 RX ORDER — SIMVASTATIN 40 MG
40 TABLET ORAL DAILY
Qty: 90 TABLET | Refills: 3 | Status: SHIPPED | OUTPATIENT
Start: 2024-05-15

## 2024-05-15 NOTE — TELEPHONE ENCOUNTER
Refill passed per [facility] protocol.    Requested Prescriptions   Pending Prescriptions Disp Refills    SIMVASTATIN 40 MG Oral Tab [Pharmacy Med Name: SIMVASTATIN TABS 40MG] 90 tablet 3     Sig: TAKE 1 TABLET DAILY       Cholesterol Medication Protocol Passed - 5/13/2024 11:58 PM        Passed - ALT < 80     Lab Results   Component Value Date    ALT 20 09/26/2023             Passed - ALT resulted within past year        Passed - Lipid panel within past 12 months     Lab Results   Component Value Date    CHOLEST 129 09/26/2023    TRIG 161 (H) 09/26/2023    HDL 33 (L) 09/26/2023    LDL 68 09/26/2023    VLDL 24 09/26/2023    NONHDLC 96 09/26/2023             Passed - In person appointment or virtual visit in the past 12 mos or appointment in next 3 mos     Recent Outpatient Visits              1 week ago Onychomycosis    [facility] Nohemy Jin DPM    Office Visit    2 weeks ago Chronic pain of left knee    [facility] Alexander Gage MD    Office Visit    1 month ago Uncontrolled type 2 diabetes mellitus with hyperglycemia ([facility])    [facility] Britta May MD    Office Visit    1 month ago S/P placement of cardiac pacemaker    [facility] Tracey Parks MD    Office Visit    2 months ago CKD (chronic kidney disease) stage 4, GFR 15-29 ml/min ([facility])    [facility] Trevor Rocha MD    Office Visit          Future Appointments         Provider Department Appt Notes    In 2 months Nohemy Jin DPM [facility] 3 month f/u    In 2 months Alexander Gage MD [facility] Return in about 3 months (around 7/30/2024).    In 3 months Trevor Rocha MD [facility] 6  mos    In 3 months Britta May MD [facility] Follow up    In 4 months Tracey Parks MD [facility] 6 mnth                       Future Appointments         Provider Department Appt Notes    In 2 months Nohemy Jin DPM [facility] 3 month f/u    In 2 months Alexander Gage MD [facility] Return in about 3 months (around 7/30/2024).    In 3 months Trevor Rocha MD [facility] 6 mos    In 3 months Britta May MD [facility] Follow up    In 4 months Tracey Parks MD [facility] 6 mnth          Recent Outpatient Visits              1 week ago Onychomycosis    [facility] Nohemy Jin DPM    Office Visit    2 weeks ago Chronic pain of left knee    [facility] Alexander Gage MD    Office Visit    1 month ago Uncontrolled type 2 diabetes mellitus with hyperglycemia ([facility])    [facility] Britta May MD    Office Visit    1 month ago S/P placement of cardiac pacemaker    [facility] Tracey Parks MD    Office Visit    2 months ago CKD (chronic kidney disease) stage 4, GFR 15-29 ml/min ([facility])    [facility] Trevor Rocha MD    Office Visit

## 2024-05-20 RX ORDER — GLIMEPIRIDE 2 MG/1
TABLET ORAL
Qty: 90 TABLET | Refills: 3 | Status: SHIPPED | OUTPATIENT
Start: 2024-05-20

## 2024-05-20 NOTE — TELEPHONE ENCOUNTER
Endocrine Refill protocol for oral and injectable diabetic medications    Protocol Criteria:    -Appointment with Endocrinology completed in the last 6 months or scheduled in the next 3 months    -A1c result <8.5% in the past 6 months      Verify the above has been completed or scheduled in the appropriate timeline. If so can send a 90 day supply with 1 refill.     Last completed office visit: 4/10/2024   Next scheduled Follow up: 08/28/24  Future Appointments   Date Time Provider Department Center   8/5/2024 10:00 AM Nohemy Jin DPM [facility]   8/5/2024  4:00 PM Alexander Gage MD PM&R [facility]   8/19/2024  4:00 PM Trevor Rocha MD [id number]   8/28/2024  2:00 PM Britta May MD [facility]   9/23/2024  1:15 PM Tracey Parks MD [facility]      Last A1c result: Last A1c value was 8% done 4/10/2024.

## 2024-05-29 ENCOUNTER — TELEPHONE (OUTPATIENT)
Dept: CARDIOLOGY | Age: 89
End: 2024-05-29

## 2024-05-31 ENCOUNTER — TELEPHONE (OUTPATIENT)
Dept: FAMILY MEDICINE CLINIC | Facility: CLINIC | Age: 89
End: 2024-05-31

## 2024-05-31 DIAGNOSIS — Z79.4 CONTROLLED TYPE 2 DIABETES MELLITUS WITH COMPLICATION, WITH LONG-TERM CURRENT USE OF INSULIN (HCC): ICD-10-CM

## 2024-05-31 DIAGNOSIS — E11.8 CONTROLLED TYPE 2 DIABETES MELLITUS WITH COMPLICATION, WITH LONG-TERM CURRENT USE OF INSULIN (HCC): ICD-10-CM

## 2024-05-31 RX ORDER — DULAGLUTIDE 0.75 MG/.5ML
0.75 INJECTION, SOLUTION SUBCUTANEOUS WEEKLY
Qty: 6 ML | Refills: 1 | Status: SHIPPED | OUTPATIENT
Start: 2024-05-31

## 2024-05-31 RX ORDER — AMLODIPINE BESYLATE 5 MG/1
TABLET ORAL
Qty: 90 TABLET | Refills: 3 | Status: SHIPPED | OUTPATIENT
Start: 2024-05-31

## 2024-05-31 RX ORDER — BLOOD SUGAR DIAGNOSTIC
STRIP MISCELLANEOUS
Qty: 200 STRIP | Refills: 1 | Status: SHIPPED | OUTPATIENT
Start: 2024-05-31

## 2024-05-31 NOTE — TELEPHONE ENCOUNTER
Endocrine Refill protocol for Glucose testing supplies       Protocol Criteria:  Appointment with Endocrinology completed in the last 12 months or scheduled in the next 6 months     Verify appointment has been completed or scheduled in the appropriate timeline. If so can send a 90 day supply with 1 refill.        Last completed office visit: 4/10/2024  Next scheduled Follow up:   Future Appointments   Date Time Provider Department Center   8/5/2024 10:00 AM Nohemy Jin DPM [facility]   8/5/2024  4:00 PM Alexander Gage MD PM&R [facility]   8/19/2024  4:00 PM Trevor Rocha MD [id number]   8/28/2024  2:00 PM Britta May MD [facility]   9/23/2024  1:15 PM Tracey Parks MD [email or web address] [facility]

## 2024-06-03 NOTE — TELEPHONE ENCOUNTER
Received fax from [employer] dated on 06/02/24 stating the patient meets program eligibility requirement and is enrolled in [employer] until the end of the 2024 calendar year.

## 2024-06-04 NOTE — TELEPHONE ENCOUNTER
See mychart message, await patient response on pharmacy preference.     Refill Passed Per Protocol    Requested Prescriptions   Pending Prescriptions Disp Refills    Insulin Pen Needle (BD PEN NEEDLE MINI U/F) 31G X 5 MM Does not apply Misc 90 each 3     Sig: USE EVERY DAY       Diabetic Supplies Protocol Passed - 5/31/2024  1:21 PM        Passed - In person appointment or virtual visit in the past 12 mos or appointment in next 3 mos     Recent Outpatient Visits              1 month ago Onychomycosis    [facility] Nohemy Jin DPM    Office Visit    1 month ago Chronic pain of left knee    [facility] Alexander Gage MD    Office Visit    1 month ago Uncontrolled type 2 diabetes mellitus with hyperglycemia ([facility])    [facility] Britta May MD    Office Visit    2 months ago S/P placement of cardiac pacemaker    [facility] Tracey Parks MD    Office Visit    3 months ago CKD (chronic kidney disease) stage 4, GFR 15-29 ml/min ([facility])    [facility] Trevor Rocha MD    Office Visit          Future Appointments         Provider Department Appt Notes    In 2 months Nohemy Jin DPM [facility] 3 month f/u    In 2 months Alexander Gage MD [facility] Return in about 3 months (around 7/30/2024).    In 2 months Trevor Rocha MD [facility] 6 mos    In 2 months Britta May MD [facility] Follow up    In 3 months Tracey Parks MD [facility] 6 mnth                         Future Appointments         Provider Department Appt Notes    In 2 months Nohemy Jin DPM  [facility] 3 month f/u    In 2 months Alexander Gage MD [facility] Return in about 3 months (around 7/30/2024).    In 2 months Trevor Rocha MD [facility] 6 mos    In 2 months Britta May MD [facility] Follow up    In 3 months Tracey Parks MD [facility] 6 mnth          Recent Outpatient Visits              1 month ago Onychomycosis    [facility] Nohemy Jin DPM    Office Visit    1 month ago Chronic pain of left knee    [facility] Alexander Gage MD    Office Visit    1 month ago Uncontrolled type 2 diabetes mellitus with hyperglycemia ([facility])    [facility] Britta May MD    Office Visit    2 months ago S/P placement of cardiac pacemaker    [facility] Tracey Parks MD    Office Visit    3 months ago CKD (chronic kidney disease) stage 4, GFR 15-29 ml/min ([facility])    [facility] Trevor Rocha MD    Office Visit

## 2024-06-06 RX ORDER — FLURBIPROFEN SODIUM 0.3 MG/ML
SOLUTION/ DROPS OPHTHALMIC
Qty: 90 EACH | Refills: 3 | OUTPATIENT
Start: 2024-06-06

## 2024-06-06 NOTE — TELEPHONE ENCOUNTER
Cal/son's voicemail--> I left detailed message on verbal release verified # [phone]; also making aware of [employer] previously sent [1st attempt]    RN Triage - please check if patient read kissnofrog message, if not please make 2nd attempt to call

## 2024-06-07 NOTE — TELEPHONE ENCOUNTER
Last read by Cal Cates at  2:16 PM on 6/6/2024   Patient's son saw [employer] message sent--->see above    See [employer] message response; Rx was picked up. No further action required by Nurse Triage

## 2024-06-12 ENCOUNTER — TELEPHONE (OUTPATIENT)
Dept: ENDOCRINOLOGY CLINIC | Facility: CLINIC | Age: 89
End: 2024-06-12

## 2024-06-12 DIAGNOSIS — E11.65 UNCONTROLLED TYPE 2 DIABETES MELLITUS WITH HYPERGLYCEMIA (HCC): ICD-10-CM

## 2024-06-12 RX ORDER — DULAGLUTIDE 0.75 MG/.5ML
0.75 INJECTION, SOLUTION SUBCUTANEOUS WEEKLY
Qty: 6 ML | Refills: 1 | Status: SHIPPED | OUTPATIENT
Start: 2024-06-12 | End: 2024-06-14

## 2024-06-12 NOTE — TELEPHONE ENCOUNTER
RX for trulicity sent to [facility] in [place]   Per refill dtd 4/10/24 - patient's [facility] for trulicity was renewed (6/2/24)   LM advising patient's son that trulicity RX sent to [facility] in [place]

## 2024-06-12 NOTE — TELEPHONE ENCOUNTER
Patient's son calling [facility] is enrolled with [facility] so script with signature needed to be sent to [facility]'s pharmacy not [facility]. [facility] sent forms twice, but states cannot be a digital signature. Please send and call.     [facility]   Phone number: [phone]

## 2024-06-12 NOTE — TELEPHONE ENCOUNTER
Patient's son states that Trulicity was sent with digital signature and this can't be accepted.  Please resend to pharmacy.

## 2024-06-14 RX ORDER — DULAGLUTIDE 0.75 MG/.5ML
0.75 INJECTION, SOLUTION SUBCUTANEOUS WEEKLY
Qty: 6 ML | Refills: 1 | Status: SHIPPED | OUTPATIENT
Start: 2024-06-14

## 2024-06-14 NOTE — TELEPHONE ENCOUNTER
Trulicity Rx 0.75mg wekely dose sent to [facility] which is [facility]'s pharmacy.     Also completed page 8 of PAP program and faxed to [facility].

## 2024-06-28 ENCOUNTER — LAB ENCOUNTER (OUTPATIENT)
Dept: LAB | Age: 89
End: 2024-06-28
Attending: INTERNAL MEDICINE
Payer: MEDICARE

## 2024-06-28 DIAGNOSIS — N18.4 CKD (CHRONIC KIDNEY DISEASE) STAGE 4, GFR 15-29 ML/MIN (HCC): ICD-10-CM

## 2024-06-28 LAB
ALBUMIN SERPL-MCNC: 4 G/DL (ref 3.2–4.8)
ANION GAP SERPL CALC-SCNC: 9 MMOL/L (ref 0–18)
BASOPHILS # BLD AUTO: 0.03 X10(3) UL (ref 0–0.2)
BASOPHILS NFR BLD AUTO: 0.5 %
BUN BLD-MCNC: 49 MG/DL (ref 9–23)
BUN/CREAT SERPL: 16.1 (ref 10–20)
CALCIUM BLD-MCNC: 9.3 MG/DL (ref 8.7–10.4)
CHLORIDE SERPL-SCNC: 110 MMOL/L (ref 98–112)
CO2 SERPL-SCNC: 21 MMOL/L (ref 21–32)
CREAT BLD-MCNC: 3.05 MG/DL
DEPRECATED RDW RBC AUTO: 44.8 FL (ref 35.1–46.3)
EGFRCR SERPLBLD CKD-EPI 2021: 19 ML/MIN/1.73M2 (ref 60–?)
EOSINOPHIL # BLD AUTO: 0.15 X10(3) UL (ref 0–0.7)
EOSINOPHIL NFR BLD AUTO: 2.3 %
ERYTHROCYTE [DISTWIDTH] IN BLOOD BY AUTOMATED COUNT: 13.3 % (ref 11–15)
GLUCOSE BLD-MCNC: 127 MG/DL (ref 70–99)
HCT VFR BLD AUTO: 37.3 %
HGB BLD-MCNC: 12.1 G/DL
IMM GRANULOCYTES # BLD AUTO: 0.01 X10(3) UL (ref 0–1)
IMM GRANULOCYTES NFR BLD: 0.2 %
LYMPHOCYTES # BLD AUTO: 1.94 X10(3) UL (ref 1–4)
LYMPHOCYTES NFR BLD AUTO: 29.5 %
MCH RBC QN AUTO: 29.7 PG (ref 26–34)
MCHC RBC AUTO-ENTMCNC: 32.4 G/DL (ref 31–37)
MCV RBC AUTO: 91.4 FL
MONOCYTES # BLD AUTO: 0.57 X10(3) UL (ref 0.1–1)
MONOCYTES NFR BLD AUTO: 8.7 %
NEUTROPHILS # BLD AUTO: 3.87 X10 (3) UL (ref 1.5–7.7)
NEUTROPHILS # BLD AUTO: 3.87 X10(3) UL (ref 1.5–7.7)
NEUTROPHILS NFR BLD AUTO: 58.8 %
OSMOLALITY SERPL CALC.SUM OF ELEC: 305 MOSM/KG (ref 275–295)
PHOSPHATE SERPL-MCNC: 4 MG/DL (ref 2.4–5.1)
PLATELET # BLD AUTO: 188 10(3)UL (ref 150–450)
POTASSIUM SERPL-SCNC: 4.8 MMOL/L (ref 3.5–5.1)
RBC # BLD AUTO: 4.08 X10(6)UL
SODIUM SERPL-SCNC: 140 MMOL/L (ref 136–145)
WBC # BLD AUTO: 6.6 X10(3) UL (ref 4–11)

## 2024-06-28 PROCEDURE — 85025 COMPLETE CBC W/AUTO DIFF WBC: CPT

## 2024-06-28 PROCEDURE — 36415 COLL VENOUS BLD VENIPUNCTURE: CPT

## 2024-06-28 PROCEDURE — 80069 RENAL FUNCTION PANEL: CPT

## 2024-06-29 ENCOUNTER — TELEPHONE (OUTPATIENT)
Dept: NEPHROLOGY | Facility: CLINIC | Age: 89
End: 2024-06-29

## 2024-06-29 NOTE — TELEPHONE ENCOUNTER
Kidney function is getting a little worse.  Anything new going on or any new medicines?  Make sure he is drinking plenty of fluids.  Avoid nonsteroidals.  Repeat labs just before upcoming visit in August.

## 2024-07-01 ENCOUNTER — APPOINTMENT (OUTPATIENT)
Dept: CARDIOLOGY | Age: 89
End: 2024-07-01

## 2024-07-01 VITALS
RESPIRATION RATE: 18 BRPM | BODY MASS INDEX: 25.45 KG/M2 | DIASTOLIC BLOOD PRESSURE: 72 MMHG | HEART RATE: 60 BPM | HEIGHT: 67 IN | SYSTOLIC BLOOD PRESSURE: 165 MMHG | WEIGHT: 162.15 LBS

## 2024-07-01 DIAGNOSIS — E78.00 HYPERCHOLESTEREMIA: ICD-10-CM

## 2024-07-01 DIAGNOSIS — N28.9 RENAL INSUFFICIENCY: ICD-10-CM

## 2024-07-01 DIAGNOSIS — I70.1 RENAL ARTERY STENOSIS (CMD): ICD-10-CM

## 2024-07-01 DIAGNOSIS — I50.22 CHRONIC SYSTOLIC CONGESTIVE HEART FAILURE  (CMD): Primary | ICD-10-CM

## 2024-07-01 DIAGNOSIS — E55.9 VITAMIN D DEFICIENCY: ICD-10-CM

## 2024-07-01 SDOH — HEALTH STABILITY: PHYSICAL HEALTH: ON AVERAGE, HOW MANY MINUTES DO YOU ENGAGE IN EXERCISE AT THIS LEVEL?: 0 MIN

## 2024-07-01 SDOH — HEALTH STABILITY: PHYSICAL HEALTH: ON AVERAGE, HOW MANY DAYS PER WEEK DO YOU ENGAGE IN MODERATE TO STRENUOUS EXERCISE (LIKE A BRISK WALK)?: 0 DAYS

## 2024-07-01 ASSESSMENT — ENCOUNTER SYMPTOMS
LOSS OF BALANCE: 0
WEIGHT LOSS: 0
CHILLS: 0
HEMATOCHEZIA: 0
ABDOMINAL PAIN: 0
HEMOPTYSIS: 0
ALLERGIC/IMMUNOLOGIC COMMENTS: NO NEW FOOD ALLERGIES
HEADACHES: 0
INSOMNIA: 1
SUSPICIOUS LESIONS: 0
WEAKNESS: 0
COUGH: 0
CONSTIPATION: 1
WEIGHT GAIN: 0
FEVER: 0
BLOATING: 1
DIZZINESS: 0
BRUISES/BLEEDS EASILY: 0
LIGHT-HEADEDNESS: 0

## 2024-07-01 ASSESSMENT — PATIENT HEALTH QUESTIONNAIRE - PHQ9
SUM OF ALL RESPONSES TO PHQ9 QUESTIONS 1 AND 2: 0
1. LITTLE INTEREST OR PLEASURE IN DOING THINGS: NOT AT ALL
SUM OF ALL RESPONSES TO PHQ9 QUESTIONS 1 AND 2: 0
CLINICAL INTERPRETATION OF PHQ2 SCORE: NO FURTHER SCREENING NEEDED
CLINICAL INTERPRETATION OF PHQ2 SCORE: NO FURTHER SCREENING NEEDED
2. FEELING DOWN, DEPRESSED OR HOPELESS: NOT AT ALL
SUM OF ALL RESPONSES TO PHQ9 QUESTIONS 1 AND 2: 0
SUM OF ALL RESPONSES TO PHQ9 QUESTIONS 1 AND 2: 0
1. LITTLE INTEREST OR PLEASURE IN DOING THINGS: NOT AT ALL
2. FEELING DOWN, DEPRESSED OR HOPELESS: NOT AT ALL

## 2024-07-01 NOTE — TELEPHONE ENCOUNTER
Dr Rocha informed patient Son Cal SLAUGHTER verified ,verbalized understanding,reported patient is dong fine ,started on Trulicity  a week ago ,due to blood sugar was not controlled,kept hydrated .

## 2024-07-15 ENCOUNTER — ANCILLARY PROCEDURE (OUTPATIENT)
Dept: CARDIOLOGY | Age: 89
End: 2024-07-15
Attending: INTERNAL MEDICINE

## 2024-07-15 DIAGNOSIS — I50.22 CHRONIC SYSTOLIC CONGESTIVE HEART FAILURE  (CMD): ICD-10-CM

## 2024-07-15 DIAGNOSIS — I70.1 RENAL ARTERY STENOSIS (CMD): ICD-10-CM

## 2024-07-15 DIAGNOSIS — N28.9 RENAL INSUFFICIENCY: ICD-10-CM

## 2024-07-15 PROCEDURE — 93975 VASCULAR STUDY: CPT | Performed by: INTERNAL MEDICINE

## 2024-08-05 ENCOUNTER — OFFICE VISIT (OUTPATIENT)
Dept: PODIATRY CLINIC | Facility: CLINIC | Age: 89
End: 2024-08-05

## 2024-08-05 ENCOUNTER — OFFICE VISIT (OUTPATIENT)
Dept: PHYSICAL MEDICINE AND REHAB | Facility: CLINIC | Age: 89
End: 2024-08-05
Payer: MEDICARE

## 2024-08-05 VITALS — HEIGHT: 67 IN | WEIGHT: 164 LBS | BODY MASS INDEX: 25.74 KG/M2

## 2024-08-05 DIAGNOSIS — G89.29 CHRONIC PAIN OF LEFT KNEE: Primary | ICD-10-CM

## 2024-08-05 DIAGNOSIS — B35.1 ONYCHOMYCOSIS: ICD-10-CM

## 2024-08-05 DIAGNOSIS — M51.9 LUMBAR DISC DISEASE: ICD-10-CM

## 2024-08-05 DIAGNOSIS — M48.061 SPINAL STENOSIS OF LUMBAR REGION WITHOUT NEUROGENIC CLAUDICATION: ICD-10-CM

## 2024-08-05 DIAGNOSIS — M17.12 PRIMARY OSTEOARTHRITIS OF LEFT KNEE: ICD-10-CM

## 2024-08-05 DIAGNOSIS — M48.061 LUMBAR FORAMINAL STENOSIS: ICD-10-CM

## 2024-08-05 DIAGNOSIS — M54.16 LUMBAR RADICULOPATHY: ICD-10-CM

## 2024-08-05 DIAGNOSIS — E11.8 CONTROLLED TYPE 2 DIABETES MELLITUS WITH COMPLICATION, WITH LONG-TERM CURRENT USE OF INSULIN (HCC): ICD-10-CM

## 2024-08-05 DIAGNOSIS — Z79.4 TYPE 2 DIABETES MELLITUS WITH HYPERGLYCEMIA, WITH LONG-TERM CURRENT USE OF INSULIN (HCC): Primary | ICD-10-CM

## 2024-08-05 DIAGNOSIS — E08.42 DIABETES MELLITUS DUE TO UNDERLYING CONDITION WITH DIABETIC POLYNEUROPATHY, UNSPECIFIED WHETHER LONG TERM INSULIN USE (HCC): ICD-10-CM

## 2024-08-05 DIAGNOSIS — M25.562 CHRONIC PAIN OF LEFT KNEE: Primary | ICD-10-CM

## 2024-08-05 DIAGNOSIS — E11.65 TYPE 2 DIABETES MELLITUS WITH HYPERGLYCEMIA, WITH LONG-TERM CURRENT USE OF INSULIN (HCC): Primary | ICD-10-CM

## 2024-08-05 DIAGNOSIS — Z79.4 CONTROLLED TYPE 2 DIABETES MELLITUS WITH COMPLICATION, WITH LONG-TERM CURRENT USE OF INSULIN (HCC): ICD-10-CM

## 2024-08-05 PROCEDURE — 99213 OFFICE O/P EST LOW 20 MIN: CPT | Performed by: STUDENT IN AN ORGANIZED HEALTH CARE EDUCATION/TRAINING PROGRAM

## 2024-08-05 RX ORDER — LIDOCAINE HYDROCHLORIDE 10 MG/ML
2.5 INJECTION, SOLUTION INFILTRATION; PERINEURAL ONCE
Status: COMPLETED | OUTPATIENT
Start: 2024-08-05 | End: 2024-08-05

## 2024-08-05 RX ORDER — TRIAMCINOLONE ACETONIDE 40 MG/ML
60 INJECTION, SUSPENSION INTRA-ARTICULAR; INTRAMUSCULAR ONCE
Status: COMPLETED | OUTPATIENT
Start: 2024-08-05 | End: 2024-08-05

## 2024-08-05 NOTE — PROGRESS NOTES
Low Back Pain H & P    Chief Complaint:   Chief Complaint   Patient presents with    Follow - Up     LOV 4/30/24 pt is here for a follow up. No N/T. Not taking any pain meds or muscle relaxer's. No current physical therapy. Pain 5/10     Nursing note reviewed and verified.    Patient was last seen on 4/30/2024.  He is having some left knee pain that is a 5/10.  The pain worse when he is walking which has not been doing much since the Olympics have on TV.  For this reason, he is not having as much pain per his son who accompanied him today.  The pain will limit his walking.  He last had the left knee injection on 1/30/2024 which helped about 70%.    Past Medical History   Past Medical History:    Acid reflux    Basal cell carcinoma    right jaw    Basal cell carcinoma    left cheek    Basal cell carcinoma    located on the right cheek sup (AO)    BPH (benign prostatic hyperplasia)    Diabetes (HCC)    Diabetes mellitus (HCC)    Glaucoma    High blood pressure    High cholesterol    Hyperlipidemia    Kidney disease    Renal disorder    Shortness of breath       Past Surgical History   Past Surgical History:   Procedure Laterality Date    Cardiac pacemaker placement      Colonoscopy  2014    Other surgical history      hemorrhoids    Other surgical history      pacemaker placed       Family History   Family History   Problem Relation Age of Onset    Glaucoma Father        Social History   Social History     Socioeconomic History    Marital status:      Spouse name: Not on file    Number of children: Not on file    Years of education: Not on file    Highest education level: Not on file   Occupational History    Not on file   Tobacco Use    Smoking status: Former     Current packs/day: 0.00     Types: Cigarettes    Smokeless tobacco: Never   Vaping Use    Vaping status: Never Used   Substance and Sexual Activity    Alcohol use: Yes     Alcohol/week: 1.0 standard drink of alcohol     Types: 1 Cans of beer per week      Comment: occ    Drug use: No    Sexual activity: Not on file   Other Topics Concern     Service Not Asked    Blood Transfusions Not Asked    Caffeine Concern Yes     Comment: coffee and soda    Occupational Exposure Not Asked    Hobby Hazards Not Asked    Sleep Concern Not Asked    Stress Concern Not Asked    Weight Concern Not Asked    Special Diet Not Asked    Back Care Not Asked    Exercise Yes     Comment: cutting grass    Bike Helmet Not Asked    Seat Belt Not Asked    Self-Exams Not Asked    Grew up on a farm Not Asked    History of tanning Not Asked    Outdoor occupation Not Asked    Pt has a pacemaker Not Asked    Pt has a defibrillator Not Asked    Reaction to local anesthetic No   Social History Narrative    The patient does not use an assistive device..      The patient does live in a home with stairs.     Social Determinants of Health     Financial Resource Strain: Not on file   Food Insecurity: Not on file   Transportation Needs: Not on file   Physical Activity: High Risk (7/1/2024)    [facility]    Exercise Vital Sign     On average, how many days per week do you engage in moderate to strenuous exercise (like a brisk walk)?: 0 days     On average, how many minutes do you engage in exercise at this level?: 0 min   Stress: Not on file   Social Connections: Not on file   Housing Stability: Not on file       PE:  The patient does appear in his stated age in no distress.  The patient is well groomed.    Psychiatric:  The patient is alert and oriented x 3.  The patient has a normal affect and mood.      Respiratory:  No acute respiratory distress. Patient does not have a cough.    HEENT:  Extraocular muscles are intact. There is no kern icterus. Pupils are equal, round, and reactive to light. No redness or discharge bilaterally.    Skin:  There are no rashes or lesions.    Vitals:  There were no vitals filed for this visit.    Left Knee  Inspection: No ecchymosis, no  erythema, no swelling   Palpation: Not warm, non tender   ROM: Extension full   Tests: No medial laxity, lateral laxity, anterior drawer sign, or posterior drawer sign     Assessment  1. Chronic pain of left knee    2. Primary osteoarthritis of left knee: moderate medial joint and patellar    3. L5-S1 left mod-severe/right severe, L4-5 bilateral mod-severe, L3-4 right mod-severe/left mod foraminal stenosis    4. L5-S1 bilateral mod-large far lateral, L4-5 right mod-large/left far lateral & mild-mod central, L3-4 right large foraminal & mod diffuse, L2-3 mild-mod diffuse, L1-2 mod diffuse bulging discs    5. L4-5 mod. L3-4 mild-mod, L2-3 mild, L1-2 mod central stenosis    6. bilateral L5-S1 radiculopathies          Plan  I did a left knee injection in the office today under ultrasound guidance.  (See procedure note)    His sone will let me know how he is doing in 2 weeks.    He marleni follow up in 4 months or sooner if needed.    I spoke to the patient and his son about doing left knee radiofrequency neurotomies to the geniculate nerves to see if this will give him relief for 12 months without using any steroids.  The patient will think about this.    The patient understands and agrees with the stated plan.  Alexander Gage MD  8/5/2024

## 2024-08-05 NOTE — TELEPHONE ENCOUNTER
Prescription  refill request( not listed)    DRUG:One Touch Delica Plus 33 G Lancets    Sig: test twice daily   Qty: 200

## 2024-08-05 NOTE — TELEPHONE ENCOUNTER
Endocrine Refill protocol for Glucose testing supplies     Protocol Criteria: PASSED  Appointment with Endocrinology completed in the last 12 months or scheduled in the next 6 months     Verify appointment has been completed or scheduled in the appropriate timeline. If so can send a 90 day supply with 1 refill.     Last completed office visit: 4/10/2024 Britta May MD   Next scheduled Follow up: 08/28/24

## 2024-08-05 NOTE — PROGRESS NOTES
[facility]  Progress Note      Cal Cates is a 89 year old male.   Chief Complaint   Patient presents with    Diabetic Foot Care     3 mo f/u - last DeE2E=7.0 from 4/10/24 - has no c/o regarding his feet - this AM his GM=609 - needs his toenails trimmed and feet checked              HPI:     Patient is a pleasant 89-year-old diabetic male presents to clinic for bilateral foot evaluation    Allergies: Barium sulfate and Sulfa antibiotics    Current Outpatient Medications   Medication Sig Dispense Refill    Dulaglutide (TRULICITY) 0.75 MG/0.5ML Subcutaneous Solution Pen-injector Inject 0.75 mg into the skin once a week. DX code: E11.65 6 mL 1    Glucose Blood (ONETOUCH ULTRA) In Vitro Strip Check blood sugar twice a day. DX: E11.8, insulin dependent 200 strip 1    GLIMEPIRIDE 2 MG Oral Tab TAKE 1 TABLET DAILY WITH BREAKFAST 90 tablet 3    simvastatin 40 MG Oral Tab Take 1 tablet (40 mg total) by mouth daily. 90 tablet 3    pantoprazole 40 MG Oral Tab EC Take 1 tablet (40 mg total) by mouth before breakfast. 90 tablet 3    BD PEN NEEDLE MINI U/F 31G X 5 MM Does not apply Misc USE EVERY DAY 90 each 3    Lancets (ONETOUCH DELICA PLUS DMGWTF03W) Does not apply Misc Place 1 Device onto the skin in the morning and 1 Device before bedtime. 200 each 1    Insulin Pen Needle (COMFORT EZ PEN NEEDLES) 32G X 4 MM Does not apply Misc Inject once daily 100 each 2    fluticasone propionate 50 MCG/ACT Nasal Suspension 2 sprays by Each Nare route every morning. 16 g 3    tamsulosin 0.4 MG Oral Cap Take 2 capsules (0.8 mg total) by mouth daily. Take 1/2 hour following the same meal each day 14 capsule 0    tamsulosin 0.4 MG Oral Cap Take 2 capsules (0.8 mg total) by mouth daily. 180 capsule 3    insulin glargine (LANTUS SOLOSTAR) 100 UNIT/ML Subcutaneous Solution Pen-injector INJECT 15 TO 25 UNITS DAILY IF BLOOD GLUCOSE IS ABOVE 250 15 mL 5    furosemide 20 MG Oral Tab TAKE 1 TABLET THREE TIMES A WEEK 30 tablet 4     finasteride 5 MG Oral Tab Take 1 tablet (5 mg total) by mouth daily. 10 tablet 0    carvedilol 6.25 MG Oral Tab       timolol 0.5 % Ophthalmic Solution       latanoprost 0.005 % Ophthalmic Solution Place 1 drop into both eyes nightly. TAKE AT BEDTIME      COMBIGAN 0.2-0.5 % Ophthalmic Solution Place 1 drop into both eyes every 12 (twelve) hours. 1 Bottle 12    AMLODIPINE BESYLATE 5 MG Oral Tab TAKE 1 TABLET(5 MG) BY MOUTH DAILY 90 tablet 1    LISINOPRIL 10 MG Oral Tab TAKE 1 TABLET BY MOUTH DAILY (Patient taking differently: Take 0.5 tablets (5 mg total) by mouth in the morning and 0.5 tablets (5 mg total) before bedtime.) 30 tablet 4      Past Medical History:    Acid reflux    Basal cell carcinoma    right jaw    Basal cell carcinoma    left cheek    Basal cell carcinoma    located on the right cheek sup (AO)    BPH (benign prostatic hyperplasia)    Diabetes (HCC)    Diabetes mellitus (HCC)    Glaucoma    High blood pressure    High cholesterol    Hyperlipidemia    Kidney disease    Renal disorder    Shortness of breath      Past Surgical History:   Procedure Laterality Date    Cardiac pacemaker placement      Colonoscopy  2014    Other surgical history      hemorrhoids    Other surgical history      pacemaker placed      Family History   Problem Relation Age of Onset    Glaucoma Father       Social History     Socioeconomic History    Marital status:    Tobacco Use    Smoking status: Former     Current packs/day: 0.00     Types: Cigarettes    Smokeless tobacco: Never   Vaping Use    Vaping status: Never Used   Substance and Sexual Activity    Alcohol use: Yes     Alcohol/week: 1.0 standard drink of alcohol     Types: 1 Cans of beer per week     Comment: occ    Drug use: No   Other Topics Concern    Caffeine Concern Yes     Comment: coffee and soda    Exercise Yes     Comment: cutting grass    Reaction to local anesthetic No           REVIEW OF SYSTEMS:     Denies nause, fever, chills  No calf pain  Denies  chest pain or SOB      EXAM:   There were no vitals taken for this visit.  GENERAL: well developed, well nourished, in no apparent distress  EXTREMITIES:   1. Integument: Normal skin temperature and turgor. Toenails x10 are elongated, thickened and discolored with subungal derbi.     2. Vascular: Dorsalis pedis two out of four bilateral and posterior tibial pulses two out of   four bilateral, capillary refill normal.   3. Musculoskeletal: All muscle groups are graded 5 out of 5 in the foot and ankle.   4. Neurological: Normal sharp dull sensation; reflexes normal.      Bilateral barefoot skin diabetic exam is normal, visualized feet and the appearance is normal.  Bilateral monofilament/sensation of both feet is normal.  Pulsation pedal pulse exam of both lower legs/feet is normal as well.               ASSESSMENT AND PLAN:   Diagnoses and all orders for this visit:    Type 2 diabetes mellitus with hyperglycemia, with long-term current use of insulin ([facility])    Onychomycosis    Diabetes mellitus due to underlying condition with diabetic polyneuropathy, unspecified whether long term insulin use ([facility])        Plan:       -Patient examined, chart history reviewed.  -Discussed importance of proper pedal hygiene, regular foot checks, and tight glucose control.  -Sharply debrided nails x10 with a sterile nail nipper achieving a 20% reduction in thickness and length, without incident.   -Ambulate with supportive shoes and inserts and avoid walking barefoot.  -Educated patient on acute signs of infection advised patient to seek immediate medical attention if symptoms arise.    RTC in 3 months     The patient indicates understanding of these issues and agrees to the plan.        Nohemy Jin DPM

## 2024-08-06 RX ORDER — LANCETS 33 GAUGE
1 EACH MISCELLANEOUS 2 TIMES DAILY
Qty: 200 EACH | Refills: 1 | Status: SHIPPED | OUTPATIENT
Start: 2024-08-06

## 2024-08-12 ENCOUNTER — PATIENT MESSAGE (OUTPATIENT)
Dept: FAMILY MEDICINE CLINIC | Facility: CLINIC | Age: 89
End: 2024-08-12

## 2024-08-14 RX ORDER — FINASTERIDE 5 MG/1
5 TABLET, FILM COATED ORAL DAILY
Qty: 90 TABLET | Refills: 3 | Status: SHIPPED | OUTPATIENT
Start: 2024-08-14

## 2024-08-14 RX ORDER — FINASTERIDE 5 MG/1
5 TABLET, FILM COATED ORAL DAILY
Qty: 10 TABLET | Refills: 0 | Status: SHIPPED | OUTPATIENT
Start: 2024-08-14 | End: 2024-08-19

## 2024-08-14 NOTE — TELEPHONE ENCOUNTER
[employer] message sent for dose clarification ====0.5 mg or 50 mg ..    Medication pended. Routing for protocol.    REFILL STAFF=see message regarding pharmacy and refill request , thanks.           From: Cal Cates  To: Tracey Parks  Sent: 2024  6:22 PM CDT  Subject: Finasteride Tabs (90 Day Supply)    Dr. Tracey Parks,    My father has a short supply of Finasteride 0.5mg tablets that he should be receiving a (90 Qty) 90-day supply. It looks to me that his prescription has  with [facility].      Can we get this prescription updated with [facility] and place a temporary order in the interim to [facility].  Thank you    Please advise....  Ramesh [phone]

## 2024-08-14 NOTE — TELEPHONE ENCOUNTER
Refill passed per [facility] protocols.    Requested Prescriptions   Pending Prescriptions Disp Refills    finasteride 5 MG Oral Tab 10 tablet 0     Sig: Take 1 tablet (5 mg total) by mouth daily.       Genitourinary Medications Passed - 8/14/2024  8:13 AM        Passed - Patient does not have pulmonary hypertension on problem list        Passed - In person appointment or virtual visit in the past 12 mos or appointment in next 3 mos     Recent Outpatient Visits              1 week ago Type 2 diabetes mellitus with hyperglycemia, with long-term current use of insulin ([facility])    [facility] Nohemy Jin DPM    Office Visit    1 week ago Chronic pain of left knee    [facility] Alexander Gage MD    Office Visit    3 months ago Onychomycosis    [facility] Nohmey Jin DPM    Office Visit    3 months ago Chronic pain of left knee    [facility] Alexander Gage MD    Office Visit    4 months ago Uncontrolled type 2 diabetes mellitus with hyperglycemia ([facility])    [facility] Britta May MD    Office Visit          Future Appointments         Provider Department Appt Notes    In 5 days Trevor Rocha MD [facility] 6 mos    In 2 weeks Britta May MD [facility] Follow up    In 1 month Tracey Parks MD [facility] 6 mnth    In 3 months Alexander Gage MD [facility] 3 mo fu                      finasteride 5 MG Oral Tab 90 tablet 3     Sig: Take 1 tablet (5 mg total) by mouth daily.       Genitourinary Medications Passed - 8/14/2024  8:13 AM        Passed - Patient does not have pulmonary hypertension on problem list         Passed - In person appointment or virtual visit in the past 12 mos or appointment in next 3 mos     Recent Outpatient Visits              1 week ago Type 2 diabetes mellitus with hyperglycemia, with long-term current use of insulin ([facility])    [facility] Nohemy Jin DPM    Office Visit    1 week ago Chronic pain of left knee    [facility] Alexander Gage MD    Office Visit    3 months ago Onychomycosis    [facility] Nohemy Jin DPM    Office Visit    3 months ago Chronic pain of left knee    [facility] Alexander Gage MD    Office Visit    4 months ago Uncontrolled type 2 diabetes mellitus with hyperglycemia ([facility])    [facility] Britta May MD    Office Visit          Future Appointments         Provider Department Appt Notes    In 5 days Trevor Rocah MD [facility] 6 mos    In 2 weeks Britta May MD [facility] Follow up    In 1 month Tracey Parks MD [facility] 6 mnth    In 3 months Alexander Gage MD [facility] 3 mo fu

## 2024-08-15 NOTE — TELEPHONE ENCOUNTER
Spoke with his son on Release and advised him of the medication for his father and explained he will need to be proxy on his father's my chart in order to communicate with us on his father's my chart. I sent the my chart help desk number for him to be proxied.

## 2024-08-17 ENCOUNTER — LAB ENCOUNTER (OUTPATIENT)
Dept: LAB | Age: 89
End: 2024-08-17
Attending: INTERNAL MEDICINE
Payer: MEDICARE

## 2024-08-17 DIAGNOSIS — N18.4 CKD (CHRONIC KIDNEY DISEASE) STAGE 4, GFR 15-29 ML/MIN (HCC): ICD-10-CM

## 2024-08-17 LAB
ALBUMIN SERPL-MCNC: 4 G/DL (ref 3.2–4.8)
ANION GAP SERPL CALC-SCNC: 5 MMOL/L (ref 0–18)
BASOPHILS # BLD AUTO: 0.03 X10(3) UL (ref 0–0.2)
BASOPHILS NFR BLD AUTO: 0.5 %
BUN BLD-MCNC: 42 MG/DL (ref 9–23)
BUN/CREAT SERPL: 14.2 (ref 10–20)
CALCIUM BLD-MCNC: 9.6 MG/DL (ref 8.7–10.4)
CHLORIDE SERPL-SCNC: 111 MMOL/L (ref 98–112)
CO2 SERPL-SCNC: 23 MMOL/L (ref 21–32)
CREAT BLD-MCNC: 2.96 MG/DL
DEPRECATED RDW RBC AUTO: 45.5 FL (ref 35.1–46.3)
EGFRCR SERPLBLD CKD-EPI 2021: 20 ML/MIN/1.73M2 (ref 60–?)
EOSINOPHIL # BLD AUTO: 0.11 X10(3) UL (ref 0–0.7)
EOSINOPHIL NFR BLD AUTO: 1.7 %
ERYTHROCYTE [DISTWIDTH] IN BLOOD BY AUTOMATED COUNT: 13.5 % (ref 11–15)
GLUCOSE BLD-MCNC: 117 MG/DL (ref 70–99)
HCT VFR BLD AUTO: 36.4 %
HGB BLD-MCNC: 12 G/DL
IMM GRANULOCYTES # BLD AUTO: 0.03 X10(3) UL (ref 0–1)
IMM GRANULOCYTES NFR BLD: 0.5 %
LYMPHOCYTES # BLD AUTO: 1.86 X10(3) UL (ref 1–4)
LYMPHOCYTES NFR BLD AUTO: 28.9 %
MCH RBC QN AUTO: 29.9 PG (ref 26–34)
MCHC RBC AUTO-ENTMCNC: 33 G/DL (ref 31–37)
MCV RBC AUTO: 90.5 FL
MONOCYTES # BLD AUTO: 0.61 X10(3) UL (ref 0.1–1)
MONOCYTES NFR BLD AUTO: 9.5 %
NEUTROPHILS # BLD AUTO: 3.8 X10 (3) UL (ref 1.5–7.7)
NEUTROPHILS # BLD AUTO: 3.8 X10(3) UL (ref 1.5–7.7)
NEUTROPHILS NFR BLD AUTO: 58.9 %
OSMOLALITY SERPL CALC.SUM OF ELEC: 300 MOSM/KG (ref 275–295)
PHOSPHATE SERPL-MCNC: 2.9 MG/DL (ref 2.4–5.1)
PLATELET # BLD AUTO: 185 10(3)UL (ref 150–450)
POTASSIUM SERPL-SCNC: 5.3 MMOL/L (ref 3.5–5.1)
RBC # BLD AUTO: 4.02 X10(6)UL
SODIUM SERPL-SCNC: 139 MMOL/L (ref 136–145)
WBC # BLD AUTO: 6.4 X10(3) UL (ref 4–11)

## 2024-08-17 PROCEDURE — 85025 COMPLETE CBC W/AUTO DIFF WBC: CPT

## 2024-08-17 PROCEDURE — 36415 COLL VENOUS BLD VENIPUNCTURE: CPT

## 2024-08-17 PROCEDURE — 80069 RENAL FUNCTION PANEL: CPT

## 2024-08-19 ENCOUNTER — OFFICE VISIT (OUTPATIENT)
Dept: NEPHROLOGY | Facility: CLINIC | Age: 89
End: 2024-08-19

## 2024-08-19 VITALS
DIASTOLIC BLOOD PRESSURE: 65 MMHG | SYSTOLIC BLOOD PRESSURE: 134 MMHG | WEIGHT: 151 LBS | BODY MASS INDEX: 24 KG/M2 | HEART RATE: 61 BPM

## 2024-08-19 DIAGNOSIS — N18.4 CKD (CHRONIC KIDNEY DISEASE) STAGE 4, GFR 15-29 ML/MIN (HCC): Primary | ICD-10-CM

## 2024-08-19 PROCEDURE — 99214 OFFICE O/P EST MOD 30 MIN: CPT | Performed by: INTERNAL MEDICINE

## 2024-08-19 NOTE — PATIENT INSTRUCTIONS
Follow low potassium diet.  Repeat your kidney blood test in 1 month.  Orders are in the computer.

## 2024-08-19 NOTE — PROGRESS NOTES
08/19/24        Patient: Cal Cates   YOB: 1934   Date of Visit: 8/19/2024       Dear  Dr. Charly MD,      Thank you for referring Cal Cates to my practice.  Please find my assessment and plan below.      As you know he is an 89-year-old male with a history of adult onset diabetes mellitus, hypertension, whitecoat syndrome, anemia of chronic disease, congestive heart failure secondary to diastolic dysfunction and moderate mitral regurgitation who I now had the pleasure of seeing for follow-up of chronic kidney disease stage IV with proteinuria thought to be secondary to diabetes.  Overall patient states he is doing okay without any chest pain, shortness of breath, GI or urinary tract symptoms.  Chronic nocturia x 3.  He is here with his son.  Gradually slowing down a bit secondary to age.  He is trying to make plans so his father can live with him.    On physical exam blood pressure 134/65 with a pulse of 61 he weighed 151 pounds.  Neck was supple without JVD.  Lungs were clear.  Heart revealed a regular rate and rhythm with a 2/6 systolic ejection murmur.  Abdomen was soft, flat, nontender without organomegaly, masses or bruits.  Extremities revealed trace edema.    Reviewed his most recent labs done August 17, 2024.  Creatinine  is creeping up a bit.  Now up to 2.96 with an estimated GFR 20 cc/min.  Potassium is 5.3.  Other electrolytes were good.  Hemoglobin 12.0.    I therefore informed the patient and his son that there has been some gradual deterioration in his renal function.  He knows to maintain adequate hydration.  Avoid nonsteroidals.  Blood pressures appear to be under good control.  Last A1c was 8.0.  The significance of a GFR of 20 cc/min and the indications for dialysis were reviewed.  We counseled him on a low potassium diet.  Will repeat his CBC and renal panel in 1 month to ensure that his potassium remains in the normal range.  Otherwise I will see him again in 3 to 4  months.    Thank you again for allowing me to participate in the care of your patient.  If you have any questions please feel free to call.               Sincerely,   Trevor Rocha MD   [facility], [facility], [place]  [address] [address]    Document electronically generated by:  Trevor Rocha MD

## 2024-08-28 ENCOUNTER — OFFICE VISIT (OUTPATIENT)
Dept: ENDOCRINOLOGY CLINIC | Facility: CLINIC | Age: 89
End: 2024-08-28

## 2024-08-28 VITALS — DIASTOLIC BLOOD PRESSURE: 79 MMHG | BODY MASS INDEX: 25 KG/M2 | SYSTOLIC BLOOD PRESSURE: 157 MMHG | WEIGHT: 157 LBS

## 2024-08-28 DIAGNOSIS — E11.65 UNCONTROLLED TYPE 2 DIABETES MELLITUS WITH HYPERGLYCEMIA (HCC): Primary | ICD-10-CM

## 2024-08-28 LAB
GLUCOSE BLOOD: 123
HEMOGLOBIN A1C: 7.8 % (ref 4.3–5.6)
TEST STRIP LOT #: NORMAL NUMERIC

## 2024-08-28 PROCEDURE — 99213 OFFICE O/P EST LOW 20 MIN: CPT | Performed by: INTERNAL MEDICINE

## 2024-08-28 PROCEDURE — 83036 HEMOGLOBIN GLYCOSYLATED A1C: CPT | Performed by: INTERNAL MEDICINE

## 2024-08-28 PROCEDURE — 82947 ASSAY GLUCOSE BLOOD QUANT: CPT | Performed by: INTERNAL MEDICINE

## 2024-08-28 RX ORDER — LANCETS 33 GAUGE
EACH MISCELLANEOUS
Qty: 100 EACH | Refills: 2 | Status: CANCELLED | OUTPATIENT
Start: 2024-08-28

## 2024-08-28 RX ORDER — LANCETS 33 GAUGE
EACH MISCELLANEOUS
Qty: 100 EACH | Refills: 2 | Status: SHIPPED | OUTPATIENT
Start: 2024-08-28

## 2024-08-28 NOTE — PROGRESS NOTES
Name: Cal Cates  Date: 2024    Referring Physician: No ref. provider found    HISTORY OF PRESENT ILLNESS   Cal Cates is a 89 year old male who presents for evaluation of diabetes mellitus.      Prior HbA, C or glycohemoglobin were 8.2% 2014, 7.4% 2014; 7.1% 10/2014; 7.6% 2015; 7.4% 2015; 7.3% 10/2015; 7.5% 2016; 7.4% 2016; 7.5% 2017; 8.0% 2017; 8.7% 2017; 7.9% 2018; 9.3% 2018; 8.8% 10/2018; 8.2% 2019; 7.8% 3/2019; 8.0% 2019; 8.2% 2019; 8.3% 2020; 8.8% 3/2021; 8.2% 2021; 8.1% 2021; 7.8% 2022; 7.3% 2022; 8.4% 2022; 10.2% 2023; 7.7% 2023; 7.9% 2023; 8.0% 2024; 7.8% POC Today     Dietary compliance: Good  Exercise: No  Polyuria/polydipsia: No  Blurred vision: No    Episodes of hypoglycemia: No  Blood Glucose:  Checking 1-2 times per day  Fastin-130    Medications for DM   Trulicity 0.75mg subcutaneous weekly    Glimepiride 2mg PO daily     Lantus 16 units SQ at bedtime (only taking if BG above 250)     REVIEW OF SYSTEMS  Eyes: Diabetic retinopathy present: No            Most recent visit to eye doctor in last 12 months: Yes    CV: Cardiovascular disease present: No         Hypertension present: Yes         Hyperlipidemia present: Yes         Peripheral Vascular Disease present: No    : Nephropathy present: Yes, Cr 2.4    Neuro: Neuropathy present: No    Skin: Infection or ulceration: No    Osteoporosis: No    Thyroid disease: No      Medications:     Current Outpatient Medications:     finasteride 5 MG Oral Tab, Take 1 tablet (5 mg total) by mouth daily., Disp: 90 tablet, Rfl: 3    Lancets (ONETOUCH DELICA PLUS [id number]) Does not apply Misc, Place 1 Device onto the skin in the morning and 1 Device before bedtime., Disp: 200 each, Rfl: 1    Dulaglutide (TRULICITY) 0.75 MG/0.5ML Subcutaneous Solution Pen-injector, Inject 0.75 mg into the skin once a week. DX code: E11.65, Disp: 6 mL, Rfl: 1    Glucose Blood (ONETOUCH ULTRA) In Vitro Strip, Check  blood sugar twice a day. DX: E11.8, insulin dependent, Disp: 200 strip, Rfl: 1    GLIMEPIRIDE 2 MG Oral Tab, TAKE 1 TABLET DAILY WITH BREAKFAST, Disp: 90 tablet, Rfl: 3    simvastatin 40 MG Oral Tab, Take 1 tablet (40 mg total) by mouth daily., Disp: 90 tablet, Rfl: 3    pantoprazole 40 MG Oral Tab EC, Take 1 tablet (40 mg total) by mouth before breakfast., Disp: 90 tablet, Rfl: 3    BD PEN NEEDLE MINI U/F 31G X 5 MM Does not apply Misc, USE EVERY DAY, Disp: 90 each, Rfl: 3    Insulin Pen Needle (COMFORT EZ PEN NEEDLES) 32G X 4 MM Does not apply Misc, Inject once daily, Disp: 100 each, Rfl: 2    fluticasone propionate 50 MCG/ACT Nasal Suspension, 2 sprays by Each Nare route every morning., Disp: 16 g, Rfl: 3    tamsulosin 0.4 MG Oral Cap, Take 2 capsules (0.8 mg total) by mouth daily., Disp: 180 capsule, Rfl: 3    insulin glargine (LANTUS SOLOSTAR) 100 UNIT/ML Subcutaneous Solution Pen-injector, INJECT 15 TO 25 UNITS DAILY IF BLOOD GLUCOSE IS ABOVE 250, Disp: 15 mL, Rfl: 5    furosemide 20 MG Oral Tab, TAKE 1 TABLET THREE TIMES A WEEK, Disp: 30 tablet, Rfl: 4    carvedilol 6.25 MG Oral Tab, , Disp: , Rfl:     timolol 0.5 % Ophthalmic Solution, , Disp: , Rfl:     latanoprost 0.005 % Ophthalmic Solution, Place 1 drop into both eyes nightly. TAKE AT BEDTIME, Disp: , Rfl:     COMBIGAN 0.2-0.5 % Ophthalmic Solution, Place 1 drop into both eyes every 12 (twelve) hours., Disp: 1 Bottle, Rfl: 12    AMLODIPINE BESYLATE 5 MG Oral Tab, TAKE 1 TABLET(5 MG) BY MOUTH DAILY, Disp: 90 tablet, Rfl: 1    LISINOPRIL 10 MG Oral Tab, TAKE 1 TABLET BY MOUTH DAILY (Patient taking differently: Take 0.5 tablets (5 mg total) by mouth in the morning and 0.5 tablets (5 mg total) before bedtime.), Disp: 30 tablet, Rfl: 4       Allergies:   Allergies   Allergen Reactions    Barium Sulfate HIVES    Sulfa Antibiotics OTHER (SEE COMMENTS)       Social History:   Social History     Socioeconomic History    Marital status:    Tobacco Use     Smoking status: Former     Current packs/day: 0.00     Types: Cigarettes    Smokeless tobacco: Never   Vaping Use    Vaping status: Never Used   Substance and Sexual Activity    Alcohol use: Yes     Alcohol/week: 1.0 standard drink of alcohol     Types: 1 Cans of beer per week     Comment: occ    Drug use: No   Other Topics Concern    Caffeine Concern Yes     Comment: coffee and soda    Exercise Yes     Comment: cutting grass    Reaction to local anesthetic No       Medical History:   Past Medical History:    Acid reflux    Basal cell carcinoma    right jaw    Basal cell carcinoma    left cheek    Basal cell carcinoma    located on the right cheek sup (AO)    BPH (benign prostatic hyperplasia)    Diabetes (HCC)    Diabetes mellitus (HCC)    Glaucoma    High blood pressure    High cholesterol    Hyperlipidemia    Kidney disease    Renal disorder    Shortness of breath       Surgical history:   Past Surgical History:   Procedure Laterality Date    Cardiac pacemaker placement      Colonoscopy  2014    Other surgical history      hemorrhoids    Other surgical history      pacemaker placed         PHYSICAL EXAM  BP (!) (P) 176/82   Pulse (P) 62   Wt 157 lb (71.2 kg)   BMI 24.59 kg/m²   Home /68    General Appearance:  alert, well developed, in no acute distress  Eyes:  normal conjunctivae, sclera., normal sclera and normal pupils  Ears/Nose/Mouth/Throat/Neck:  no palpable thyroid nodules or cervical lymphadenopathy  Musculoskeletal:  normal muscle strength and tone  Hair & Nails:  normal scalp hair     Hematologic:  no excessive bruising  Neuro:  sensory grossly intact and motor grossly intact  Psychiatric:  oriented to time, self, and place  Nutritional:  no abnormal weight gain or loss      ASSESSMENT/PLAN:      1. Diabetes Mellitus, uncontrolled  -uncontrolled, HgA1c 7.8% -->stable   -Discussed importance of glycemic control to prevent complications of diabetes  -Discussed complications of diabetes include  retinopathy, neuropathy, nephropathy and cardiovascular disease  -Discussed importance of SBGM  -Discussed importance of low CHO diet  -Continue Glimepiride 2mg PO daily   -Continue Trulicity 0.75mg subcutaneous weekly (he is getting medication from [facility])  -Discussed with Dr. Rocha and prefer no SGLT-2  -Only taking Lantus if BG above 250 - Lantus 16 units subcutaneous at bedtime    -Normotensive at home (reviewed home BP logbook)   -UTD with podiatry   -Renal function stable   -BP elevated, stable on repeat - monitoring at home     RTC 6 months     8/28/2024  Britta May MD

## 2024-09-04 ENCOUNTER — TELEPHONE (OUTPATIENT)
Dept: ENDOCRINOLOGY CLINIC | Facility: CLINIC | Age: 89
End: 2024-09-04

## 2024-09-04 NOTE — TELEPHONE ENCOUNTER
Received fax from [employer] attached is a DWO dated 08/28/2024. Fax placed in provider folder for review and signature.

## 2024-09-23 ENCOUNTER — OFFICE VISIT (OUTPATIENT)
Dept: FAMILY MEDICINE CLINIC | Facility: CLINIC | Age: 89
End: 2024-09-23

## 2024-09-23 VITALS
HEART RATE: 60 BPM | WEIGHT: 159.81 LBS | SYSTOLIC BLOOD PRESSURE: 150 MMHG | DIASTOLIC BLOOD PRESSURE: 64 MMHG | BODY MASS INDEX: 25 KG/M2

## 2024-09-23 DIAGNOSIS — M48.061 SPINAL STENOSIS OF LUMBAR REGION WITHOUT NEUROGENIC CLAUDICATION: ICD-10-CM

## 2024-09-23 DIAGNOSIS — J42 CHRONIC BRONCHITIS, UNSPECIFIED CHRONIC BRONCHITIS TYPE (HCC): ICD-10-CM

## 2024-09-23 DIAGNOSIS — I70.1 ATHEROSCLEROSIS OF RENAL ARTERY (HCC): ICD-10-CM

## 2024-09-23 DIAGNOSIS — E11.65 TYPE 2 DIABETES MELLITUS WITH HYPERGLYCEMIA, WITH LONG-TERM CURRENT USE OF INSULIN (HCC): Primary | ICD-10-CM

## 2024-09-23 DIAGNOSIS — I44.2 AV BLOCK, 3RD DEGREE (HCC): ICD-10-CM

## 2024-09-23 DIAGNOSIS — E78.2 MIXED HYPERLIPIDEMIA: ICD-10-CM

## 2024-09-23 DIAGNOSIS — E55.9 VITAMIN D DEFICIENCY: ICD-10-CM

## 2024-09-23 DIAGNOSIS — M54.16 LUMBAR RADICULOPATHY: ICD-10-CM

## 2024-09-23 DIAGNOSIS — Z00.00 ENCOUNTER FOR ANNUAL HEALTH EXAMINATION: ICD-10-CM

## 2024-09-23 DIAGNOSIS — Z79.4 TYPE 2 DIABETES MELLITUS WITH HYPERGLYCEMIA, WITH LONG-TERM CURRENT USE OF INSULIN (HCC): Primary | ICD-10-CM

## 2024-09-23 DIAGNOSIS — G89.29 CHRONIC PAIN OF LEFT KNEE: ICD-10-CM

## 2024-09-23 DIAGNOSIS — M43.26 FUSION OF LUMBAR SPINE: ICD-10-CM

## 2024-09-23 DIAGNOSIS — N18.4 CHRONIC RENAL DISEASE, STAGE IV (HCC): ICD-10-CM

## 2024-09-23 DIAGNOSIS — Z95.0 S/P PLACEMENT OF CARDIAC PACEMAKER: ICD-10-CM

## 2024-09-23 DIAGNOSIS — M19.072 PRIMARY OSTEOARTHRITIS OF LEFT ANKLE: ICD-10-CM

## 2024-09-23 DIAGNOSIS — M25.562 CHRONIC PAIN OF LEFT KNEE: ICD-10-CM

## 2024-09-23 DIAGNOSIS — M48.061 LUMBAR FORAMINAL STENOSIS: ICD-10-CM

## 2024-09-23 DIAGNOSIS — M17.12 PRIMARY OSTEOARTHRITIS OF LEFT KNEE: ICD-10-CM

## 2024-09-23 DIAGNOSIS — I50.22 CHRONIC SYSTOLIC CONGESTIVE HEART FAILURE (HCC): ICD-10-CM

## 2024-09-23 DIAGNOSIS — M51.9 LUMBAR DISC DISEASE: ICD-10-CM

## 2024-09-23 NOTE — PROGRESS NOTES
Subjective:   Cal Cates is a 89 year old male who presents for a Medicare Subsequent Annual Wellness visit (Pt already had Initial Annual Wellness) and scheduled follow up of multiple significant but stable problems.   Still driving and lives alone. Reports never went to doc when young but started in [facility]. Staying active. Has son's support. Always has some constipation and feels bloated.   No pain per pt. Home BP readings are 130s/60s   History/Other:   Fall Risk Assessment:   He has been screened for Falls and is High Risk. Fall Prevention information provided to patient in After Visit Summary.    Do you feel unsteady when standing or walking?: Yes  Do you worry about falling?: No  Have you fallen in the past year?: No     Cognitive Assessment:   He had a completely normal cognitive assessment - see flowsheet entries     Functional Ability/Status:   Cal Cates has a completely normal functional assessment. See flowsheet for details.      Depression Screening (PHQ):  PHQ-2 SCORE: 0  , done 9/23/2024          Advanced Directives:   He does NOT have a Living Will. [Do you have a living will?: No]  He does NOT have a Power of  for Health Care. [Do you have a healthcare power of ?: No]  Discussed Advance Care Planning with patient (and family/surrogate if present). Standard forms made available to patient in After Visit Summary.      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 L3-4 mild-mod, L2-3 mild, L1-2 mod central stenosis    Atherosclerosis of renal artery (HCC)    Primary osteoarthritis of left ankle: mild    Primary osteoarthritis of left knee: moderate medial joint and patellar    Chronic pain of left knee     Allergies:  He is allergic to barium sulfate and sulfa antibiotics.    Current Medications:  Outpatient Medications Marked as Taking for the 9/23/24 encounter (Office Visit) with Tracey Parks MD   Medication Sig    Insulin Pen Needle (COMFORT EZ PEN NEEDLES) 32G X 4 MM Does not apply Misc Inject once daily    finasteride 5 MG Oral Tab Take 1 tablet (5 mg total) by mouth daily.    Lancets (ONETOUCH DELICA PLUS [id number]) Does not apply Misc Place 1 Device onto the skin in the morning and 1 Device before bedtime.    Dulaglutide (TRULICITY) 0.75 MG/0.5ML Subcutaneous Solution Pen-injector Inject 0.75 mg into the skin once a week. DX code: E11.65    Glucose Blood (ONETOUCH ULTRA) In Vitro Strip Check blood sugar twice a day. DX: E11.8, insulin dependent    GLIMEPIRIDE 2 MG Oral Tab TAKE 1 TABLET DAILY WITH BREAKFAST    simvastatin 40 MG Oral Tab Take 1 tablet (40 mg total) by mouth daily.    pantoprazole 40 MG Oral Tab EC Take 1 tablet (40 mg total) by mouth before breakfast.    BD PEN NEEDLE MINI U/F 31G X 5 MM Does not apply Misc USE EVERY DAY    fluticasone propionate 50 MCG/ACT Nasal Suspension 2 sprays by Each Nare route every morning.    tamsulosin 0.4 MG Oral Cap Take 2 capsules (0.8 mg total) by mouth daily.    insulin glargine (LANTUS SOLOSTAR) 100 UNIT/ML Subcutaneous Solution Pen-injector INJECT 15 TO 25 UNITS DAILY IF BLOOD GLUCOSE IS ABOVE 250    furosemide 20 MG Oral Tab TAKE 1 TABLET THREE TIMES A WEEK    carvedilol 6.25 MG Oral  Tab     timolol 0.5 % Ophthalmic Solution     latanoprost 0.005 % Ophthalmic Solution Place 1 drop into both eyes nightly. TAKE AT BEDTIME    COMBIGAN 0.2-0.5 % Ophthalmic Solution Place 1 drop into both eyes every 12 (twelve) hours.    AMLODIPINE BESYLATE 5 MG Oral Tab TAKE 1 TABLET(5 MG) BY MOUTH DAILY    LISINOPRIL 10 MG Oral Tab TAKE 1 TABLET BY MOUTH DAILY (Patient taking differently: Take 0.5 tablets (5 mg total) by mouth in the morning and 0.5 tablets (5 mg total) before bedtime.)       Medical History:  He  has a past medical history of Acid reflux, Basal cell carcinoma, Basal cell carcinoma (2009), Basal cell carcinoma (1/13/16), BPH (benign prostatic hyperplasia), Diabetes (HCC), Diabetes mellitus (HCC), Glaucoma, High blood pressure, High cholesterol, Hyperlipidemia, Kidney disease, Renal disorder, and Shortness of breath.  Surgical History:  He  has a past surgical history that includes colonoscopy (2014); other surgical history; other surgical history; and Cardiac pacemaker placement.   Family History:  His family history includes Glaucoma in his father.  Social History:  He  reports that he has quit smoking. His smoking use included cigarettes. He has never used smokeless tobacco. He reports current alcohol use of about 1.0 standard drink of alcohol per week. He reports that he does not use drugs.    Tobacco:  He smoked tobacco in the past but quit greater than 12 months ago.  Social History     Tobacco Use   Smoking Status Former    Current packs/day: 0.00    Types: Cigarettes   Smokeless Tobacco Never        CAGE Alcohol Screen:   CAGE screening score of 0 on 9/23/2024, showing low risk of alcohol abuse.      Patient Care Team:  Tracey Parks MD as PCP - General (Family Practice)  Pura Leong, RN as  (EM) (Care Management)  Jesica Garcia, PT (Physical Therapy)  Mary Lou De Leon, PT as Physical Therapist (Physical Therapy)  Alexander Gage MD (Physical Medicine)    Review of  Systems     Negative except see Hpi     Objective:   Physical Exam  Constitutional:       Appearance: He is well-developed.   Cardiovascular:      Rate and Rhythm: Normal rate and regular rhythm.      Heart sounds: Normal heart sounds.   Pulmonary:      Effort: Pulmonary effort is normal.      Breath sounds: Normal breath sounds.   Abdominal:      General: Bowel sounds are normal.      Palpations: Abdomen is soft.   Neurological:      Mental Status: He is alert and oriented to person, place, and time.      Deep Tendon Reflexes: Reflexes are normal and symmetric.      Comments: Mild imbalance   Psychiatric:         Behavior: Behavior normal.         BP (!) 166/80   Pulse 60   Wt 159 lb 12.8 oz (72.5 kg)   BMI 25.03 kg/m²  Estimated body mass index is 25.03 kg/m² as calculated from the following:    Height as of 8/5/24: 5' 7\" (1.702 m).    Weight as of this encounter: 159 lb 12.8 oz (72.5 kg).  /64   Pulse 60   Wt 159 lb 12.8 oz (72.5 kg)   BMI 25.03 kg/m²     Medicare Hearing Assessment:   Hearing Screening    Time taken: 9/23/2024  1:17 PM  Screening Method: Questionnaire  I have a problem hearing over the telephone: No I have trouble following the conversations when two or more people are talking at the same time: No   I have trouble understanding things on the TV: No I have to strain to understand conversations: No   I have to worry about missing the telephone ring or doorbell: No I have trouble hearing conversations in a noisy background such as a crowded room or restaurant: No   I get confused about where sounds come from: No I misunderstand some words in a sentence and need to ask people to repeat themselves: No   I especially have trouble understanding the speech of women and children: No I have trouble understanding the speaker in a large room such as at a meeting or place of Faith: No   Many people I talk to seem to mumble (or don't speak clearly): No People get annoyed because I misunderstand  what they say: No   I misunderstand what others are saying and make inappropriate responses: No I avoid social activities because I cannot hear well and fear I will reply improperly: No   Family members and friends have told me they think I may have hearing loss: No             Visual Acuity:   Right Eye Visual Acuity: Uncorrected Right Eye Chart Acuity: 20/50   Left Eye Visual Acuity: Uncorrected Left Eye Chart Acuity: 20/50   Both Eyes Visual Acuity: Uncorrected Both Eyes Chart Acuity: 20/50            Assessment & Plan:   Cal Cates is a 89 year old male who presents for a Medicare Assessment.     1. Type 2 diabetes mellitus with hyperglycemia, with long-term current use of insulin (HCC)  Per endo. Stable labs   - TSH W Reflex To Free T4; Future    2. Chronic systolic congestive heart failure (HCC)  Stable. Seeing cards    3. Chronic renal disease, stage IV (HCC)  Stable. See nephrologist     4. Chronic bronchitis, unspecified chronic bronchitis type (HCC)  Stable. See pulm     5. AV block, 3rd degree ([facility])  Stable.     6. Atherosclerosis of renal artery ([facility])  Stable     7. S/P placement of cardiac pacemaker  Stable     8. Mixed hyperlipidemia  Stable on meds. Due for labs. Order given.   - TSH W Reflex To Free T4; Future  - Lipid Panel; Future    9. L5-S1 left mod-severe/right severe, L4-5 bilateral mod-severe, L3-4 right mod-severe/left mod foraminal stenosis  Stable per physiatrist.     10. L5-S1 bilateral mod-large far lateral, L4-5 right mod-large/left far lateral & mild-mod central, L3-4 right large foraminal & mod diffuse, L2-3 mild-mod diffuse, L1-2 mod diffuse bulging discs  Stable     11. L4-5 mod. L3-4 mild-mod, L2-3 mild, L1-2 mod central stenosis  Stable     12. bilateral L5-S1 radiculopathies   Stable     13. Primary osteoarthritis of left knee: moderate medial joint and patellar  Stable     14. Primary osteoarthritis of left ankle: mild  Stable     15. Fusion of lumbar spine anteriorly at L1-2  and L2-3  Stable     16. Chronic pain of left knee  Stable     17. Vitamin D deficiency  Due for labs.   - Vitamin B12; Future  - Vitamin D; Future    18. Encounter for annual health examination      The patient indicates understanding of these issues and agrees to the plan.  Reinforced healthy diet, lifestyle, and exercise.      No follow-ups on file.     Tracey Parks MD, 9/23/2024     Supplementary Documentation:   General Health:  In the past six months, have you lost more than 10 pounds without trying?: 2 - No  Has your appetite been poor?: No  Type of Diet: Balanced  How does the patient maintain a good energy level?: Appropriate Exercise  How would you describe your daily physical activity?: Moderate  How would you describe your current health state?: Fair  How do you maintain positive mental well-being?: Social Interaction  On a scale of 0 to 10, with 0 being no pain and 10 being severe pain, what is your pain level?: 0 - (None)  In the past six months, have you experienced urine leakage?: 0-No  At any time do you feel concerned for the safety/well-being of yourself and/or your children, in your home or elsewhere?: No  Have you had any immunizations at another office such as Influenza, Hepatitis B, Tetanus, or Pneumococcal?: No    Health Maintenance   Topic Date Due    COVID-19 Vaccine (6 - 2023-24 season) 09/01/2024    Annual Physical  09/25/2024    HTN: BP Follow-Up  09/28/2024    Zoster Vaccines (1 of 2) 10/24/2024 (Originally 9/5/1984)    Diabetes Care: Microalb/Creat Ratio  09/26/2024    Influenza Vaccine (1) 10/01/2024    Diabetes Care Foot Exam  02/02/2025    Diabetes Care A1C  02/28/2025    Diabetes Care Dilated Eye Exam  04/12/2025    Diabetes Care: GFR  08/17/2025    Annual Depression Screening  Completed    Fall Risk Screening (Annual)  Completed    Pneumococcal Vaccine: 65+ Years  Completed

## 2024-09-24 ENCOUNTER — LAB ENCOUNTER (OUTPATIENT)
Dept: LAB | Age: 89
End: 2024-09-24
Attending: FAMILY MEDICINE
Payer: MEDICARE

## 2024-09-24 DIAGNOSIS — E11.65 TYPE 2 DIABETES MELLITUS WITH HYPERGLYCEMIA, WITH LONG-TERM CURRENT USE OF INSULIN (HCC): ICD-10-CM

## 2024-09-24 DIAGNOSIS — E78.2 MIXED HYPERLIPIDEMIA: ICD-10-CM

## 2024-09-24 DIAGNOSIS — E55.9 VITAMIN D DEFICIENCY: ICD-10-CM

## 2024-09-24 DIAGNOSIS — Z79.4 TYPE 2 DIABETES MELLITUS WITH HYPERGLYCEMIA, WITH LONG-TERM CURRENT USE OF INSULIN (HCC): ICD-10-CM

## 2024-09-24 DIAGNOSIS — N18.4 CKD (CHRONIC KIDNEY DISEASE) STAGE 4, GFR 15-29 ML/MIN (HCC): ICD-10-CM

## 2024-09-24 LAB
ALBUMIN SERPL-MCNC: 4.1 G/DL (ref 3.2–4.8)
ANION GAP SERPL CALC-SCNC: 6 MMOL/L (ref 0–18)
BASOPHILS # BLD AUTO: 0.03 X10(3) UL (ref 0–0.2)
BASOPHILS NFR BLD AUTO: 0.4 %
BUN BLD-MCNC: 40 MG/DL (ref 9–23)
BUN/CREAT SERPL: 14.5 (ref 10–20)
CALCIUM BLD-MCNC: 9.5 MG/DL (ref 8.7–10.4)
CHLORIDE SERPL-SCNC: 111 MMOL/L (ref 98–112)
CHOLEST SERPL-MCNC: 130 MG/DL (ref ?–200)
CO2 SERPL-SCNC: 22 MMOL/L (ref 21–32)
CREAT BLD-MCNC: 2.76 MG/DL
DEPRECATED RDW RBC AUTO: 45.9 FL (ref 35.1–46.3)
EGFRCR SERPLBLD CKD-EPI 2021: 21 ML/MIN/1.73M2 (ref 60–?)
EOSINOPHIL # BLD AUTO: 0.13 X10(3) UL (ref 0–0.7)
EOSINOPHIL NFR BLD AUTO: 1.8 %
ERYTHROCYTE [DISTWIDTH] IN BLOOD BY AUTOMATED COUNT: 13.6 % (ref 11–15)
FASTING PATIENT LIPID ANSWER: YES
GLUCOSE BLD-MCNC: 109 MG/DL (ref 70–99)
HCT VFR BLD AUTO: 37.6 %
HDLC SERPL-MCNC: 28 MG/DL (ref 40–59)
HGB BLD-MCNC: 12.7 G/DL
IMM GRANULOCYTES # BLD AUTO: 0.03 X10(3) UL (ref 0–1)
IMM GRANULOCYTES NFR BLD: 0.4 %
LDLC SERPL CALC-MCNC: 66 MG/DL (ref ?–100)
LYMPHOCYTES # BLD AUTO: 1.97 X10(3) UL (ref 1–4)
LYMPHOCYTES NFR BLD AUTO: 28 %
MCH RBC QN AUTO: 30.9 PG (ref 26–34)
MCHC RBC AUTO-ENTMCNC: 33.8 G/DL (ref 31–37)
MCV RBC AUTO: 91.5 FL
MONOCYTES # BLD AUTO: 0.59 X10(3) UL (ref 0.1–1)
MONOCYTES NFR BLD AUTO: 8.4 %
NEUTROPHILS # BLD AUTO: 4.28 X10 (3) UL (ref 1.5–7.7)
NEUTROPHILS # BLD AUTO: 4.28 X10(3) UL (ref 1.5–7.7)
NEUTROPHILS NFR BLD AUTO: 61 %
NONHDLC SERPL-MCNC: 102 MG/DL (ref ?–130)
OSMOLALITY SERPL CALC.SUM OF ELEC: 298 MOSM/KG (ref 275–295)
PHOSPHATE SERPL-MCNC: 2.9 MG/DL (ref 2.4–5.1)
PLATELET # BLD AUTO: 208 10(3)UL (ref 150–450)
POTASSIUM SERPL-SCNC: 4.8 MMOL/L (ref 3.5–5.1)
RBC # BLD AUTO: 4.11 X10(6)UL
SODIUM SERPL-SCNC: 139 MMOL/L (ref 136–145)
T4 FREE SERPL-MCNC: 1.1 NG/DL (ref 0.8–1.7)
TRIGL SERPL-MCNC: 214 MG/DL (ref 30–149)
TSI SER-ACNC: 5.59 MIU/ML (ref 0.55–4.78)
VIT B12 SERPL-MCNC: 607 PG/ML (ref 211–911)
VIT D+METAB SERPL-MCNC: 28.6 NG/ML (ref 30–100)
VLDLC SERPL CALC-MCNC: 32 MG/DL (ref 0–30)
WBC # BLD AUTO: 7 X10(3) UL (ref 4–11)

## 2024-09-24 PROCEDURE — 80061 LIPID PANEL: CPT

## 2024-09-24 PROCEDURE — 82607 VITAMIN B-12: CPT

## 2024-09-24 PROCEDURE — 80069 RENAL FUNCTION PANEL: CPT

## 2024-09-24 PROCEDURE — 82306 VITAMIN D 25 HYDROXY: CPT

## 2024-09-24 PROCEDURE — 84439 ASSAY OF FREE THYROXINE: CPT

## 2024-09-24 PROCEDURE — 36415 COLL VENOUS BLD VENIPUNCTURE: CPT

## 2024-09-24 PROCEDURE — 84443 ASSAY THYROID STIM HORMONE: CPT

## 2024-09-24 PROCEDURE — 85025 COMPLETE CBC W/AUTO DIFF WBC: CPT

## 2024-10-23 ENCOUNTER — APPOINTMENT (OUTPATIENT)
Dept: URBAN - METROPOLITAN AREA CLINIC 244 | Age: 89
Setting detail: DERMATOLOGY
End: 2024-10-24

## 2024-10-23 DIAGNOSIS — L57.0 ACTINIC KERATOSIS: ICD-10-CM

## 2024-10-23 DIAGNOSIS — L82.1 OTHER SEBORRHEIC KERATOSIS: ICD-10-CM

## 2024-10-23 DIAGNOSIS — Z86.007 PERSONAL HISTORY OF IN-SITU NEOPLASM OF SKIN: ICD-10-CM

## 2024-10-23 DIAGNOSIS — D22 MELANOCYTIC NEVI: ICD-10-CM

## 2024-10-23 DIAGNOSIS — L81.4 OTHER MELANIN HYPERPIGMENTATION: ICD-10-CM

## 2024-10-23 DIAGNOSIS — Z85.828 PERSONAL HISTORY OF OTHER MALIGNANT NEOPLASM OF SKIN: ICD-10-CM

## 2024-10-23 PROBLEM — D22.9 MELANOCYTIC NEVI, UNSPECIFIED: Status: ACTIVE | Noted: 2024-10-23

## 2024-10-23 PROCEDURE — OTHER LIQUID NITROGEN: OTHER

## 2024-10-23 PROCEDURE — OTHER COUNSELING: OTHER

## 2024-10-23 PROCEDURE — 17000 DESTRUCT PREMALG LESION: CPT

## 2024-10-23 PROCEDURE — 99213 OFFICE O/P EST LOW 20 MIN: CPT | Mod: 25

## 2024-10-23 PROCEDURE — 17003 DESTRUCT PREMALG LES 2-14: CPT

## 2024-10-23 ASSESSMENT — LOCATION SIMPLE DESCRIPTION DERM
LOCATION SIMPLE: RIGHT CHEEK
LOCATION SIMPLE: RIGHT LOWER BACK
LOCATION SIMPLE: SUPERIOR FOREHEAD

## 2024-10-23 ASSESSMENT — LOCATION DETAILED DESCRIPTION DERM
LOCATION DETAILED: SUPERIOR MID FOREHEAD
LOCATION DETAILED: RIGHT SUPERIOR LATERAL LOWER BACK
LOCATION DETAILED: RIGHT CENTRAL MALAR CHEEK
LOCATION DETAILED: RIGHT SUPERIOR CENTRAL BUCCAL CHEEK

## 2024-10-23 ASSESSMENT — LOCATION ZONE DERM
LOCATION ZONE: TRUNK
LOCATION ZONE: FACE

## 2024-10-23 NOTE — PROCEDURE: COUNSELING
Detail Level: Generalized
Detail Level: Simple
Detail Level: Detailed
Nicotinamide Supplementation Recommendations: All supplements should be USP (https://www.usp.org/verification-services/verified-michelle).
Sunscreen Recommendations: Brands include neutrogena, La-Roche, and Elta-MD. Rec mineral sunscreen use (zinc/titanium dioxide) over chemical sunscreens due to safety.

## 2024-10-23 NOTE — PROCEDURE: LIQUID NITROGEN
Consent: The patient has had the opportunity to ask questions and understand the purpose of the treatment, benefits, risks, and alternatives. Consent has been obtained after discussing/reviewing the following:  \\n- Risks to the procedure may include but are not limited to pain/discomfort, redness, swelling, crusting/scabbing/blistering, discoloration, recurrence, persistence, and the risk of scarring. \\n- Rec re-evaluation in clinic if an AK does not resolve within 6 wks and/or sooner if worsening.
Render Post-Care Instructions In Note?: yes
Post-Care Instructions: I reviewed with the patient post-care instructions. Patient is to wear sunprotection / sun protective clothing and avoid picking areas. Vaseline use daily is encouraged until healed.
Total Number Of Aks Treated: 3
Number Of Freeze-Thaw Cycles: 1 freeze-thaw cycle
Detail Level: Simple

## 2024-11-01 DIAGNOSIS — E11.65 UNCONTROLLED TYPE 2 DIABETES MELLITUS WITH HYPERGLYCEMIA (HCC): ICD-10-CM

## 2024-11-01 RX ORDER — DULAGLUTIDE 0.75 MG/.5ML
INJECTION, SOLUTION SUBCUTANEOUS
Qty: 6 ML | Refills: 0 | Status: SHIPPED | OUTPATIENT
Start: 2024-11-01

## 2024-11-01 NOTE — TELEPHONE ENCOUNTER
Endocrine Refill protocol for oral and injectable diabetic medications    Protocol Criteria:  PASSED  Reason: N/A    If all below requirements are met, send a 90-day supply with 1 refill per provider protocol.    Verify appointment with Endocrinology completed in the last 6 months or scheduled in the next 3 months.  Verify A1C has been completed within the last 6 months and is below 8.5%     Last completed office visit: 8/28/2024 Britta May MD   Next scheduled Follow up:   Future Appointments   Date Time Provider Department Center   11/14/2024  3:30 PM Alexander Gage MD [facility]   11/22/2024 11:40 AM Trevor Rocha MD [id number]   12/23/2024 10:30 AM Nohemy Jin DPM [facility]   3/5/2025 10:30 AM Britta May MD [facility]   3/24/2025 11:00 AM Tracey Parks MD [facility]      Last A1c result: Last A1c value was 7.8% done 8/28/2024.

## 2024-11-14 ENCOUNTER — OFFICE VISIT (OUTPATIENT)
Dept: PHYSICAL MEDICINE AND REHAB | Facility: CLINIC | Age: 89
End: 2024-11-14
Payer: MEDICARE

## 2024-11-14 VITALS — BODY MASS INDEX: 25 KG/M2 | WEIGHT: 160 LBS

## 2024-11-14 DIAGNOSIS — M51.9 LUMBAR DISC DISEASE: ICD-10-CM

## 2024-11-14 DIAGNOSIS — M48.061 SPINAL STENOSIS OF LUMBAR REGION WITHOUT NEUROGENIC CLAUDICATION: Primary | ICD-10-CM

## 2024-11-14 DIAGNOSIS — M48.061 LUMBAR FORAMINAL STENOSIS: ICD-10-CM

## 2024-11-14 DIAGNOSIS — M25.562 CHRONIC PAIN OF LEFT KNEE: ICD-10-CM

## 2024-11-14 DIAGNOSIS — Z79.4 TYPE 2 DIABETES MELLITUS WITH HYPERGLYCEMIA, WITH LONG-TERM CURRENT USE OF INSULIN (HCC): ICD-10-CM

## 2024-11-14 DIAGNOSIS — G89.29 CHRONIC PAIN OF LEFT KNEE: ICD-10-CM

## 2024-11-14 DIAGNOSIS — M17.12 PRIMARY OSTEOARTHRITIS OF LEFT KNEE: ICD-10-CM

## 2024-11-14 DIAGNOSIS — M43.26 FUSION OF LUMBAR SPINE: ICD-10-CM

## 2024-11-14 DIAGNOSIS — E11.65 TYPE 2 DIABETES MELLITUS WITH HYPERGLYCEMIA, WITH LONG-TERM CURRENT USE OF INSULIN (HCC): ICD-10-CM

## 2024-11-14 PROCEDURE — 99213 OFFICE O/P EST LOW 20 MIN: CPT | Performed by: PHYSICAL MEDICINE & REHABILITATION

## 2024-11-14 NOTE — PROGRESS NOTES
Low Back Pain H & P    Chief Complaint:   Chief Complaint   Patient presents with    Follow - Up     LOV 8/5/24 pt had left knee injection done under ultrasound guidance. Pt has no pain in left knee. Denies N/T. Denies weakness. No pain medications. No tx. Pt does not feel the need for another knee injection     Nursing note reviewed and verified.    Patient was last seen on 8/5/2024.  I did a left knee steroid injection at that time.  The patient is not having any left knee pain currently.  Patient states that is not having any other leg or back pain currently.  The patient's son states that he will shuffle when he is walking at times, but otherwise is active without any complaints of pain.  The patient is no longer taking aspirin for any pain.    Past Medical History   Past Medical History:    Acid reflux    Basal cell carcinoma    right jaw    Basal cell carcinoma    left cheek    Basal cell carcinoma    located on the right cheek sup (AO)    BPH (benign prostatic hyperplasia)    Diabetes (HCC)    Diabetes mellitus (HCC)    Glaucoma    High blood pressure    High cholesterol    Hyperlipidemia    Kidney disease    Renal disorder    Shortness of breath       Past Surgical History   Past Surgical History:   Procedure Laterality Date    Cardiac pacemaker placement      Colonoscopy  2014    Other surgical history      hemorrhoids    Other surgical history      pacemaker placed       Family History   Family History   Problem Relation Age of Onset    Glaucoma Father        Social History   Social History     Socioeconomic History    Marital status:      Spouse name: Not on file    Number of children: Not on file    Years of education: Not on file    Highest education level: Not on file   Occupational History    Not on file   Tobacco Use    Smoking status: Former     Current packs/day: 0.00     Types: Cigarettes    Smokeless tobacco: Never   Vaping Use    Vaping status: Never Used   Substance and Sexual Activity     Alcohol use: Yes     Alcohol/week: 1.0 standard drink of alcohol     Types: 1 Cans of beer per week     Comment: occ    Drug use: No    Sexual activity: Not on file   Other Topics Concern     Service Not Asked    Blood Transfusions Not Asked    Caffeine Concern Yes     Comment: coffee and soda    Occupational Exposure Not Asked    Hobby Hazards Not Asked    Sleep Concern Not Asked    Stress Concern Not Asked    Weight Concern Not Asked    Special Diet Not Asked    Back Care Not Asked    Exercise Yes     Comment: cutting grass    Bike Helmet Not Asked    Seat Belt Not Asked    Self-Exams Not Asked    Grew up on a farm Not Asked    History of tanning Not Asked    Outdoor occupation Not Asked    Pt has a pacemaker Not Asked    Pt has a defibrillator Not Asked    Reaction to local anesthetic No   Social History Narrative    The patient does not use an assistive device..      The patient does live in a home with stairs.     Social Drivers of Health     Financial Resource Strain: Not on file   Food Insecurity: Not on file   Transportation Needs: Not on file   Physical Activity: High Risk (7/1/2024)    Received [facility]    Exercise Vital Sign     On average, how many days per week do you engage in moderate to strenuous exercise (like a brisk walk)?: 0 days     On average, how many minutes do you engage in exercise at this level?: 0 min   Stress: Not on file   Social Connections: Not on file   Housing Stability: Not on file       PE:  The patient does appear in his stated age in no distress.  The patient is well groomed.    Psychiatric:  The patient is alert and oriented x 3.  The patient has a normal affect and mood.      Respiratory:  No acute respiratory distress. Patient does not have a cough.    HEENT:  Extraocular muscles are intact. There is no kern icterus. Pupils are equal, round, and reactive to light. No redness or discharge bilaterally.    Skin:  There are no rashes or  lesions.    Vitals:  There were no vitals filed for this visit.    Gait:    Gait: Normal gait   Sit to Stand: no difficulty      Vascular lower extremity:   Dorsalis pedis pulse-RIGHT 2+   Dorsalis pedis pulse-LEFT 2+     Neurological Lower Extremity:    LE Muscle Strength: All LE strength measurements 5/5   Reflexes: 2+ in bilateral lower extremities     Assessment  1. L4-5 mod. L3-4 mild-mod, L2-3 mild, L1-2 mod central stenosis    2. L5-S1 bilateral mod-large far lateral, L4-5 right mod-large/left far lateral & mild-mod central, L3-4 right large foraminal & mod diffuse, L2-3 mild-mod diffuse, L1-2 mod diffuse bulging discs    3. L5-S1 left mod-severe/right severe, L4-5 bilateral mod-severe, L3-4 right mod-severe/left mod foraminal stenosis    4. Chronic pain of left knee    5. 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 Primary osteoarthritis of left knee: moderate medial joint and patellar    7. Type 2 diabetes mellitus with hyperglycemia, with long-term current use of insulin (HCC)         Plan  He does not need any injections at this time.    He will continue with his current activities.    He will follow-up in 6 months or sooner if needed.    The patient understands and agrees with the stated plan.  Alexander Gage MD  11/14/2024

## 2024-11-14 NOTE — PATIENT INSTRUCTIONS
Plan  He does not need any injections at this time.    He will continue with his current activities.    He will follow-up in 6 months or sooner if needed.

## 2024-11-19 ENCOUNTER — LAB ENCOUNTER (OUTPATIENT)
Dept: LAB | Age: 89
End: 2024-11-19
Attending: INTERNAL MEDICINE
Payer: MEDICARE

## 2024-11-19 DIAGNOSIS — N18.4 CKD (CHRONIC KIDNEY DISEASE) STAGE 4, GFR 15-29 ML/MIN (HCC): ICD-10-CM

## 2024-11-19 LAB
ALBUMIN SERPL-MCNC: 4.1 G/DL (ref 3.2–4.8)
ANION GAP SERPL CALC-SCNC: 6 MMOL/L (ref 0–18)
BASOPHILS # BLD AUTO: 0.04 X10(3) UL (ref 0–0.2)
BASOPHILS NFR BLD AUTO: 0.5 %
BUN BLD-MCNC: 44 MG/DL (ref 9–23)
BUN/CREAT SERPL: 15.5 (ref 10–20)
CALCIUM BLD-MCNC: 9.7 MG/DL (ref 8.7–10.4)
CHLORIDE SERPL-SCNC: 111 MMOL/L (ref 98–112)
CO2 SERPL-SCNC: 23 MMOL/L (ref 21–32)
CREAT BLD-MCNC: 2.83 MG/DL
DEPRECATED RDW RBC AUTO: 45 FL (ref 35.1–46.3)
EGFRCR SERPLBLD CKD-EPI 2021: 21 ML/MIN/1.73M2 (ref 60–?)
EOSINOPHIL # BLD AUTO: 0.22 X10(3) UL (ref 0–0.7)
EOSINOPHIL NFR BLD AUTO: 2.9 %
ERYTHROCYTE [DISTWIDTH] IN BLOOD BY AUTOMATED COUNT: 13.2 % (ref 11–15)
GLUCOSE BLD-MCNC: 96 MG/DL (ref 70–99)
HCT VFR BLD AUTO: 35.8 %
HGB BLD-MCNC: 12.1 G/DL
IMM GRANULOCYTES # BLD AUTO: 0.03 X10(3) UL (ref 0–1)
IMM GRANULOCYTES NFR BLD: 0.4 %
LYMPHOCYTES # BLD AUTO: 2.18 X10(3) UL (ref 1–4)
LYMPHOCYTES NFR BLD AUTO: 28.3 %
MCH RBC QN AUTO: 31.3 PG (ref 26–34)
MCHC RBC AUTO-ENTMCNC: 33.8 G/DL (ref 31–37)
MCV RBC AUTO: 92.7 FL
MONOCYTES # BLD AUTO: 0.55 X10(3) UL (ref 0.1–1)
MONOCYTES NFR BLD AUTO: 7.1 %
NEUTROPHILS # BLD AUTO: 4.68 X10 (3) UL (ref 1.5–7.7)
NEUTROPHILS # BLD AUTO: 4.68 X10(3) UL (ref 1.5–7.7)
NEUTROPHILS NFR BLD AUTO: 60.8 %
OSMOLALITY SERPL CALC.SUM OF ELEC: 301 MOSM/KG (ref 275–295)
PHOSPHATE SERPL-MCNC: 3.3 MG/DL (ref 2.4–5.1)
PLATELET # BLD AUTO: 203 10(3)UL (ref 150–450)
POTASSIUM SERPL-SCNC: 5.1 MMOL/L (ref 3.5–5.1)
RBC # BLD AUTO: 3.86 X10(6)UL
SODIUM SERPL-SCNC: 140 MMOL/L (ref 136–145)
WBC # BLD AUTO: 7.7 X10(3) UL (ref 4–11)

## 2024-11-19 PROCEDURE — 36415 COLL VENOUS BLD VENIPUNCTURE: CPT

## 2024-11-19 PROCEDURE — 85025 COMPLETE CBC W/AUTO DIFF WBC: CPT

## 2024-11-19 PROCEDURE — 80069 RENAL FUNCTION PANEL: CPT

## 2024-11-22 ENCOUNTER — OFFICE VISIT (OUTPATIENT)
Dept: NEPHROLOGY | Facility: CLINIC | Age: 89
End: 2024-11-22

## 2024-11-22 VITALS
DIASTOLIC BLOOD PRESSURE: 80 MMHG | BODY MASS INDEX: 25 KG/M2 | HEART RATE: 66 BPM | SYSTOLIC BLOOD PRESSURE: 140 MMHG | WEIGHT: 161 LBS

## 2024-11-22 DIAGNOSIS — N18.4 CKD (CHRONIC KIDNEY DISEASE) STAGE 4, GFR 15-29 ML/MIN (HCC): Primary | ICD-10-CM

## 2024-11-22 PROCEDURE — 99214 OFFICE O/P EST MOD 30 MIN: CPT | Performed by: INTERNAL MEDICINE

## 2024-11-22 NOTE — PATIENT INSTRUCTIONS
Repeat your kidney blood test in 3 months.  Orders updated in the computer.  Let me know if you are going to get a mitral valve clip.

## 2024-12-05 ENCOUNTER — PATIENT MESSAGE (OUTPATIENT)
Dept: FAMILY MEDICINE CLINIC | Facility: CLINIC | Age: 89
End: 2024-12-05

## 2024-12-07 DIAGNOSIS — E11.8 CONTROLLED TYPE 2 DIABETES MELLITUS WITH COMPLICATION, WITH LONG-TERM CURRENT USE OF INSULIN (HCC): ICD-10-CM

## 2024-12-07 DIAGNOSIS — Z79.4 CONTROLLED TYPE 2 DIABETES MELLITUS WITH COMPLICATION, WITH LONG-TERM CURRENT USE OF INSULIN (HCC): ICD-10-CM

## 2024-12-07 NOTE — TELEPHONE ENCOUNTER
Onsite Clinical--- please assist with obtaining medical records, see patient's MyChart Message     Dr Parks - I attempted to update patient's  Care Everywhere, can you see the results and advise?

## 2024-12-09 RX ORDER — BLOOD SUGAR DIAGNOSTIC
STRIP MISCELLANEOUS
Qty: 200 STRIP | Refills: 1 | Status: SHIPPED | OUTPATIENT
Start: 2024-12-09

## 2024-12-09 NOTE — TELEPHONE ENCOUNTER
Endocrine Refill protocol for Glucose testing supplies     Protocol Criteria: PASSED     If below requirement is met, send a 90-day supply with 1 refill per provider protocol.    Verify appointment with Endocrinology completed in the last 6 months or scheduled in the next 3 months.    Last completed office visit: 8/28/2024 Britta May MD   Next scheduled Follow up:   Future Appointments   Date Time Provider Department Center   3/5/2025 10:30 AM Britta May MD [facility]

## 2024-12-11 ENCOUNTER — MED REC SCAN ONLY (OUTPATIENT)
Dept: FAMILY MEDICINE CLINIC | Facility: CLINIC | Age: 89
End: 2024-12-11

## 2024-12-23 ENCOUNTER — OFFICE VISIT (OUTPATIENT)
Dept: PODIATRY CLINIC | Facility: CLINIC | Age: 89
End: 2024-12-23

## 2024-12-23 DIAGNOSIS — E11.65 TYPE 2 DIABETES MELLITUS WITH HYPERGLYCEMIA, WITH LONG-TERM CURRENT USE OF INSULIN (HCC): Primary | ICD-10-CM

## 2024-12-23 DIAGNOSIS — E08.42 DIABETES MELLITUS DUE TO UNDERLYING CONDITION WITH DIABETIC POLYNEUROPATHY, UNSPECIFIED WHETHER LONG TERM INSULIN USE (HCC): ICD-10-CM

## 2024-12-23 DIAGNOSIS — B35.1 ONYCHOMYCOSIS: ICD-10-CM

## 2024-12-23 DIAGNOSIS — Z79.4 TYPE 2 DIABETES MELLITUS WITH HYPERGLYCEMIA, WITH LONG-TERM CURRENT USE OF INSULIN (HCC): Primary | ICD-10-CM

## 2024-12-23 NOTE — PROGRESS NOTES
[facility]  Progress Note      Cal Cates is a 89 year old male.   Chief Complaint   Patient presents with    Diabetic Foot Care     D[facility] - Pt here for nail trim and foot check. FBS- 94  Denies any pain or concerns.             HPI:     Patient is a pleasant 89-year-old diabetic male presents to clinic for bilateral foot evaluation.  Admits to elongated toenails he is unable to trim himself.  Denies any pedal injuries or trauma.  Denies any acute signs of infection.  Admits to ambulating in supportive shoes.    Allergies: Barium sulfate and Sulfa antibiotics    Current Outpatient Medications   Medication Sig Dispense Refill    Glucose Blood (ONETOUCH ULTRA) In Vitro Strip CHECK BLOOD SUGAR TWICE DAILY. DX CODE: E11.65 WITH USE OF INSULIN 200 strip 1    TRULICITY 0.75 MG/0.5ML Subcutaneous Solution Auto-injector INJECT 0.75MG (0.5ML) UNDER THE SKIN ONCE A WEEK. 6 mL 0    Insulin Pen Needle (COMFORT EZ PEN NEEDLES) 32G X 4 MM Does not apply Misc Inject once daily 100 each 2    finasteride 5 MG Oral Tab Take 1 tablet (5 mg total) by mouth daily. 90 tablet 3    Lancets (ONETOUCH DELICA PLUS [id number]) Does not apply Misc Place 1 Device onto the skin in the morning and 1 Device before bedtime. 200 each 1    GLIMEPIRIDE 2 MG Oral Tab TAKE 1 TABLET DAILY WITH BREAKFAST 90 tablet 3    simvastatin 40 MG Oral Tab Take 1 tablet (40 mg total) by mouth daily. 90 tablet 3    pantoprazole 40 MG Oral Tab EC Take 1 tablet (40 mg total) by mouth before breakfast. 90 tablet 3    BD PEN NEEDLE MINI U/F 31G X 5 MM Does not apply Misc USE EVERY DAY 90 each 3    fluticasone propionate 50 MCG/ACT Nasal Suspension 2 sprays by Each Nare route every morning. 16 g 3    tamsulosin 0.4 MG Oral Cap Take 2 capsules (0.8 mg total) by mouth daily. 180 capsule 3    insulin glargine (LANTUS SOLOSTAR) 100 UNIT/ML Subcutaneous Solution Pen-injector INJECT 15 TO 25 UNITS DAILY IF BLOOD GLUCOSE IS ABOVE 250 15 mL 5    furosemide 20 MG  Oral Tab TAKE 1 TABLET THREE TIMES A WEEK 30 tablet 4    carvedilol 6.25 MG Oral Tab       timolol 0.5 % Ophthalmic Solution       latanoprost 0.005 % Ophthalmic Solution Place 1 drop into both eyes nightly. TAKE AT BEDTIME      COMBIGAN 0.2-0.5 % Ophthalmic Solution Place 1 drop into both eyes every 12 (twelve) hours. 1 Bottle 12    AMLODIPINE BESYLATE 5 MG Oral Tab TAKE 1 TABLET(5 MG) BY MOUTH DAILY 90 tablet 1    LISINOPRIL 10 MG Oral Tab TAKE 1 TABLET BY MOUTH DAILY (Patient taking differently: Take 0.5 tablets (5 mg total) by mouth in the morning and 0.5 tablets (5 mg total) before bedtime.) 30 tablet 4      Past Medical History:    Acid reflux    Basal cell carcinoma    right jaw    Basal cell carcinoma    left cheek    Basal cell carcinoma    located on the right cheek sup (AO)    BPH (benign prostatic hyperplasia)    Diabetes (HCC)    Diabetes mellitus (HCC)    Glaucoma    High blood pressure    High cholesterol    Hyperlipidemia    Kidney disease    Renal disorder    Shortness of breath      Past Surgical History:   Procedure Laterality Date    Cardiac pacemaker placement      Colonoscopy  2014    Other surgical history      hemorrhoids    Other surgical history      pacemaker placed      Family History   Problem Relation Age of Onset    Glaucoma Father       Social History     Socioeconomic History    Marital status:    Tobacco Use    Smoking status: Former     Current packs/day: 0.00     Types: Cigarettes    Smokeless tobacco: Never   Vaping Use    Vaping status: Never Used   Substance and Sexual Activity    Alcohol use: Yes     Alcohol/week: 1.0 standard drink of alcohol     Types: 1 Cans of beer per week     Comment: occ    Drug use: No   Other Topics Concern    Caffeine Concern Yes     Comment: coffee and soda    Exercise Yes     Comment: cutting grass    Reaction to local anesthetic No           REVIEW OF SYSTEMS:     Denies nause, fever, chills  No calf pain  Denies chest pain or  SOB      EXAM:   There were no vitals taken for this visit.  GENERAL: well developed, well nourished, in no apparent distress  EXTREMITIES:   1. Integument: Normal skin temperature and turgor. Toenails x10 are elongated, thickened and discolored with subungal derbi.     2. Vascular: Dorsalis pedis two out of four bilateral and posterior tibial pulses two out of   four bilateral, capillary refill normal.   3. Musculoskeletal: All muscle groups are graded 5 out of 5 in the foot and ankle.   4. Neurological: Normal sharp dull sensation; reflexes normal.      Bilateral barefoot skin diabetic exam is normal, visualized feet and the appearance is normal.  Bilateral monofilament/sensation of both feet is normal.  Pulsation pedal pulse exam of both lower legs/feet is normal as well.               ASSESSMENT AND PLAN:   There are no diagnoses linked to this encounter.      Plan:       -Patient examined, chart history reviewed.  -Discussed importance of proper pedal hygiene, regular foot checks, and tight glucose control.  -Sharply debrided nails x10 with a sterile nail nipper achieving a 20% reduction in thickness and length, without incident.   -Ambulate with supportive shoes and inserts and avoid walking barefoot.  -Educated patient on acute signs of infection advised patient to seek immediate medical attention if symptoms arise.    RTC in 3 months     The patient indicates understanding of these issues and agrees to the plan.        Nohemy Jin DPM

## 2025-01-05 ENCOUNTER — PATIENT MESSAGE (OUTPATIENT)
Dept: FAMILY MEDICINE CLINIC | Facility: CLINIC | Age: OVER 89
End: 2025-01-05

## 2025-01-06 NOTE — TELEPHONE ENCOUNTER
Tamsulosin 0.4 mg refill request via [employer] sent to refill request folder.        Please reply to pool: EM RN TRIAGE

## 2025-01-06 NOTE — PROGRESS NOTES
PREOPERATIVE DIAGNOSIS:     Microhematuria     POSTOP DIAGNOSIS:               The same;  No tumors, or CIS of the bladder or bladder neck    PROCEDURE:              Cystoscopy              ANESTHESIA:     2% Xylocaine jelly local;  also see above    VIA [facility] urinary tract; no hydroureteronephrosis. Patient presently denies any associated episodes of gross hematuria. He is unaware of anything that makes this better or worse.  The patient feels this is stable as he is currently asymptomatic.      Kidney Stone  Ch consult with me; AUA of 17; Started Tamsulosin 0.4 mg 5 years ago and Finasteride April 2017; 25 year exposure to fumes; 17 pack year smoking history;  Denies any FHx of kidney stones; 4 + enlarged, 50 g, no palpable nodules or indurations; Continue 2 Tamsu 1.91; eGFR = 31  04/13/2018 Creatinine = 1.83; eGFR 33  11/15/2017 UA RBC = 1; WBC = 2; Protein = 100  11/04/2017 Creatinine = 1.73; eGFR = 38  11/16/2016 Creatinine = 1.77; eGFR = 39  06/06/2017 PSA = 1.6      UROLOGICAL IMAGING   06/05/2021 CT ABDOMEN + nursing home papers Microhematuria  (primary encounter diagnosis)  Problem started 01/12/2021 when UA RBC = 14.  05/25/2021 Urine cytology = negative for high-grade urothelial carcinoma.  Most recent 06/05/2021 CT ABDOMEN + PELVIS KIDNEY (NO IV NO ORAL) = No tumors, stones or 04/30/2021 when Creatinine = 2.21; GFR = 26 . Patient presently denies any associated symptoms.  He feels this is stable and chooses to continue to follow with his nephrologist, Dr. Elda Khanna, for this.     (N28.1) Complex renal cyst  06/05/2021 CT ABDOMEN + patient the benefits, risks, complications, side effects, reasons for, nature of, alternatives to the above treatment options, and I answered questions concerning them; patient understands all of this and decides to proceed with the following:     TREATMEN documentation has been prepared under the direction and in the presence of Brigitte Hadley MD.   Electronically Signed: Reema Otoole, 6/11/2021, 2:30 PM.    Holli Edwards MD,  personally performed the services described in this documentation.  All

## 2025-01-07 RX ORDER — AMLODIPINE BESYLATE 5 MG/1
5 TABLET ORAL DAILY
Qty: 30 TABLET | Refills: 0 | Status: SHIPPED | OUTPATIENT
Start: 2025-01-07

## 2025-01-07 RX ORDER — TAMSULOSIN HYDROCHLORIDE 0.4 MG/1
0.8 CAPSULE ORAL DAILY
Qty: 180 CAPSULE | Refills: 3 | Status: SHIPPED | OUTPATIENT
Start: 2025-01-07

## 2025-01-07 RX ORDER — TAMSULOSIN HYDROCHLORIDE 0.4 MG/1
0.8 CAPSULE ORAL DAILY
Qty: 180 CAPSULE | Refills: 3 | OUTPATIENT
Start: 2025-01-07

## 2025-01-07 NOTE — TELEPHONE ENCOUNTER
Refill passed per [facility] protocol.   Requested Prescriptions   Pending Prescriptions Disp Refills    tamsulosin 0.4 MG Oral Cap 180 capsule 3     Sig: Take 2 capsules (0.8 mg total) by mouth daily.       Genitourinary Medications Passed - 1/7/2025  8:45 AM        Passed - Patient does not have pulmonary hypertension on problem list        Passed - In person appointment or virtual visit in the past 12 mos or appointment in next 3 mos     Recent Outpatient Visits              2 weeks ago Type 2 diabetes mellitus with hyperglycemia, with long-term current use of insulin ([facility])    [facility] Nohemy Jin DPM    Office Visit    1 month ago CKD (chronic kidney disease) stage 4, GFR 15-29 ml/min ([facility])    [facility] Trevor Rocha MD    Office Visit    1 month ago L4-5 mod. L3-4 mild-mod, L2-3 mild, L1-2 mod central stenosis    [facility] Alexander Gage MD    Office Visit    3 months ago Type 2 diabetes mellitus with hyperglycemia, with long-term current use of insulin ([facility])    [facility] Tracey Parks MD    Office Visit    4 months ago Uncontrolled type 2 diabetes mellitus with hyperglycemia ([facility])    [facility] Britta May MD    Office Visit          Future Appointments         Provider Department Appt Notes    In 1 month Nohemy Jin DPM [facility] Follow up    In 1 month Britta May MD [facility] Follow up    In 2 months Alexander Gage MD [facility] F/U    In 2 months Tracey Parks MD [facility] 6mo follow up    In 4 months Trevor Rocha MD [facility] 6  months

## 2025-01-10 ENCOUNTER — APPOINTMENT (OUTPATIENT)
Dept: CARDIOLOGY | Age: OVER 89
End: 2025-01-10

## 2025-01-10 VITALS
BODY MASS INDEX: 25.15 KG/M2 | DIASTOLIC BLOOD PRESSURE: 64 MMHG | WEIGHT: 160.6 LBS | HEART RATE: 60 BPM | SYSTOLIC BLOOD PRESSURE: 135 MMHG | OXYGEN SATURATION: 98 %

## 2025-01-10 DIAGNOSIS — I50.22 CHRONIC SYSTOLIC CONGESTIVE HEART FAILURE  (CMD): Primary | ICD-10-CM

## 2025-01-10 SDOH — HEALTH STABILITY: PHYSICAL HEALTH: ON AVERAGE, HOW MANY DAYS PER WEEK DO YOU ENGAGE IN MODERATE TO STRENUOUS EXERCISE (LIKE A BRISK WALK)?: 0 DAYS

## 2025-01-10 SDOH — HEALTH STABILITY: PHYSICAL HEALTH: ON AVERAGE, HOW MANY MINUTES DO YOU ENGAGE IN EXERCISE AT THIS LEVEL?: 0 MIN

## 2025-01-10 ASSESSMENT — ENCOUNTER SYMPTOMS
WEIGHT LOSS: 0
SUSPICIOUS LESIONS: 0
CHILLS: 0
LOSS OF BALANCE: 0
ABDOMINAL PAIN: 0
DIZZINESS: 0
HEADACHES: 0
COUGH: 0
HEMATOCHEZIA: 0
FEVER: 0
CONSTIPATION: 1
WEIGHT GAIN: 0
BRUISES/BLEEDS EASILY: 0
WEAKNESS: 0
LIGHT-HEADEDNESS: 0
HEMOPTYSIS: 0
BLOATING: 1
ALLERGIC/IMMUNOLOGIC COMMENTS: NO NEW FOOD ALLERGIES
INSOMNIA: 1

## 2025-01-10 ASSESSMENT — PATIENT HEALTH QUESTIONNAIRE - PHQ9
2. FEELING DOWN, DEPRESSED OR HOPELESS: NOT AT ALL
SUM OF ALL RESPONSES TO PHQ9 QUESTIONS 1 AND 2: 0
1. LITTLE INTEREST OR PLEASURE IN DOING THINGS: NOT AT ALL
CLINICAL INTERPRETATION OF PHQ2 SCORE: NO FURTHER SCREENING NEEDED
SUM OF ALL RESPONSES TO PHQ9 QUESTIONS 1 AND 2: 0

## 2025-01-13 DIAGNOSIS — E11.65 UNCONTROLLED TYPE 2 DIABETES MELLITUS WITH HYPERGLYCEMIA (HCC): ICD-10-CM

## 2025-01-13 RX ORDER — LISINOPRIL 10 MG/1
5 TABLET ORAL 2 TIMES DAILY
Qty: 90 TABLET | Refills: 1 | OUTPATIENT
Start: 2025-01-13

## 2025-01-13 RX ORDER — GLIMEPIRIDE 2 MG/1
2 TABLET ORAL
Qty: 90 TABLET | Refills: 1 | Status: SHIPPED | OUTPATIENT
Start: 2025-01-13

## 2025-01-13 RX ORDER — DULAGLUTIDE 0.75 MG/.5ML
INJECTION, SOLUTION SUBCUTANEOUS
Qty: 6 ML | Refills: 0 | Status: SHIPPED | OUTPATIENT
Start: 2025-01-13

## 2025-01-13 RX ORDER — FUROSEMIDE 20 MG/1
TABLET ORAL
Qty: 30 TABLET | Refills: 4 | OUTPATIENT
Start: 2025-01-13

## 2025-01-13 NOTE — TELEPHONE ENCOUNTER
Endocrine Refill protocol for oral and injectable diabetic medications    Protocol Criteria:  PASSED  Reason: N/A    If all below requirements are met, send a 90-day supply with 1 refill per provider protocol.    Verify appointment with Endocrinology completed in the last 6 months or scheduled in the next 3 months.  Verify A1C has been completed within the last 6 months and is below 8.5%     Last completed office visit: 8/28/2024 Britta May MD   Next scheduled Follow up:   Future Appointments   Date Time Provider Department Center   2/24/2025 11:00 AM Nohemy Jin DPM [facility]   3/5/2025 10:30 AM Britta May MD [facility]   3/20/2025  8:30 AM Alexander Gage MD [facility]   3/24/2025 11:00 AM Tracey Parks MD [facility]   5/19/2025  4:00 PM Trevor Rocha MD [id number]      Last A1c result: Last A1c value was 7.8% done 8/28/2024.

## 2025-01-13 NOTE — TELEPHONE ENCOUNTER
Medication Quantity Refills Start End   furosemide 20 MG Oral Tab 30 tablet 4 4/18/2023 --   Sig:   TAKE 1 TABLET THREE TIMES A WEEK         Medication Quantity Refills Start End   LISINOPRIL 10 MG Oral Tab 30 tablet 4 11/13/2018 --   Sig:   TAKE 1 TABLET BY MOUTH DAILY     Patient taking differently:   Take 0.5 tablets (5 mg total) by mouth in the morning and 0.5 tablets (5 mg total) before bedtime.

## 2025-01-13 NOTE — TELEPHONE ENCOUNTER
Last office visit- 11/22/2024    Return to clinic- 6 months    Follow up- 5/19/25        Furosemide 20 MG was last ordered by PCP

## 2025-01-13 NOTE — TELEPHONE ENCOUNTER
Endocrine Refill protocol for oral and injectable diabetic medications    Protocol Criteria:  PASSED      If all below requirements are met, send a 90-day supply with 1 refill per provider protocol.    Verify appointment with Endocrinology completed in the last 6 months or scheduled in the next 3 months.  Verify A1C has been completed within the last 6 months and is below 8.5%     Last completed office visit: 8/28/2024 Britta May MD   Next scheduled Follow up:   Future Appointments   Date Time Provider Department Center   3/5/2025 10:30 AM Britta May MD [facility]      Last A1c result: Last A1c value was 7.8% done 8/28/2024.

## 2025-01-14 RX ORDER — LISINOPRIL 10 MG/1
10 TABLET ORAL DAILY
Qty: 90 TABLET | Refills: 1 | Status: SHIPPED | OUTPATIENT
Start: 2025-01-14

## 2025-01-14 NOTE — TELEPHONE ENCOUNTER
Spoke with patients son ( confirmed), medication has been updated. Pt taking Lisinopril 10 MG daily.

## 2025-02-07 RX ORDER — FINASTERIDE 5 MG/1
5 TABLET, FILM COATED ORAL DAILY
Qty: 90 TABLET | Refills: 3 | Status: CANCELLED | OUTPATIENT
Start: 2025-02-07

## 2025-02-10 ENCOUNTER — TELEPHONE (OUTPATIENT)
Dept: NEPHROLOGY | Facility: CLINIC | Age: OVER 89
End: 2025-02-10

## 2025-02-10 ENCOUNTER — LAB ENCOUNTER (OUTPATIENT)
Dept: LAB | Age: OVER 89
End: 2025-02-10
Attending: INTERNAL MEDICINE
Payer: MEDICARE

## 2025-02-10 DIAGNOSIS — N18.4 CKD (CHRONIC KIDNEY DISEASE) STAGE 4, GFR 15-29 ML/MIN (HCC): ICD-10-CM

## 2025-02-10 LAB
ALBUMIN SERPL-MCNC: 4.3 G/DL (ref 3.2–4.8)
ANION GAP SERPL CALC-SCNC: 10 MMOL/L (ref 0–18)
BASOPHILS # BLD AUTO: 0.03 X10(3) UL (ref 0–0.2)
BASOPHILS NFR BLD AUTO: 0.4 %
BUN BLD-MCNC: 57 MG/DL (ref 9–23)
BUN/CREAT SERPL: 17.1 (ref 10–20)
CALCIUM BLD-MCNC: 9 MG/DL (ref 8.7–10.4)
CHLORIDE SERPL-SCNC: 108 MMOL/L (ref 98–112)
CO2 SERPL-SCNC: 22 MMOL/L (ref 21–32)
CREAT BLD-MCNC: 3.33 MG/DL
CREAT UR-SCNC: 71.7 MG/DL
DEPRECATED RDW RBC AUTO: 47.6 FL (ref 35.1–46.3)
EGFRCR SERPLBLD CKD-EPI 2021: 17 ML/MIN/1.73M2 (ref 60–?)
EOSINOPHIL # BLD AUTO: 0.18 X10(3) UL (ref 0–0.7)
EOSINOPHIL NFR BLD AUTO: 2.3 %
ERYTHROCYTE [DISTWIDTH] IN BLOOD BY AUTOMATED COUNT: 13.6 % (ref 11–15)
GLUCOSE BLD-MCNC: 88 MG/DL (ref 70–99)
HCT VFR BLD AUTO: 36.2 %
HGB BLD-MCNC: 11.6 G/DL
IMM GRANULOCYTES # BLD AUTO: 0.03 X10(3) UL (ref 0–1)
IMM GRANULOCYTES NFR BLD: 0.4 %
LYMPHOCYTES # BLD AUTO: 2.05 X10(3) UL (ref 1–4)
LYMPHOCYTES NFR BLD AUTO: 26 %
MCH RBC QN AUTO: 30.4 PG (ref 26–34)
MCHC RBC AUTO-ENTMCNC: 32 G/DL (ref 31–37)
MCV RBC AUTO: 95 FL
MICROALBUMIN UR-MCNC: 20.7 MG/DL
MICROALBUMIN/CREAT 24H UR-RTO: 288.7 UG/MG (ref ?–30)
MONOCYTES # BLD AUTO: 0.58 X10(3) UL (ref 0.1–1)
MONOCYTES NFR BLD AUTO: 7.4 %
NEUTROPHILS # BLD AUTO: 5 X10 (3) UL (ref 1.5–7.7)
NEUTROPHILS # BLD AUTO: 5 X10(3) UL (ref 1.5–7.7)
NEUTROPHILS NFR BLD AUTO: 63.5 %
OSMOLALITY SERPL CALC.SUM OF ELEC: 305 MOSM/KG (ref 275–295)
PHOSPHATE SERPL-MCNC: 4 MG/DL (ref 2.4–5.1)
PLATELET # BLD AUTO: 191 10(3)UL (ref 150–450)
POTASSIUM SERPL-SCNC: 4.9 MMOL/L (ref 3.5–5.1)
RBC # BLD AUTO: 3.81 X10(6)UL
SODIUM SERPL-SCNC: 140 MMOL/L (ref 136–145)
WBC # BLD AUTO: 7.9 X10(3) UL (ref 4–11)

## 2025-02-10 PROCEDURE — 82043 UR ALBUMIN QUANTITATIVE: CPT

## 2025-02-10 PROCEDURE — 85025 COMPLETE CBC W/AUTO DIFF WBC: CPT

## 2025-02-10 PROCEDURE — 82570 ASSAY OF URINE CREATININE: CPT

## 2025-02-10 PROCEDURE — 80069 RENAL FUNCTION PANEL: CPT

## 2025-02-10 PROCEDURE — 36415 COLL VENOUS BLD VENIPUNCTURE: CPT

## 2025-02-11 RX ORDER — FINASTERIDE 5 MG/1
5 TABLET, FILM COATED ORAL DAILY
Qty: 10 TABLET | Refills: 0 | OUTPATIENT
Start: 2025-02-11

## 2025-02-11 NOTE — TELEPHONE ENCOUNTER
Kidneys are getting worse again.  Anything new going on any change in medications?  Repeat labs in 1 month to make sure things remain stable.

## 2025-02-11 NOTE — TELEPHONE ENCOUNTER
Finasteride 5m year supply sent to [facility] on 2024    Patient requesting through [employer] also    Sent [employer] message to patient to verify which pharmacy patient wants his prescription sent to

## 2025-02-11 NOTE — TELEPHONE ENCOUNTER
Dr. Sellers; Cardiologist at [facility]  prescribed furosemide 20 mg daily for 5 days then to return to 3 times a week.  He was short of breath and wanted to remove fluid. Visit 1/10/25 in epic, has f/u 2/13/25    Discussed test results with son (KASANDRA signed) Agreed to do labs in a month

## 2025-02-13 ENCOUNTER — OFFICE VISIT (OUTPATIENT)
Dept: CARDIOLOGY | Age: OVER 89
End: 2025-02-13

## 2025-02-13 ENCOUNTER — HOSPITAL ENCOUNTER (OUTPATIENT)
Dept: GENERAL RADIOLOGY | Age: OVER 89
Discharge: HOME | End: 2025-02-13

## 2025-02-13 VITALS
BODY MASS INDEX: 25.64 KG/M2 | SYSTOLIC BLOOD PRESSURE: 145 MMHG | WEIGHT: 163.69 LBS | OXYGEN SATURATION: 97 % | HEART RATE: 71 BPM | DIASTOLIC BLOOD PRESSURE: 74 MMHG

## 2025-02-13 DIAGNOSIS — I50.22 CHRONIC SYSTOLIC CONGESTIVE HEART FAILURE  (CMD): ICD-10-CM

## 2025-02-13 DIAGNOSIS — I50.22 CHRONIC SYSTOLIC CONGESTIVE HEART FAILURE  (CMD): Primary | ICD-10-CM

## 2025-02-13 PROCEDURE — 71046 X-RAY EXAM CHEST 2 VIEWS: CPT

## 2025-02-13 ASSESSMENT — ENCOUNTER SYMPTOMS
ABDOMINAL PAIN: 0
HEMOPTYSIS: 0
INSOMNIA: 1
BRUISES/BLEEDS EASILY: 0
CONSTIPATION: 1
BLOATING: 1
HEMATOCHEZIA: 0
LIGHT-HEADEDNESS: 0
WEAKNESS: 0
FEVER: 0
HEADACHES: 0
ALLERGIC/IMMUNOLOGIC COMMENTS: NO NEW FOOD ALLERGIES
LOSS OF BALANCE: 0
COUGH: 0
SUSPICIOUS LESIONS: 0
CHILLS: 0
DIZZINESS: 0
WEIGHT GAIN: 1
WEIGHT LOSS: 0

## 2025-02-13 ASSESSMENT — PATIENT HEALTH QUESTIONNAIRE - PHQ9
2. FEELING DOWN, DEPRESSED OR HOPELESS: NOT AT ALL
CLINICAL INTERPRETATION OF PHQ2 SCORE: NO FURTHER SCREENING NEEDED
SUM OF ALL RESPONSES TO PHQ9 QUESTIONS 1 AND 2: 0
SUM OF ALL RESPONSES TO PHQ9 QUESTIONS 1 AND 2: 0
1. LITTLE INTEREST OR PLEASURE IN DOING THINGS: NOT AT ALL

## 2025-02-14 ENCOUNTER — TELEPHONE (OUTPATIENT)
Dept: CARDIOLOGY | Age: OVER 89
End: 2025-02-14

## 2025-02-14 ENCOUNTER — E-ADVICE (OUTPATIENT)
Dept: CARDIOLOGY | Age: OVER 89
End: 2025-02-14

## 2025-02-24 ENCOUNTER — OFFICE VISIT (OUTPATIENT)
Dept: PODIATRY CLINIC | Facility: CLINIC | Age: OVER 89
End: 2025-02-24

## 2025-02-24 DIAGNOSIS — E11.65 TYPE 2 DIABETES MELLITUS WITH HYPERGLYCEMIA, WITH LONG-TERM CURRENT USE OF INSULIN (HCC): Primary | ICD-10-CM

## 2025-02-24 DIAGNOSIS — B35.1 ONYCHOMYCOSIS: ICD-10-CM

## 2025-02-24 DIAGNOSIS — Z79.4 TYPE 2 DIABETES MELLITUS WITH HYPERGLYCEMIA, WITH LONG-TERM CURRENT USE OF INSULIN (HCC): Primary | ICD-10-CM

## 2025-02-24 PROCEDURE — 99213 OFFICE O/P EST LOW 20 MIN: CPT | Performed by: STUDENT IN AN ORGANIZED HEALTH CARE EDUCATION/TRAINING PROGRAM

## 2025-02-24 NOTE — PROGRESS NOTES
[facility] Podiatry  Progress Note      Cal Cates is a 89 year old male.   Chief Complaint   Patient presents with    Diabetic Foot Care     F/u Diabetic Foot Care. On 8/28/24 A1C= 7.8,  am, LOV with PCP with PCP Dr Parks, Tracey Washburn MD Was on 09/23/2024                  HPI:     Patient is a pleasant 89-year-old diabetic male presents to clinic for bilateral foot evaluation.  Admits to elongated toenails he is unable to trim himself.  Denies any pedal injuries or trauma.  Denies any acute signs of infection.  Admits to ambulating in supportive shoes.    Allergies: Barium sulfate and Sulfa antibiotics    Current Outpatient Medications   Medication Sig Dispense Refill    lisinopril 10 MG Oral Tab Take 1 tablet (10 mg total) by mouth daily. 90 tablet 1    TRULICITY 0.75 MG/0.5ML Subcutaneous Solution Auto-injector INJECT 0.75MG (0.5ML) UNDER THE SKIN ONCE A WEEK. 6 mL 0    glimepiride 2 MG Oral Tab Take 1 tablet (2 mg total) by mouth daily with breakfast. 90 tablet 1    tamsulosin 0.4 MG Oral Cap Take 2 capsules (0.8 mg total) by mouth daily. 180 capsule 3    Glucose Blood (ONETOUCH ULTRA) In Vitro Strip CHECK BLOOD SUGAR TWICE DAILY. DX CODE: E11.65 WITH USE OF INSULIN 200 strip 1    Insulin Pen Needle (COMFORT EZ PEN NEEDLES) 32G X 4 MM Does not apply Misc Inject once daily 100 each 2    finasteride 5 MG Oral Tab Take 1 tablet (5 mg total) by mouth daily. 90 tablet 3    Lancets (ONETOUCH DELICA PLUS [id number]) Does not apply Misc Place 1 Device onto the skin in the morning and 1 Device before bedtime. 200 each 1    simvastatin 40 MG Oral Tab Take 1 tablet (40 mg total) by mouth daily. 90 tablet 3    pantoprazole 40 MG Oral Tab EC Take 1 tablet (40 mg total) by mouth before breakfast. 90 tablet 3    BD PEN NEEDLE MINI U/F 31G X 5 MM Does not apply Misc USE EVERY DAY 90 each 3    fluticasone propionate 50 MCG/ACT Nasal Suspension 2 sprays by Each Nare route every morning. 16 g 3    insulin glargine  (LANTUS SOLOSTAR) 100 UNIT/ML Subcutaneous Solution Pen-injector INJECT 15 TO 25 UNITS DAILY IF BLOOD GLUCOSE IS ABOVE 250 15 mL 5    furosemide 20 MG Oral Tab TAKE 1 TABLET THREE TIMES A WEEK 30 tablet 4    carvedilol 6.25 MG Oral Tab       timolol 0.5 % Ophthalmic Solution       latanoprost 0.005 % Ophthalmic Solution Place 1 drop into both eyes nightly. TAKE AT BEDTIME      COMBIGAN 0.2-0.5 % Ophthalmic Solution Place 1 drop into both eyes every 12 (twelve) hours. 1 Bottle 12    AMLODIPINE BESYLATE 5 MG Oral Tab TAKE 1 TABLET(5 MG) BY MOUTH DAILY 90 tablet 1      Past Medical History:    Acid reflux    Basal cell carcinoma    right jaw    Basal cell carcinoma    left cheek    Basal cell carcinoma    located on the right cheek sup (AO)    BPH (benign prostatic hyperplasia)    Diabetes (HCC)    Diabetes mellitus (HCC)    Glaucoma    High blood pressure    High cholesterol    Hyperlipidemia    Kidney disease    Renal disorder    Shortness of breath      Past Surgical History:   Procedure Laterality Date    Cardiac pacemaker placement      Colonoscopy  2014    Other surgical history      hemorrhoids    Other surgical history      pacemaker placed      Family History   Problem Relation Age of Onset    Glaucoma Father       Social History     Socioeconomic History    Marital status:    Tobacco Use    Smoking status: Former     Current packs/day: 0.00     Types: Cigarettes    Smokeless tobacco: Never   Vaping Use    Vaping status: Never Used   Substance and Sexual Activity    Alcohol use: Yes     Alcohol/week: 1.0 standard drink of alcohol     Types: 1 Cans of beer per week     Comment: occ    Drug use: No   Other Topics Concern    Caffeine Concern Yes     Comment: coffee and soda    Exercise Yes     Comment: cutting grass    Reaction to local anesthetic No           REVIEW OF SYSTEMS:     Denies nause, fever, chills  No calf pain  Denies chest pain or SOB      EXAM:   There were no vitals taken for this  visit.  GENERAL: well developed, well nourished, in no apparent distress  EXTREMITIES:   1. Integument: Normal skin temperature and turgor. Toenails x10 are elongated, thickened and discolored with subungal derbi.     2. Vascular: Dorsalis pedis two out of four bilateral and posterior tibial pulses two out of   four bilateral, capillary refill normal.   3. Musculoskeletal: All muscle groups are graded 5 out of 5 in the foot and ankle.   4. Neurological: Normal sharp dull sensation; reflexes normal.      Bilateral barefoot skin diabetic exam is normal, visualized feet and the appearance is normal.  Bilateral monofilament/sensation of both feet is normal.  Pulsation pedal pulse exam of both lower legs/feet is normal as well.               ASSESSMENT AND PLAN:   Diagnoses and all orders for this visit:    Type 2 diabetes mellitus with hyperglycemia, with long-term current use of insulin ([facility])    Onychomycosis          Plan:       -Patient examined, chart history reviewed.  -Discussed importance of proper pedal hygiene, regular foot checks, and tight glucose control.  -Sharply debrided nails x10 with a sterile nail nipper achieving a 20% reduction in thickness and length, without incident.   -Ambulate with supportive shoes and inserts and avoid walking barefoot.  -Educated patient on acute signs of infection advised patient to seek immediate medical attention if symptoms arise.    RTC in 3 months     The patient indicates understanding of these issues and agrees to the plan.        Nohemy Jin DPM

## 2025-03-05 ENCOUNTER — TELEPHONE (OUTPATIENT)
Facility: LOCATION | Age: OVER 89
End: 2025-03-05

## 2025-03-05 ENCOUNTER — OFFICE VISIT (OUTPATIENT)
Dept: ENDOCRINOLOGY CLINIC | Facility: CLINIC | Age: OVER 89
End: 2025-03-05

## 2025-03-05 VITALS
BODY MASS INDEX: 26 KG/M2 | SYSTOLIC BLOOD PRESSURE: 166 MMHG | WEIGHT: 165 LBS | HEART RATE: 61 BPM | DIASTOLIC BLOOD PRESSURE: 82 MMHG

## 2025-03-05 DIAGNOSIS — Z79.4 CONTROLLED TYPE 2 DIABETES MELLITUS WITH COMPLICATION, WITH LONG-TERM CURRENT USE OF INSULIN (HCC): ICD-10-CM

## 2025-03-05 DIAGNOSIS — E11.65 UNCONTROLLED TYPE 2 DIABETES MELLITUS WITH HYPERGLYCEMIA (HCC): Primary | ICD-10-CM

## 2025-03-05 DIAGNOSIS — E11.8 CONTROLLED TYPE 2 DIABETES MELLITUS WITH COMPLICATION, WITH LONG-TERM CURRENT USE OF INSULIN (HCC): ICD-10-CM

## 2025-03-05 LAB
GLUCOSE BLOOD: 252
HEMOGLOBIN A1C: 7.9 % (ref 4.3–5.6)
TEST STRIP LOT #: NORMAL NUMERIC

## 2025-03-05 PROCEDURE — 82947 ASSAY GLUCOSE BLOOD QUANT: CPT | Performed by: INTERNAL MEDICINE

## 2025-03-05 PROCEDURE — 83036 HEMOGLOBIN GLYCOSYLATED A1C: CPT | Performed by: INTERNAL MEDICINE

## 2025-03-05 PROCEDURE — 99213 OFFICE O/P EST LOW 20 MIN: CPT | Performed by: INTERNAL MEDICINE

## 2025-03-05 RX ORDER — LANCETS 33 GAUGE
1 EACH MISCELLANEOUS 2 TIMES DAILY
Qty: 200 EACH | Refills: 1 | Status: SHIPPED | OUTPATIENT
Start: 2025-03-05

## 2025-03-05 RX ORDER — BLOOD-GLUCOSE METER
EACH MISCELLANEOUS
Qty: 1 KIT | Refills: 1 | Status: SHIPPED | OUTPATIENT
Start: 2025-03-05

## 2025-03-05 RX ORDER — BLOOD SUGAR DIAGNOSTIC
STRIP MISCELLANEOUS
Qty: 200 STRIP | Refills: 1 | Status: SHIPPED | OUTPATIENT
Start: 2025-03-05

## 2025-03-05 NOTE — PROGRESS NOTES
Name: Cal Cates  Date: 3/5/2025    Referring Physician: No ref. provider found    HISTORY OF PRESENT ILLNESS   Cal Cates is a 89 year old male who presents for evaluation of diabetes mellitus.      Prior HbA, C or glycohemoglobin were 8.2% 2014, 7.4% 2014; 7.1% 10/2014; 7.6% 2015; 7.4% 2015; 7.3% 10/2015; 7.5% 2016; 7.4% 2016; 7.5% 2017; 8.0% 2017; 8.7% 2017; 7.9% 2018; 9.3% 2018; 8.8% 10/2018; 8.2% 2019; 7.8% 3/2019; 8.0% 2019; 8.2% 2019; 8.3% 2020; 8.8% 3/2021; 8.2% 2021; 8.1% 2021; 7.8% 2022; 7.3% 2022; 8.4% 2022; 10.2% 2023; 7.7% 2023; 7.9% 2023; 8.0% 2024; 7.8% 2024; 7.9% POC Today     Dietary compliance: Good  Exercise: No  Polyuria/polydipsia: No  Blurred vision: No    Episodes of hypoglycemia: No  Blood Glucose:  Checking 1-2 times per day  Fastin-130    Medications for DM   Trulicity 0.75mg subcutaneous weekly    Glimepiride 2mg PO daily     Lantus 16 units SQ at bedtime (only taking if BG above 250)     REVIEW OF SYSTEMS  Eyes: Diabetic retinopathy present: No            Most recent visit to eye doctor in last 12 months: Yes    CV: Cardiovascular disease present: No         Hypertension present: Yes         Hyperlipidemia present: Yes         Peripheral Vascular Disease present: No    : Nephropathy present: Yes, Cr 2.4    Neuro: Neuropathy present: No    Skin: Infection or ulceration: No    Osteoporosis: No    Thyroid disease: No      Medications:     Current Outpatient Medications:     lisinopril 10 MG Oral Tab, Take 1 tablet (10 mg total) by mouth daily., Disp: 90 tablet, Rfl: 1    TRULICITY 0.75 MG/0.5ML Subcutaneous Solution Auto-injector, INJECT 0.75MG (0.5ML) UNDER THE SKIN ONCE A WEEK., Disp: 6 mL, Rfl: 0    glimepiride 2 MG Oral Tab, Take 1 tablet (2 mg total) by mouth daily with breakfast., Disp: 90 tablet, Rfl: 1    tamsulosin 0.4 MG Oral Cap, Take 2 capsules (0.8 mg total) by mouth daily., Disp: 180 capsule, Rfl: 3     Glucose Blood (ONETOUCH ULTRA) In Vitro Strip, CHECK BLOOD SUGAR TWICE DAILY. DX CODE: E11.65 WITH USE OF INSULIN, Disp: 200 strip, Rfl: 1    Insulin Pen Needle (COMFORT EZ PEN NEEDLES) 32G X 4 MM Does not apply Misc, Inject once daily, Disp: 100 each, Rfl: 2    finasteride 5 MG Oral Tab, Take 1 tablet (5 mg total) by mouth daily., Disp: 90 tablet, Rfl: 3    Lancets (ONETOUCH DELICA PLUS [id number]) Does not apply Misc, Place 1 Device onto the skin in the morning and 1 Device before bedtime., Disp: 200 each, Rfl: 1    simvastatin 40 MG Oral Tab, Take 1 tablet (40 mg total) by mouth daily., Disp: 90 tablet, Rfl: 3    pantoprazole 40 MG Oral Tab EC, Take 1 tablet (40 mg total) by mouth before breakfast., Disp: 90 tablet, Rfl: 3    BD PEN NEEDLE MINI U/F 31G X 5 MM Does not apply Misc, USE EVERY DAY, Disp: 90 each, Rfl: 3    fluticasone propionate 50 MCG/ACT Nasal Suspension, 2 sprays by Each Nare route every morning., Disp: 16 g, Rfl: 3    insulin glargine (LANTUS SOLOSTAR) 100 UNIT/ML Subcutaneous Solution Pen-injector, INJECT 15 TO 25 UNITS DAILY IF BLOOD GLUCOSE IS ABOVE 250, Disp: 15 mL, Rfl: 5    furosemide 20 MG Oral Tab, TAKE 1 TABLET THREE TIMES A WEEK, Disp: 30 tablet, Rfl: 4    carvedilol 6.25 MG Oral Tab, , Disp: , Rfl:     timolol 0.5 % Ophthalmic Solution, , Disp: , Rfl:     latanoprost 0.005 % Ophthalmic Solution, Place 1 drop into both eyes nightly. TAKE AT BEDTIME, Disp: , Rfl:     COMBIGAN 0.2-0.5 % Ophthalmic Solution, Place 1 drop into both eyes every 12 (twelve) hours., Disp: 1 Bottle, Rfl: 12    AMLODIPINE BESYLATE 5 MG Oral Tab, TAKE 1 TABLET(5 MG) BY MOUTH DAILY, Disp: 90 tablet, Rfl: 1       Allergies:   Allergies   Allergen Reactions    Barium Sulfate HIVES    Sulfa Antibiotics OTHER (SEE COMMENTS)       Social History:   Social History     Socioeconomic History    Marital status:    Tobacco Use    Smoking status: Former     Current packs/day: 0.00     Types: Cigarettes    Smokeless  tobacco: Never   Vaping Use    Vaping status: Never Used   Substance and Sexual Activity    Alcohol use: Yes     Alcohol/week: 1.0 standard drink of alcohol     Types: 1 Cans of beer per week     Comment: occ    Drug use: No   Other Topics Concern    Caffeine Concern Yes     Comment: coffee and soda    Exercise Yes     Comment: cutting grass    Reaction to local anesthetic No       Medical History:   Past Medical History:    Acid reflux    Basal cell carcinoma    right jaw    Basal cell carcinoma    left cheek    Basal cell carcinoma    located on the right cheek sup (AO)    BPH (benign prostatic hyperplasia)    Diabetes (HCC)    Diabetes mellitus (HCC)    Glaucoma    High blood pressure    High cholesterol    Hyperlipidemia    Kidney disease    Renal disorder    Shortness of breath       Surgical history:   Past Surgical History:   Procedure Laterality Date    Cardiac pacemaker placement      Colonoscopy  2014    Other surgical history      hemorrhoids    Other surgical history      pacemaker placed         PHYSICAL EXAM  /81   Pulse 61   Wt 165 lb (74.8 kg)   BMI 25.84 kg/m²   Home /68    General Appearance:  alert, well developed, in no acute distress  Eyes:  normal conjunctivae, sclera., normal sclera and normal pupils  Ears/Nose/Mouth/Throat/Neck:  no palpable thyroid nodules or cervical lymphadenopathy  Musculoskeletal:  normal muscle strength and tone  Hair & Nails:  normal scalp hair     Hematologic:  no excessive bruising  Neuro:  sensory grossly intact and motor grossly intact  Psychiatric:  oriented to time, self, and place  Nutritional:  no abnormal weight gain or loss  Bilateral barefoot skin diabetic exam is abnormal with diabetic monofilament/sensation testing abnormal and dystrophic nails and/or dry skin        ASSESSMENT/PLAN:      1. Diabetes Mellitus, uncontrolled  -uncontrolled, HgA1c 7.9% -->stable   -Discussed importance of glycemic control to prevent complications of  diabetes  -Discussed complications of diabetes include retinopathy, neuropathy, nephropathy and cardiovascular disease  -Discussed importance of SBGM  -Discussed importance of low CHO diet  -Continue Glimepiride 2mg PO daily   -Continue Trulicity 0.75mg subcutaneous weekly (he is getting medication from [facility])  -Discussed with Dr. Rocha and prefer no SGLT-2  -Only taking Lantus if BG above 250 - Lantus 16 units subcutaneous at bedtime    -Normotensive at home (reviewed home BP logbook)   -UTD with podiatry   -Renal function stable   -BP elevated, stable on repeat - monitoring at home   -he will bring home readings   -Abnormal foot exam performed today     RTC 6 months     3/5/2025  Britta May MD

## 2025-03-05 NOTE — TELEPHONE ENCOUNTER
Received a fax of patients most recent eye exam dated on 2/28/25 with Julio Enriquez at .[facility] exam was documented in patient's 'Diabetes Flowsheet' and placed in providers folder for review.

## 2025-03-12 ENCOUNTER — TELEPHONE (OUTPATIENT)
Dept: ENDOCRINOLOGY CLINIC | Facility: CLINIC | Age: OVER 89
End: 2025-03-12

## 2025-03-12 NOTE — TELEPHONE ENCOUNTER
Received fax from [facility] requesting orders for Diabetic supplies to be signed. Placed in Dr. May folder for review.

## 2025-03-17 ENCOUNTER — TELEPHONE (OUTPATIENT)
Dept: ENDOCRINOLOGY CLINIC | Facility: CLINIC | Age: OVER 89
End: 2025-03-17

## 2025-03-17 ENCOUNTER — OFFICE VISIT (OUTPATIENT)
Dept: FAMILY MEDICINE CLINIC | Facility: CLINIC | Age: OVER 89
End: 2025-03-17

## 2025-03-17 VITALS
BODY MASS INDEX: 26 KG/M2 | DIASTOLIC BLOOD PRESSURE: 76 MMHG | WEIGHT: 163 LBS | HEART RATE: 59 BPM | SYSTOLIC BLOOD PRESSURE: 162 MMHG

## 2025-03-17 DIAGNOSIS — H61.21 HEARING LOSS OF RIGHT EAR DUE TO CERUMEN IMPACTION: Primary | ICD-10-CM

## 2025-03-17 DIAGNOSIS — N18.4 CHRONIC RENAL DISEASE, STAGE IV (HCC): ICD-10-CM

## 2025-03-17 DIAGNOSIS — Z79.4 TYPE 2 DIABETES MELLITUS WITH HYPERGLYCEMIA, WITH LONG-TERM CURRENT USE OF INSULIN (HCC): ICD-10-CM

## 2025-03-17 DIAGNOSIS — E11.65 TYPE 2 DIABETES MELLITUS WITH HYPERGLYCEMIA, WITH LONG-TERM CURRENT USE OF INSULIN (HCC): ICD-10-CM

## 2025-03-17 PROCEDURE — 99214 OFFICE O/P EST MOD 30 MIN: CPT | Performed by: FAMILY MEDICINE

## 2025-03-17 NOTE — PROGRESS NOTES
Cal Cates is a 89 year old male.   Chief Complaint   Patient presents with    Follow - Up     For diabetes and blood pressure    Ear Problem     Patient states he can't hear     HPI:   Here with son today.   Has list of glucose readings and BP readings. Afternoon glucose is elevated - mornings stable. BP stable at home - has list.  Here always high.   Yesterday home reading was 132/73.   Reports cannot hear from right ear. - believes wax issue.   Medications Ordered Prior to Encounter[1]   Past Medical History:    Acid reflux    Basal cell carcinoma    right jaw    Basal cell carcinoma    left cheek    Basal cell carcinoma    located on the right cheek sup (AO)    BPH (benign prostatic hyperplasia)    Diabetes (HCC)    Diabetes mellitus (HCC)    Glaucoma    High blood pressure    High cholesterol    Hyperlipidemia    Kidney disease    Renal disorder    Shortness of breath      Social History:  Social History     Socioeconomic History    Marital status:    Tobacco Use    Smoking status: Former     Current packs/day: 0.00     Types: Cigarettes    Smokeless tobacco: Never   Vaping Use    Vaping status: Never Used   Substance and Sexual Activity    Alcohol use: Yes     Alcohol/week: 1.0 standard drink of alcohol     Types: 1 Cans of beer per week     Comment: occ    Drug use: No   Other Topics Concern    Caffeine Concern Yes     Comment: coffee and soda    Exercise Yes     Comment: cutting grass    Reaction to local anesthetic No   Social History Narrative    The patient does not use an assistive device..      The patient does live in a home with stairs.        REVIEW OF SYSTEMS:   Review of Systems   See HPI      EXAM:   BP (!) 165/81 (BP Location: Right arm, Patient Position: Sitting, Cuff Size: adult)   Pulse 59   Wt 163 lb (73.9 kg)   BMI 25.53 kg/m²   BP (!) 162/76   Pulse 59   Wt 163 lb (73.9 kg)   BMI 25.53 kg/m²     GENERAL: well developed, well nourished,in no apparent distress  SKIN: no  rashes,no suspicious lesions  Right cerumen impaction  - left clear  Using ear elephant flush - removed cerumen and canal normal - improved hearing.   LUNGS: clear to auscultation  CARDIO: RRR       ASSESSMENT AND PLAN:   1. Hearing loss of right ear due to cerumen impaction  Resolved with ear flush     2. Type 2 diabetes mellitus with hyperglycemia, with long-term current use of insulin (HCC)  Per endo. Discussed trying to cut back on desserts and to give himself insulin a bit earlier in the evening.     3. Chronic renal disease, stage IV (HCC)  BP high here today but normal at home - has list of BP readings.     The patient indicates understanding of these issues and agrees to the plan.      Tracey Parks MD  3/17/2025  1:20 PM       [1]   Current Outpatient Medications on File Prior to Visit   Medication Sig Dispense Refill    Lancets (ONETOUCH DELICA PLUS [id number]) Does not apply Misc Place 1 Device onto the skin in the morning and 1 Device before bedtime. 200 each 1    Glucose Blood (ONETOUCH ULTRA) In Vitro Strip CHECK BLOOD SUGAR TWICE DAILY. DX CODE: E11.65 WITH USE OF INSULIN 200 strip 1    Blood Glucose Monitoring Suppl (ONETOUCH ULTRA 2) w/Device Does not apply Kit Use to check blood sugars twice daily 1 kit 1    lisinopril 10 MG Oral Tab Take 1 tablet (10 mg total) by mouth daily. 90 tablet 1    TRULICITY 0.75 MG/0.5ML Subcutaneous Solution Auto-injector INJECT 0.75MG (0.5ML) UNDER THE SKIN ONCE A WEEK. 6 mL 0    glimepiride 2 MG Oral Tab Take 1 tablet (2 mg total) by mouth daily with breakfast. 90 tablet 1    tamsulosin 0.4 MG Oral Cap Take 2 capsules (0.8 mg total) by mouth daily. 180 capsule 3    Insulin Pen Needle (COMFORT EZ PEN NEEDLES) 32G X 4 MM Does not apply Misc Inject once daily 100 each 2    finasteride 5 MG Oral Tab Take 1 tablet (5 mg total) by mouth daily. 90 tablet 3    simvastatin 40 MG Oral Tab Take 1 tablet (40 mg total) by mouth daily. 90 tablet 3    pantoprazole 40 MG Oral Tab  EC Take 1 tablet (40 mg total) by mouth before breakfast. 90 tablet 3    BD PEN NEEDLE MINI U/F 31G X 5 MM Does not apply Misc USE EVERY DAY 90 each 3    fluticasone propionate 50 MCG/ACT Nasal Suspension 2 sprays by Each Nare route every morning. 16 g 3    insulin glargine (LANTUS SOLOSTAR) 100 UNIT/ML Subcutaneous Solution Pen-injector INJECT 15 TO 25 UNITS DAILY IF BLOOD GLUCOSE IS ABOVE 250 15 mL 5    furosemide 20 MG Oral Tab TAKE 1 TABLET THREE TIMES A WEEK 30 tablet 4    carvedilol 6.25 MG Oral Tab       timolol 0.5 % Ophthalmic Solution       latanoprost 0.005 % Ophthalmic Solution Place 1 drop into both eyes nightly. TAKE AT BEDTIME      COMBIGAN 0.2-0.5 % Ophthalmic Solution Place 1 drop into both eyes every 12 (twelve) hours. 1 Bottle 12    AMLODIPINE BESYLATE 5 MG Oral Tab TAKE 1 TABLET(5 MG) BY MOUTH DAILY 90 tablet 1     No current facility-administered medications on file prior to visit.

## 2025-03-20 ENCOUNTER — TELEPHONE (OUTPATIENT)
Dept: PHYSICAL MEDICINE AND REHAB | Facility: CLINIC | Age: OVER 89
End: 2025-03-20

## 2025-03-20 ENCOUNTER — OFFICE VISIT (OUTPATIENT)
Dept: PHYSICAL MEDICINE AND REHAB | Facility: CLINIC | Age: OVER 89
End: 2025-03-20
Payer: MEDICARE

## 2025-03-20 DIAGNOSIS — M25.562 CHRONIC PAIN OF LEFT KNEE: ICD-10-CM

## 2025-03-20 DIAGNOSIS — G89.29 CHRONIC PAIN OF LEFT KNEE: ICD-10-CM

## 2025-03-20 DIAGNOSIS — G89.29 CHRONIC LEFT SHOULDER PAIN: Primary | ICD-10-CM

## 2025-03-20 DIAGNOSIS — M43.26 FUSION OF LUMBAR SPINE: ICD-10-CM

## 2025-03-20 DIAGNOSIS — M48.061 LUMBAR FORAMINAL STENOSIS: ICD-10-CM

## 2025-03-20 DIAGNOSIS — E11.65 TYPE 2 DIABETES MELLITUS WITH HYPERGLYCEMIA, WITH LONG-TERM CURRENT USE OF INSULIN (HCC): ICD-10-CM

## 2025-03-20 DIAGNOSIS — M48.061 SPINAL STENOSIS OF LUMBAR REGION WITHOUT NEUROGENIC CLAUDICATION: ICD-10-CM

## 2025-03-20 DIAGNOSIS — M67.912 DYSFUNCTION OF LEFT ROTATOR CUFF: ICD-10-CM

## 2025-03-20 DIAGNOSIS — M25.512 CHRONIC LEFT SHOULDER PAIN: Primary | ICD-10-CM

## 2025-03-20 DIAGNOSIS — M17.12 PRIMARY OSTEOARTHRITIS OF LEFT KNEE: ICD-10-CM

## 2025-03-20 DIAGNOSIS — M54.16 LUMBAR RADICULOPATHY: ICD-10-CM

## 2025-03-20 DIAGNOSIS — Z79.4 TYPE 2 DIABETES MELLITUS WITH HYPERGLYCEMIA, WITH LONG-TERM CURRENT USE OF INSULIN (HCC): ICD-10-CM

## 2025-03-20 DIAGNOSIS — M51.9 LUMBAR DISC DISEASE: ICD-10-CM

## 2025-03-20 PROCEDURE — 99214 OFFICE O/P EST MOD 30 MIN: CPT | Performed by: PHYSICAL MEDICINE & REHABILITATION

## 2025-03-20 NOTE — PROGRESS NOTES
[facility]    Chief Complaint:    Chief Complaint   Patient presents with    Follow - Up     LOV 11/14/24 Patient is present for follow up on low back pain. Patient states the pain is in the back and the shoulder, Pain 6/10. Denies N/T. Denies Weakness. Denies PT. Denies pain medication and muscle relaxer's.      Nursing note reviewed and verified.    Patient was last seen on 11/14/2024.  He will occasionally have low back and the knee is not painful at all.  He did develop left shoulder pain about 3-4 months ago which was of gradual onset.  He is taking Tylenol at night time which helps with the pain.  He will take this 3-4 times per week.      Description of the Pain  The pain is located in the  left shoulder .    The pain radiates to left upper lateral arm.  The pain at its best is 2/10. The pain at its worst is 7/10. The pain is currently  0/10.  The pain is described as a(n) aching sensation.    The patient reports no numbness.  The patient reports no tingling.  There is not weakness in bilateral hands and arms.  The pain is worse  with shoulder movement .   The pain is better  when not moving the shoulder .         Past Medical History   Past Medical History:    Acid reflux    Basal cell carcinoma    right jaw    Basal cell carcinoma    left cheek    Basal cell carcinoma    located on the right cheek sup (AO)    BPH (benign prostatic hyperplasia)    Diabetes (HCC)    Diabetes mellitus (HCC)    Glaucoma    High blood pressure    High cholesterol    Hyperlipidemia    Kidney disease    Renal disorder    Shortness of breath       Past Surgical History   Past Surgical History:   Procedure Laterality Date    Cardiac pacemaker placement      Colonoscopy  2014    Other surgical history      hemorrhoids    Other surgical history      pacemaker placed       Family History   Family History   Problem Relation Age of Onset    Glaucoma Father        Social History   Social History     Socioeconomic History     Marital status:      Spouse name: Not on file    Number of children: Not on file    Years of education: Not on file    Highest education level: Not on file   Occupational History    Not on file   Tobacco Use    Smoking status: Former     Current packs/day: 0.00     Types: Cigarettes    Smokeless tobacco: Never   Vaping Use    Vaping status: Never Used   Substance and Sexual Activity    Alcohol use: Yes     Alcohol/week: 1.0 standard drink of alcohol     Types: 1 Cans of beer per week     Comment: occ    Drug use: No    Sexual activity: Not on file   Other Topics Concern     Service Not Asked    Blood Transfusions Not Asked    Caffeine Concern Yes     Comment: coffee and soda    Occupational Exposure Not Asked    Hobby Hazards Not Asked    Sleep Concern Not Asked    Stress Concern Not Asked    Weight Concern Not Asked    Special Diet Not Asked    Back Care Not Asked    Exercise Yes     Comment: cutting grass    Bike Helmet Not Asked    Seat Belt Not Asked    Self-Exams Not Asked    Grew up on a farm Not Asked    History of tanning Not Asked    Outdoor occupation Not Asked    Pt has a pacemaker Not Asked    Pt has a defibrillator Not Asked    Reaction to local anesthetic No   Social History Narrative    The patient does not use an assistive device..      The patient does live in a home with stairs.     Social Drivers of Health     Food Insecurity: Not on file   Transportation Needs: Not on file   Stress: Not on file   Housing Stability: Not on file       PE:  The patient does appear in his stated age in no distress.  The patient is well groomed.    Psychiatric:  The patient is alert and oriented x 3.  The patient has a normal affect and mood.      Respiratory:  No acute respiratory distress. Patient does not have a cough.    HEENT:  Extraocular muscles are intact. There is no kern icterus. Pupils are equal, round, and reactive to light. No redness or discharge bilaterally.    Skin:  There are no rashes  or lesions.    Lymph Nodes:  The patient has no palpable submandibular, supraclavicular, and cervical lymph nodes..    Vitals:  There were no vitals filed for this visit.    Cervical Spine:    Posture: moderate-severe chin forward superiorly rotated protracted shoulder posture.   Shoulders: Level   Head: Tilted to the left   Spinous Processes Palpations: Non-tender for all Spinous Processes   Z-Joints Palpations: Non-tender for all Z-joints   Muscular Palpations: Non-tender to palpation.     Vascular upper extremity:   Right radial pulses: 2+   Left radial pulses: 2+      Neurological Upper Extremity:    Light Touch: Intact in Bilateral upper extremities.   Pin Prick: Not tested.   UE Muscle Strength: All Upper Extremity strength measurements 5/5   Reflexes: 2+ In the bilateral upper extremities.   Khoury's sign Right: Negative   Khoury's sigh Left: Negative     Shoulder: The shoulders are stable.    Medial Border Scapular Winging: absent   Right Scapula: laterally displaced   Left Scapula: laterally displaced   Palpation: Non-tender shoulder palpations.   Right Internal Rotation: normal   Left Internal Rotation: severely limited   Right External Rotation: normal   Left External Rotation: moderately limited   Shoulder Special Tests: Right Perez test: Negative  Left Perez test: Positive  Right external rotation: Negative  Left external rotation: Positive     Assessment  1. Chronic left shoulder pain    2. Dysfunction of left rotator cuff    3. L5-S1 left mod-severe/right severe, L4-5 bilateral mod-severe, L3-4 right mod-severe/left mod foraminal stenosis    4. L5-S1 bilateral mod-large far lateral, L4-5 right mod-large/left far lateral & mild-mod central, L3-4 right large foraminal & mod diffuse, L2-3 mild-mod diffuse, L1-2 mod diffuse bulging discs    5. L4-5 mod. L3-4 mild-mod, L2-3 mild, L1-2 mod central stenosis    6. bilateral L5-S1 radiculopathies     7. Primary osteoarthritis of left knee: moderate  medial joint and patellar    8. 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 Chronic pain of left knee    10. Type 2 diabetes mellitus with hyperglycemia, with long-term current use of insulin (HCC)        Plan  He will get a left shoulder x-ray.    He will let me know once he has had the x-ray and then will decide about the PT for the shoulder and/or a left shoulder steroid injection.    He will follow up in 3-4 months or sooner if needed.    The patient understands and agrees with the stated plan.    Alexander Gage MD  3/20/2025

## 2025-03-20 NOTE — TELEPHONE ENCOUNTER
Per CMS Guidelines -no authorization is required for left shoulder injection under ultrasound guidance.  CPT codes: 79290,        Status: Authorization is not required based on medical necessity however is not a guarantee of payment and may be subject to review once claim is submitted.  Per Dr. Gage: He will let me know once he has had the x-ray and then will decide about the PT for the shoulder and/or a left shoulder steroid injection.

## 2025-03-20 NOTE — PATIENT INSTRUCTIONS
Plan  He will get a left shoulder x-ray.    He will let me know once he has had the x-ray and then will decide about the PT for the shoulder and/or a left shoulder steroid injection.    He will follow up in 3-4 months or sooner if needed.

## 2025-03-21 ENCOUNTER — TELEPHONE (OUTPATIENT)
Dept: PHYSICAL THERAPY | Age: OVER 89
End: 2025-03-21

## 2025-03-25 ENCOUNTER — TELEPHONE (OUTPATIENT)
Dept: PHYSICAL THERAPY | Age: OVER 89
End: 2025-03-25

## 2025-03-29 ENCOUNTER — HOSPITAL ENCOUNTER (OUTPATIENT)
Dept: GENERAL RADIOLOGY | Facility: HOSPITAL | Age: OVER 89
Discharge: HOME | End: 2025-03-29
Attending: PHYSICAL MEDICINE & REHABILITATION
Payer: MEDICARE

## 2025-03-29 DIAGNOSIS — G89.29 CHRONIC LEFT SHOULDER PAIN: ICD-10-CM

## 2025-03-29 DIAGNOSIS — M25.512 CHRONIC LEFT SHOULDER PAIN: ICD-10-CM

## 2025-03-29 PROCEDURE — 73030 X-RAY EXAM OF SHOULDER: CPT | Performed by: PHYSICAL MEDICINE & REHABILITATION

## 2025-04-02 ENCOUNTER — PATIENT MESSAGE (OUTPATIENT)
Dept: PHYSICAL MEDICINE AND REHAB | Facility: CLINIC | Age: OVER 89
End: 2025-04-02

## 2025-04-02 DIAGNOSIS — M67.912 DYSFUNCTION OF LEFT ROTATOR CUFF: ICD-10-CM

## 2025-04-02 DIAGNOSIS — M19.012 PRIMARY OSTEOARTHRITIS OF LEFT SHOULDER: Primary | ICD-10-CM

## 2025-04-02 DIAGNOSIS — G89.29 CHRONIC LEFT SHOULDER PAIN: ICD-10-CM

## 2025-04-02 DIAGNOSIS — M25.512 CHRONIC LEFT SHOULDER PAIN: ICD-10-CM

## 2025-04-10 RX ORDER — PANTOPRAZOLE SODIUM 40 MG/1
40 TABLET, DELAYED RELEASE ORAL
Qty: 90 TABLET | Refills: 3 | Status: SHIPPED | OUTPATIENT
Start: 2025-04-10

## 2025-04-10 NOTE — TELEPHONE ENCOUNTER
Refill Per Protocol     Requested Prescriptions   Pending Prescriptions Disp Refills    PANTOPRAZOLE 40 MG Oral Tab EC [Pharmacy Med Name: PANTOPRAZOLE SODIUM DR TABS 40MG] 90 tablet 3     Sig: TAKE 1 TABLET BEFORE BREAKFAST       Gastrointestional Medication Protocol Passed - 4/10/2025 11:50 AM        Passed - In person appointment or virtual visit in the past 12 mos or appointment in next 3 mos     Recent Outpatient Visits              3 weeks ago Chronic left shoulder pain    [facility] Alexander Gage MD    Office Visit    3 weeks ago Hearing loss of right ear due to cerumen impaction    [facility] Tracey Parks MD    Office Visit    1 month ago Uncontrolled type 2 diabetes mellitus with hyperglycemia ([facility])    [facility] Britta May MD    Office Visit    1 month ago Type 2 diabetes mellitus with hyperglycemia, with long-term current use of insulin ([facility])    [facility] Nohemy Jin DPM    Office Visit    3 months ago Type 2 diabetes mellitus with hyperglycemia, with long-term current use of insulin ([facility])    [facility] Nohemy Jin DPM    Office Visit          Future Appointments         Provider Department Appt Notes    In 2 weeks Alexander Gage MD [facility] Return in about 3 months (around 6/20/2025).    In 1 month Trevor Rocha MD [facility] 6 months    In 1 month Nohemy Jin DPM [facility] Follow up    In 5 months Britta May MD [facility] Follow    In 5 months Tracey Parks MD [facility], [address], [place] medicare                    Passed - Medication is active on  med list

## 2025-04-19 PROBLEM — M19.012 PRIMARY OSTEOARTHRITIS OF LEFT SHOULDER: Status: ACTIVE | Noted: 2025-04-19

## 2025-04-19 NOTE — TELEPHONE ENCOUNTER
He has moderate OA of the left shoulder.  Please see if he would like to try PT or the steroid injection or both?  Orders for both have been placed.

## 2025-04-21 ENCOUNTER — TELEPHONE (OUTPATIENT)
Dept: PHYSICAL THERAPY | Age: OVER 89
End: 2025-04-21

## 2025-04-21 NOTE — TELEPHONE ENCOUNTER
Per CMS Guidelines -no authorization is required for left shoulder injection under ultrasound guidance.   CPT codes: 54070,      Status: Authorization is not required based on medical necessity however is not a guarantee of payment and may be subject to review once claim is submitted.

## 2025-04-24 ENCOUNTER — LAB ENCOUNTER (OUTPATIENT)
Dept: LAB | Age: OVER 89
End: 2025-04-24
Attending: INTERNAL MEDICINE
Payer: MEDICARE

## 2025-04-24 DIAGNOSIS — N18.4 CKD (CHRONIC KIDNEY DISEASE) STAGE 4, GFR 15-29 ML/MIN (HCC): ICD-10-CM

## 2025-04-24 LAB
ALBUMIN SERPL-MCNC: 4.1 G/DL (ref 3.2–4.8)
ANION GAP SERPL CALC-SCNC: 6 MMOL/L (ref 0–18)
BASOPHILS # BLD AUTO: 0.04 X10(3) UL (ref 0–0.2)
BASOPHILS NFR BLD AUTO: 0.5 %
BUN BLD-MCNC: 66 MG/DL (ref 9–23)
BUN/CREAT SERPL: 19.7 (ref 10–20)
CALCIUM BLD-MCNC: 9.1 MG/DL (ref 8.7–10.4)
CHLORIDE SERPL-SCNC: 110 MMOL/L (ref 98–112)
CO2 SERPL-SCNC: 22 MMOL/L (ref 21–32)
CREAT BLD-MCNC: 3.35 MG/DL (ref 0.7–1.3)
CREAT UR-SCNC: 70.2 MG/DL
DEPRECATED RDW RBC AUTO: 44.2 FL (ref 35.1–46.3)
EGFRCR SERPLBLD CKD-EPI 2021: 17 ML/MIN/1.73M2 (ref 60–?)
EOSINOPHIL # BLD AUTO: 0.15 X10(3) UL (ref 0–0.7)
EOSINOPHIL NFR BLD AUTO: 1.7 %
ERYTHROCYTE [DISTWIDTH] IN BLOOD BY AUTOMATED COUNT: 13.3 % (ref 11–15)
GLUCOSE BLD-MCNC: 93 MG/DL (ref 70–99)
HCT VFR BLD AUTO: 35.6 % (ref 39–53)
HGB BLD-MCNC: 11.4 G/DL (ref 13–17.5)
IMM GRANULOCYTES # BLD AUTO: 0.03 X10(3) UL (ref 0–1)
IMM GRANULOCYTES NFR BLD: 0.3 %
LYMPHOCYTES # BLD AUTO: 1.93 X10(3) UL (ref 1–4)
LYMPHOCYTES NFR BLD AUTO: 22.1 %
MCH RBC QN AUTO: 29.1 PG (ref 26–34)
MCHC RBC AUTO-ENTMCNC: 32 G/DL (ref 31–37)
MCV RBC AUTO: 90.8 FL (ref 80–100)
MICROALBUMIN UR-MCNC: 11.2 MG/DL
MICROALBUMIN/CREAT 24H UR-RTO: 159.5 UG/MG (ref ?–30)
MONOCYTES # BLD AUTO: 0.61 X10(3) UL (ref 0.1–1)
MONOCYTES NFR BLD AUTO: 7 %
NEUTROPHILS # BLD AUTO: 5.99 X10 (3) UL (ref 1.5–7.7)
NEUTROPHILS # BLD AUTO: 5.99 X10(3) UL (ref 1.5–7.7)
NEUTROPHILS NFR BLD AUTO: 68.4 %
OSMOLALITY SERPL CALC.SUM OF ELEC: 305 MOSM/KG (ref 275–295)
PHOSPHATE SERPL-MCNC: 3.8 MG/DL (ref 2.4–5.1)
PLATELET # BLD AUTO: 191 10(3)UL (ref 150–450)
POTASSIUM SERPL-SCNC: 4.7 MMOL/L (ref 3.5–5.1)
RBC # BLD AUTO: 3.92 X10(6)UL (ref 3.8–5.8)
SODIUM SERPL-SCNC: 138 MMOL/L (ref 136–145)
WBC # BLD AUTO: 8.8 X10(3) UL (ref 4–11)

## 2025-04-24 PROCEDURE — 82570 ASSAY OF URINE CREATININE: CPT

## 2025-04-24 PROCEDURE — 85025 COMPLETE CBC W/AUTO DIFF WBC: CPT

## 2025-04-24 PROCEDURE — 82043 UR ALBUMIN QUANTITATIVE: CPT

## 2025-04-24 PROCEDURE — 36415 COLL VENOUS BLD VENIPUNCTURE: CPT

## 2025-04-24 PROCEDURE — 80069 RENAL FUNCTION PANEL: CPT

## 2025-04-28 ENCOUNTER — TELEPHONE (OUTPATIENT)
Dept: PHYSICAL THERAPY | Age: OVER 89
End: 2025-04-28

## 2025-04-29 ENCOUNTER — OFFICE VISIT (OUTPATIENT)
Dept: PHYSICAL MEDICINE AND REHAB | Facility: CLINIC | Age: OVER 89
End: 2025-04-29
Payer: MEDICARE

## 2025-04-29 DIAGNOSIS — G89.29 CHRONIC LEFT SHOULDER PAIN: Primary | ICD-10-CM

## 2025-04-29 DIAGNOSIS — M67.912 DYSFUNCTION OF LEFT ROTATOR CUFF: ICD-10-CM

## 2025-04-29 DIAGNOSIS — M25.512 CHRONIC LEFT SHOULDER PAIN: Primary | ICD-10-CM

## 2025-04-29 DIAGNOSIS — M19.012 PRIMARY OSTEOARTHRITIS OF LEFT SHOULDER: ICD-10-CM

## 2025-04-29 PROCEDURE — 20611 DRAIN/INJ JOINT/BURSA W/US: CPT | Performed by: PHYSICAL MEDICINE & REHABILITATION

## 2025-04-29 RX ORDER — TRIAMCINOLONE ACETONIDE 40 MG/ML
60 INJECTION, SUSPENSION INTRA-ARTICULAR; INTRAMUSCULAR ONCE
Status: COMPLETED | OUTPATIENT
Start: 2025-04-29 | End: 2025-04-29

## 2025-04-29 RX ORDER — LIDOCAINE HYDROCHLORIDE 10 MG/ML
3.5 INJECTION, SOLUTION INFILTRATION; PERINEURAL ONCE
Status: COMPLETED | OUTPATIENT
Start: 2025-04-29 | End: 2025-04-29

## 2025-04-30 NOTE — PROCEDURES
The patient is here for a left shoulder injection done under ultrasound guidance.    Under ultrasound guidance the left posterior glenohumeral joint was identified and a nathanael was placed on the patient's skin.The skin was cleaned with betadyne swabs x 3 and anesthetized with cold spray.  Then a 25 gauge needle was inserted under ultrasound guidance into the left posterior glenohumeral joint.  Aspiration was performed and no blood, fluid, or air was aspirated.  The patient was then injected with 4 ml of 1.5 ml of 40 mg of Kenalog/ml and 2.5 ml of 1% lidocaine without epinephrine.  The needle was removed and a band aid was applied.  The patient will follow up in 2 weeks with a message.    These images are permanently stored in the ultrasound unit or PACS system.

## 2025-05-02 ENCOUNTER — APPOINTMENT (OUTPATIENT)
Dept: CARDIOLOGY | Age: OVER 89
End: 2025-05-02
Attending: INTERNAL MEDICINE

## 2025-05-02 DIAGNOSIS — I50.22 CHRONIC SYSTOLIC CONGESTIVE HEART FAILURE  (CMD): ICD-10-CM

## 2025-05-02 LAB
AORTIC VALVE AREA (AVA): 0.83
ASCENDING AORTA (AAD): 3
AV PEAK GRADIENT (AVPG): 8
AV STENOSIS SEVERITY TEXT: NORMAL
AV VMAX SYS DOP: 1.38
AVI LVOT PEAK GRADIENT (LVOTMG): 1
E WAVE DECELARATION TIME (MDT): 19.13
INTERVENTRICULAR SEPTUM IN END DIASTOLE (IVSD): 2.7
LEFT INTERNAL DIMENSION IN SYSTOLE (LVSD): 1
LEFT VENTRICULAR INTERNAL DIMENSION IN DIASTOLE (LVDD): 2.6
LEFT VENTRICULAR POSTERIOR WALL IN END DIASTOLE (LVPW): 4.1
LV EF 2D ECHO EST: NORMAL %
LVOT 2D (LVOTD): 31.2
MV E TISSUE VEL MED (MESV): 6.37
MV E WAVE VEL/E TISSUE VEL MED(MSR): 4.35
MV PEAK A VELOCITY (MVPAV): 270
MV PEAK E VELOCITY (MVPEV): 1.1
RV END SYSTOLIC LONGITUDINAL STRAIN FREE WALL (RVGS): 2
TRICUSPID VALVE ANNULAR PEAK VELOCITY (TVAPV): 34
TRICUSPID VALVE PEAK REGURGITATION VELOCITY (TRPV): 3.4
TV ESTIMATED RIGHT ARTERIAL PRESSURE (RAP): 17.9

## 2025-05-02 PROCEDURE — 93306 TTE W/DOPPLER COMPLETE: CPT | Performed by: INTERNAL MEDICINE

## 2025-05-06 ENCOUNTER — RESULTS FOLLOW-UP (OUTPATIENT)
Dept: CARDIOLOGY | Age: OVER 89
End: 2025-05-06

## 2025-05-16 ENCOUNTER — APPOINTMENT (OUTPATIENT)
Dept: CARDIOLOGY | Age: OVER 89
End: 2025-05-16

## 2025-05-16 VITALS
BODY MASS INDEX: 24.83 KG/M2 | HEART RATE: 65 BPM | SYSTOLIC BLOOD PRESSURE: 123 MMHG | WEIGHT: 158.51 LBS | DIASTOLIC BLOOD PRESSURE: 69 MMHG | OXYGEN SATURATION: 98 %

## 2025-05-16 DIAGNOSIS — E78.00 HYPERCHOLESTEREMIA: Primary | ICD-10-CM

## 2025-05-16 DIAGNOSIS — I50.22 CHRONIC SYSTOLIC CONGESTIVE HEART FAILURE  (CMD): ICD-10-CM

## 2025-05-16 DIAGNOSIS — E55.9 VITAMIN D DEFICIENCY: ICD-10-CM

## 2025-05-16 DIAGNOSIS — E11.22 TYPE 2 DIABETES MELLITUS WITH CHRONIC KIDNEY DISEASE, WITHOUT LONG-TERM CURRENT USE OF INSULIN, UNSPECIFIED CKD STAGE  (CMD): ICD-10-CM

## 2025-05-16 DIAGNOSIS — I10 ESSENTIAL HYPERTENSION: ICD-10-CM

## 2025-05-16 RX ORDER — FLUTICASONE PROPIONATE 50 MCG
2 SPRAY, SUSPENSION (ML) NASAL DAILY
COMMUNITY

## 2025-05-16 RX ORDER — FUROSEMIDE 20 MG/1
20 TABLET ORAL
Qty: 90 TABLET | Refills: 3 | Status: SHIPPED | OUTPATIENT
Start: 2025-05-16

## 2025-05-16 SDOH — HEALTH STABILITY: PHYSICAL HEALTH: ON AVERAGE, HOW MANY MINUTES DO YOU ENGAGE IN EXERCISE AT THIS LEVEL?: 0 MIN

## 2025-05-16 SDOH — HEALTH STABILITY: PHYSICAL HEALTH: ON AVERAGE, HOW MANY DAYS PER WEEK DO YOU ENGAGE IN MODERATE TO STRENUOUS EXERCISE (LIKE A BRISK WALK)?: 0 DAYS

## 2025-05-16 ASSESSMENT — PATIENT HEALTH QUESTIONNAIRE - PHQ9
1. LITTLE INTEREST OR PLEASURE IN DOING THINGS: NOT AT ALL
CLINICAL INTERPRETATION OF PHQ2 SCORE: NO FURTHER SCREENING NEEDED
SUM OF ALL RESPONSES TO PHQ9 QUESTIONS 1 AND 2: 0
2. FEELING DOWN, DEPRESSED OR HOPELESS: NOT AT ALL
SUM OF ALL RESPONSES TO PHQ9 QUESTIONS 1 AND 2: 0

## 2025-05-16 ASSESSMENT — ENCOUNTER SYMPTOMS
HEMOPTYSIS: 0
LOSS OF BALANCE: 0
DIZZINESS: 0
ABDOMINAL PAIN: 0
HEMATOCHEZIA: 0
CONSTIPATION: 1
WEAKNESS: 0
HEADACHES: 0
BRUISES/BLEEDS EASILY: 0
SUSPICIOUS LESIONS: 0
WEIGHT LOSS: 1
ALLERGIC/IMMUNOLOGIC COMMENTS: NO NEW FOOD ALLERGIES
LIGHT-HEADEDNESS: 0
BLOATING: 1
INSOMNIA: 1
CHILLS: 0
COUGH: 0
FEVER: 0

## 2025-05-19 ENCOUNTER — OFFICE VISIT (OUTPATIENT)
Dept: NEPHROLOGY | Facility: CLINIC | Age: OVER 89
End: 2025-05-19

## 2025-05-19 VITALS
BODY MASS INDEX: 25 KG/M2 | SYSTOLIC BLOOD PRESSURE: 128 MMHG | HEART RATE: 67 BPM | WEIGHT: 159 LBS | DIASTOLIC BLOOD PRESSURE: 58 MMHG

## 2025-05-19 DIAGNOSIS — N18.4 CKD (CHRONIC KIDNEY DISEASE) STAGE 4, GFR 15-29 ML/MIN (HCC): Primary | ICD-10-CM

## 2025-05-19 PROCEDURE — 99214 OFFICE O/P EST MOD 30 MIN: CPT | Performed by: INTERNAL MEDICINE

## 2025-05-19 RX ORDER — LISINOPRIL 5 MG/1
5 TABLET ORAL DAILY
Qty: 90 TABLET | Refills: 1 | Status: SHIPPED | OUTPATIENT
Start: 2025-05-19

## 2025-05-19 NOTE — PATIENT INSTRUCTIONS
Decrease lisinopril from 10 mg to 5 mg daily.  Try to drink more water.  Repeat your labs in 1 month.  Orders are in the computer.

## 2025-05-19 NOTE — PROGRESS NOTES
05/19/25        Patient: Cal Cates   YOB: 1934   Date of Visit: 5/19/2025       Dear  Dr. Charly MD,      Thank you for referring Cal Cates to my practice.  Please find my assessment and plan below.      As you know he is a 89-year-old male with a history of adult onset diabetes mellitus, hypertension, whitecoat syndrome, anemia chronic disease, congestive heart failure secondary to diastolic dysfunction, moderate mitral regurgitation, status post permanent pacemaker who I now had the pleasure of seeing for follow-up chronic kidney disease stage IV with proteinuria thought to be secondary to diabetic nephropathy.  Overall patient states he is doing okay without any chest pain, shortness of breath, GI or urinary tract symptoms.  Saw cardiology earlier this year.  Mitral regurgitation was thought not to be bad enough for mitral valve clip.  Stress test in January 2025 was within normal limits.  Last A1c was 7.9 in March 2025.  Still living independently and drives.  Complains of some fatigue.  Still works in the garden.    On physical exam his blood pressure 128/58 with pulse 6070 weight 159 pounds.  Neck was supple without JVD.  Lungs are clear.  Heart revealed a regular rate and rhythm and S4 but no gallops, murmurs or rubs.  Abdomen was soft, flat, nontender without organomegaly, masses or bruits.  Extremities revealed trace edema bilaterally.    Reviewed his most recent labs done on April 24, 2025.  Creatinine is creeping up now 3.35 with an estimated GFR of 17 cc/min.  Electrolytes are good.  Hemoglobin 11.4.  Urine microalbumin creatinine ratio was 159.    I therefore informed the patient and son that his renal function is gradually declining.  He is only on furosemide 20 mg 3 times per week.  Will try decreasing lisinopril from 10 mg to 5 mg daily to see if this improves renal function.  Can probably do better with increasing his fluid intake as well.  He will continue to monitor his  blood pressure readings daily.  Repeat labs in 1 month.  Return in 3 months.  Maintain adequate hydration.  Avoid nonsteroidals.    Thank you again for allowing me to participate in the care of your patient.  If you have any questions please feel free to call.           Sincerely,   Trevor Rocha MD   [facility], [facility]  [address]  [facility] [address]    Document electronically generated by:  Trevor Rocha MD

## 2025-05-23 ENCOUNTER — TELEPHONE (OUTPATIENT)
Dept: ENDOCRINOLOGY CLINIC | Facility: CLINIC | Age: OVER 89
End: 2025-05-23

## 2025-05-23 RX ORDER — PEN NEEDLE, DIABETIC 32GX 5/32"
NEEDLE, DISPOSABLE MISCELLANEOUS
Qty: 100 EACH | Refills: 1 | Status: SHIPPED | OUTPATIENT
Start: 2025-05-23

## 2025-05-23 NOTE — TELEPHONE ENCOUNTER
Requesting med that is not on med list and from another Dr ROCIO ROBERTS 2ND GEN PEN NDL [id number] RN  USE ONCE DAILY WITH INSULIN

## 2025-05-23 NOTE — TELEPHONE ENCOUNTER
Endocrine Refill protocol for Glucose testing supplies     Protocol Criteria: PASSED Reason: N/A    If below requirement is met, send a 90-day supply with 1 refill per provider protocol.    Verify appointment with Endocrinology completed in the last 6 months or scheduled in the next 3 months.    Last completed office visit:3/5/2025 Britta May MD   Last completed telemed visit: Visit date not found  Next scheduled Follow up:   Future Appointments   Date Time Provider Department Center   6/2/2025 10:30 AM Nohemy Jin DPM [facility]   8/7/2025  2:30 PM Alexander Gage MD [facility]R [facility]   8/11/2025  4:20 PM Trevor Rocha MD [id number] [facility]   9/10/2025 10:30 AM Britta May MD [facility]   9/17/2025 11:00 AM Tracey Parks MD [facility]      90 day +1 refill approved per protocol

## 2025-05-30 RX ORDER — SIMVASTATIN 40 MG
40 TABLET ORAL DAILY
Qty: 90 TABLET | Refills: 4 | Status: SHIPPED | OUTPATIENT
Start: 2025-05-30

## 2025-05-30 NOTE — TELEPHONE ENCOUNTER
Please review; protocol failed/ has no protocol        Please see message below for upcoming appointment.    Future Appointments   Date Time Provider Department Center                               9/17/2025 11:00 AM [facility]

## 2025-06-02 ENCOUNTER — PATIENT MESSAGE (OUTPATIENT)
Dept: ENDOCRINOLOGY CLINIC | Facility: CLINIC | Age: OVER 89
End: 2025-06-02

## 2025-06-02 ENCOUNTER — OFFICE VISIT (OUTPATIENT)
Dept: PODIATRY CLINIC | Facility: CLINIC | Age: OVER 89
End: 2025-06-02

## 2025-06-02 ENCOUNTER — PATIENT MESSAGE (OUTPATIENT)
Dept: PHYSICAL MEDICINE AND REHAB | Facility: CLINIC | Age: OVER 89
End: 2025-06-02

## 2025-06-02 DIAGNOSIS — E11.65 TYPE 2 DIABETES MELLITUS WITH HYPERGLYCEMIA, WITH LONG-TERM CURRENT USE OF INSULIN (HCC): Primary | ICD-10-CM

## 2025-06-02 DIAGNOSIS — B35.1 ONYCHOMYCOSIS: ICD-10-CM

## 2025-06-02 DIAGNOSIS — Z79.4 TYPE 2 DIABETES MELLITUS WITH HYPERGLYCEMIA, WITH LONG-TERM CURRENT USE OF INSULIN (HCC): Primary | ICD-10-CM

## 2025-06-02 PROCEDURE — 99213 OFFICE O/P EST LOW 20 MIN: CPT | Performed by: STUDENT IN AN ORGANIZED HEALTH CARE EDUCATION/TRAINING PROGRAM

## 2025-06-02 RX ORDER — CARVEDILOL 12.5 MG/1
TABLET ORAL
COMMUNITY
Start: 2025-01-10

## 2025-06-02 NOTE — PROGRESS NOTES
[facility] Podiatry  Progress Note      Cal Cates is a 89 year old male.   Chief Complaint   Patient presents with    Diabetic Foot Care     Nail trim and foot check f/u- last A1c-=7.9 on 3/5/25- LOV w/ PCP Dr. Parks on  3/17/2025- pt declines              HPI:     Patient is a pleasant 89-year-old diabetic male presents to clinic for bilateral foot evaluation.  Admits to elongated toenails he is unable to trim himself.  Denies any pedal injuries or trauma.  Denies any acute signs of infection.  Admits to ambulating in supportive shoes.    Allergies: Barium sulfate and Sulfa antibiotics    Current Outpatient Medications   Medication Sig Dispense Refill    carvedilol 12.5 MG Oral Tab       simvastatin 40 MG Oral Tab Take 1 tablet (40 mg total) by mouth daily. 90 tablet 4    Insulin Pen Needle (BD PEN NEEDLE ALEJANDRA 2ND GEN) 32G X 4 MM Does not apply Misc Pt uses once daily for insulin injection. Dx code:E11.65 100 each 1    lisinopril 5 MG Oral Tab Take 1 tablet (5 mg total) by mouth daily. 90 tablet 1    pantoprazole 40 MG Oral Tab EC Take 1 tablet (40 mg total) by mouth before breakfast. 90 tablet 3    Lancets (ONETOUCH DELICA PLUS LVRFXZ20C) Does not apply Misc Place 1 Device onto the skin in the morning and 1 Device before bedtime. 200 each 1    Glucose Blood (ONETOUCH ULTRA) In Vitro Strip CHECK BLOOD SUGAR TWICE DAILY. DX CODE: E11.65 WITH USE OF INSULIN 200 strip 1    Blood Glucose Monitoring Suppl (ONETOUCH ULTRA 2) w/Device Does not apply Kit Use to check blood sugars twice daily 1 kit 1    TRULICITY 0.75 MG/0.5ML Subcutaneous Solution Auto-injector INJECT 0.75MG (0.5ML) UNDER THE SKIN ONCE A WEEK. 6 mL 0    glimepiride 2 MG Oral Tab Take 1 tablet (2 mg total) by mouth daily with breakfast. 90 tablet 1    tamsulosin 0.4 MG Oral Cap Take 2 capsules (0.8 mg total) by mouth daily. 180 capsule 3    Insulin Pen Needle (COMFORT EZ PEN NEEDLES) 32G X 4 MM Does not apply Misc Inject once daily 100  each 2    finasteride 5 MG Oral Tab Take 1 tablet (5 mg total) by mouth daily. 90 tablet 3    BD PEN NEEDLE MINI U/F 31G X 5 MM Does not apply Misc USE EVERY DAY 90 each 3    fluticasone propionate 50 MCG/ACT Nasal Suspension 2 sprays by Each Nare route every morning. 16 g 3    insulin glargine (LANTUS SOLOSTAR) 100 UNIT/ML Subcutaneous Solution Pen-injector INJECT 15 TO 25 UNITS DAILY IF BLOOD GLUCOSE IS ABOVE 250 15 mL 5    furosemide 20 MG Oral Tab TAKE 1 TABLET THREE TIMES A WEEK 30 tablet 4    carvedilol 6.25 MG Oral Tab       timolol 0.5 % Ophthalmic Solution       latanoprost 0.005 % Ophthalmic Solution Place 1 drop into both eyes nightly. TAKE AT BEDTIME      COMBIGAN 0.2-0.5 % Ophthalmic Solution Place 1 drop into both eyes every 12 (twelve) hours. 1 Bottle 12    AMLODIPINE BESYLATE 5 MG Oral Tab TAKE 1 TABLET(5 MG) BY MOUTH DAILY 90 tablet 1      Past Medical History:    Acid reflux    Basal cell carcinoma    right jaw    Basal cell carcinoma    left cheek    Basal cell carcinoma    located on the right cheek sup (AO)    BPH (benign prostatic hyperplasia)    Diabetes (HCC)    Diabetes mellitus (HCC)    Glaucoma    High blood pressure    High cholesterol    Hyperlipidemia    Kidney disease    Renal disorder    Shortness of breath      Past Surgical History:   Procedure Laterality Date    Cardiac pacemaker placement      Colonoscopy  2014    Other surgical history      hemorrhoids    Other surgical history      pacemaker placed      Family History   Problem Relation Age of Onset    Glaucoma Father       Social History     Socioeconomic History    Marital status:    Tobacco Use    Smoking status: Former     Current packs/day: 0.00     Types: Cigarettes    Smokeless tobacco: Never   Vaping Use    Vaping status: Never Used   Substance and Sexual Activity    Alcohol use: Yes     Alcohol/week: 1.0 standard drink of alcohol     Types: 1 Cans of beer per week     Comment: occ    Drug use: No   Other Topics  Concern    Caffeine Concern Yes     Comment: coffee and soda    Exercise Yes     Comment: cutting grass    Reaction to local anesthetic No           REVIEW OF SYSTEMS:     Denies nause, fever, chills  No calf pain  Denies chest pain or SOB      EXAM:   There were no vitals taken for this visit.  GENERAL: well developed, well nourished, in no apparent distress  EXTREMITIES:   1. Integument: Normal skin temperature and turgor. Toenails x10 are elongated, thickened and discolored with subungal derbi.     2. Vascular: Dorsalis pedis two out of four bilateral and posterior tibial pulses two out of   four bilateral, capillary refill normal.   3. Musculoskeletal: All muscle groups are graded 5 out of 5 in the foot and ankle.   4. Neurological: Normal sharp dull sensation; reflexes normal.      Bilateral barefoot skin diabetic exam is normal, visualized feet and the appearance is normal.  Bilateral monofilament/sensation of both feet is normal.  Pulsation pedal pulse exam of both lower legs/feet is normal as well.               ASSESSMENT AND PLAN:   There are no diagnoses linked to this encounter.        Plan:       -Patient examined, chart history reviewed.  -Discussed importance of proper pedal hygiene, regular foot checks, and tight glucose control.  -Sharply debrided nails x10 with a sterile nail nipper achieving a 20% reduction in thickness and length, without incident.   -Ambulate with supportive shoes and inserts and avoid walking barefoot.  -Educated patient on acute signs of infection advised patient to seek immediate medical attention if symptoms arise.    RTC in 3 months     The patient indicates understanding of these issues and agrees to the plan.        Nohemy Jin DPM

## 2025-06-03 DIAGNOSIS — E11.8 CONTROLLED TYPE 2 DIABETES MELLITUS WITH COMPLICATION, WITH LONG-TERM CURRENT USE OF INSULIN (HCC): ICD-10-CM

## 2025-06-03 DIAGNOSIS — Z79.4 CONTROLLED TYPE 2 DIABETES MELLITUS WITH COMPLICATION, WITH LONG-TERM CURRENT USE OF INSULIN (HCC): ICD-10-CM

## 2025-06-05 RX ORDER — INSULIN GLARGINE 100 [IU]/ML
INJECTION, SOLUTION SUBCUTANEOUS
Qty: 15 ML | Refills: 5 | Status: SHIPPED | OUTPATIENT
Start: 2025-06-05

## 2025-06-05 NOTE — TELEPHONE ENCOUNTER
Endocrine refill protocol for basal insulins     Protocol Criteria: PASSED Reason: N/A    If all below requirements are met, send a 90-day supply with 1 refill per provider protocol.       Verify Appointment with Endocrinology completed in the last 6 months or scheduled in the next 3 months.  Verify A1C has been completed within the last 6 months and is below 8.5%     Last completed office visit:3/5/2025 Britta May MD   Last completed telemed visit: Visit date not found  Next scheduled Follow up:   Future Appointments   Date Time Provider Department Center   8/7/2025  2:30 PM Alexander Gage MD [facility]   8/11/2025  4:20 PM Trevor Rocha MD [id number] [facility]   9/8/2025 11:00 AM Nohemy Jin DPM [facility]   9/10/2025 10:30 AM Britta May MD [facility]   9/17/2025 11:00 AM Tracey Parks MD ECADOFM [facility]      Last A1c result: Last A1c value was 7.9% done 3/5/2025.

## 2025-06-06 NOTE — TELEPHONE ENCOUNTER
Forms faxed awaiting fax confirmation.   Fax confirmation received   Forms are in the brown accordion on my desk.

## 2025-06-10 ENCOUNTER — TELEPHONE (OUTPATIENT)
Dept: ENDOCRINOLOGY CLINIC | Facility: CLINIC | Age: OVER 89
End: 2025-06-10

## 2025-06-10 NOTE — TELEPHONE ENCOUNTER
Pharmacy states that insurance does not cover Solostar Pen.  She is requesting it to be changed to the preferred medication of Insulin Degludec Pen.  She states this is an urgent invoice and is requesting a call back today.  Please call     Ref # [id number]

## 2025-06-30 RX ORDER — AMLODIPINE BESYLATE 5 MG/1
5 TABLET ORAL DAILY
Qty: 90 TABLET | Refills: 1 | Status: SHIPPED | OUTPATIENT
Start: 2025-06-30

## 2025-06-30 NOTE — TELEPHONE ENCOUNTER
Received fax from [facility] date don 05/08 stating they are in need of prescription for requested medication, MA pended rx   no wheezing/no dyspnea/no cough

## 2025-07-21 RX ORDER — GLIMEPIRIDE 2 MG/1
2 TABLET ORAL
Qty: 90 TABLET | Refills: 3 | Status: SHIPPED | OUTPATIENT
Start: 2025-07-21

## 2025-07-21 NOTE — TELEPHONE ENCOUNTER
Endocrine Refill protocol for oral and injectable diabetic medications    Protocol Criteria:  PASSED  Reason: N/A    If all below requirements are met, send a 90-day supply with 1 refill per provider protocol.    Verify appointment with Endocrinology completed in the last 6 months or scheduled in the next 3 months.  Verify A1C has been completed within the last 6 months and is below 8.5%     Last completed office visit: 3/5/2025 Britta May MD   Next scheduled Follow up:   Future Appointments   Date Time Provider Department Center   8/7/2025  2:30 PM Alexander Gage MD PM&R [facility]   8/11/2025  4:20 PM Trevor Rocha MD [id number] [facility]   9/8/2025 11:00 AM Nohemy Jin DPM [facility]   9/10/2025 10:30 AM Britta May MD [facility]   9/17/2025 11:00 AM Tracey Parks MD [facility]ADOFM [facility]      Last A1c result: Last A1c value was 7.9% done 3/5/2025.

## 2025-08-07 ENCOUNTER — OFFICE VISIT (OUTPATIENT)
Dept: PHYSICAL MEDICINE AND REHAB | Facility: CLINIC | Age: OVER 89
End: 2025-08-07

## 2025-08-07 ENCOUNTER — TELEPHONE (OUTPATIENT)
Dept: PHYSICAL MEDICINE AND REHAB | Facility: CLINIC | Age: OVER 89
End: 2025-08-07

## 2025-08-07 VITALS — BODY MASS INDEX: 25 KG/M2 | WEIGHT: 159 LBS

## 2025-08-07 DIAGNOSIS — Z79.4 TYPE 2 DIABETES MELLITUS WITH HYPERGLYCEMIA, WITH LONG-TERM CURRENT USE OF INSULIN (HCC): ICD-10-CM

## 2025-08-07 DIAGNOSIS — G89.29 CHRONIC LEFT SHOULDER PAIN: ICD-10-CM

## 2025-08-07 DIAGNOSIS — I50.22 CHRONIC SYSTOLIC CONGESTIVE HEART FAILURE (HCC): ICD-10-CM

## 2025-08-07 DIAGNOSIS — E11.65 TYPE 2 DIABETES MELLITUS WITH HYPERGLYCEMIA, WITH LONG-TERM CURRENT USE OF INSULIN (HCC): ICD-10-CM

## 2025-08-07 DIAGNOSIS — M19.012 PRIMARY OSTEOARTHRITIS OF LEFT SHOULDER: Primary | ICD-10-CM

## 2025-08-07 DIAGNOSIS — M67.912 DYSFUNCTION OF LEFT ROTATOR CUFF: ICD-10-CM

## 2025-08-07 DIAGNOSIS — M25.512 CHRONIC LEFT SHOULDER PAIN: ICD-10-CM

## 2025-08-07 PROCEDURE — 99214 OFFICE O/P EST MOD 30 MIN: CPT | Performed by: PHYSICAL MEDICINE & REHABILITATION

## 2025-08-07 PROCEDURE — 20611 DRAIN/INJ JOINT/BURSA W/US: CPT | Performed by: PHYSICAL MEDICINE & REHABILITATION

## 2025-08-07 RX ORDER — TRIAMCINOLONE ACETONIDE 40 MG/ML
60 INJECTION, SUSPENSION INTRA-ARTICULAR; INTRAMUSCULAR ONCE
Status: COMPLETED | OUTPATIENT
Start: 2025-08-07 | End: 2025-08-07

## 2025-08-07 RX ORDER — LIDOCAINE HYDROCHLORIDE 10 MG/ML
2.5 INJECTION, SOLUTION INFILTRATION; PERINEURAL ONCE
Status: COMPLETED | OUTPATIENT
Start: 2025-08-07 | End: 2025-08-07

## 2025-08-07 RX ADMIN — TRIAMCINOLONE ACETONIDE 60 MG: 40 INJECTION, SUSPENSION INTRA-ARTICULAR; INTRAMUSCULAR at 16:39:00

## 2025-08-11 ENCOUNTER — OFFICE VISIT (OUTPATIENT)
Dept: NEPHROLOGY | Facility: CLINIC | Age: OVER 89
End: 2025-08-11

## 2025-08-11 ENCOUNTER — LAB ENCOUNTER (OUTPATIENT)
Dept: LAB | Age: OVER 89
End: 2025-08-11
Attending: INTERNAL MEDICINE

## 2025-08-11 VITALS
BODY MASS INDEX: 25 KG/M2 | SYSTOLIC BLOOD PRESSURE: 130 MMHG | DIASTOLIC BLOOD PRESSURE: 67 MMHG | HEART RATE: 60 BPM | WEIGHT: 159 LBS

## 2025-08-11 DIAGNOSIS — N18.4 CKD (CHRONIC KIDNEY DISEASE) STAGE 4, GFR 15-29 ML/MIN (HCC): Primary | ICD-10-CM

## 2025-08-11 DIAGNOSIS — N18.4 CKD (CHRONIC KIDNEY DISEASE) STAGE 4, GFR 15-29 ML/MIN (HCC): ICD-10-CM

## 2025-08-11 LAB
ALBUMIN SERPL-MCNC: 4.2 G/DL (ref 3.2–4.8)
ANION GAP SERPL CALC-SCNC: 9 MMOL/L (ref 0–18)
BASOPHILS # BLD AUTO: 0 X10(3) UL (ref 0–0.2)
BASOPHILS NFR BLD AUTO: 0 %
BUN BLD-MCNC: 67 MG/DL (ref 9–23)
BUN/CREAT SERPL: 21 (ref 10–20)
CALCIUM BLD-MCNC: 9.4 MG/DL (ref 8.7–10.4)
CHLORIDE SERPL-SCNC: 111 MMOL/L (ref 98–112)
CO2 SERPL-SCNC: 20 MMOL/L (ref 21–32)
CREAT BLD-MCNC: 3.19 MG/DL (ref 0.7–1.3)
CREAT UR-SCNC: 92.9 MG/DL
DEPRECATED RDW RBC AUTO: 44 FL (ref 35.1–46.3)
EGFRCR SERPLBLD CKD-EPI 2021: 18 ML/MIN/1.73M2 (ref 60–?)
EOSINOPHIL # BLD AUTO: 0.03 X10(3) UL (ref 0–0.7)
EOSINOPHIL NFR BLD AUTO: 0.4 %
ERYTHROCYTE [DISTWIDTH] IN BLOOD BY AUTOMATED COUNT: 13.4 % (ref 11–15)
GLUCOSE BLD-MCNC: 96 MG/DL (ref 70–99)
HCT VFR BLD AUTO: 35 % (ref 39–53)
HGB BLD-MCNC: 11.2 G/DL (ref 13–17.5)
IMM GRANULOCYTES # BLD AUTO: 0.02 X10(3) UL (ref 0–1)
IMM GRANULOCYTES NFR BLD: 0.3 %
LYMPHOCYTES # BLD AUTO: 1.61 X10(3) UL (ref 1–4)
LYMPHOCYTES NFR BLD AUTO: 21.2 %
MCH RBC QN AUTO: 28.6 PG (ref 26–34)
MCHC RBC AUTO-ENTMCNC: 32 G/DL (ref 31–37)
MCV RBC AUTO: 89.3 FL (ref 80–100)
MICROALBUMIN UR-MCNC: 52.6 MG/DL
MICROALBUMIN/CREAT 24H UR-RTO: 566.2 UG/MG (ref ?–30)
MONOCYTES # BLD AUTO: 0.83 X10(3) UL (ref 0.1–1)
MONOCYTES NFR BLD AUTO: 10.9 %
NEUTROPHILS # BLD AUTO: 5.09 X10 (3) UL (ref 1.5–7.7)
NEUTROPHILS # BLD AUTO: 5.09 X10(3) UL (ref 1.5–7.7)
NEUTROPHILS NFR BLD AUTO: 67.2 %
OSMOLALITY SERPL CALC.SUM OF ELEC: 309 MOSM/KG (ref 275–295)
PHOSPHATE SERPL-MCNC: 3 MG/DL (ref 2.4–5.1)
PLATELET # BLD AUTO: 211 10(3)UL (ref 150–450)
POTASSIUM SERPL-SCNC: 4.7 MMOL/L (ref 3.5–5.1)
RBC # BLD AUTO: 3.92 X10(6)UL (ref 3.8–5.8)
SODIUM SERPL-SCNC: 140 MMOL/L (ref 136–145)
WBC # BLD AUTO: 7.6 X10(3) UL (ref 4–11)

## 2025-08-11 PROCEDURE — 82043 UR ALBUMIN QUANTITATIVE: CPT

## 2025-08-11 PROCEDURE — 80069 RENAL FUNCTION PANEL: CPT

## 2025-08-11 PROCEDURE — 36415 COLL VENOUS BLD VENIPUNCTURE: CPT

## 2025-08-11 PROCEDURE — 85025 COMPLETE CBC W/AUTO DIFF WBC: CPT

## 2025-08-11 PROCEDURE — 82570 ASSAY OF URINE CREATININE: CPT

## 2025-08-11 PROCEDURE — 99214 OFFICE O/P EST MOD 30 MIN: CPT | Performed by: INTERNAL MEDICINE

## 2025-11-17 ENCOUNTER — APPOINTMENT (OUTPATIENT)
Dept: CARDIOLOGY | Age: OVER 89
End: 2025-11-17

## (undated) DEVICE — 6 ML SYRINGE LUER-LOCK TIP: Brand: MONOJECT

## (undated) DEVICE — MEDI-VAC NON-CONDUCTIVE SUCTION TUBING 6MM X 1.8M (6FT.) L: Brand: CARDINAL HEALTH

## (undated) DEVICE — CATH BALLOON CRE 15-18MM 5837

## (undated) DEVICE — 3 ML SYRINGE LUER-LOCK TIP: Brand: MONOJECT

## (undated) DEVICE — LINE MNTR ADLT SET O2 INTMD

## (undated) DEVICE — Device

## (undated) DEVICE — KIT MICROINTRODUCER 4FR 21GA 40CM 4CM .018IN SMTH NDL MNDRL

## (undated) DEVICE — KIT MICROINTRODUCER 4FR .018IN 40CM 7CM .018IN SFTP MNDRL

## (undated) DEVICE — STOPCOCK IV DARK BLUE .082IN MARQUIS 1050 PSI PLYCRB 3W HPRS

## (undated) DEVICE — Device: Brand: DEFENDO AIR/WATER/SUCTION AND BIOPSY VALVE

## (undated) DEVICE — DRAPE L4 APRTR BRR PRTC 42X29IN 4.5X3.5IN SURG ACTI-GARD

## (undated) DEVICE — 35 ML SYRINGE REGULAR TIP: Brand: MONOJECT

## (undated) DEVICE — COVER PROBE FLX-FEEL 58X6IN OR 4 FLAG 2 SHT 24 PRINT STRL LF

## (undated) DEVICE — SHIELD RAD RADPAD RAD PRTC STRL FEM ENTRY ANGIO

## (undated) DEVICE — Device: Brand: CUSTOM PROCEDURE KIT

## (undated) DEVICE — CONMED SCOPE SAVER BITE BLOCK, 20X27 MM: Brand: SCOPE SAVER

## (undated) DEVICE — SHEATH 7FR 10CM 2.5CM .035IN INTRO SNAP ON DIL LOCK KINK RST

## (undated) DEVICE — SYRINGE/GUAGE ASSEMBLY

## (undated) NOTE — LETTER
No referring provider defined for this encounter.       08/19/24        Patient: Cal Cates   YOB: 1934   Date of Visit: 8/19/2024       Dear  Dr. Charly MD,      Thank you for referring Cal Cates to my practice.  Please find my assessment and plan below.      As you know he is an 89-year-old male with a history of adult onset diabetes mellitus, hypertension, whitecoat syndrome, anemia of chronic disease, congestive heart failure secondary to diastolic dysfunction and moderate mitral regurgitation who I now had the pleasure of seeing for follow-up of chronic kidney disease stage IV with proteinuria thought to be secondary to diabetes.  Overall patient states he is doing okay without any chest pain, shortness of breath, GI or urinary tract symptoms.  Chronic nocturia x 3.  He is here with his son.  Gradually slowing down a bit secondary to age.  He is trying to make plans so his father can live with him.    On physical exam blood pressure 134/65 with a pulse of 61 he weighed 151 pounds.  Neck was supple without JVD.  Lungs were clear.  Heart revealed a regular rate and rhythm with a 2/6 systolic ejection murmur.  Abdomen was soft, flat, nontender without organomegaly, masses or bruits.  Extremities revealed trace edema.    Reviewed his most recent labs done August 17, 2024.  Creatinine  is creeping up a bit.  Now up to 2.96 with an estimated GFR 20 cc/min.  Potassium is 5.3.  Other electrolytes were good.  Hemoglobin 12.0.    I therefore informed the patient and his son that there has been some gradual deterioration in his renal function.  He knows to maintain adequate hydration.  Avoid nonsteroidals.  Blood pressures appear to be under good control.  Last A1c was 8.0.  The significance of a GFR of 20 cc/min and the indications for dialysis were reviewed.  We counseled him on a low potassium diet.  Will repeat his CBC and renal panel in 1 month to ensure that his potassium remains in the  normal range.  Otherwise I will see him again in 3 to 4 months.    Thank you again for allowing me to participate in the care of your patient.  If you have any questions please feel free to call.               Sincerely,   Trevor Rocha MD   [facility] [place], [facility], [place]  [address]  [facility] [address]    Document electronically generated by:  Trevor Rocha MD

## (undated) NOTE — LETTER
No referring provider defined for this encounter. 07/08/22        Patient: Catarina Hathaway   YOB: 1934   Date of Visit: 7/8/2022       Dear  Dr. Dong Terrell MD,      Thank you for referring Catarina Hathaway to my practice. Please find my assessment and plan below. As you know he is an 15-year-old male with a history of adult onset diabetes mellitus, hypertension, whitecoat syndrome, congestive heart failure secondary to diastolic dysfunction who I now had the pleasure of seeing for follow-up of chronic kidney disease stage IV and anemia of chronic disease. The patient is here with his son. Overall patient states he has been doing reasonably well. No chest pain, shortness of breath, GI or urinary tract symptoms. Tries to stay reasonably active. They have been monitoring his blood pressures and blood sugars on a regular basis and overall appear to be under reasonable control. Appetite remains decent    On physical exam his blood pressure was 128/67 with a pulse of 67 he weighed and 61 pounds. Neck was supple without JVD. Lungs were clear. Heart revealed a regular rate and rhythm with an S4 but no gallops, murmurs or rubs. Abdomen was soft, flat, nontender without organomegaly, masses or bruits. Extremities revealed no edema. I reviewed his most recent labs done on July 7, 2022. His creatinine actually is slightly better down to 2.56 with an estimated GFR of 22 cc/min. Creatinine was up to 2.94 back in January 2022. His electrolytes were good. Hemoglobin 12.3. Albumin 3.3. I therefore reassured the patient and his son that his renal function is slightly better than it was back in January 2022. Reinforced the importance of maintaining adequate hydration. Avoid nonsteroidals. Keep blood pressures and blood sugars under good control. He will continue to do CBC and renal panels quarterly.   I will see him again in 6 months for follow-up or sooner if clinically indicated. Thank you again for allowing me to participate in the care of your patient. If you have any questions please feel free to call.       Sincerely,   Bernadette Villaseñor MD   [address]  [address] [facility] [address]  [address] [facility] [address]    Document electronically generated by:  Bernadette Villaseñor MD

## (undated) NOTE — Clinical Note
No referring provider defined for this encounter. 03/31/2017        Patient: Radha Ridley   YOB: 1934   Date of Visit: 3/31/2017       Dear  Dr. Misael Serna MD,      Thank you for referring Radha Ridley to my practice.   Please find m Document electronically generated by:  Aaron Acosta

## (undated) NOTE — LETTER
No referring provider defined for this encounter. 01/31/22        Patient: Kamilah Richardson   YOB: 1934   Date of Visit: 1/31/2022       Dear  Dr. Joseph Jay MD,      Thank you for referring Elderja Richardson to my practice.   Please find my follow-up tomorrow. They are concerned because some of his a.m. glucoscans are borderline low. I will have him repeat his labs in 4 weeks. If his creatinine is still rising may need to place Trulicity on hold.   I will see him again in 3 to 6 months jennifer

## (undated) NOTE — LETTER
[address] [facility]  Autorizacion para Procedimientos Invasivos    1.  Por el presente, autorizo al doctor Horace Galaviz , a mi(s) médico(s) y a Terrance Lopez designado leobardo asistente del doctor, a realizar la siguiente operación y/o proced 5. Doy mi consentimiento para que se tomen fotografías del futuro procedimiento(s) con los efectos de ayudar a progresar la medicina, la ciencia y/o la educación, siempre y cuando mi identidad permanezca confidencial. Si se ha grabado en video el procedimi ________________________________________________________________________________________________________________________   Ramond Right responsable del paciente tabatha de edad o inconsciente) (Relación con el paciente)   ________________________________________

## (undated) NOTE — MR AVS SNAPSHOT
[address], [address]  [address] [facility]  [phone]               Thank you for choosing us for your health care visit with Tricia Pandya MD.  We are glad to serve you and happy to provide you with this summa [place]aya, [facility]  [address], [address] (MRI Only)  [address]    It is the patient's responsibility to check with and follow their insurance company's guidelin schedule your appointment. Failure to obtain required authorization numbers can create reimbursement difficulties for you.     Assoc Dx:  Hypertrophy of prostate with urinary retention [N40.1, R33.8]        VASCULAR SURGERY - INTERNAL [[id number] [place] TAKE 1 AND 1/2 TABLETS BY MOUTH DAILY   Commonly known as:  AMARYL           Insulin Glargine 100 UNIT/ML Sopn   INJECT 20 UNITS BY SUBCUTANEOUS ROUTE EVERY EVENING.    Commonly known as:  LANTUS SOLOSTAR           * Insulin Pen Needle 31G X 5 MM Misc   inj - simvastatin 40 MG Tabs            Health Goals discussed Today        Last Edited       Therapy Goals 1/13/2017  5:03 PM by Hannah Carpenter, PT     Goal Comments    1.  Improve L-spine mobility in the sagittal plane to  so that patient may have sufficien

## (undated) NOTE — LETTER
No referring provider defined for this encounter.       02/19/24        Patient: Cal Cates   YOB: 1934   Date of Visit: 2/19/2024       Dear  Dr. Charly MD,      Thank you for referring Cal Cates to my practice.  Please find my assessment and plan below.      As you know he is an 89-year-old male with a history of adult onset diabetes mellitus, hypertension, whitecoat syndrome, congestive heart failure secondary to diastolic dysfunction who I now had the pleasure of seeing for follow-up of chronic kidney disease stage IV with proteinuria thought to be secondary to diabetes and anemia of chronic disease.  Overall the patient states he is doing well except during the last month he has noted some sense of orthopnea.  Still remains fairly active for his age.  Driving.  No chest pain, GI or urinary tract symptoms..  He is scheduled to see his cardiologist Dr. Sellers tomorrow with a repeat echocardiogram.  Nocturia x 2.  Chronic lower extremity edema stable.  Checks his blood pressure readings at home and usually has readings in the 130/70 range.    On physical exam his blood pressure 142/62 with a pulse of 70 weight 164 pounds.  His neck was supple without JVD.  Lungs were clear.  Heart revealed a regular rate and rhythm with an S4 but no gallops, murmurs or rubs.  Abdomen was soft, flat, nontender without organomegaly, masses or bruits.  Extremities revealed trace edema bilaterally.    I reviewed his most recent labs done on February 16, 2024.  Creatinine fluctuates a bit but fairly stable at 2.72 with an estimated GFR of 22 cc/min.  Electrolytes were good.  Hemoglobin 12.2.  Last A1c was 7.9.    I therefore informed the patient and his son that overall renal function is fairly stable.  Reinforced importance of maintaining adequate hydration.  Blood pressure and blood sugar control.  On furosemide 3 times per week.  Scheduled to see cardiology tomorrow.  Avoid nonsteroidals.  Continue to do  CBC and renal panels quarterly.  I will see him again in 6 months for follow-up or sooner if clinically indicated.    Thank you again for allowing me to participate in the care of your patient.  If you have any questions please feel free to call.           Sincerely,   Trevor Rocha MD   [facility], [facility]  [address] [address]  [facility] [address]    Document electronically generated by:  Trevor Rocha MD

## (undated) NOTE — LETTER
11/14/2024      Alexander Gage MD  Physical Medicine and Rehabilitation  [address] [place] [address], [address]  [facility] [address]  Dept: [phone]  Dept Fax: [phone]        RE: Consultation for Cal Cates        Dear Tracey Parks MD,    Thank you very much for the opportunity to see your patient.  Attached please find a summary from your patient's recent visit.     I appreciate the chance to take care of your patient with you.  Please feel free to call me with any questions or concerns.    Sincerely,        Alexander Gage MD  Electronically Signed on 11/14/2024

## (undated) NOTE — LETTER
No referring provider defined for this encounter. 07/20/20        Patient: Nirav Sanchez   YOB: 1934   Date of Visit: 7/20/2020       Dear  Dr. Jonathan Knight MD,      Thank you for referring Nirav Sanchez to my practice.   Please find my Lisinopril was just increased by cardiology and he will be doing follow-up chemistries in 2 weeks. Assuming they are stable he will continue to do CBC and renal panels quarterly.   I will see him again in 6 months for follow-up or sooner if clinically ind

## (undated) NOTE — LETTER
JAQUELINE. Notifier: Degreed. Patient Name: Cal Cates Identification Number: [id number]  Aviso anticipado de no cobertura al beneficiario (ABN, por monica siglas   en inglés)   NOTA: Si Medicare no paga por los artículos o servicios a continuación, usted podría tener que pagar. Medicare no paga por todo, incluidos algunos cuidados que usted o calderon proveedor de atención médica entienda que son necesarios. Se anticipa que Medicare no pague por los artículos o servicios a continuación.  D. Artículos o servicios  left knee steroid injection under ultrasound    E. Motivo por el cual Medicare podría simba el pago: F. Costo estimado   __ EKG ($87.00)  __ Pap smear ($101) __Pelvic/Breast ($147.00)  __ Ear Irrigation ($138)  _x_ Injection(s)  ___ Tdap ($181)       ___ Meningitis ($290)   __Prevnar ($555)  ___ Td ($66)              ___ Prevnar 20 ($549)  ___ Hep A ($152)     ___ Prolia ($1827)         __ Xiaflex ($         )   ___ Hep B ($150)     ___ Pneumovax($287)                                     ___ Vaccine Administration ($65)   __ Medicare no se cubre owen artículo o servicio      __ No se cubre owen artículo o servicio para calderon condición     __ Es posible que Medicare no pague por owen artículo o servicio con tanta frecuencia        LO QUE USTED DEBE HACER AHORA:  Mena owen aviso para poder shila amie decisión informada sobre monica cuidados.  Háganos las preguntas que tenga después de terminar de leer.  Elija amie opción a continuación sobre si recibirá los artículos o servicios que se indica arriba.       Nota: Si elige Opción 1 o 2, podríamos ayudarle a utilizar los otros seguros que tenga,        jorgito Medicare no nos puede obligar a hacer esto.  G. Opciones: Suleiman solamente amie roya. No podemos elegir la roya para usted.    OPCIÓN 1. Quiero los artículos o servicios que se indica arriba. Pudiera pedir el pago ahora, jorgito yo también solicito que se facture a Medicare para obtener amie decisión  oficial respecto al pago, la cual me será enviada en un Resumen de Medicare (MSN, por monica siglas en inglés). Entiendo que, si Medicare no paga, yo seré responsable del pago, jorgito puedo apelar a Medicare según las indicaciones en el MSN. Si Medicare pagará, me serán reembolsados todos los pagos que yo haya hecho, valentina los copagos o deducibles.   OPCIÓN 2. Quiero los artículos o servicios que se indica arriba, jorgito no  facture a Medicare. Se podrá pedir el pago ahora, ya que yo soy responsable del pago. No podré apelar si no se facturara a Medicare.   OPCIÓN 3. No quiero los artículos o servicios que se indica arriba. Entiendo que, con esta elección, no seré responsable del pago, y no podré apelar para saber si Medicare hubiera pagado.    H. Información adicional:   Kaia aviso explica nuestra opinión y no constituye amie decisión oficial de Medicare. Si usted tiene otras preguntas relativas a kaia aviso o la facturación de Medicare, llame al 1-800-MEDICARE (3-893-678-5077/TTY: 1-877-486-2048). Firme abajo para reconocer yeimi recibido y entendido kaia aviso. Usted también recibirá amie copia.  IAretha Flores:   RADHA Fecmonet:       Tiene derecho a obtener información de Medicare en un formato accesible, leobardo letra max, braille o audio. También tiene derecho a presentar amie queja si siente que ha sido discriminado. Visite Medicare.gov/about-us/uhnouediakefe-gwbeqttwhzdndtmfq-ycexnr.    De acuerdo con la Connie para la Reducción de Trámites de 1995, ninguna persona será obligada a responder a amie recopilación de información a menos que se exhiba un número de control válido de la OMB. El número de control válido de la OMB para esta recopilación de información es [phone]. El tiempo necesario para completar esta recopilación de información es de aproximadamente 7 minutos por respuesta, incluido el tiempo para revisar las instrucciones, buscar ballesteros de datos existentes, reunir los datos necesarios, y completar y revisar la  recopilación de información. Si tiene preguntas sobre la precisión del estimado de tiempo o sugerencias para mejorar owen formulario, escriba a: CMS, [facility]     [employer] [place], Attn: [facility] Reports Clearance Officer, [place], [place] [address].    Formulario [id number] (Exp. 01/31/2026)                                      Formulario aprobado [facility] [id number]

## (undated) NOTE — MR AVS SNAPSHOT
Clemente Ray [address]  [phone]  [phone]               Thank you for choosing us for your health care visit with Queen Nancie PT.   We are glad to serve you and happy to provide you with [address] [facility] ([address] [place] [address])    [address] [address]   [phone]           Please arrive at your scheduled appointment time. Wear comfortable, loose fitting clothing.             Feb 22, 201

## (undated) NOTE — LETTER
[address] [place] [address]  Autorizacion para Procedimientos Invasivos    1.  Por el presente, autorizo al doctor Gami, a mi(s) médico(s) y a Johnnye How designado leobardo asistente del doctor, a realizar la siguiente operación y/o procedimi 5. Doy mi consentimiento para que se tomen fotografías del futuro procedimiento(s) con los efectos de ayudar a progresar la medicina, la ciencia y/o la educación, siempre y cuando mi identidad permanezca confidencial. Si se ha grabado en video el procedimi _____________________________________________   Cameron Eglin responsable del paciente tabatha de edad o inconsciente) (Relación con el paciente)   ___________________________________________________________________________________________________________________

## (undated) NOTE — MR AVS SNAPSHOT
Clemente Ray [address]  [phone]  [phone]               Thank you for choosing us for your health care visit with Joleen Beltran PT.   We are glad to serve you and happy to provide you with [place] [facility] Visit By Therapist with Jonathon Lyon, [address] [facility] ([address] [facility] [address])    [address]   [phone]           Please arrive at your scheduled appointment

## (undated) NOTE — LETTER
No referring provider defined for this encounter. 01/25/21        Patient: Hernan Godinez   YOB: 1934   Date of Visit: 1/25/2021       Dear  Dr. Scar Donahue MD,      Thank you for referring Hernan Godinez to my practice.   Please find my Thank you again for allowing me to participate in the care of your patient. If you have any questions please feel free to call.            Sincerely,   Susana Cazares MD   [facility], [address] [place] [address], [place]  [address]  [facility]

## (undated) NOTE — LETTER
1/31/2024      Alexander Gage MD  Physical Medicine and Rehabilitation  [address] [place] [address], [address]  [facility] [address]  Dept: [phone]  Dept Fax: [phone]        RE: Consultation for Cal Cates        Dear Tracey Parks MD,    Thank you very much for the opportunity to see your patient.  Attached please find a summary from your patient's recent visit.     I appreciate the chance to take care of your patient with you.  Please feel free to call me with any questions or concerns.    Sincerely,        Alexander Gage MD  Electronically Signed on 1/31/2024

## (undated) NOTE — LETTER
Missy Reddy, [address]  [address]  [address] [facility] [address]  [address]       04/16/18        Patient: Radha Ridley   YOB: 1934   Date of Visit: 4/16/2018       Dear  Dr. Misael Serna MD,      Thank you for referring Radha Jannieherrera to my p Sincerely,   Rosie Marquez MD   [facility]  [address], [address] [place] [id number]    Document electronically generated by:  Rosie Marquez

## (undated) NOTE — LETTER
AUTHORIZATION FOR SURGICAL OPERATION OR OTHER PROCEDURE    1. I hereby authorize Dr. Priscila Pearson and the [facility] Office staff assigned to my case to perform the following operation and/or procedure at the [facility] Office:    _______________________________________________________________________________________________    Left Knee Baker's Cyst Aspiration and CSI under USG  _______________________________________________________________________________________________    2. My physician has explained the nature and purpose of the operation or other procedure, possible alternative methods of treatment, the risks involved, and the possibility of complication to me. I acknowledge that no guarantee has been made as to the result that may be obtained. 3.  I recognize that, during the course of this operation, or other procedure, unforseen conditions may necessitate additional or different procedure than those listed above. I, therefore, further authorize and request that the above named physician, his/her physician assistants or designees perform such procedures as are, in his/her professional opinion, necessary and desirable. 4.  Any tissue or organs removed in the operation or other procedure may be disposed of by and at the discretion of the [facility] staff and [facility]. 5.  I understand that in the event of a medical emergency, I will be transported by local paramedics to [facility] or other hospital emergency department. 6.  I certify that I have read and fully understand the above consent to operation and/or other procedure. 7.  I acknowledge that my physician has explained sedation/analgesia administration to me including the risks and benefits. I consent to the administration of sedation/analgesia as may be necessary or desirable in the judgement of my physician.     Witness signature: ___________________________________________________ Date:  ______/______/_____ Time:  ________ A. M.  P.MAretha Scott Christianpily  9/5/1934  [id number]         Patient signature:  ___________________________________________________        Statement of Physician  My signature below affirms that prior to the time of the procedure, I have explained to the patient and/or his/her guardian, the risks and benefits involved in the proposed treatment and any reasonable alternative to the proposed treatment. I have also explained the risks and benefits involved in the refusal of the proposed treatment and have answered the patient's questions.                         Date:  ______/______/_______  Provider                      Signature:  __________________________________________________________       Time:  ___________ A.M    P.M.

## (undated) NOTE — MR AVS SNAPSHOT
Clemente Ray [address]  [phone]  [phone]               Thank you for choosing us for your health care visit with Jonathon Lyon PT.   We are glad to serve you and happy to provide you with [place] [facility] Visit By Therapist with Luis Tran, [address] [facility] ([address] [facility])    [address]   [phone]           Please arrive at your scheduled appointment

## (undated) NOTE — LETTER
August 3, 2020         Christianne Ball MD  [address] [facility] [address]      Patient: Hernan Godinez   YOB: 1934   Date of Visit: 8/3/2020       Dear Dr. Osman Jansen MD,    I saw your patient, Hernan Godinez, on 8/3/2020.  Ninoo

## (undated) NOTE — LETTER
No referring provider defined for this encounter. 11/15/17        Patient: More Nolasco   YOB: 1934   Date of Visit: 11/15/2017       Dear  Dr. Teo Perdue MD,      Thank you for referring More Nolasco to my practice.   Please

## (undated) NOTE — MR AVS SNAPSHOT
Clemente Ray [address]  [phone]  [phone]               Thank you for choosing us for your health care visit with Joleen Beltran PT.   We are glad to serve you and happy to provide you with [place] [facility] Visit By Therapist with Terrell Huitron, [address] [facility] ([facility])    [address]   [phone]           Please arrive at your scheduled appointment ti MetFORMIN HCl 500 MG Tabs   TAKE 1 TABLET BY MOUTH TWICE DAILY WITH MEALS   Commonly known as:  GLUCOPHAGE           ONETOUCH DELICA LANCETS 74R Misc           Pantoprazole Sodium 40 MG Tbec   Take 1 tablet by mouth every morning before breakfast. take

## (undated) NOTE — LETTER
4/30/2024      Alexander Gage MD  Physical Medicine and Rehabilitation  [address] [place] [address], [address]  [facility] [address]  Dept: [phone]  Dept Fax: [phone]        RE: Consultation for Cal Cates        Dear Tracey Parks MD,    Thank you very much for the opportunity to see your patient.  Attached please find a summary from your patient's recent visit.     I appreciate the chance to take care of your patient with you.  Please feel free to call me with any questions or concerns.    Sincerely,        Alexander Gage MD  Electronically Signed on 4/30/2024

## (undated) NOTE — MR AVS SNAPSHOT
Clemente Ray [address]  [phone]               Thank you for choosing us for your health care visit with Darwin Samano PT.   We are glad to serve you and happy to provide you with

## (undated) NOTE — LETTER
No referring provider defined for this encounter. 10/19/18        Patient: Jam Black   YOB: 1934   Date of Visit: 10/19/2018       Dear  Dr. Kurt Santos MD,      Thank you for referring Jam Black to my practice.   Please find my quarterly. I will see him again in 6 months for follow-up or sooner if clinically indicated. He will continue to work with endocrine to get his blood sugars under better control.     Thank you again for allowing me to participate in the care of your patie

## (undated) NOTE — MR AVS SNAPSHOT
[address]  [address] [facility]  [phone]               Thank you for choosing us for your health care visit with Lefty Tamayo MD.  We are glad to serve you and happy to provide you with this summa Commonly known as:  LANTUS SOLOSTAR           * Insulin Pen Needle 31G X 5 MM Misc   inject once daily   Commonly known as:  FIFTY50 PEN NEEDLES           * BD PEN NEEDLE MINI U/F 31G X 5 MM Misc   Generic drug:  Insulin Pen Needle   USE ONCE DAILY Health Goals discussed Today        Last Edited       Therapy Goals 1/13/2017  5:03 PM by Susana Viveros PT     Goal Comments    1.  Improve L-spine mobility in the sagittal plane to  so that patient may have sufficient range to perform daily activities/A ? Use non skid mats only. ? Clean up spills as soon as they happen. ? Keep objects that you use often within easy reach. BATHROOM:  ? Install grab bars on the bathroom walls beside the tub, shower and toilet. ?  Use a non skid rubber mat in the tub/sh

## (undated) NOTE — LETTER
No referring provider defined for this encounter.       05/19/25        Patient: Cal Cates   YOB: 1934   Date of Visit: 5/19/2025       Dear  Dr. Charly MD,      Thank you for referring Cal Cates to my practice.  Please find my assessment and plan below.      As you know he is a 89-year-old male with a history of adult onset diabetes mellitus, hypertension, whitecoat syndrome, anemia chronic disease, congestive heart failure secondary to diastolic dysfunction, moderate mitral regurgitation, status post permanent pacemaker who I now had the pleasure of seeing for follow-up chronic kidney disease stage IV with proteinuria thought to be secondary to diabetic nephropathy.  Overall patient states he is doing okay without any chest pain, shortness of breath, GI or urinary tract symptoms.  Saw cardiology earlier this year.  Mitral regurgitation was thought not to be bad enough for mitral valve clip.  Stress test in January 2025 was within normal limits.  Last A1c was 7.9 in March 2025.  Still living independently and drives.  Complains of some fatigue.  Still works in the garden.    On physical exam his blood pressure 128/58 with pulse 6070 weight 159 pounds.  Neck was supple without JVD.  Lungs are clear.  Heart revealed a regular rate and rhythm and S4 but no gallops, murmurs or rubs.  Abdomen was soft, flat, nontender without organomegaly, masses or bruits.  Extremities revealed trace edema bilaterally.    Reviewed his most recent labs done on April 24, 2025.  Creatinine is creeping up now 3.35 with an estimated GFR of 17 cc/min.  Electrolytes are good.  Hemoglobin 11.4.  Urine microalbumin creatinine ratio was 159.    I therefore informed the patient and son that his renal function is gradually declining.  He is only on furosemide 20 mg 3 times per week.  Will try decreasing lisinopril from 10 mg to 5 mg daily to see if this improves renal function.  Can probably do better with increasing  his fluid intake as well.  He will continue to monitor his blood pressure readings daily.  Repeat labs in 1 month.  Return in 3 months.  Maintain adequate hydration.  Avoid nonsteroidals.    Thank you again for allowing me to participate in the care of your patient.  If you have any questions please feel free to call.           Sincerely,   Trevor Rocha MD   [facility], [facility]  [address] [facility] [address]  [facility] [address]    Document electronically generated by:  Trevor Rocha MD

## (undated) NOTE — LETTER
AUTHORIZATION FOR SURGICAL OPERATION OR OTHER PROCEDURE    1. I hereby authorize Dr. Alexander Gage and the [facility] Office staff assigned to my case to perform the following operation and/or procedure at the [facility] Office:    left knee steroid injection under ultrasound     2.  My physician has explained the nature and purpose of the operation or other procedure, possible alternative methods of treatment, the risks involved, and the possibility of complication to me.  I acknowledge that no guarantee has been made as to the result that may be obtained.  3.  I recognize that, during the course of this operation, or other procedure, unforseen conditions may necessitate additional or different procedure than those listed above.  I, therefore, further authorize and request that the above named physician, his/her physician assistants or designees perform such procedures as are, in his/her professional opinion, necessary and desirable.  4.  Any tissue or organs removed in the operation or other procedure may be disposed of by and at the discretion of the [facility] Office staff and [facility].  5.  I understand that in the event of a medical emergency, I will be transported by local paramedics to [facility] or other hospital emergency department.  6.  I certify that I have read and fully understand the above consent to operation and/or other procedure.    7.  I acknowledge that my physician has explained sedation/analgesia administration to me including the risks and benefits.  I consent to the administration of sedation/analgesia as may be necessary or desirable in the judgement of my physician.    Witness signature: ___________________________________________________ Date:  ______/______/_____                    Time:  ________ A.M.  P.MAretha Cates  9/5/1934  [id number]         Patient signature:  ___________________________________________________        Statement of Physician  My signature  below affirms that prior to the time of the procedure, I have explained to the patient and/or his/her guardian, the risks and benefits involved in the proposed treatment and any reasonable alternative to the proposed treatment.  I have also explained the risks and benefits involved in the refusal of the proposed treatment and have answered the patient's questions.                        Date:  ______/______/_______  Provider                      Signature:  __________________________________________________________       Time:  ___________ A.M    P.M.

## (undated) NOTE — LETTER
8/5/2024      Alexander Gage MD  Physical Medicine and Rehabilitation  [address] [place] [address], [address]  [facility] [address]  Dept: [phone]  Dept Fax: [phone]        RE: Consultation for Cal Cates        Dear Tracey Parks MD,    Thank you very much for the opportunity to see your patient.  Attached please find a summary from your patient's recent visit.     I appreciate the chance to take care of your patient with you.  Please feel free to call me with any questions or concerns.    Sincerely,        Alexander Gage MD  Electronically Signed on 8/5/2024

## (undated) NOTE — LETTER
[facility] SURGERY SCHEDULING FORM   [address] [facility]   [phone] (scheduling phone) [phone] (scheduling fax)     PATIENT INFORMATION   Patient Name:    Karla Aguilar   :    1934    Completed by:    Veda Paula      Date:    8/21/2017    [facility] will follow its Pre-Admission Assessment and Screening Policy for pre-admission testing.   Physicians that require   additional testing must order this directly and have results faxed to [facility] at 3

## (undated) NOTE — MR AVS SNAPSHOT
Clemente Ray [address]  [facility] [address]  [phone]               Thank you for choosing us for your health care visit with Michael Goodman PT.   We are glad to serve you and happy to provide you with t [address] [facility] ([address] [place] [address])    [address] [address]   [phone]           Please arrive at your scheduled appointment time. Wear comfortable, loose fitting clothing.             Jan 30, 201

## (undated) NOTE — LETTER
AUTHORIZATION FOR SURGICAL OPERATION OR OTHER PROCEDURE    1. I hereby authorize Dr. Alexander Gage and the [facility] staff assigned to my case to perform the following operation and/or procedure at the [facility]:  left shoulder injection under ultrasound guidance.       2.  My physician has explained the nature and purpose of the operation or other procedure, possible alternative methods of treatment, the risks involved, and the possibility of complication to me.  I acknowledge that no guarantee has been made as to the result that may be obtained.  3.  I recognize that, during the course of this operation, or other procedure, unforseen conditions may necessitate additional or different procedure than those listed above.  I, therefore, further authorize and request that the above named physician, his/her physician assistants or designees perform such procedures as are, in his/her professional opinion, necessary and desirable.  4.  Any tissue or organs removed in the operation or other procedure may be disposed of by and at the discretion of the [facility] staff and [facility].  5.  I understand that in the event of a medical emergency, I will be transported by local paramedics to [facility] or other hospital emergency department.  6.  I certify that I have read and fully understand the above consent to operation and/or other procedure.    7.  I acknowledge that my physician has explained sedation/analgesia administration to me including the risks and benefits.  I consent to the administration of sedation/analgesia as may be necessary or desirable in the judgement of my physician.    Witness signature: ___________________________________________________ Date:  ______/______/_____                    Time:  ________ A.M.  P.M.       Patient Name:  Cal Cates  9/5/1934  [id number]         Patient signature:  ___________________________________________________             Statement of  Physician  My signature below affirms that prior to the time of the procedure, I have explained to the patient and/or his/her guardian, the risks and benefits involved in the proposed treatment and any reasonable alternative to the proposed treatment.  I have also explained the risks and benefits involved in the refusal of the proposed treatment and have answered the patient's questions.                        Date:  ______/______/_______  Provider                      Signature:  __________________________________________________________       Time:  ___________ A.M    P.M.

## (undated) NOTE — LETTER
November 11, 2017     Mary 35      Dear Serafin Carey:    Below are the results from your recent visit:  Tests are all stable. No changes to medications.  Cholesterol controlled    Resulted Orders   LIPID PANEL   R

## (undated) NOTE — LETTER
6/24/2023      Kae Carrizales MD  Physical Medicine and Rehabilitation  [address] [facility], [address] [place] [address]  Dept: [phone]  Dept Fax: [phone]        RE: Consultation for Emmanuel Banerjee        Dear Justyna Thacker MD,    Thank you very much for the opportunity to see your patient. Attached please find a summary from your patient's recent visit. I appreciate the chance to take care of your patient with you. Please feel free to call me with any questions or concerns. Sincerely,        Simone Acosta.  Virgilio Carrizales MD  Electronically Signed on 6/24/2023

## (undated) NOTE — MR AVS SNAPSHOT
Clemente Ray 12 [address] [address]  [facility] [address]  [phone]               Thank you for choosing us for your health care visit with Angela Finch PT.   We are glad to serve you and happy to provide you with t [facility] Visit By Therapist with Kvng Fine, [address] [facility] ([facility])    [address]   [phone]           Please arrive at your scheduled appointment ti

## (undated) NOTE — LETTER
No referring provider defined for this encounter. 01/06/23        Patient: Amanda Barnes   YOB: 1934   Date of Visit: 1/6/2023       Dear  Dr. Rolly Oakley MD,      Thank you for referring Amanda Barnes to my practice. Please find my assessment and plan below. As you know he is an 63-year-old male with a history of adult onset diabetes mellitus, hypertension, whitecoat syndrome, congestive heart failure secondary to diastolic dysfunction who I now had the pleasure of seeing for follow-up of chronic kidney disease stage IV and an anemia of chronic disease. The patient is here with his son. Overall states he has been doing well without any chest pain, shortness of breath, GI or urinary tract symptoms. Appetite remains good. Energy fair. He has however been having problems with low back pain. Had an epidural injection which did seem to help and is now undergoing physical therapy. Knows to avoid nonsteroidals. They brought in his blood pressure readings and they appear to be under good control at home. Systolics usually in the 222S with diastolics in the 51K and heart rates in the 60s. Physical exam his blood pressure 155/77 with a pulse of 60 and he weighed under 60 pounds. Neck was supple without JVD. Lungs were clear. Heart revealed a regular rate and rhythm with an S4 but no gallops, murmurs or rubs. Abdomen was soft, flat, nontender without organomegaly, masses or bruits. Extremities revealed no edema. I reviewed his most recent laboratory studies which were just done today. His creatinine was 2.74 in September 2022 and is now crept up to 2.94 with an estimated GFR 20 cc/min. Electrolytes were good. Hemoglobin 12.0. I therefore informed the patient and son that there has been some mild deterioration in his renal function. He will try to drink a bit more fluids. They know to avoid any nonsteroidals. Blood pressure readings appear to be good at home.   Last A1c was 8.4. We will have him  repeat a CBC and renal panel in 1 month to ensure stability. If stable will continue quarterly. I will see him again in 6 months for follow-up or sooner if clinically indicated. Thank you again for allowing me to participate in the care of your patient. If you have any questions please feel free to call.            Sincerely,   Nolan Lopez MD   [facility] , [address] [place] [address]  [address] [place] [address]  [address] [facility] [id number]    Document electronically generated by:  Nolan Lopez MD

## (undated) NOTE — LETTER
No referring provider defined for this encounter. 07/26/21        Patient: Hetal Ya   YOB: 1934   Date of Visit: 7/26/2021       Dear  Dr. Del Tavarez MD,      Thank you for referring Hetal Ya to my practice.   Please find my However follow-up labs done in April, May and July 2020 when shows his creatinine is crept up a bit but is stabilized most recently at 2.29 but now with an estimated GFR 25 cc/min. Currently his volume status appears to be acceptable.   Harvinder bird

## (undated) NOTE — LETTER
[address] [place] [address]            Allyssa Garcia                        [phone]      9/13/2021      Floyd Vo MD  [address]  Shanique Stokes          Patient: Leida Stonington   Date of B

## (undated) NOTE — MR AVS SNAPSHOT
Clemente Ray 12 [address]  [phone]  [phone]               Thank you for choosing us for your health care visit with Liset Hernandez PT.   We are glad to serve you and happy to provide you with

## (undated) NOTE — LETTER
Notifier: [facility]       Patient Name: Cal Cates       Identification Number: [id number]                          Advance Beneficiary Notice of Noncoverage (ABN)   NOTE:  If Medicare doesn’t pay for D. Items/service(s) below, you may have to pay.  Medicare does not pay for everything, even some care that you or your health care provider have good reason to think you need. We expect Medicare may not pay for the D. items/service(s) below.  Items or Services Reason Medicare May Not Pay: Estimated Cost   left knee steroid injection under ultrasound    __ Medicare does not cover this service      __ Medicare may not pay for this   item/service for your condition     __ Medicare may not pay for this item/service as often as this        WHAT YOU NEED TO DO NOW:  Read this notice, so you can make an informed decision about your care.  Ask us any questions that you may have after you finish reading.  Choose an option below about whether to receive the D. item/service(s)  listed above.  Note: If you choose Option 1 or 2, we may help you to use any other insurance that you might have, but Medicare cannot require us to do this.  OPTIONS: Check only one box.  We cannot choose a box for you.   OPTION 1. I want the D. item/service(s) listed above. You may ask to be paid now, but I also want Medicare billed for an official decision on payment, which is sent to me on a Medicare Summary Notice (MSN). I understand that if Medicare doesn’t pay, I am responsible for payment, but I can appeal to Medicare by following the directions on the MSN. If Medicare does pay, you will refund any payments I made to you, less co-pays or deductibles.  OPTION 2. I want the D. item/service(s) listed above, but do not bill Medicare. You may ask to be paid now as I am responsible for payment. I cannot appeal if Medicare is not billed.  OPTION 3. I don't want the D. item/service(s) listed above. I understand with this choice I am not  responsible for payment, and I cannot appeal to see if Medicare would pay.    H. Additional Information:    This notice gives our opinion, not an official Medicare decision. If you have other questions on this notice or Medicare billing, call 1-800-MEDICARE (1-915.352.2613/TTY: 1-332.410.5834). Signing below means that you have received and understand this notice. You also receive a copy.  Signature: Date:       You have the right to get Medicare information in an accessible format, like large print, Braille, or audio. You also have the right to file a complaint if you feel you’ve been discriminated against. Visit Medicare.gov/about- us/jiwozdyhcwbqv-gjszmzbajwmelxypl-vnsuub.  According to the Paperwork Reduction Act of 1995, no persons are required to respond to a collection of information unless it displays a valid OMB control number. The valid OMB control number for this information collection is 0864-5590. The time required to complete this information collection is estimated to average 7 minutes per response, including the time to review instructions, search existing data resources, gather the data needed, and complete and review the information collection. If you have comments concerning the accuracy of the time estimate or suggestions for improving this form, please write to: CMS, [employer] [facility] [employer]     Mickey, Attn: [facility] Reports Clearance Officer, [place], [place] [address].  Form CMS-R-131 (Exp. 1/31/2026) Form Approved OMB No. [id number]

## (undated) NOTE — LETTER
No referring provider defined for this encounter. 08/14/23        Patient: Neli Owen   YOB: 1934   Date of Visit: 8/14/2023       Dear  Dr. Enrique Jacobo MD,      Thank you for referring Neli Owen to my practice. Please find my assessment and plan below. 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 Overall the patient states has been doing well without any chest pain, shortness of breath, GI or urinary tract symptoms. Nocturia 2-3. Diabetes is now under better control as he had an A1c in April 2023 of 10.2 and now in July 2023 it is down to 7.7. Reviewed his blood pressure readings from home and overall they are acceptable with systolics usually in the 161A and 640Y and diastolics in the 13F. Heart rate 60s. On physical exam his blood pressure is 132/65 with a pulse of 62 and he weighed 162 pounds. Neck was supple without JVD. Lungs were clear. Heart revealed a regular rate and rhythm and S4 but no gallops, murmurs or rubs. Abdomen was soft, flat, nontender without organomegaly, masses or bruits. Extremities revealed trace edema. I reviewed his most recent labs done on August 11, 2023. Creatinine was slightly better down to 2.65 now with an estimated GFR of 22 cc/min. Electrolytes were good. Hemoglobin 12.6. I therefore reassured the patient and his son that overall his renal function is a bit better. Reinforced importance of blood pressure and blood sugar control. Maintain adequate hydration. Avoid nonsteroidals. Continue to do CBC and renal panels quarterly. I will see him again in 6 months for follow-up or sooner if clinically indicated.     Thank you again for allowing me to participate in the care of your patient. If you have any questions please feel free to call.            Sincerely,   Sean Rao MD   [facility], [address] [place] [address]  [address] [place] [address]  [address] [facility] [address]    Document electronically generated by:  Sean Rao MD

## (undated) NOTE — LETTER
11/19/2017              [facility]        [facility] [address]        [place] [address]         Dear Amanda Joshua,    11/15/17 urine for cytology showed no cancer cells in the urine. Urine test showed NO significant microscopic blood in the urine.   Pl

## (undated) NOTE — LETTER
No referring provider defined for this encounter.       11/22/24        Patient: Cal Cates   YOB: 1934   Date of Visit: 11/22/2024       Dear  Dr. Charly MD,      Thank you for referring Cal Cates to my practice.  Please find my assessment and plan below.      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  Patient is still living independently.  Family watches him closely.  He does note some gradually increasing dyspnea on exertion.  He is followed by Dr. Sellers and there has been discussions about doing a mitral valve clip.  Otherwise he is doing well without any chest pain, GI or urinary tract symptoms.  Nocturia x 2.  Appetite and energy level are stable.  Blood pressures at home show systolics in the 130s.  Here with his son.    On physical exam his blood pressure was 140/80 with a pulse of 66 and he weighed 161 pounds.  His neck was supple without JVD.  Lungs are clear.  Heart revealed a regular rate and rhythm with an S4 and a 2/6 systolic ejection murmur.  Abdomen was soft, flat, nontender without organomegaly, masses or bruits.  Extremities revealed trace edema bilaterally.    I reviewed his most recent labs done on November 19, 2024.  Creatinine had been up to 3.05 back in June 2024.  Now down to 2.83 with an estimated GFR 21 cc/min.  Potassium 5.1.  Hemoglobin 12.1.  Reviewed modest potassium restricted diet.  He is scheduled to see cardiology in January.  They will let me know if they decide to proceed with a mitral valve clip.  Otherwise he will continue to do CBC, renal panels and urine for microalbumin quarterly.  I will see him in 3 to  6 months depending upon clinical course.    Thank you again for allowing me to participate in the care of your patient.  If you have any questions please feel free to call.           Sincerely,   Trevor Rocha MD   [facility], [facility], [place]  [address]  [facility] [address]    Document electronically generated by:  Trevor Rocha MD

## (undated) NOTE — LETTER
12/4/2023      Keagan Kwon MD  Physical Medicine and Rehabilitation  [address] [facility], [address] [place] [address]  Dept: [phone]  Dept Fax: [phone]        RE: Consultation for Shmuel Mosher        Dear Rosita Rosenberg MD,    Thank you very much for the opportunity to see your patient. Attached please find a summary from your patient's recent visit. I appreciate the chance to take care of your patient with you. Please feel free to call me with any questions or concerns. Sincerely,        Parul Kwon MD  Electronically Signed on 12/4/2023

## (undated) NOTE — LETTER
No referring provider defined for this encounter. 10/02/17        Patient: Cristina Ambrocio   YOB: 1934   Date of Visit: 10/2/2017       Dear  Dr. Clayton Fulton MD,      Thank you for referring Cristina Ambrocio to my practice.   Please find my Thank you again for allowing me to participate in the care of your patient. If you have any questions please feel free to call.            Sincerely,   Kerney Bence, MD   [facility]  [address] [facility], [address] [place] [address]

## (undated) NOTE — LETTER
3/20/2025      Alexander Gage MD  Physical Medicine and Rehabilitation  [address] [place] [address], [address]  [facility] [address]  Dept: [phone]  Dept Fax: [phone]        RE: Consultation for Cal Cates        Dear Tracey Parks MD,    Thank you very much for the opportunity to see your patient.  Attached please find a summary from your patient's recent visit.     I appreciate the chance to take care of your patient with you.  Please feel free to call me with any questions or concerns.    Sincerely,        Alexander Gage MD  Electronically Signed on 3/20/2025

## (undated) NOTE — LETTER
AUTHORIZATION FOR SURGICAL OPERATION OR OTHER PROCEDURE    1. I hereby authorize Dr. Sia Bello and the [facility] staff assigned to my case to perform the following operation and/or procedure at the [facility]:    Left knee Monovisc injection under ultrasound guidance     2. My physician has explained the nature and purpose of the operation or other procedure, possible alternative methods of treatment, the risks involved, and the possibility of complication to me. I acknowledge that no guarantee has been made as to the result that may be obtained. 3.  I recognize that, during the course of this operation, or other procedure, unforseen conditions may necessitate additional or different procedure than those listed above. I, therefore, further authorize and request that the above named physician, his/her physician assistants or designees perform such procedures as are, in his/her professional opinion, necessary and desirable. 4.  Any tissue or organs removed in the operation or other procedure may be disposed of by and at the discretion of the [facility] staff and [facility] AT [facility]. 5.  I understand that in the event of a medical emergency, I will be transported by local paramedics to [facility] or other [facility] emergency department. 6.  I certify that I have read and fully understand the above consent to operation and/or other procedure. 7.  I acknowledge that my physician has explained sedation/analgesia administration to me including the risks and benefits. I consent to the administration of sedation/analgesia as may be necessary or desirable in the judgement of my physician. Witness signature: ___________________________________________________ Date:  ______/______/_____                    Time:  ________ A. M.  P.M.        Patient Name:  Catarina Hathaway  9/5/1934  [id number]         Patient signature: ___________________________________________________                 Statement of Physician  My signature below affirms that prior to the time of the procedure, I have explained to the patient and/or his/her guardian, the risks and benefits involved in the proposed treatment and any reasonable alternative to the proposed treatment. I have also explained the risks and benefits involved in the refusal of the proposed treatment and have answered the patient's questions.                         Date:  ______/______/_______  Provider                      Signature:  __________________________________________________________       Time:  ___________ ACLAUDIA MAYA

## (undated) NOTE — LETTER
4/30/2025      Alexander Gage MD  Physical Medicine and Rehabilitation  [address] [place] [address], [address]  [facility] [address]  Dept: [phone]  Dept Fax: [phone]        RE: Consultation for Cal Cates        Dear Tracey Parks MD,    Thank you very much for the opportunity to see your patient.  Attached please find a summary from your patient's recent visit.     I appreciate the chance to take care of your patient with you.  Please feel free to call me with any questions or concerns.    Sincerely,        Alexander Gage MD  Electronically Signed on 4/30/2025

## (undated) NOTE — LETTER
A. Notifier: Above Security. Patient Name: Cal Cates Identification Number: [id number]  Aviso anticipado de no cobertura al beneficiario (ABN, por monica siglas   en inglés)   NOTA: Si Medicare no paga por los artículos o servicios a continuación, usted podría tener que pagar. Medicare no paga por todo, incluidos algunos cuidados que usted o calderon proveedor de atención médica entienda que son necesarios. Se anticipa que Medicare no pague por los artículos o servicios a continuación.  D. Artículos o servicios  left shoulder injection under ultrasound guidance.  E. Motivo por el cual Medicare podría simba el pago: F. Costo estimado   __ EKG ($87.00)  __ Pap smear ($101) __Pelvic/Breast ($147.00)  __ Ear Irrigation ($138)  _X_ Injection(s)  ___ Tdap ($181)       ___ Meningitis ($290)   __Prevnar ($555)  ___ Td ($66)              ___ Prevnar 20 ($549)  ___ Hep A ($152)     ___ Prolia ($1827)         __ Xiaflex ($         )   ___ Hep B ($150)     ___ Pneumovax($287)                                     ___ Vaccine Administration ($65)   __ Medicare no se cubre owen artículo o servicio      __ No se cubre owen artículo o servicio para calderon condición     __ Es posible que Medicare no pague por owen artículo o servicio con tanta frecuencia        LO QUE USTED DEBE HACER AHORA:  Mena owen aviso para poder shila amie decisión informada sobre monica cuidados.  Háganos las preguntas que tenga después de terminar de leer.  Elija amie opción a continuación sobre si recibirá los artículos o servicios que se indica arriba.       Nota: Si elige Opción 1 o 2, podríamos ayudarle a utilizar los otros seguros que tenga,        jorgito Medicare no nos puede obligar a hacer esto.  G. Opciones: Suleiman solamente amie roya. No podemos elegir la roya para usted.    OPCIÓN 1. Quiero los artículos o servicios que se indica arriba. Pudiera pedir el pago ahora, jorgito yo también solicito que se facture a Medicare para obtener amie  decisión oficial respecto al pago, la cual me será enviada en un Resumen de Medicare (MSN, por monica siglas en inglés). Entiendo que, si Medicare no paga, yo seré responsable del pago, jorgito puedo apelar a Medicare según las indicaciones en el MSN. Si Medicare pagará, me serán reembolsados todos los pagos que yo haya hecho, valentina los copagos o deducibles.   OPCIÓN 2. Quiero los artículos o servicios que se indica arriba, jorgito no  facture a Medicare. Se podrá pedir el pago ahora, ya que yo soy responsable del pago. No podré apelar si no se facturara a Medicare.   OPCIÓN 3. No quiero los artículos o servicios que se indica arriba. Entiendo que, con esta elección, no seré responsable del pago, y no podré apelar para saber si Medicare hubiera pagado.    H. Información adicional:   Kaia aviso explica nuestra opinión y no constituye amie decisión oficial de Medicare. Si usted tiene otras preguntas relativas a kaia aviso o la facturación de Medicare, llame al 1-800-MEDICARE (7-851-356-1623/TTY: 1-877-486-2048). Firme abajo para reconocer yeimi recibido y entendido kaia aviso. Usted también recibirá amie copia.  IAretha Flores:   RADHA Fecmonet:       Tiene derecho a obtener información de Medicare en un formato accesible, leobardo letra max, braille o audio. También tiene derecho a presentar amie queja si siente que ha sido discriminado. Visite Medicare.gov/about-us/tvkzlqlvutuct-zcptynzijftskejar-ruplis.    De acuerdo con la Connie para la Reducción de Trámites de 1995, ninguna persona será obligada a responder a amie recopilación de información a menos que se exhiba un número de control válido de la OMB. El número de control válido de la OMB para esta recopilación de información es [phone]. El tiempo necesario para completar esta recopilación de información es de aproximadamente 7 minutos por respuesta, incluido el tiempo para revisar las instrucciones, buscar ballesteros de datos existentes, reunir los datos necesarios, y completar y revisar la  recopilación de información. Si tiene preguntas sobre la precisión del estimado de tiempo o sugerencias para mejorar owen formulario, escriba a: CMS, [facility]     Security Jenks, Attn: [facility] Reports Clearance Officer, [place], [place] [address].    Formulario [id number] (Exp. 01/31/2026)                                      Formulario aprobado [facility] [id number]

## (undated) NOTE — MR AVS SNAPSHOT
Clemente Ray [address] [place]  [phone]  [phone]               Thank you for choosing us for your health care visit with Joleen Beltran PT.   We are glad to serve you and happy to provide you with Please arrive at your scheduled appointment time. Wear comfortable, loose fitting clothing.             Feb 01, 2017 12:45 PM   [facility] Visit By Therapist with Baby Vivek, [address] [facility] ([place] TEST ONCE DAILY           Pantoprazole Sodium 40 MG Tbec   Take 1 tablet by mouth every morning before breakfast. take 1 tablet (40MG)  by oral route  every day   Commonly known as:  PROTONIX           simvastatin 40 MG Tabs   TAKE 1 TABLET BY MOUTH DAILY

## (undated) NOTE — LETTER
[address] [place] [address]  Authorization for Invasive Procedures  1.  I hereby authorize Dr. Philip Diaz , my physician and whomever may be designated as the doctor's assistant, to perform the following operation and/or procedure:  Upgrade to 4. Should the need arise during my operation or immediate post-operative period; I also consent to the administration of blood and/or blood products.  Further, I understand that despite careful testing and screening of blood and blood products, I may still 9. Patients having a sterilization procedure: I understand that if the procedure is successful the results will be permanent and it will therefore be impossible for me to inseminate, conceive or bear children.  I also understand that the procedure is intend

## (undated) NOTE — MR AVS SNAPSHOT
[address]  [address] [facility]  [phone]               Thank you for choosing us for your health care visit with Aron Caballero MD.  We are glad to serve you and happy to provide you with this summary o finasteride 5 MG Tabs   Take 1 tablet (5 mg total) by mouth daily. Commonly known as:  PROSCAR           Fluticasone Propionate 50 MCG/ACT Susp   2 sprays by Each Nare route daily.    Commonly known as:  FLONASE           glimepiride 2 MG Tabs   TAKE 1 A have sufficient range to perform daily activities/ADLs without increased pain or deviation--IMET  2. Improve Postural awareness to facilitate symptom management to allow for proper sitting posture/tolerance to activity--MET  3.  Patient able to sit using pr

## (undated) NOTE — LETTER
No referring provider defined for this encounter. 10/18/19        Patient: Mirta Carrillo   YOB: 1934   Date of Visit: 10/18/2019       Dear  Dr. Panfilo Avery MD,      Thank you for referring Mirta Carrillo to my practice.   Please find my

## (undated) NOTE — ED AVS SNAPSHOT
Josearvindominga Nguyen   MRN: [id number]    Department:  [facility] Emergency Department   Date of Visit:  12/28/2017           Disclosure     Insurance plans vary and the physician(s) referred by the ER may not be covered by your plan.  Please contact within the next three months to obtain basic health screening including reassessment of your blood pressure.     IF THERE IS ANY CHANGE OR WORSENING OF YOUR CONDITION, CALL YOUR PRIMARY CARE PHYSICIAN AT ONCE OR RETURN IMMEDIATELY TO THE EMERGENCY DEPARTMEN

## (undated) NOTE — LETTER
AUTHORIZATION FOR SURGICAL OPERATION OR OTHER PROCEDURE    1. I hereby authorize Dr. Alexander Gage and the [facility] staff assigned to my case to perform the following operation and/or procedure at the [facility]:    left knee steroid injection under ultrasound     2.  My physician has explained the nature and purpose of the operation or other procedure, possible alternative methods of treatment, the risks involved, and the possibility of complication to me.  I acknowledge that no guarantee has been made as to the result that may be obtained.  3.  I recognize that, during the course of this operation, or other procedure, unforseen conditions may necessitate additional or different procedure than those listed above.  I, therefore, further authorize and request that the above named physician, his/her physician assistants or designees perform such procedures as are, in his/her professional opinion, necessary and desirable.  4.  Any tissue or organs removed in the operation or other procedure may be disposed of by and at the discretion of the [facility] staff and [facility].  5.  I understand that in the event of a medical emergency, I will be transported by local paramedics to [facility] or other hospital emergency department.  6.  I certify that I have read and fully understand the above consent to operation and/or other procedure.    7.  I acknowledge that my physician has explained sedation/analgesia administration to me including the risks and benefits.  I consent to the administration of sedation/analgesia as may be necessary or desirable in the judgement of my physician.    Witness signature: ___________________________________________________ Date:  ______/______/_____                    Time:  ________ A.M.  P.M.       Patient Name:  Cal Cates  9/5/1934  [id number]       Patient signature:  ___________________________________________________        Statement of  Physician  My signature below affirms that prior to the time of the procedure, I have explained to the patient and/or his/her guardian, the risks and benefits involved in the proposed treatment and any reasonable alternative to the proposed treatment.  I have also explained the risks and benefits involved in the refusal of the proposed treatment and have answered the patient's questions.                        Date:  ______/______/_______  Provider                      Signature:  __________________________________________________________       Time:  ___________ A.M    P.M.

## (undated) NOTE — LETTER
November 23, 2021    [address] [facility] [phone]    Dear Edita Aceves: It was a pleasure speaking with you over the phone recently.  To follow up, I wanted to send you some contact information to utilize when you have a que

## (undated) NOTE — LETTER
No referring provider defined for this encounter. 04/19/19        Patient: Glorious Mass   YOB: 1934   Date of Visit: 4/19/2019       Dear  Dr. Ayaan Alvares MD,      Thank you for referring Glorious Mass to my practice.   Please find my

## (undated) NOTE — MR AVS SNAPSHOT
[address]  [phone]               Thank you for choosing us for your health care visit with Angelo Mullen MD.  We are glad to serve you and happy to provide you with this summary of your visit. Allergies as of Mar 31, 2017     No Known Allergies                Today's Vital Signs     BP Pulse Temp Height Weight BMI    144/76 mmHg 70 98.3 °F (36.8 °C) (Oral) 5' 8\" (1.727 m) 168 lb (76.204 kg) 25.55 kg/m2         Current Medications          This Commonly known as:  PROTONIX           simvastatin 40 MG Tabs   TAKE 1 TABLET BY MOUTH DAILY   Commonly known as:  ZOCOR           tamsulosin HCl 0.4 MG Caps   TAKE 2 CAPSULES BY MOUTH EVERY DAY   Commonly known as:  FLOMAX           * Notice:   This list h Modification Recommendation   Weight Reduction Maintain normal body weight (body mass index 18.5-24.9 kg/m2)   DASH eating plan Adopt a diet rich in fruits, vegetables, and low fat dairy products with reduced content of saturated and total fat.    Dietary s Don’t forget strength training with weights and resistance Set goals and track your progress   You don’t need to join a gym. Home exercises work great.  Put more priority on exercise in your life                    Visit [facility] online at

## (undated) NOTE — LETTER
No referring provider defined for this encounter. 04/10/23        Patient: Kavitha Wong   YOB: 1934   Date of Visit: 4/10/2023       Dear  Dr. Bassem Mills MD,      Thank you for referring Kavitha Wong to my practice. Please find my assessment and plan below. 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 Overall the patient states has been doing well without any chest pain, shortness of breath, GI or urinary tract symptoms. Blood pressures at home are usually in the 807B to 661H systolics with diastolics in the 37R and 02D. Was on Trulicity. But now off. Trying to get repeat prior authorization. Blood sugars have been creeping up. Physical exam his blood pressure 147/68 with a pulse 73 weight 166 pounds. His neck was supple without JVD. Lungs were clear. Heart revealed a regular rate and rhythm with an S4 but no gallops, murmurs or rubs. Abdomen soft, flat, nontender without organomegaly, masses or bruits. Extremities revealed trace edema. I reviewed his most recent laboratory studies done on April 6, 2023. Creatinine did creep up a bit to 2.98 with an estimated GFR 20 cc/min. Electrolytes were good. Hemoglobin 11.9. Last A1c was 8.4. I therefore informed the patient and his son that his renal function has deteriorated slightly. The significance of a GFR 20 cc/min and the indications for dialysis were reviewed. Reinforced importance of maintaining adequate hydration. Avoid nonsteroidals. Blood pressure and blood sugar control. Hopefully will get approval for Trulicity as his sugars were under better control with this medication. We will have him repeat a CBC and renal panel in 1 month to ensure stability.   If stable will continue quarterly. I will see him again in 6 months for follow-up or sooner if clinically indicated. Thank you again for allowing me to participate in the care of your patient. If you have any questions please feel free to call.            Sincerely,   Hitesh Avalos MD   [facility], [address] [place] [address]  [address] [place] [address]  [address] [facility] [phone]    Document electronically generated by:  Hitesh Avalos MD